# Patient Record
Sex: MALE | Race: WHITE | NOT HISPANIC OR LATINO | URBAN - METROPOLITAN AREA
[De-identification: names, ages, dates, MRNs, and addresses within clinical notes are randomized per-mention and may not be internally consistent; named-entity substitution may affect disease eponyms.]

---

## 2018-01-12 ENCOUNTER — APPOINTMENT (RX ONLY)
Dept: URBAN - METROPOLITAN AREA CLINIC 31 | Facility: CLINIC | Age: 83
Setting detail: DERMATOLOGY
End: 2018-01-12

## 2018-01-12 DIAGNOSIS — L57.8 OTHER SKIN CHANGES DUE TO CHRONIC EXPOSURE TO NONIONIZING RADIATION: ICD-10-CM

## 2018-01-12 DIAGNOSIS — D22 MELANOCYTIC NEVI: ICD-10-CM

## 2018-01-12 DIAGNOSIS — L11.1 TRANSIENT ACANTHOLYTIC DERMATOSIS [GROVER]: ICD-10-CM

## 2018-01-12 DIAGNOSIS — L82.1 OTHER SEBORRHEIC KERATOSIS: ICD-10-CM

## 2018-01-12 DIAGNOSIS — L57.0 ACTINIC KERATOSIS: ICD-10-CM

## 2018-01-12 PROBLEM — D22.61 MELANOCYTIC NEVI OF RIGHT UPPER LIMB, INCLUDING SHOULDER: Status: ACTIVE | Noted: 2018-01-12

## 2018-01-12 PROBLEM — I10 ESSENTIAL (PRIMARY) HYPERTENSION: Status: ACTIVE | Noted: 2018-01-12

## 2018-01-12 PROBLEM — Z85.828 PERSONAL HISTORY OF OTHER MALIGNANT NEOPLASM OF SKIN: Status: ACTIVE | Noted: 2018-01-12

## 2018-01-12 PROBLEM — D22.62 MELANOCYTIC NEVI OF LEFT UPPER LIMB, INCLUDING SHOULDER: Status: ACTIVE | Noted: 2018-01-12

## 2018-01-12 PROBLEM — D22.39 MELANOCYTIC NEVI OF OTHER PARTS OF FACE: Status: ACTIVE | Noted: 2018-01-12

## 2018-01-12 PROBLEM — D48.5 NEOPLASM OF UNCERTAIN BEHAVIOR OF SKIN: Status: ACTIVE | Noted: 2018-01-12

## 2018-01-12 PROBLEM — D22.5 MELANOCYTIC NEVI OF TRUNK: Status: ACTIVE | Noted: 2018-01-12

## 2018-01-12 PROBLEM — Z85.46 PERSONAL HISTORY OF MALIGNANT NEOPLASM OF PROSTATE: Status: ACTIVE | Noted: 2018-01-12

## 2018-01-12 PROCEDURE — ? LIQUID NITROGEN

## 2018-01-12 PROCEDURE — 11100: CPT | Mod: 59

## 2018-01-12 PROCEDURE — 17004 DESTROY PREMAL LESIONS 15/>: CPT

## 2018-01-12 PROCEDURE — ? COUNSELING

## 2018-01-12 PROCEDURE — ? BIOPSY BY SHAVE METHOD

## 2018-01-12 PROCEDURE — 99203 OFFICE O/P NEW LOW 30 MIN: CPT | Mod: 25

## 2018-01-12 ASSESSMENT — LOCATION DETAILED DESCRIPTION DERM
LOCATION DETAILED: LEFT CENTRAL MALAR CHEEK
LOCATION DETAILED: RIGHT ULNAR DORSAL HAND
LOCATION DETAILED: LEFT SUPERIOR UPPER BACK
LOCATION DETAILED: LEFT ANTERIOR DISTAL UPPER ARM
LOCATION DETAILED: LEFT LATERAL FOREHEAD
LOCATION DETAILED: RIGHT ANTERIOR PROXIMAL UPPER ARM
LOCATION DETAILED: RIGHT FOREHEAD
LOCATION DETAILED: RIGHT PROXIMAL DORSAL FOREARM
LOCATION DETAILED: LEFT SUPERIOR ANTERIOR NECK
LOCATION DETAILED: RIGHT ANTERIOR DISTAL UPPER ARM
LOCATION DETAILED: LEFT MEDIAL INFERIOR CHEST
LOCATION DETAILED: RIGHT MID-UPPER BACK
LOCATION DETAILED: RIGHT SUPERIOR MEDIAL UPPER BACK
LOCATION DETAILED: RIGHT MEDIAL SUPERIOR CHEST
LOCATION DETAILED: LEFT NASAL ALA
LOCATION DETAILED: LEFT DISTAL DORSAL FOREARM
LOCATION DETAILED: LEFT ULNAR DORSAL HAND
LOCATION DETAILED: STERNUM
LOCATION DETAILED: LEFT VENTRAL PROXIMAL FOREARM
LOCATION DETAILED: RIGHT DORSAL MIDDLE METACARPOPHALANGEAL JOINT
LOCATION DETAILED: RIGHT CENTRAL TEMPLE
LOCATION DETAILED: LEFT RADIAL DORSAL HAND
LOCATION DETAILED: RIGHT SUPERIOR LATERAL MALAR CHEEK
LOCATION DETAILED: LEFT FOREHEAD
LOCATION DETAILED: RIGHT DISTAL DORSAL FOREARM
LOCATION DETAILED: RIGHT RADIAL DORSAL HAND
LOCATION DETAILED: LEFT CENTRAL TEMPLE

## 2018-01-12 ASSESSMENT — LOCATION SIMPLE DESCRIPTION DERM
LOCATION SIMPLE: CHEST
LOCATION SIMPLE: LEFT FOREHEAD
LOCATION SIMPLE: RIGHT HAND
LOCATION SIMPLE: CHEST
LOCATION SIMPLE: LEFT ANTERIOR NECK
LOCATION SIMPLE: LEFT UPPER BACK
LOCATION SIMPLE: LEFT UPPER ARM
LOCATION SIMPLE: RIGHT FOREHEAD
LOCATION SIMPLE: LEFT TEMPLE
LOCATION SIMPLE: LEFT CHEEK
LOCATION SIMPLE: LEFT NOSE
LOCATION SIMPLE: RIGHT CHEEK
LOCATION SIMPLE: RIGHT TEMPLE
LOCATION SIMPLE: RIGHT UPPER BACK
LOCATION SIMPLE: RIGHT UPPER ARM
LOCATION SIMPLE: RIGHT FOREARM
LOCATION SIMPLE: LEFT FOREARM
LOCATION SIMPLE: LEFT HAND

## 2018-01-12 ASSESSMENT — LOCATION ZONE DERM
LOCATION ZONE: HAND
LOCATION ZONE: ARM
LOCATION ZONE: FACE
LOCATION ZONE: TRUNK
LOCATION ZONE: NOSE
LOCATION ZONE: NECK
LOCATION ZONE: TRUNK

## 2018-01-12 NOTE — PROCEDURE: BIOPSY BY SHAVE METHOD
Anesthesia Type: 1% lidocaine with epinephrine
Anesthesia Volume In Cc: 0
Hemostasis: Aluminum Chloride and Electrocautery
Notification Instructions: Patient will be notified of biopsy results. However, patient instructed to call the office if not contacted within 2 weeks.
Curettage Text: The wound bed was treated with curettage after the biopsy was performed.
Type Of Destruction Used: Curettage
Destruction After The Procedure: No
Detail Level: Detailed
Biopsy Type: H and E
Silver Nitrate Text: The wound bed was treated with silver nitrate after the biopsy was performed.
Electrodesiccation And Curettage Text: The wound bed was treated with electrodesiccation and curettage after the biopsy was performed.
Dressing: bandage
Post-Care Instructions: I reviewed with the patient in detail post-care instructions. Patient is to keep the biopsy site bandaged overnight, and then wash once daily with soap and water and then apply a thin layer of petrolatum once daily until healed.
Lab Facility: 
Lab: [id number]
Wound Care: Petrolatum
Consent: Written consent was obtained and risks were reviewed including but not limited to scarring, infection, bleeding, scabbing, incomplete removal, nerve damage and allergy to anesthesia.
Electrodesiccation Text: The wound bed was treated with electrodesiccation after the biopsy was performed.
Biopsy Method: Double edge Personna blades
Size Of Lesion In Cm: 0.5
Cryotherapy Text: The wound bed was treated with cryotherapy after the biopsy was performed.
Billing Type: Third-Party Bill

## 2018-01-12 NOTE — PROCEDURE: REASSURANCE
Additional Note: Patient is reassured regarding the benign nature of this/these lesion(s).  Patient is encouraged to return for evaluation if lesion(s) grow, change, or becomes symptomatic.
Detail Level: Zone
Include Location In Plan?: No

## 2018-01-12 NOTE — PROCEDURE: LIQUID NITROGEN
Render Post-Care Instructions In Note?: no
Post-Care Instructions: I reviewed with the patient in detail post-care instructions. Patient is to wear sunprotection, and avoid picking at any of the treated lesions. Pt may apply Vaseline to crusted or scabbing areas.
Detail Level: Detailed
Duration Of Freeze Thaw-Cycle (Seconds): 15
Consent: The patient's consent was obtained including but not limited to risks of crusting, scabbing, blistering, scarring, darker or lighter pigmentary change, recurrence, incomplete removal and infection.
Number Of Freeze-Thaw Cycles: 1 freeze-thaw cycle

## 2018-01-22 ENCOUNTER — APPOINTMENT (RX ONLY)
Dept: URBAN - METROPOLITAN AREA CLINIC 31 | Facility: CLINIC | Age: 83
Setting detail: DERMATOLOGY
End: 2018-01-22

## 2018-01-22 PROBLEM — C44.91 BASAL CELL CARCINOMA OF SKIN, UNSPECIFIED: Status: ACTIVE | Noted: 2018-01-22

## 2018-01-22 PROCEDURE — ? MOHS SURGERY PHONE CONSULTATION

## 2018-01-22 PROCEDURE — ? PATIENT SPECIFIC COUNSELING

## 2018-01-22 NOTE — PROCEDURE: MOHS SURGERY PHONE CONSULTATION
Has The Pathology Report Been Received?: Yes
Does The Patient Have A Living Will (Optional)?: No
Office Location Of Mohs Surgery: [place]
Pathology Report Dx If Different Than Diagnosis Selected: BCC, nodluo-infiltrative type
Detail Level: Simple
Referring Provider: Pa Andrews
Patient's Insurance: Medicare/
Time Of Mohs Surgery: 8:45am
Date Of Mohs Surgery: 02/05/2018
Patient Reported Location: Right superior medial malar cheek

## 2018-02-05 ENCOUNTER — APPOINTMENT (RX ONLY)
Dept: URBAN - METROPOLITAN AREA CLINIC 31 | Facility: CLINIC | Age: 83
Setting detail: DERMATOLOGY
End: 2018-02-05

## 2018-02-05 PROBLEM — C44.319 BASAL CELL CARCINOMA OF SKIN OF OTHER PARTS OF FACE: Status: ACTIVE | Noted: 2018-02-05

## 2018-02-05 PROCEDURE — ? MOHS SURGERY

## 2018-02-05 PROCEDURE — 13152 CMPLX RPR E/N/E/L 2.6-7.5 CM: CPT

## 2018-02-05 PROCEDURE — ? MEDICATION COUNSELING

## 2018-02-05 PROCEDURE — ? PRESCRIPTION

## 2018-02-05 PROCEDURE — 17311 MOHS 1 STAGE H/N/HF/G: CPT

## 2018-02-05 RX ORDER — HYDROCODONE BITATRATE AND ACETAMINOPHEN 7.5; 325 MG/1; MG/1
1 TABLET ORAL
Qty: 20 | Refills: 0 | COMMUNITY
Start: 2018-02-05

## 2018-02-05 RX ADMIN — HYDROCODONE BITATRATE AND ACETAMINOPHEN 1: 7.5; 325 TABLET ORAL at 00:00

## 2018-02-05 NOTE — PROCEDURE: MEDICATION COUNSELING
Methotrexate Pregnancy And Lactation Text: This medication is Pregnancy Category X and is known to cause fetal harm. This medication is excreted in breast milk.
Isotretinoin Pregnancy And Lactation Text: This medication is Pregnancy Category X and is considered extremely dangerous during pregnancy. It is unknown if it is excreted in breast milk.
High Dose Vitamin A Pregnancy And Lactation Text: High dose vitamin A therapy is contraindicated during pregnancy and breast feeding.
Picato Counseling:  I discussed with the patient the risks of Picato including but not limited to erythema, scaling, itching, weeping, crusting, and pain.
Ketoconazole Counseling:   Patient counseled regarding improving absorption with orange juice.  Adverse effects include but are not limited to breast enlargement, headache, diarrhea, nausea, upset stomach, liver function test abnormalities, taste disturbance, and stomach pain.  There is a rare possibility of liver failure that can occur when taking ketoconazole. The patient understands that monitoring of LFTs may be required, especially at baseline. The patient verbalized understanding of the proper use and possible adverse effects of ketoconazole.  All of the patient's questions and concerns were addressed.
Dapsone Counseling: I discussed with the patient the risks of dapsone including but not limited to hemolytic anemia, agranulocytosis, rashes, methemoglobinemia, kidney failure, peripheral neuropathy, headaches, GI upset, and liver toxicity.  Patients who start dapsone require monitoring including baseline LFTs and weekly CBCs for the first month, then every month thereafter.  The patient verbalized understanding of the proper use and possible adverse effects of dapsone.  All of the patient's questions and concerns were addressed.
Eucrisa Pregnancy And Lactation Text: This medication has not been assigned a Pregnancy Risk Category but animal studies failed to show danger with the topical medication. It is unknown if the medication is excreted in breast milk.
Griseofulvin Counseling:  I discussed with the patient the risks of griseofulvin including but not limited to photosensitivity, cytopenia, liver damage, nausea/vomiting and severe allergy.  The patient understands that this medication is best absorbed when taken with a fatty meal (e.g., ice cream or french fries).
Cellcept Counseling:  I discussed with the patient the risks of mycophenolate mofetil including but not limited to infection/immunosuppression, GI upset, hypokalemia, hypercholesterolemia, bone marrow suppression, lymphoproliferative disorders, malignancy, GI ulceration/bleed/perforation, colitis, interstitial lung disease, kidney failure, progressive multifocal leukoencephalopathy, and birth defects.  The patient understands that monitoring is required including a baseline creatinine and regular CBC testing. In addition, patient must alert us immediately if symptoms of infection or other concerning signs are noted.
Benzoyl Peroxide Counseling: Patient counseled that medicine may cause skin irritation and bleach clothing.  In the event of skin irritation, the patient was advised to reduce the amount of the drug applied or use it less frequently.   The patient verbalized understanding of the proper use and possible adverse effects of benzoyl peroxide.  All of the patient's questions and concerns were addressed.
Tetracycline Pregnancy And Lactation Text: This medication is Pregnancy Category D and not consider safe during pregnancy. It is also excreted in breast milk.
Odomzo Pregnancy And Lactation Text: This medication is Pregnancy Category X and is absolutely contraindicated during pregnancy. It is unknown if it is excreted in breast milk.
Drysol Pregnancy And Lactation Text: This medication is considered safe during pregnancy and breast feeding.
Itraconazole Pregnancy And Lactation Text: This medication is Pregnancy Category C and it isn't know if it is safe during pregnancy. It is also excreted in breast milk.
Colchicine Counseling:  Patient counseled regarding adverse effects including but not limited to stomach upset (nausea, vomiting, stomach pain, or diarrhea).  Patient instructed to limit alcohol consumption while taking this medication.  Colchicine may reduce blood counts especially with prolonged use.  The patient understands that monitoring of kidney function and blood counts may be required, especially at baseline. The patient verbalized understanding of the proper use and possible adverse effects of colchicine.  All of the patient's questions and concerns were addressed.
Cephalexin Counseling: I counseled the patient regarding use of cephalexin as an antibiotic for prophylactic and/or therapeutic purposes. Cephalexin (commonly prescribed under brand name Keflex) is a cephalosporin antibiotic which is active against numerous classes of bacteria, including most skin bacteria. Side effects may include nausea, diarrhea, gastrointestinal upset, rash, hives, yeast infections, and in rare cases, hepatitis, kidney disease, seizures, fever, confusion, neurologic symptoms, and others. Patients with severe allergies to penicillin medications are cautioned that there is about a 10% incidence of cross-reactivity with cephalosporins. When possible, patients with penicillin allergies should use alternatives to cephalosporins for antibiotic therapy.
Acitretin Pregnancy And Lactation Text: This medication is Pregnancy Category X and should not be given to women who are pregnant or may become pregnant in the future. This medication is excreted in breast milk.
Solaraze Counseling:  I discussed with the patient the risks of Solaraze including but not limited to erythema, scaling, itching, weeping, crusting, and pain.
Tazorac Counseling:  Patient advised that medication is irritating and drying.  Patient may need to apply sparingly and wash off after an hour before eventually leaving it on overnight.  The patient verbalized understanding of the proper use and possible adverse effects of tazorac.  All of the patient's questions and concerns were addressed.
Doxepin Counseling:  Patient advised that the medication is sedating and not to drive a car after taking this medication. Patient informed of potential adverse effects including but not limited to dry mouth, urinary retention, and blurry vision.  The patient verbalized understanding of the proper use and possible adverse effects of doxepin.  All of the patient's questions and concerns were addressed.
Minocycline Counseling: Patient advised regarding possible photosensitivity and discoloration of the teeth, skin, lips, tongue and gums.  Patient instructed to avoid sunlight, if possible.  When exposed to sunlight, patients should wear protective clothing, sunglasses, and sunscreen.  The patient was instructed to call the office immediately if the following severe adverse effects occur:  hearing changes, easy bruising/bleeding, severe headache, or vision changes.  The patient verbalized understanding of the proper use and possible adverse effects of minocycline.  All of the patient's questions and concerns were addressed.
Hydroxychloroquine Pregnancy And Lactation Text: This medication has been shown to cause fetal harm but it isn't assigned a Pregnancy Risk Category. There are small amounts excreted in breast milk.
Solaraze Pregnancy And Lactation Text: This medication is Pregnancy Category B and is considered safe. There is some data to suggest avoiding during the third trimester. It is unknown if this medication is excreted in breast milk.
SSKI Counseling:  I discussed with the patient the risks of SSKI including but not limited to thyroid abnormalities, metallic taste, GI upset, fever, headache, acne, arthralgias, paraesthesias, lymphadenopathy, easy bleeding, arrhythmias, and allergic reaction.
Prednisone Pregnancy And Lactation Text: This medication is Pregnancy Category C and it isn't know if it is safe during pregnancy. This medication is excreted in breast milk.
Elidel Pregnancy And Lactation Text: This medication is Pregnancy Category C. It is unknown if this medication is excreted in breast milk.
Cimetidine Pregnancy And Lactation Text: This medication is Pregnancy Category B and is considered safe during pregnancy. It is also excreted in breast milk and breast feeding isn't recommended.
Cellcept Pregnancy And Lactation Text: This medication is Pregnancy Category D and isn't considered safe during pregnancy. It is unknown if this medication is excreted in breast milk.
Fluconazole Counseling:  Patient counseled regarding adverse effects of fluconazole including but not limited to headache, diarrhea, nausea, upset stomach, liver function test abnormalities, taste disturbance, and stomach pain.  There is a rare possibility of liver failure that can occur when taking fluconazole.  The patient understands that monitoring of LFTs and kidney function test may be required, especially at baseline. The patient verbalized understanding of the proper use and possible adverse effects of fluconazole.  All of the patient's questions and concerns were addressed.
Stelara Pregnancy And Lactation Text: This medication is Pregnancy Category B and is considered safe during pregnancy. It is unknown if this medication is excreted in breast milk.
Nsaids Pregnancy And Lactation Text: These medications are considered safe up to 30 weeks gestation. It is excreted in breast milk.
Arava Counseling:  Patient counseled regarding adverse effects of Arava including but not limited to nausea, vomiting, abnormalities in liver function tests. Patients may develop mouth sores, rash, diarrhea, and abnormalities in blood counts. The patient understands that monitoring is required including LFTs and blood counts.  There is a rare possibility of scarring of the liver and lung problems that can occur when taking methotrexate. Persistent nausea, loss of appetite, pale stools, dark urine, cough, and shortness of breath should be reported immediately. Patient advised to discontinue Arava treatment and consult with a physician prior to attempting conception. The patient will have to undergo a treatment to eliminate Arava from the body prior to conception.
Isotretinoin Counseling: Patient should get monthly blood tests, not donate blood, not drive at night if vision affected, not share medication, and not undergo elective surgery for 6 months after tx completed. Side effects reviewed, pt to contact office should one occur.
Dupixent Counseling: I discussed with the patient the risks of dupilumab including but not limited to eye infection and irritation, cold sores, injection site reactions, worsening of asthma, allergic reactions and increased risk of parasitic infection.  Live vaccines should be avoided while taking dupilumab. Dupilumab will also interact with certain medications such as warfarin and cyclosporine. The patient understands that monitoring is required and they must alert us or the primary physician if symptoms of infection or other concerning signs are noted.
High Dose Vitamin A Counseling: Side effects reviewed, pt to contact office should one occur.
Hydroxyzine Counseling: Patient advised that the medication is sedating and not to drive a car after taking this medication.  Patient informed of potential adverse effects including but not limited to dry mouth, urinary retention, and blurry vision.  The patient verbalized understanding of the proper use and possible adverse effects of hydroxyzine.  All of the patient's questions and concerns were addressed.
Use Enhanced Medication Counseling?: No
5-Fu Counseling: 5-Fluorouracil Counseling:  I discussed with the patient the risks of 5-fluorouracil including but not limited to erythema, scaling, itching, weeping, crusting, and pain.
Doxycycline Pregnancy And Lactation Text: This medication is Pregnancy Category D and not consider safe during pregnancy. It is also excreted in breast milk but is considered safe for shorter treatment courses.
Humira Counseling:  I discussed with the patient the risks of adalimumab including but not limited to myelosuppression, immunosuppression, autoimmune hepatitis, demyelinating diseases, lymphoma, and serious infections.  The patient understands that monitoring is required including a PPD at baseline and must alert us or the primary physician if symptoms of infection or other concerning signs are noted.
Tremfya Pregnancy And Lactation Text: The risk during pregnancy and breastfeeding is uncertain with this medication.
Doxepin Pregnancy And Lactation Text: This medication is Pregnancy Category C and it isn't known if it is safe during pregnancy. It is also excreted in breast milk and breast feeding isn't recommended.
Rifampin Counseling: I discussed with the patient the risks of rifampin including but not limited to liver damage, kidney damage, red-orange body fluids, nausea/vomiting and severe allergy.
Birth Control Pills Pregnancy And Lactation Text: This medication should be avoided if pregnant and for the first 30 days post-partum.
Albendazole Counseling:  I discussed with the patient the risks of albendazole including but not limited to cytopenia, kidney damage, nausea/vomiting and severe allergy.  The patient understands that this medication is being used in an off-label manner.
Gabapentin Counseling: I discussed with the patient the risks of gabapentin including but not limited to dizziness, somnolence, fatigue and ataxia.
Zyclara Counseling:  I discussed with the patient the risks of imiquimod including but not limited to erythema, scaling, itching, weeping, crusting, and pain.  Patient understands that the inflammatory response to imiquimod is variable from person to person and was educated regarded proper titration schedule.  If flu-like symptoms develop, patient knows to discontinue the medication and contact us.
Topical Sulfur Applications Pregnancy And Lactation Text: This medication is Pregnancy Category C and has an unknown safety profile during pregnancy. It is unknown if this topical medication is excreted in breast milk.
Topical Clindamycin Counseling: Patient counseled that this medication may cause skin irritation or allergic reactions.  In the event of skin irritation, the patient was advised to reduce the amount of the drug applied or use it less frequently.   The patient verbalized understanding of the proper use and possible adverse effects of clindamycin.  All of the patient's questions and concerns were addressed.
Valtrex Pregnancy And Lactation Text: this medication is Pregnancy Category B and is considered safe during pregnancy. This medication is not directly found in breast milk but it's metabolite acyclovir is present.
Prednisone Counseling:  I discussed with the patient the risks of prolonged use of prednisone including but not limited to weight gain, insomnia, osteoporosis, mood changes, diabetes, susceptibility to infection, glaucoma and high blood pressure.  In cases where prednisone use is prolonged, patients should be monitored with blood pressure checks, serum glucose levels and an eye exam.  Additionally, the patient may need to be placed on GI prophylaxis, PCP prophylaxis, and calcium and vitamin D supplementation and/or a bisphosphonate.  The patient verbalized understanding of the proper use and the possible adverse effects of prednisone.  All of the patient's questions and concerns were addressed.
Carac Counseling:  I discussed with the patient the risks of Carac including but not limited to erythema, scaling, itching, weeping, crusting, and pain.
Thalidomide Counseling: I discussed with the patient the risks of thalidomide including but not limited to birth defects, anxiety, weakness, chest pain, dizziness, cough and severe allergy.
Bactrim Pregnancy And Lactation Text: This medication is Pregnancy Category D and is known to cause fetal risk.  It is also excreted in breast milk.
Sski Pregnancy And Lactation Text: This medication is Pregnancy Category D and isn't considered safe during pregnancy. It is excreted in breast milk.
Cephalexin Pregnancy And Lactation Text: This medication is Pregnancy Category B and considered safe during pregnancy.  It is also excreted in breast milk but can be used safely for shorter doses.
Spironolactone Pregnancy And Lactation Text: This medication can cause feminization of the male fetus and should be avoided during pregnancy. The active metabolite is also found in breast milk.
Birth Control Pills Counseling: Birth Control Pill Counseling: I discussed with the patient the potential side effects of OCPs including but not limited to increased risk of stroke, heart attack, thrombophlebitis, deep venous thrombosis, hepatic adenomas, breast changes, GI upset, headaches, and depression.  The patient verbalized understanding of the proper use and possible adverse effects of OCPs. All of the patient's questions and concerns were addressed.
Tazorac Pregnancy And Lactation Text: This medication is not safe during pregnancy. It is unknown if this medication is excreted in breast milk.
Eucrisa Counseling: Patient may experience a mild burning sensation during topical application. Eucrisa is not approved in children less than 2 years of age.
Erivedge Counseling- I discussed with the patient the risks of Erivedge including but not limited to nausea, vomiting, diarrhea, constipation, weight loss, changes in the sense of taste, decreased appetite, muscle spasms, and hair loss.  The patient verbalized understanding of the proper use and possible adverse effects of Erivedge.  All of the patient's questions and concerns were addressed.
Tetracycline Counseling: Patient counseled regarding possible photosensitivity and increased risk for sunburn.  Patient instructed to avoid sunlight, if possible.  When exposed to sunlight, patients should wear protective clothing, sunglasses, and sunscreen.  The patient was instructed to call the office immediately if the following severe adverse effects occur:  hearing changes, easy bruising/bleeding, severe headache, or vision changes.  The patient verbalized understanding of the proper use and possible adverse effects of tetracycline.  All of the patient's questions and concerns were addressed. Patient understands to avoid pregnancy while on therapy due to potential birth defects.
Azithromycin Pregnancy And Lactation Text: This medication is considered safe during pregnancy and is also secreted in breast milk.
Infliximab Counseling:  I discussed with the patient the risks of infliximab including but not limited to myelosuppression, immunosuppression, autoimmune hepatitis, demyelinating diseases, lymphoma, and serious infections.  The patient understands that monitoring is required including a PPD at baseline and must alert us or the primary physician if symptoms of infection or other concerning signs are noted.
Acitretin Counseling:  I discussed with the patient the risks of acitretin including but not limited to hair loss, dry lips/skin/eyes, liver damage, hyperlipidemia, depression/suicidal ideation, photosensitivity.  Serious rare side effects can include but are not limited to pancreatitis, pseudotumor cerebri, bony changes, clot formation/stroke/heart attack.  Patient understands that alcohol is contraindicated since it can result in liver toxicity and significantly prolong the elimination of the drug by many years.
Quinolones Counseling:  I discussed with the patient the risks of fluoroquinolones including but not limited to GI upset, allergic reaction, drug rash, diarrhea, dizziness, photosensitivity, yeast infections, liver function test abnormalities, tendonitis/tendon rupture.
Terbinafine Counseling: Patient counseling regarding adverse effects of terbinafine including but not limited to headache, diarrhea, rash, upset stomach, liver function test abnormalities, itching, taste/smell disturbance, nausea, abdominal pain, and flatulence.  There is a rare possibility of liver failure that can occur when taking terbinafine.  The patient understands that a baseline LFT and kidney function test may be required. The patient verbalized understanding of the proper use and possible adverse effects of terbinafine.  All of the patient's questions and concerns were addressed.
Itraconazole Counseling:  I discussed with the patient the risks of itraconazole including but not limited to liver damage, nausea/vomiting, neuropathy, and severe allergy.  The patient understands that this medication is best absorbed when taken with acidic beverages such as non-diet cola or ginger ale.  The patient understands that monitoring is required including baseline LFTs and repeat LFTs at intervals.  The patient understands that they are to contact us or the primary physician if concerning signs are noted.
Metronidazole Counseling:  I discussed with the patient the risks of metronidazole including but not limited to seizures, nausea/vomiting, a metallic taste in the mouth, nausea/vomiting and severe allergy.
Azithromycin Counseling:  I discussed with the patient the risks of azithromycin including but not limited to GI upset, allergic reaction, drug rash, diarrhea, and yeast infections.
Rituxan Pregnancy And Lactation Text: This medication is Pregnancy Category C and it isn't know if it is safe during pregnancy. It is unknown if this medication is excreted in breast milk but similar antibodies are known to be excreted.
Xeljanz Counseling: I discussed with the patient the risks of Xeljanz therapy including increased risk of infection, liver issues, headache, diarrhea, or cold symptoms. Live vaccines should be avoided. They were instructed to call if they have any problems.
Hydroxychloroquine Counseling:  I discussed with the patient that a baseline ophthalmologic exam is needed at the start of therapy and every year thereafter while on therapy. A CBC may also be warranted for monitoring.  The side effects of this medication were discussed with the patient, including but not limited to agranulocytosis, aplastic anemia, seizures, rashes, retinopathy, and liver toxicity. Patient instructed to call the office should any adverse effect occur.  The patient verbalized understanding of the proper use and possible adverse effects of Plaquenil.  All the patient's questions and concerns were addressed.
Doxycycline Counseling:  Patient counseled regarding possible photosensitivity and increased risk for sunburn.  Patient instructed to avoid sunlight, if possible.  When exposed to sunlight, patients should wear protective clothing, sunglasses, and sunscreen.  The patient was instructed to call the office immediately if the following severe adverse effects occur:  hearing changes, easy bruising/bleeding, severe headache, or vision changes.  The patient verbalized understanding of the proper use and possible adverse effects of doxycycline.  All of the patient's questions and concerns were addressed.
Spironolactone Counseling: Patient advised regarding risks of diarrhea, abdominal pain, hyperkalemia, birth defects (for female patients), liver toxicity and renal toxicity. The patient may need blood work to monitor liver and kidney function and potassium levels while on therapy. The patient verbalized understanding of the proper use and possible adverse effects of spironolactone.  All of the patient's questions and concerns were addressed.
Stelara Counseling:  I discussed with the patient the risks of ustekinumab including but not limited to immunosuppression, malignancy, posterior leukoencephalopathy syndrome, and serious infections.  The patient understands that monitoring is required including a PPD at baseline and must alert us or the primary physician if symptoms of infection or other concerning signs are noted.
Clofazimine Pregnancy And Lactation Text: This medication is Pregnancy Category C and isn't considered safe during pregnancy. It is excreted in breast milk.
Odomzo Counseling- I discussed with the patient the risks of Odomzo including but not limited to nausea, vomiting, diarrhea, constipation, weight loss, changes in the sense of taste, decreased appetite, muscle spasms, and hair loss.  The patient verbalized understanding of the proper use and possible adverse effects of Odomzo.  All of the patient's questions and concerns were addressed.
Topical Clindamycin Pregnancy And Lactation Text: This medication is Pregnancy Category B and is considered safe during pregnancy. It is unknown if it is excreted in breast milk.
Xolair Pregnancy And Lactation Text: This medication is Pregnancy Category B and is considered safe during pregnancy. This medication is excreted in breast milk.
Simponi Counseling:  I discussed with the patient the risks of golimumab including but not limited to myelosuppression, immunosuppression, autoimmune hepatitis, demyelinating diseases, lymphoma, and serious infections.  The patient understands that monitoring is required including a PPD at baseline and must alert us or the primary physician if symptoms of infection or other concerning signs are noted.
Dapsone Pregnancy And Lactation Text: This medication is Pregnancy Category C and is not considered safe during pregnancy or breast feeding.
Bexarotene Counseling:  I discussed with the patient the risks of bexarotene including but not limited to hair loss, dry lips/skin/eyes, liver abnormalities, hyperlipidemia, pancreatitis, depression/suicidal ideation, photosensitivity, drug rash/allergic reactions, hypothyroidism, anemia, leukopenia, infection, cataracts, and teratogenicity.  Patient understands that they will need regular blood tests to check lipid profile, liver function tests, white blood cell count, thyroid function tests and pregnancy test if applicable.
Rituxan Counseling:  I discussed with the patient the risks of Rituxan infusions. Side effects can include infusion reactions, severe drug rashes including mucocutaneous reactions, reactivation of latent hepatitis and other infections and rarely progressive multifocal leukoencephalopathy.  All of the patient's questions and concerns were addressed.
Dupixent Pregnancy And Lactation Text: This medication likely crosses the placenta but the risk for the fetus is uncertain. This medication is excreted in breast milk.
Azathioprine Counseling:  I discussed with the patient the risks of azathioprine including but not limited to myelosuppression, immunosuppression, hepatotoxicity, lymphoma, and infections.  The patient understands that monitoring is required including baseline LFTs, Creatinine, possible TPMP genotyping and weekly CBCs for the first month and then every 2 weeks thereafter.  The patient verbalized understanding of the proper use and possible adverse effects of azathioprine.  All of the patient's questions and concerns were addressed.
Detail Level: Detailed
Erythromycin Pregnancy And Lactation Text: This medication is Pregnancy Category B and is considered safe during pregnancy. It is also excreted in breast milk.
Drysol Counseling:  I discussed with the patient the risks of drysol/aluminum chloride including but not limited to skin rash, itching, irritation, burning.
Nsaids Counseling: NSAID Counseling: I discussed with the patient that NSAIDs should be taken with food. Prolonged use of NSAIDs can result in the development of stomach ulcers.  Patient advised to stop taking NSAIDs if abdominal pain occurs.  The patient verbalized understanding of the proper use and possible adverse effects of NSAIDs.  All of the patient's questions and concerns were addressed.
Ivermectin Counseling:  Patient instructed to take medication on an empty stomach with a full glass of water.  Patient informed of potential adverse effects including but not limited to nausea, diarrhea, dizziness, itching, and swelling of the extremities or lymph nodes.  The patient verbalized understanding of the proper use and possible adverse effects of ivermectin.  All of the patient's questions and concerns were addressed.
Enbrel Counseling:  I discussed with the patient the risks of etanercept including but not limited to myelosuppression, immunosuppression, autoimmune hepatitis, demyelinating diseases, lymphoma, and infections.  The patient understands that monitoring is required including a PPD at baseline and must alert us or the primary physician if symptoms of infection or other concerning signs are noted.
Cyclosporine Counseling:  I discussed with the patient the risks of cyclosporine including but not limited to hypertension, gingival hyperplasia,myelosuppression, immunosuppression, liver damage, kidney damage, neurotoxicity, lymphoma, and serious infections. The patient understands that monitoring is required including baseline blood pressure, CBC, CMP, lipid panel and uric acid, and then 1-2 times monthly CMP and blood pressure.
Cimetidine Counseling:  I discussed with the patient the risks of Cimetidine including but not limited to gynecomastia, headache, diarrhea, nausea, drowsiness, arrhythmias, pancreatitis, skin rashes, psychosis, bone marrow suppression and kidney toxicity.
Xelyukiz Pregnancy And Lactation Text: This medication is Pregnancy Category D and is not considered safe during pregnancy.  The risk during breast feeding is also uncertain.
Metronidazole Pregnancy And Lactation Text: This medication is Pregnancy Category B and considered safe during pregnancy.  It is also excreted in breast milk.
Clofazimine Counseling:  I discussed with the patient the risks of clofazimine including but not limited to skin and eye pigmentation, liver damage, nausea/vomiting, gastrointestinal bleeding and allergy.
Topical Retinoid counseling:  Patient advised to apply a pea-sized amount only at bedtime and wait 30 minutes after washing their face before applying.  If too drying, patient may add a non-comedogenic moisturizer. The patient verbalized understanding of the proper use and possible adverse effects of retinoids.  All of the patient's questions and concerns were addressed.
Griseofulvin Pregnancy And Lactation Text: This medication is Pregnancy Category X and is known to cause serious birth defects. It is unknown if this medication is excreted in breast milk but breast feeding should be avoided.
Ketoconazole Pregnancy And Lactation Text: This medication is Pregnancy Category C and it isn't know if it is safe during pregnancy. It is also excreted in breast milk and breast feeding isn't recommended.
Protopic Counseling: Patient may experience a mild burning sensation during topical application. Protopic is not approved in children less than 2 years of age. There have been case reports of hematologic and skin malignancies in patients using topical calcineurin inhibitors although causality is questionable.
Hydroxyzine Pregnancy And Lactation Text: This medication is not safe during pregnancy and should not be taken. It is also excreted in breast milk and breast feeding isn't recommended.
Protopic Pregnancy And Lactation Text: This medication is Pregnancy Category C. It is unknown if this medication is excreted in breast milk when applied topically.
Topical Sulfur Applications Counseling: Topical Sulfur Counseling: Patient counseled that this medication may cause skin irritation or allergic reactions.  In the event of skin irritation, the patient was advised to reduce the amount of the drug applied or use it less frequently.   The patient verbalized understanding of the proper use and possible adverse effects of topical sulfur application.  All of the patient's questions and concerns were addressed.
Methotrexate Counseling:  Patient counseled regarding adverse effects of methotrexate including but not limited to nausea, vomiting, abnormalities in liver function tests. Patients may develop mouth sores, rash, diarrhea, and abnormalities in blood counts. The patient understands that monitoring is required including LFT's and blood counts.  There is a rare possibility of scarring of the liver and lung problems that can occur when taking methotrexate. Persistent nausea, loss of appetite, pale stools, dark urine, cough, and shortness of breath should be reported immediately. Patient advised to discontinue methotrexate treatment at least three months before attempting to become pregnant.  I discussed the need for folate supplements while taking methotrexate.  These supplements can decrease side effects during methotrexate treatment. The patient verbalized understanding of the proper use and possible adverse effects of methotrexate.  All of the patient's questions and concerns were addressed.
Elidel Counseling: Patient may experience a mild burning sensation during topical application. Elidel is not approved in children less than 2 years of age. There have been case reports of hematologic and skin malignancies in patients using topical calcineurin inhibitors although causality is questionable.
Bactrim Counseling:  I discussed with the patient the risks of sulfa antibiotics including but not limited to GI upset, allergic reaction, drug rash, diarrhea, dizziness, photosensitivity, and yeast infections.  Rarely, more serious reactions can occur including but not limited to aplastic anemia, agranulocytosis, methemoglobinemia, blood dyscrasias, liver or kidney failure, lung infiltrates or desquamative/blistering drug rashes.
Minoxidil Counseling: Minoxidil is a topical medication which can increase blood flow where it is applied. It is uncertain how this medication increases hair growth. Side effects are uncommon and include stinging and allergic reactions.
Bexarotene Pregnancy And Lactation Text: This medication is Pregnancy Category X and should not be given to women who are pregnant or may become pregnant. This medication should not be used if you are breast feeding.
Imiquimod Counseling:  I discussed with the patient the risks of imiquimod including but not limited to erythema, scaling, itching, weeping, crusting, and pain.  Patient understands that the inflammatory response to imiquimod is variable from person to person and was educated regarded proper titration schedule.  If flu-like symptoms develop, patient knows to discontinue the medication and contact us.
Ivermectin Pregnancy And Lactation Text: This medication is Pregnancy Category C and it isn't known if it is safe during pregnancy. It is also excreted in breast milk.
Tremfya Counseling: I discussed with the patient the risks of guselkumab including but not limited to immunosuppression, serious infections, worsening of inflammatory bowel disease and drug reactions.  The patient understands that monitoring is required including a PPD at baseline and must alert us or the primary physician if symptoms of infection or other concerning signs are noted.
Taltz Counseling: I discussed with the patient the risks of ixekizumab including but not limited to immunosuppression, serious infections, worsening of inflammatory bowel disease and drug reactions.  The patient understands that monitoring is required including a PPD at baseline and must alert us or the primary physician if symptoms of infection or other concerning signs are noted.
Erythromycin Counseling:  I discussed with the patient the risks of erythromycin including but not limited to GI upset, allergic reaction, drug rash, diarrhea, increase in liver enzymes, and yeast infections.
Benzoyl Peroxide Pregnancy And Lactation Text: This medication is Pregnancy Category C. It is unknown if benzoyl peroxide is excreted in breast milk.
Cosentyx Counseling:  I discussed with the patient the risks of Cosentyx including but not limited to worsening of Crohn's disease, immunosuppression, allergic reactions and infections.  The patient understands that monitoring is required including a PPD at baseline and must alert us or the primary physician if symptoms of infection or other concerning signs are noted.
5-Fu Pregnancy And Lactation Text: This medication is Pregnancy Category X and contraindicated in pregnancy and in women who may become pregnant. It is unknown if this medication is excreted in breast milk.
Hydroquinone Counseling:  Patient advised that medication may result in skin irritation, lightening (hypopigmentation), dryness, and burning.  In the event of skin irritation, the patient was advised to reduce the amount of the drug applied or use it less frequently.  Rarely, spots that are treated with hydroquinone can become darker (pseudoochronosis).  Should this occur, patient instructed to stop medication and call the office. The patient verbalized understanding of the proper use and possible adverse effects of hydroquinone.  All of the patient's questions and concerns were addressed.
Valtrex Counseling: I discussed with the patient the risks of valacyclovir including but not limited to kidney damage, nausea, vomiting and severe allergy.  The patient understands that if the infection seems to be worsening or is not improving, they are to call.
Xolair Counseling:  Patient informed of potential adverse effects including but not limited to fever, muscle aches, rash and allergic reactions.  The patient verbalized understanding of the proper use and possible adverse effects of Xolair.  All of the patient's questions and concerns were addressed.
Rifampin Pregnancy And Lactation Text: This medication is Pregnancy Category C and it isn't know if it is safe during pregnancy. It is also excreted in breast milk and should not be used if you are breast feeding.

## 2018-02-05 NOTE — PROCEDURE: MOHS SURGERY
Postop Diagnosis: same
Consent 3/Introductory Paragraph: I gave the patient a chance to ask questions they had about the procedure.  Following this I explained the Mohs procedure and consent was obtained. The risks, benefits and alternatives to therapy were discussed in detail. Specifically, the risks of infection, scarring, bleeding, prolonged wound healing, incomplete removal, allergy to anesthesia, nerve injury and recurrence were addressed. Prior to the procedure, the treatment site was clearly identified and confirmed by the patient. All components of Universal Protocol/PAUSE Rule completed.
Stage 11: Number Of Blocks?: 0
Split-Thickness Skin Graft Text: The defect edges were debeveled with a #15 scalpel blade.  Given the location of the defect, shape of the defect and the proximity to free margins a split thickness skin graft was deemed most appropriate.  Using a sterile surgical marker, the primary defect shape was transferred to the donor site. The split thickness graft was then harvested.  The skin graft was then placed in the primary defect and oriented appropriately.
Hatchet Flap Text: The defect edges were debeveled with a #15 scalpel blade.  Given the location of the defect, shape of the defect and the proximity to free margins a hatchet flap was deemed most appropriate.  Using a sterile surgical marker, an appropriate hatchet flap was drawn incorporating the defect and placing the expected incisions within the relaxed skin tension lines where possible.    The area thus outlined was incised deep to adipose tissue with a #15 scalpel blade.  The skin margins were undermined to an appropriate distance in all directions utilizing iris scissors.
O-Z Plasty Text: The defect edges were debeveled with a #15 scalpel blade.  Given the location of the defect, shape of the defect and the proximity to free margins an O-Z plasty (double transposition flap) was deemed most appropriate.  Using a sterile surgical marker, the appropriate transposition flaps were drawn incorporating the defect and placing the expected incisions within the relaxed skin tension lines where possible.    The area thus outlined was incised deep to adipose tissue with a #15 scalpel blade.  The skin margins were undermined to an appropriate distance in all directions utilizing iris scissors.  Hemostasis was achieved with electrocautery.  The flaps were then transposed into place, one clockwise and the other counterclockwise, and anchored with interrupted buried subcutaneous sutures.
Consent (Marginal Mandibular)/Introductory Paragraph: The rationale for Mohs was explained to the patient and consent was obtained. The risks, benefits and alternatives to therapy were discussed in detail. Specifically, the risks of damage to the marginal mandibular branch of the facial nerve, infection, scarring, bleeding, prolonged wound healing, incomplete removal, allergy to anesthesia, and recurrence were addressed. Prior to the procedure, the treatment site was clearly identified and confirmed by the patient. All components of Universal Protocol/PAUSE Rule completed.
Show Quadrant Variables In The Stage Tabs: Yes
Dermal Autograft Text: The defect edges were debeveled with a #15 scalpel blade.  Given the location of the defect, shape of the defect and the proximity to free margins a dermal autograft was deemed most appropriate.  Using a sterile surgical marker, the primary defect shape was transferred to the donor site. The area thus outlined was incised deep to adipose tissue with a #15 scalpel blade.  The harvested graft was then trimmed of adipose and epidermal tissue until only dermis was left.  The skin graft was then placed in the primary defect and oriented appropriately.
Quadrant Reporting?: no
Area H Indication Text: Tumors in this location are included in Area H (eyelids, eyebrows, nose, lips, chin, ear, pre-auricular, post-auricular, temple, genitalia, hands, feet, ankles and areola).  Tissue conservation is critical in these anatomic locations.
Stage 11: Additional Anesthesia Type: 1% lidocaine with epinephrine
Detail Level: Detailed
H Plasty Text: Given the location of the defect, shape of the defect and the proximity to free margins a H-plasty was deemed most appropriate for repair.  Using a sterile surgical marker, the appropriate advancement arms of the H-plasty were drawn incorporating the defect and placing the expected incisions within the relaxed skin tension lines where possible. The area thus outlined was incised deep to adipose tissue with a #15 scalpel blade. The skin margins were undermined to an appropriate distance in all directions utilizing iris scissors.  The opposing advancement arms were then advanced into place in opposite direction and anchored with interrupted buried subcutaneous sutures.
Surgical Defect Width In Cm (Optional): 0.6
O-L Flap Text: The defect edges were debeveled with a #15 scalpel blade.  Given the location of the defect, shape of the defect and the proximity to free margins an O-L flap was deemed most appropriate.  Using a sterile surgical marker, an appropriate advancement flap was drawn incorporating the defect and placing the expected incisions within the relaxed skin tension lines where possible.    The area thus outlined was incised deep to adipose tissue with a #15 scalpel blade.  The skin margins were undermined to an appropriate distance in all directions utilizing iris scissors.
Anesthesia Volume In Cc: 6
Repair Performed By Another Provider Text (Leave Blank If You Do Not Want): After obtaining clear surgical margins the defect was repaired by another provider.
Epidermal Sutures: 6-0 Nylon
Trilobed Flap Text: The defect edges were debeveled with a #15 scalpel blade.  Given the location of the defect and the proximity to free margins a trilobed flap was deemed most appropriate.  Using a sterile surgical marker, an appropriate trilobed flap drawn around the defect.    The area thus outlined was incised deep to adipose tissue with a #15 scalpel blade.  The skin margins were undermined to an appropriate distance in all directions utilizing iris scissors.
Purse String (Intermediate) Text: Given the location of the defect and the characteristics of the surrounding skin a purse string intermediate closure was deemed most appropriate.  Undermining was performed circumfirentially around the surgical defect.  A purse string suture was then placed and tightened.
Interpolation Flap Text: A decision was made to reconstruct the defect utilizing an interpolation axial flap and a staged reconstruction.  A telfa template was made of the defect.  This telfa template was then used to outline the interpolation flap.  The donor area for the pedicle flap was then injected with anesthesia.  The flap was excised through the skin and subcutaneous tissue down to the layer of the underlying musculature.  The interpolation flap was carefully excised within this deep plane to maintain its blood supply.  The edges of the donor site were undermined.   The donor site was closed in a primary fashion.  The pedicle was then rotated into position and sutured.  Once the tube was sutured into place, adequate blood supply was confirmed with blanching and refill.  The pedicle was then wrapped with xeroform gauze and dressed appropriately with a telfa and gauze bandage to ensure continued blood supply and protect the attached pedicle.
Advancement Flap (Double) Text: The defect edges were debeveled with a #15 scalpel blade.  Given the location of the defect and the proximity to free margins a double advancement flap was deemed most appropriate.  Using a sterile surgical marker, the appropriate advancement flaps were drawn incorporating the defect and placing the expected incisions within the relaxed skin tension lines where possible.    The area thus outlined was incised deep to adipose tissue with a #15 scalpel blade.  The skin margins were undermined to an appropriate distance in all directions utilizing iris scissors.
Area L Indication Text: Tumors in this location are included in Area L (trunk and extremities).  Mohs surgery is indicated for larger tumors, or tumors with aggressive histologic features, in these anatomic locations.
Surgeon Performing Repair (Optional): Ravin Jerry M.D.
Surgeon/Pathologist Verbiage (Will Incorporate Name Of Surgeon From Intro If Not Blank): operated in two distinct and integrated capacities as the surgeon and pathologist.
Referred To Mid-Level For Closure Text (Leave Blank If You Do Not Want): After obtaining clear surgical margins the patient was sent to a mid-level provider for surgical repair.  The patient understands they will receive post-surgical care and follow-up from the mid-level provider.
Rhombic Flap Text: The defect edges were debeveled with a #15 scalpel blade.  Given the location of the defect and the proximity to free margins a rhombic flap was deemed most appropriate.  Using a sterile surgical marker, an appropriate rhombic flap was drawn incorporating the defect.    The area thus outlined was incised deep to adipose tissue with a #15 scalpel blade.  The skin margins were undermined to an appropriate distance in all directions utilizing iris scissors.
Eye Protection Verbiage: Before proceeding with the stage, a plastic scleral shield was inserted. The globe was anesthetized with a few drops of 1% lidocaine with 1:100,000 epinephrine. Then, an appropriate sized scleral shield was chosen and coated with lacrilube ointment. The shield was gently inserted and left in place for the duration of each stage. After the stage was completed, the shield was gently removed.
Secondary Intention Text (Leave Blank If You Do Not Want): The defect will heal with secondary intention.
Muscle Hinge Flap Text: The defect edges were debeveled with a #15 scalpel blade.  Given the size, depth and location of the defect and the proximity to free margins a muscle hinge flap was deemed most appropriate.  Using a sterile surgical marker, an appropriate hinge flap was drawn incorporating the defect. The area thus outlined was incised with a #15 scalpel blade.  The skin margins were undermined to an appropriate distance in all directions utilizing iris scissors.
Donor Site Anesthesia Type: same as repair anesthesia
Same Histology In Subsequent Stages Text: The pattern and morphology of the tumor is as described in the first stage.
Intermediate Repair Preamble Text (Leave Blank If You Do Not Want): Undermining was performed with blunt dissection.
Bilobed Flap Text: The defect edges were debeveled with a #15 scalpel blade.  Given the location of the defect and the proximity to free margins a bilobe flap was deemed most appropriate.  Using a sterile surgical marker, an appropriate bilobe flap drawn around the defect.    The area thus outlined was incised deep to adipose tissue with a #15 scalpel blade.  The skin margins were undermined to an appropriate distance in all directions utilizing iris scissors.
Full Thickness Lip Wedge Repair (Flap) Text: Given the location of the defect and the proximity to free margins a full thickness wedge repair was deemed most appropriate.  Using a sterile surgical marker, the appropriate repair was drawn incorporating the defect and placing the expected incisions perpendicular to the vermilion border.  The vermilion border was also meticulously outlined to ensure appropriate reapproximation during the repair.  The area thus outlined was incised through and through with a #15 scalpel blade.  The muscularis and dermis were reaproximated with deep sutures following hemostasis. Care was taken to realign the vermilion border before proceeding with the superficial closure.  Once the vermilion was realigned the superfical and mucosal closure was finished.
Consent (Ear)/Introductory Paragraph: The rationale for Mohs was explained to the patient and consent was obtained. The risks, benefits and alternatives to therapy were discussed in detail. Specifically, the risks of ear deformity, infection, scarring, bleeding, prolonged wound healing, incomplete removal, allergy to anesthesia, nerve injury and recurrence were addressed. Prior to the procedure, the treatment site was clearly identified and confirmed by the patient. All components of Universal Protocol/PAUSE Rule completed.
Modified Advancement Flap Text: The defect edges were debeveled with a #15 scalpel blade.  Given the location of the defect, shape of the defect and the proximity to free margins a modified advancement flap was deemed most appropriate.  Using a sterile surgical marker, an appropriate advancement flap was drawn incorporating the defect and placing the expected incisions within the relaxed skin tension lines where possible.    The area thus outlined was incised deep to adipose tissue with a #15 scalpel blade.  The skin margins were undermined to an appropriate distance in all directions utilizing iris scissors.
Unna Boot Text: An Unna boot was placed to help immobilize the limb and facilitate more rapid healing.
Xenograft Text: The defect edges were debeveled with a #15 scalpel blade.  Given the location of the defect, shape of the defect and the proximity to free margins a xenograft was deemed most appropriate.  The graft was then trimmed to fit the size of the defect.  The graft was then placed in the primary defect and oriented appropriately.
X Size Of Lesion In Cm (Optional): 0.3
Paramedian Forehead Flap Text: A decision was made to reconstruct the defect utilizing an interpolation axial flap and a staged reconstruction.  A telfa template was made of the defect.  This telfa template was then used to outline the paramedian forehead pedicle flap.  The donor area for the pedicle flap was then injected with anesthesia.  The flap was excised through the skin and subcutaneous tissue down to the layer of the underlying musculature.  The pedicle flap was carefully excised within this deep plane to maintain its blood supply.  The edges of the donor site were undermined.   The donor site was closed in a primary fashion.  The pedicle was then rotated into position and sutured.  Once the tube was sutured into place, adequate blood supply was confirmed with blanching and refill.  The pedicle was then wrapped with xeroform gauze and dressed appropriately with a telfa and gauze bandage to ensure continued blood supply and protect the attached pedicle.
Composite Graft Text: The defect edges were debeveled with a #15 scalpel blade.  Given the location of the defect, shape of the defect, the proximity to free margins and the fact the defect was full thickness a composite graft was deemed most appropriate.  The defect was outline and then transferred to the donor site.  A full thickness graft was then excised from the donor site. The graft was then placed in the primary defect, oriented appropriately and then sutured into place.  The secondary defect was then repaired using a primary closure.
Tarsorrhaphy Text: A tarsorrhaphy was performed using Frost sutures.
Consent 1/Introductory Paragraph: The rationale for Mohs was explained to the patient and consent was obtained. The risks, benefits and alternatives to therapy were discussed in detail. Specifically, the risks of infection, scarring, bleeding, prolonged wound healing, incomplete removal, allergy to anesthesia, nerve injury and recurrence were addressed. Prior to the procedure, the treatment site was clearly identified and confirmed by the patient. All components of Universal Protocol/PAUSE Rule completed.
Bilateral Helical Rim Advancement Flap Text: The defect edges were debeveled with a #15 blade scalpel.  Given the location of the defect and the proximity to free margins (helical rim) a bilateral helical rim advancement flap was deemed most appropriate.  Using a sterile surgical marker, the appropriate advancement flaps were drawn incorporating the defect and placing the expected incisions between the helical rim and antihelix where possible.  The area thus outlined was incised through and through with a #15 scalpel blade.  With a skin hook and iris scissors, the flaps were gently and sharply undermined and freed up.
Tumor Debulked?: curette
Localized Dermabrasion With Wire Brush Text: The patient was draped in routine manner.  Localized dermabrasion using 3 x 17 mm wire brush was performed in routine manner to papillary dermis. This spot dermabrasion is being performed to complete skin cancer reconstruction. It also will eliminate the other sun damaged precancerous cells that are known to be part of the regional effect of a lifetime's worth of sun exposure. This localized dermabrasion is therapeutic and should not be considered cosmetic in any regard.
Stage 1: Number Of Blocks?: 1
Advancement-Rotation Flap Text: The defect edges were debeveled with a #15 scalpel blade.  Given the location of the defect, shape of the defect and the proximity to free margins an advancement-rotation flap was deemed most appropriate.  Using a sterile surgical marker, an appropriate flap was drawn incorporating the defect and placing the expected incisions within the relaxed skin tension lines where possible. The area thus outlined was incised deep to adipose tissue with a #15 scalpel blade.  The skin margins were undermined to an appropriate distance in all directions utilizing iris scissors.
Skin Substitute Text: The defect edges were debeveled with a #15 scalpel blade.  Given the location of the defect, shape of the defect and the proximity to free margins a skin substitute graft was deemed most appropriate.  The graft material was trimmed to fit the size of the defect. The graft was then placed in the primary defect and oriented appropriately.
Dorsal Nasal Flap Text: The defect edges were debeveled with a #15 scalpel blade.  Given the location of the defect and the proximity to free margins a dorsal nasal flap was deemed most appropriate.  Using a sterile surgical marker, an appropriate dorsal nasal flap was drawn around the defect.    The area thus outlined was incised deep to adipose tissue with a #15 scalpel blade.  The skin margins were undermined to an appropriate distance in all directions utilizing iris scissors.
Z Plasty Text: The lesion was extirpated to the level of the fat with a #15 scalpel blade.  Given the location of the defect, shape of the defect and the proximity to free margins a Z-plasty was deemed most appropriate for repair.  Using a sterile surgical marker, the appropriate transposition arms of the Z-plasty were drawn incorporating the defect and placing the expected incisions within the relaxed skin tension lines where possible.    The area thus outlined was incised deep to adipose tissue with a #15 scalpel blade.  The skin margins were undermined to an appropriate distance in all directions utilizing iris scissors.  The opposing transposition arms were then transposed into place in opposite direction and anchored with interrupted buried subcutaneous sutures.
Alar Island Pedicle Flap Text: The defect edges were debeveled with a #15 scalpel blade.  Given the location of the defect, shape of the defect and the proximity to the alar rim an island pedicle advancement flap was deemed most appropriate.  Using a sterile surgical marker, an appropriate advancement flap was drawn incorporating the defect, outlining the appropriate donor tissue and placing the expected incisions within the nasal ala running parallel to the alar rim. The area thus outlined was incised with a #15 scalpel blade.  The skin margins were undermined minimally to an appropriate distance in all directions around the primary defect and laterally outward around the island pedicle utilizing iris scissors.  There was minimal undermining beneath the pedicle flap.
Crescentic Advancement Flap Text: The defect edges were debeveled with a #15 scalpel blade.  Given the location of the defect and the proximity to free margins a crescentic advancement flap was deemed most appropriate.  Using a sterile surgical marker, the appropriate advancement flap was drawn incorporating the defect and placing the expected incisions within the relaxed skin tension lines where possible.    The area thus outlined was incised deep to adipose tissue with a #15 scalpel blade.  The skin margins were undermined to an appropriate distance in all directions utilizing iris scissors.
Mastoid Interpolation Flap Text: A decision was made to reconstruct the defect utilizing an interpolation axial flap and a staged reconstruction.  A telfa template was made of the defect.  This telfa template was then used to outline the mastoid interpolation flap.  The donor area for the pedicle flap was then injected with anesthesia.  The flap was excised through the skin and subcutaneous tissue down to the layer of the underlying musculature.  The pedicle flap was carefully excised within this deep plane to maintain its blood supply.  The edges of the donor site were undermined.   The donor site was closed in a primary fashion.  The pedicle was then rotated into position and sutured.  Once the tube was sutured into place, adequate blood supply was confirmed with blanching and refill.  The pedicle was then wrapped with xeroform gauze and dressed appropriately with a telfa and gauze bandage to ensure continued blood supply and protect the attached pedicle.
Mohs Rapid Report Verbiage: The area of clinically evident tumor was marked with skin marking ink and appropriately hatched.  The initial incision was made following the Mohs approach through the skin.  The specimen was taken to the lab, divided into the necessary number of pieces, chromacoded and processed according to the Mohs protocol.  This was repeated in successive stages until a tumor free defect was achieved.
Consent Type: Consent 1 (Standard)
Cheiloplasty (Complex) Text: A decision was made to reconstruct the defect with a  cheiloplasty.  The defect was undermined extensively.  Additional obicularis oris muscle was excised with a 15 blade scalpel.  The defect was converted into a full thickness wedge to facilite a better cosmetic result.  Small vessels were then tied off with 5-0 monocyrl. The obicularis oris, superficial fascia, adipose and dermis were then reapproximated.  After the deeper layers were approximated the epidermis was reapproximated with particular care given to realign the vermilion border.
Inflammation Suggestive Of Cancer Camouflage Histology Text: There was a dense lymphocytic infiltrate which prevented adequate histologic evaluation of adjacent structures.
Spiral Flap Text: The defect edges were debeveled with a #15 scalpel blade.  Given the location of the defect, shape of the defect and the proximity to free margins a spiral flap was deemed most appropriate.  Using a sterile surgical marker, an appropriate rotation flap was drawn incorporating the defect and placing the expected incisions within the relaxed skin tension lines where possible. The area thus outlined was incised deep to adipose tissue with a #15 scalpel blade.  The skin margins were undermined to an appropriate distance in all directions utilizing iris scissors.
Keystone Flap Text: The defect edges were debeveled with a #15 scalpel blade.  Given the location of the defect, shape of the defect a keystone flap was deemed most appropriate.  Using a sterile surgical marker, an appropriate keystone flap was drawn incorporating the defect, outlining the appropriate donor tissue and placing the expected incisions within the relaxed skin tension lines where possible. The area thus outlined was incised deep to adipose tissue with a #15 scalpel blade.  The skin margins were undermined to an appropriate distance in all directions around the primary defect and laterally outward around the flap utilizing iris scissors.
Consent (Spinal Accessory)/Introductory Paragraph: The rationale for Mohs was explained to the patient and consent was obtained. The risks, benefits and alternatives to therapy were discussed in detail. Specifically, the risks of damage to the spinal accessory nerve, infection, scarring, bleeding, prolonged wound healing, incomplete removal, allergy to anesthesia, and recurrence were addressed. Prior to the procedure, the treatment site was clearly identified and confirmed by the patient. All components of Universal Protocol/PAUSE Rule completed.
Consent (Lip)/Introductory Paragraph: The rationale for Mohs was explained to the patient and consent was obtained. The risks, benefits and alternatives to therapy were discussed in detail. Specifically, the risks of lip deformity, changes in the oral aperture, infection, scarring, bleeding, prolonged wound healing, incomplete removal, allergy to anesthesia, nerve injury and recurrence were addressed. Prior to the procedure, the treatment site was clearly identified and confirmed by the patient. All components of Universal Protocol/PAUSE Rule completed.
Referred To Plastics For Closure Text (Leave Blank If You Do Not Want): After obtaining clear surgical margins the patient was sent to plastics for surgical repair.  The patient understands they will receive post-surgical care and follow-up from the referring physician's office.
Epidermal Closure Graft Donor Site (Optional): simple interrupted
Partial Purse String (Simple) Text: Given the location of the defect and the characteristics of the surrounding skin a simple purse string closure was deemed most appropriate.  Undermining was performed circumfirentially around the surgical defect.  A purse string suture was then placed and tightened. Wound tension only allowed a partial closure of the circular defect.
Cheek-To-Nose Interpolation Flap Text: A decision was made to reconstruct the defect utilizing an interpolation axial flap and a staged reconstruction.  A telfa template was made of the defect.  This telfa template was then used to outline the Cheek-To-Nose Interpolation flap.  The donor area for the pedicle flap was then injected with anesthesia.  The flap was excised through the skin and subcutaneous tissue down to the layer of the underlying musculature.  The interpolation flap was carefully excised within this deep plane to maintain its blood supply.  The edges of the donor site were undermined.   The donor site was closed in a primary fashion.  The pedicle was then rotated into position and sutured.  Once the tube was sutured into place, adequate blood supply was confirmed with blanching and refill.  The pedicle was then wrapped with xeroform gauze and dressed appropriately with a telfa and gauze bandage to ensure continued blood supply and protect the attached pedicle.
Bi-Rhombic Flap Text: The defect edges were debeveled with a #15 scalpel blade.  Given the location of the defect and the proximity to free margins a bi-rhombic flap was deemed most appropriate.  Using a sterile surgical marker, an appropriate rhombic flap was drawn incorporating the defect. The area thus outlined was incised deep to adipose tissue with a #15 scalpel blade.  The skin margins were undermined to an appropriate distance in all directions utilizing iris scissors.
Referred To Asc For Closure Text (Leave Blank If You Do Not Want): After obtaining clear surgical margins the patient was sent to an ASC for surgical repair.  The patient understands they will receive post-surgical care and follow-up from the ASC physician.
No Repair - Repaired With Adjacent Surgical Defect Text (Leave Blank If You Do Not Want): After obtaining clear surgical margins the defect was repaired concurrently with another surgical defect which was in close approximation.
Closure 2 Information: This tab is for additional flaps and grafts, including complex repair and grafts and complex repair and flaps. You can also specify a different location for the additional defect, if the location is the same you do not need to select a new one. We will insert the automated text for the repair you select below just as we do for solitary flaps and grafts. Please note that at this time if you select a location with a different insurance zone you will need to override the ICD10 and CPT if appropriate.
Deep Sutures: 6-0 PDS
Melolabial Interpolation Flap Text: A decision was made to reconstruct the defect utilizing an interpolation axial flap and a staged reconstruction.  A telfa template was made of the defect.  This telfa template was then used to outline the melolabial interpolation flap.  The donor area for the pedicle flap was then injected with anesthesia.  The flap was excised through the skin and subcutaneous tissue down to the layer of the underlying musculature.  The pedicle flap was carefully excised within this deep plane to maintain its blood supply.  The edges of the donor site were undermined.   The donor site was closed in a primary fashion.  The pedicle was then rotated into position and sutured.  Once the tube was sutured into place, adequate blood supply was confirmed with blanching and refill.  The pedicle was then wrapped with xeroform gauze and dressed appropriately with a telfa and gauze bandage to ensure continued blood supply and protect the attached pedicle.
Advancement Flap (Single) Text: The defect edges were debeveled with a #15 scalpel blade.  Given the location of the defect and the proximity to free margins a single advancement flap was deemed most appropriate.  Using a sterile surgical marker, an appropriate advancement flap was drawn incorporating the defect and placing the expected incisions within the relaxed skin tension lines where possible.    The area thus outlined was incised deep to adipose tissue with a #15 scalpel blade.  The skin margins were undermined to an appropriate distance in all directions utilizing iris scissors.
Complex Repair And Graft Additional Text (Will Appearing After The Standard Complex Repair Text): The complex repair was not sufficient to completely close the primary defect. The remaining additional defect was repaired with the graft mentioned below.
Post-Care Instructions: I reviewed with the patient in detail post-care instructions. Patient is not to engage in any heavy lifting, exercise, or swimming for the next 14 days. Should the patient develop any fevers, chills, bleeding, severe pain patient will contact the office immediately.
Referred To Oculoplastics For Closure Text (Leave Blank If You Do Not Want): After obtaining clear surgical margins the patient was sent to oculoplastics for surgical repair.  The patient understands they will receive post-surgical care and follow-up from the referring physician's office.
Medical Necessity Statement: Based on my medical judgement, Mohs surgery is the most appropriate treatment for this cancer compared to other treatments.
Transposition Flap Text: The defect edges were debeveled with a #15 scalpel blade.  Given the location of the defect and the proximity to free margins a transposition flap was deemed most appropriate.  Using a sterile surgical marker, an appropriate transposition flap was drawn incorporating the defect.    The area thus outlined was incised deep to adipose tissue with a #15 scalpel blade.  The skin margins were undermined to an appropriate distance in all directions utilizing iris scissors.
Mohs Method Verbiage: An incision at a 45 degree angle following the standard Mohs approach was done and the specimen was harvested as a microscopic controlled layer.
Complex Repair Preamble Text (Leave Blank If You Do Not Want): Extensive wide undermining was performed.
Mauc Instructions: By selecting yes to the question below the MAUC number will be added into the note.  This will be calculated automatically based on the diagnosis chosen, the size entered, the body zone selected (H,M,L) and the specific indications you chose. You will also have the option to override the Mohs AUC if you disagree with the automatically calculated number and this option is found in the Case Summary tab.
Consent (Temporal Branch)/Introductory Paragraph: The rationale for Mohs was explained to the patient and consent was obtained. The risks, benefits and alternatives to therapy were discussed in detail. Specifically, the risks of damage to the temporal branch of the facial nerve, infection, scarring, bleeding, prolonged wound healing, incomplete removal, allergy to anesthesia, and recurrence were addressed. Prior to the procedure, the treatment site was clearly identified and confirmed by the patient. All components of Universal Protocol/PAUSE Rule completed.
O-T Plasty Text: The defect edges were debeveled with a #15 scalpel blade.  Given the location of the defect, shape of the defect and the proximity to free margins an O-T plasty was deemed most appropriate.  Using a sterile surgical marker, an appropriate O-T plasty was drawn incorporating the defect and placing the expected incisions within the relaxed skin tension lines where possible.    The area thus outlined was incised deep to adipose tissue with a #15 scalpel blade.  The skin margins were undermined to an appropriate distance in all directions utilizing iris scissors.
Bilobed Transposition Flap Text: The defect edges were debeveled with a #15 scalpel blade.  Given the location of the defect and the proximity to free margins a bilobed transposition flap was deemed most appropriate.  Using a sterile surgical marker, an appropriate bilobe flap drawn around the defect.    The area thus outlined was incised deep to adipose tissue with a #15 scalpel blade.  The skin margins were undermined to an appropriate distance in all directions utilizing iris scissors.
Ear Wedge Repair Text: A wedge excision was completed by carrying down an excision through the full thickness of the ear and cartilage with an inward facing Burow's triangle. The wound was then closed in a layered fashion.
Dressing: pressure dressing with telfa
Area M Indication Text: Tumors in this location are included in Area M (cheek, forehead, scalp, neck, jawline and pretibial skin).  Mohs surgery is indicated for tumors in these anatomic locations.
W Plasty Text: The lesion was extirpated to the level of the fat with a #15 scalpel blade.  Given the location of the defect, shape of the defect and the proximity to free margins a W-plasty was deemed most appropriate for repair.  Using a sterile surgical marker, the appropriate transposition arms of the W-plasty were drawn incorporating the defect and placing the expected incisions within the relaxed skin tension lines where possible.    The area thus outlined was incised deep to adipose tissue with a #15 scalpel blade.  The skin margins were undermined to an appropriate distance in all directions utilizing iris scissors.  The opposing transposition arms were then transposed into place in opposite direction and anchored with interrupted buried subcutaneous sutures.
Ftsg Text: The defect edges were debeveled with a #15 scalpel blade.  Given the location of the defect, shape of the defect and the proximity to free margins a full thickness skin graft was deemed most appropriate.  Using a sterile surgical marker, the primary defect shape was transferred to the donor site. The area thus outlined was incised deep to adipose tissue with a #15 scalpel blade.  The harvested graft was then trimmed of adipose tissue until only dermis and epidermis was left.  The skin margins of the secondary defect were undermined to an appropriate distance in all directions utilizing iris scissors.  The secondary defect was closed with interrupted buried subcutaneous sutures.  The skin edges were then re-apposed with running  sutures.  The skin graft was then placed in the primary defect and oriented appropriately.
Mucosal Advancement Flap Text: Given the location of the defect, shape of the defect and the proximity to free margins a mucosal advancement flap was deemed most appropriate. Incisions were made with a 15 blade scalpel in the appropriate fashion along the cutaneous vermilion border and the mucosal lip. The remaining actinically damaged mucosal tissue was excised.  The mucosal advancement flap was then elevated to the gingival sulcus with care taken to preserve the neurovascular structures and advanced into the primary defect. Care was taken to ensure that precise realignment of the vermilion border was achieved.
Dressing (No Sutures): dry sterile dressing
Purse String (Simple) Text: Given the location of the defect and the characteristics of the surrounding skin a purse string closure was deemed most appropriate.  Undermining was performed circumfirentially around the surgical defect.  A purse string suture was then placed and tightened.
Surgical Defect Length In Cm (Optional): 1.5
Previous Accession (Optional): [id number]
Bcc Histology Text: There were numerous aggregates of basaloid cells.
Melolabial Transposition Flap Text: The defect edges were debeveled with a #15 scalpel blade.  Given the location of the defect and the proximity to free margins a melolabial flap was deemed most appropriate.  Using a sterile surgical marker, an appropriate melolabial transposition flap was drawn incorporating the defect.    The area thus outlined was incised deep to adipose tissue with a #15 scalpel blade.  The skin margins were undermined to an appropriate distance in all directions utilizing iris scissors.
Estimated Blood Loss (Cc): minimal
Mohs Case Number: [id number]
O-T Advancement Flap Text: The defect edges were debeveled with a #15 scalpel blade.  Given the location of the defect, shape of the defect and the proximity to free margins an O-T advancement flap was deemed most appropriate.  Using a sterile surgical marker, an appropriate advancement flap was drawn incorporating the defect and placing the expected incisions within the relaxed skin tension lines where possible.    The area thus outlined was incised deep to adipose tissue with a #15 scalpel blade.  The skin margins were undermined to an appropriate distance in all directions utilizing iris scissors.
S Plasty Text: Given the location and shape of the defect, and the orientation of relaxed skin tension lines, an S-plasty was deemed most appropriate for repair.  Using a sterile surgical marker, the appropriate outline of the S-plasty was drawn, incorporating the defect and placing the expected incisions within the relaxed skin tension lines where possible.  The area thus outlined was incised deep to adipose tissue with a #15 scalpel blade.  The skin margins were undermined to an appropriate distance in all directions utilizing iris scissors. The skin flaps were advanced over the defect.  The opposing margins were then approximated with interrupted buried subcutaneous sutures.
Cartilage Graft Text: The defect edges were debeveled with a #15 scalpel blade.  Given the location of the defect, shape of the defect, the fact the defect involved a full thickness cartilage defect a cartilage graft was deemed most appropriate.  An appropriate donor site was identified, cleansed, and anesthetized. The cartilage graft was then harvested and transferred to the recipient site, oriented appropriately and then sutured into place.  The secondary defect was then repaired using a primary closure.
Closure 3 Information: This tab is for additional flaps and grafts above and beyond our usual structured repairs.  Please note if you enter information here it will not currently bill and you will need to add the billing information manually.
Subsequent Stages Histo Method Verbiage: Using a similar technique to that described above, a thin layer of tissue was removed from all areas where tumor was visible on the previous stage.  The tissue was again oriented, mapped, dyed, and processed as above.
Posterior Auricular Interpolation Flap Text: A decision was made to reconstruct the defect utilizing an interpolation axial flap and a staged reconstruction.  A telfa template was made of the defect.  This telfa template was then used to outline the posterior auricular interpolation flap.  The donor area for the pedicle flap was then injected with anesthesia.  The flap was excised through the skin and subcutaneous tissue down to the layer of the underlying musculature.  The pedicle flap was carefully excised within this deep plane to maintain its blood supply.  The edges of the donor site were undermined.   The donor site was closed in a primary fashion.  The pedicle was then rotated into position and sutured.  Once the tube was sutured into place, adequate blood supply was confirmed with blanching and refill.  The pedicle was then wrapped with xeroform gauze and dressed appropriately with a telfa and gauze bandage to ensure continued blood supply and protect the attached pedicle.
Hemostasis: Electrocautery
Burow's Advancement Flap Text: The defect edges were debeveled with a #15 scalpel blade.  Given the location of the defect and the proximity to free margins a Burow's advancement flap was deemed most appropriate.  Using a sterile surgical marker, the appropriate advancement flap was drawn incorporating the defect and placing the expected incisions within the relaxed skin tension lines where possible.    The area thus outlined was incised deep to adipose tissue with a #15 scalpel blade.  The skin margins were undermined to an appropriate distance in all directions utilizing iris scissors.
Island Pedicle Flap With Canthal Suspension Text: The defect edges were debeveled with a #15 scalpel blade.  Given the location of the defect, shape of the defect and the proximity to free margins an island pedicle advancement flap was deemed most appropriate.  Using a sterile surgical marker, an appropriate advancement flap was drawn incorporating the defect, outlining the appropriate donor tissue and placing the expected incisions within the relaxed skin tension lines where possible. The area thus outlined was incised deep to adipose tissue with a #15 scalpel blade.  The skin margins were undermined to an appropriate distance in all directions around the primary defect and laterally outward around the island pedicle utilizing iris scissors.  There was minimal undermining beneath the pedicle flap. A suspension suture was placed in the canthal tendon to prevent tension and prevent ectropion.
A-T Advancement Flap Text: The defect edges were debeveled with a #15 scalpel blade.  Given the location of the defect, shape of the defect and the proximity to free margins an A-T advancement flap was deemed most appropriate.  Using a sterile surgical marker, an appropriate advancement flap was drawn incorporating the defect and placing the expected incisions within the relaxed skin tension lines where possible.    The area thus outlined was incised deep to adipose tissue with a #15 scalpel blade.  The skin margins were undermined to an appropriate distance in all directions utilizing iris scissors.
Wound Care: Aquaphor
Helical Rim Advancement Flap Text: The defect edges were debeveled with a #15 blade scalpel.  Given the location of the defect and the proximity to free margins (helical rim) a double helical rim advancement flap was deemed most appropriate.  Using a sterile surgical marker, the appropriate advancement flaps were drawn incorporating the defect and placing the expected incisions between the helical rim and antihelix where possible.  The area thus outlined was incised through and through with a #15 scalpel blade.  With a skin hook and iris scissors, the flaps were gently and sharply undermined and freed up.
Home Suture Removal Text: Patient was provided instructions on removing sutures and will remove their sutures at home.  If they have any questions or difficulties they will call the office.
Consent (Near Eyelid Margin)/Introductory Paragraph: The rationale for Mohs was explained to the patient and consent was obtained. The risks, benefits and alternatives to therapy were discussed in detail. Specifically, the risks of ectropion or eyelid deformity, infection, scarring, bleeding, prolonged wound healing, incomplete removal, allergy to anesthesia, nerve injury and recurrence were addressed. Prior to the procedure, the treatment site was clearly identified and confirmed by the patient. All components of Universal Protocol/PAUSE Rule completed.
Graft Donor Site Bandage (Optional-Leave Blank If You Don't Want In Note): Steri-strips and a pressure bandage were applied to the donor site.
Cheek Interpolation Flap Text: A decision was made to reconstruct the defect utilizing an interpolation axial flap and a staged reconstruction.  A telfa template was made of the defect.  This telfa template was then used to outline the Cheek Interpolation flap.  The donor area for the pedicle flap was then injected with anesthesia.  The flap was excised through the skin and subcutaneous tissue down to the layer of the underlying musculature.  The interpolation flap was carefully excised within this deep plane to maintain its blood supply.  The edges of the donor site were undermined.   The donor site was closed in a primary fashion.  The pedicle was then rotated into position and sutured.  Once the tube was sutured into place, adequate blood supply was confirmed with blanching and refill.  The pedicle was then wrapped with xeroform gauze and dressed appropriately with a telfa and gauze bandage to ensure continued blood supply and protect the attached pedicle.
Mohs Histo Method Verbiage: Each section was then chromacoded and processed in the Mohs lab using the Mohs protocol and submitted for frozen section.
Initial Size Of Lesion: 1.1
No Residual Tumor Seen Histology Text: There were no malignant cells seen in the sections examined.
Tissue Cultured Epidermal Autograft Text: The defect edges were debeveled with a #15 scalpel blade.  Given the location of the defect, shape of the defect and the proximity to free margins a tissue cultured epidermal autograft was deemed most appropriate.  The graft was then trimmed to fit the size of the defect.  The graft was then placed in the primary defect and oriented appropriately.
Cheiloplasty (Less Than 50%) Text: A decision was made to reconstruct the defect with a  cheiloplasty.  The defect was undermined extensively.  Additional obicularis oris muscle was excised with a 15 blade scalpel.  The defect was converted into a full thickness wedge, of less than 50% of the vertical height of the lip, to facilite a better cosmetic result.  Small vessels were then tied off with 5-0 monocyrl. The obicularis oris, superficial fascia, adipose and dermis were then reapproximated.  After the deeper layers were approximated the epidermis was reapproximated with particular care given to realign the vermilion border.
Epidermal Autograft Text: The defect edges were debeveled with a #15 scalpel blade.  Given the location of the defect, shape of the defect and the proximity to free margins an epidermal autograft was deemed most appropriate.  Using a sterile surgical marker, the primary defect shape was transferred to the donor site. The epidermal graft was then harvested.  The skin graft was then placed in the primary defect and oriented appropriately.
Bcc Infiltrative Histology Text: There were numerous aggregates of basaloid cells demonstrating an infiltrative pattern.
V-Y Plasty Text: The defect edges were debeveled with a #15 scalpel blade.  Given the location of the defect, shape of the defect and the proximity to free margins an V-Y advancement flap was deemed most appropriate.  Using a sterile surgical marker, an appropriate advancement flap was drawn incorporating the defect and placing the expected incisions within the relaxed skin tension lines where possible.    The area thus outlined was incised deep to adipose tissue with a #15 scalpel blade.  The skin margins were undermined to an appropriate distance in all directions utilizing iris scissors.
Island Pedicle Flap Text: The defect edges were debeveled with a #15 scalpel blade.  Given the location of the defect, shape of the defect and the proximity to free margins an island pedicle advancement flap was deemed most appropriate.  Using a sterile surgical marker, an appropriate advancement flap was drawn incorporating the defect, outlining the appropriate donor tissue and placing the expected incisions within the relaxed skin tension lines where possible.    The area thus outlined was incised deep to adipose tissue with a #15 scalpel blade.  The skin margins were undermined to an appropriate distance in all directions around the primary defect and laterally outward around the island pedicle utilizing iris scissors.  There was minimal undermining beneath the pedicle flap.
Simple / Intermediate / Complex Repair - Final Wound Length In Cm: 2.7
Alternatives Discussed Intro (Do Not Add Period): I discussed alternative treatments to Mohs surgery and specifically discussed the risks and benefits of
Consent (Nose)/Introductory Paragraph: The rationale for Mohs was explained to the patient and consent was obtained. The risks, benefits and alternatives to therapy were discussed in detail. Specifically, the risks of nasal deformity, changes in the flow of air through the nose, infection, scarring, bleeding, prolonged wound healing, incomplete removal, allergy to anesthesia, nerve injury and recurrence were addressed. Prior to the procedure, the treatment site was clearly identified and confirmed by the patient. All components of Universal Protocol/PAUSE Rule completed.
Star Wedge Flap Text: The defect edges were debeveled with a #15 scalpel blade.  Given the location of the defect, shape of the defect and the proximity to free margins a star wedge flap was deemed most appropriate.  Using a sterile surgical marker, an appropriate rotation flap was drawn incorporating the defect and placing the expected incisions within the relaxed skin tension lines where possible. The area thus outlined was incised deep to adipose tissue with a #15 scalpel blade.  The skin margins were undermined to an appropriate distance in all directions utilizing iris scissors.
Suture Removal: 14 days
Epidermal Closure: running
Rotation Flap Text: The defect edges were debeveled with a #15 scalpel blade.  Given the location of the defect, shape of the defect and the proximity to free margins a rotation flap was deemed most appropriate.  Using a sterile surgical marker, an appropriate rotation flap was drawn incorporating the defect and placing the expected incisions within the relaxed skin tension lines where possible.    The area thus outlined was incised deep to adipose tissue with a #15 scalpel blade.  The skin margins were undermined to an appropriate distance in all directions utilizing iris scissors.
Consent (Scalp)/Introductory Paragraph: The rationale for Mohs was explained to the patient and consent was obtained. The risks, benefits and alternatives to therapy were discussed in detail. Specifically, the risks of changes in hair growth pattern secondary to repair, infection, scarring, bleeding, prolonged wound healing, incomplete removal, allergy to anesthesia, nerve injury and recurrence were addressed. Prior to the procedure, the treatment site was clearly identified and confirmed by the patient. All components of Universal Protocol/PAUSE Rule completed.
Manual Repair Warning Statement: We plan on removing the manually selected variable below in favor of our much easier automatic structured text blocks found in the previous tab. We decided to do this to help make the flow better and give you the full power of structured data. Manual selection is never going to be ideal in our platform and I would encourage you to avoid using manual selection from this point on, especially since I will be sunsetting this feature. It is important that you do one of two things with the customized text below. First, you can save all of the text in a word file so you can have it for future reference. Second, transfer the text to the appropriate area in the Library tab. Lastly, if there is a flap or graft type which we do not have you need to let us know right away so I can add it in before the variable is hidden. No need to panic, we plan to give you roughly 6 months to make the change.
Double Island Pedicle Flap Text: The defect edges were debeveled with a #15 scalpel blade.  Given the location of the defect, shape of the defect and the proximity to free margins a double island pedicle advancement flap was deemed most appropriate.  Using a sterile surgical marker, an appropriate advancement flap was drawn incorporating the defect, outlining the appropriate donor tissue and placing the expected incisions within the relaxed skin tension lines where possible.    The area thus outlined was incised deep to adipose tissue with a #15 scalpel blade.  The skin margins were undermined to an appropriate distance in all directions around the primary defect and laterally outward around the island pedicle utilizing iris scissors.  There was minimal undermining beneath the pedicle flap.
Referred To Otolaryngology For Closure Text (Leave Blank If You Do Not Want): After obtaining clear surgical margins the patient was sent to otolaryngology for surgical repair.  The patient understands they will receive post-surgical care and follow-up from the referring physician's office.
Island Pedicle Flap-Requiring Vessel Identification Text: The defect edges were debeveled with a #15 scalpel blade.  Given the location of the defect, shape of the defect and the proximity to free margins an island pedicle advancement flap was deemed most appropriate.  Using a sterile surgical marker, an appropriate advancement flap was drawn, based on the axial vessel mentioned above, incorporating the defect, outlining the appropriate donor tissue and placing the expected incisions within the relaxed skin tension lines where possible.    The area thus outlined was incised deep to adipose tissue with a #15 scalpel blade.  The skin margins were undermined to an appropriate distance in all directions around the primary defect and laterally outward around the island pedicle utilizing iris scissors.  There was minimal undermining beneath the pedicle flap.
Ear Star Wedge Flap Text: The defect edges were debeveled with a #15 blade scalpel.  Given the location of the defect and the proximity to free margins (helical rim) an ear star wedge flap was deemed most appropriate.  Using a sterile surgical marker, the appropriate flap was drawn incorporating the defect and placing the expected incisions between the helical rim and antihelix where possible.  The area thus outlined was incised through and through with a #15 scalpel blade.
Repair Type: Complex Repair
Complex Repair And Flap Additional Text (Will Appearing After The Standard Complex Repair Text): The complex repair was not sufficient to completely close the primary defect. The remaining additional defect was repaired with the flap mentioned below.
V-Y Flap Text: The defect edges were debeveled with a #15 scalpel blade.  Given the location of the defect, shape of the defect and the proximity to free margins a V-Y flap was deemed most appropriate.  Using a sterile surgical marker, an appropriate advancement flap was drawn incorporating the defect and placing the expected incisions within the relaxed skin tension lines where possible.    The area thus outlined was incised deep to adipose tissue with a #15 scalpel blade.  The skin margins were undermined to an appropriate distance in all directions utilizing iris scissors.
Wound Care (No Sutures): Petrolatum
Consent 2/Introductory Paragraph: Mohs surgery was explained to the patient and consent was obtained. The risks, benefits and alternatives to therapy were discussed in detail. Specifically, the risks of infection, scarring, bleeding, prolonged wound healing, incomplete removal, allergy to anesthesia, nerve injury and recurrence were addressed. Prior to the procedure, the treatment site was clearly identified and confirmed by the patient. All components of Universal Protocol/PAUSE Rule completed.
Partial Purse String (Intermediate) Text: Given the location of the defect and the characteristics of the surrounding skin an intermediate purse string closure was deemed most appropriate.  Undermining was performed circumfirentially around the surgical defect.  A purse string suture was then placed and tightened. Wound tension only allowed a partial closure of the circular defect.

## 2018-02-20 ENCOUNTER — APPOINTMENT (RX ONLY)
Dept: URBAN - METROPOLITAN AREA CLINIC 31 | Facility: CLINIC | Age: 83
Setting detail: DERMATOLOGY
End: 2018-02-20

## 2018-02-20 DIAGNOSIS — Z48.02 ENCOUNTER FOR REMOVAL OF SUTURES: ICD-10-CM

## 2018-02-20 PROCEDURE — ? COUNSELING

## 2018-02-20 PROCEDURE — ? SUTURE REMOVAL (GLOBAL PERIOD)

## 2018-02-20 ASSESSMENT — LOCATION ZONE DERM
LOCATION ZONE: FACE
LOCATION ZONE: FACE

## 2018-02-20 ASSESSMENT — LOCATION DETAILED DESCRIPTION DERM
LOCATION DETAILED: RIGHT MEDIAL MALAR CHEEK
LOCATION DETAILED: RIGHT MEDIAL MALAR CHEEK

## 2018-02-20 ASSESSMENT — LOCATION SIMPLE DESCRIPTION DERM
LOCATION SIMPLE: RIGHT CHEEK
LOCATION SIMPLE: RIGHT CHEEK

## 2018-02-20 NOTE — PROCEDURE: SUTURE REMOVAL (GLOBAL PERIOD)
Add 87863 Cpt? (Important Note: In 2017 The Use Of 52413 Is Being Tracked By Cms To Determine Future Global Period Reimbursement For Global Periods): no
Detail Level: Detailed

## 2018-04-18 ENCOUNTER — APPOINTMENT (RX ONLY)
Dept: URBAN - METROPOLITAN AREA CLINIC 31 | Facility: CLINIC | Age: 83
Setting detail: DERMATOLOGY
End: 2018-04-18

## 2018-04-18 DIAGNOSIS — Z48.817 ENCOUNTER FOR SURGICAL AFTERCARE FOLLOWING SURGERY ON THE SKIN AND SUBCUTANEOUS TISSUE: ICD-10-CM

## 2018-04-18 PROCEDURE — ? POST-OP WOUND EVALUATION

## 2018-04-18 PROCEDURE — 99024 POSTOP FOLLOW-UP VISIT: CPT

## 2018-04-18 ASSESSMENT — LOCATION ZONE DERM: LOCATION ZONE: FACE

## 2018-04-18 ASSESSMENT — LOCATION DETAILED DESCRIPTION DERM: LOCATION DETAILED: RIGHT LATERAL MALAR CHEEK

## 2018-04-18 ASSESSMENT — LOCATION SIMPLE DESCRIPTION DERM: LOCATION SIMPLE: RIGHT CHEEK

## 2018-04-18 NOTE — PROCEDURE: POST-OP WOUND EVALUATION
Follow Up Units (Optional): -
Wound Evaluated By (Optional): Ravin Jerry MD
Add 57155 Cpt? (Important Note: In 2017 The Use Of 62229 Is Being Tracked By Cms To Determine Future Global Period Reimbursement For Global Periods): yes
Detail Level: Detailed
Patient To Follow-Up With?: their primary dermatologist
Wound Diameter In Cm(Optional): 0
Wound Crusting?: clean

## 2018-07-16 ENCOUNTER — APPOINTMENT (RX ONLY)
Dept: URBAN - METROPOLITAN AREA CLINIC 31 | Facility: CLINIC | Age: 83
Setting detail: DERMATOLOGY
End: 2018-07-16

## 2018-07-16 DIAGNOSIS — D22 MELANOCYTIC NEVI: ICD-10-CM

## 2018-07-16 DIAGNOSIS — L85.3 XEROSIS CUTIS: ICD-10-CM

## 2018-07-16 DIAGNOSIS — L82.1 OTHER SEBORRHEIC KERATOSIS: ICD-10-CM

## 2018-07-16 DIAGNOSIS — L57.8 OTHER SKIN CHANGES DUE TO CHRONIC EXPOSURE TO NONIONIZING RADIATION: ICD-10-CM

## 2018-07-16 DIAGNOSIS — L57.0 ACTINIC KERATOSIS: ICD-10-CM

## 2018-07-16 DIAGNOSIS — D18.0 HEMANGIOMA: ICD-10-CM

## 2018-07-16 PROBLEM — D22.5 MELANOCYTIC NEVI OF TRUNK: Status: ACTIVE | Noted: 2018-07-16

## 2018-07-16 PROBLEM — D48.5 NEOPLASM OF UNCERTAIN BEHAVIOR OF SKIN: Status: ACTIVE | Noted: 2018-07-16

## 2018-07-16 PROBLEM — D22.61 MELANOCYTIC NEVI OF RIGHT UPPER LIMB, INCLUDING SHOULDER: Status: ACTIVE | Noted: 2018-07-16

## 2018-07-16 PROBLEM — D18.01 HEMANGIOMA OF SKIN AND SUBCUTANEOUS TISSUE: Status: ACTIVE | Noted: 2018-07-16

## 2018-07-16 PROBLEM — D22.62 MELANOCYTIC NEVI OF LEFT UPPER LIMB, INCLUDING SHOULDER: Status: ACTIVE | Noted: 2018-07-16

## 2018-07-16 PROCEDURE — 17004 DESTROY PREMAL LESIONS 15/>: CPT

## 2018-07-16 PROCEDURE — ? LIQUID NITROGEN

## 2018-07-16 PROCEDURE — 99214 OFFICE O/P EST MOD 30 MIN: CPT | Mod: 25

## 2018-07-16 PROCEDURE — ? COUNSELING

## 2018-07-16 PROCEDURE — ? BIOPSY BY SHAVE METHOD

## 2018-07-16 PROCEDURE — 11100: CPT | Mod: 59

## 2018-07-16 PROCEDURE — 11101: CPT

## 2018-07-16 ASSESSMENT — LOCATION SIMPLE DESCRIPTION DERM
LOCATION SIMPLE: LEFT UPPER ARM
LOCATION SIMPLE: RIGHT CHEEK
LOCATION SIMPLE: LEFT FOREHEAD
LOCATION SIMPLE: CHEST
LOCATION SIMPLE: LEFT ANTERIOR NECK
LOCATION SIMPLE: LEFT HAND
LOCATION SIMPLE: RIGHT HAND
LOCATION SIMPLE: LEFT EAR
LOCATION SIMPLE: SCALP
LOCATION SIMPLE: RIGHT FOREARM
LOCATION SIMPLE: LEFT FOREARM
LOCATION SIMPLE: LEFT UPPER BACK
LOCATION SIMPLE: NOSE
LOCATION SIMPLE: RIGHT EAR
LOCATION SIMPLE: RIGHT UPPER ARM
LOCATION SIMPLE: LEFT CHEEK
LOCATION SIMPLE: NECK

## 2018-07-16 ASSESSMENT — LOCATION DETAILED DESCRIPTION DERM
LOCATION DETAILED: LEFT SUPERIOR FOREHEAD
LOCATION DETAILED: LEFT INFERIOR MEDIAL UPPER BACK
LOCATION DETAILED: RIGHT ANTERIOR DISTAL UPPER ARM
LOCATION DETAILED: RIGHT MEDIAL INFERIOR CHEST
LOCATION DETAILED: LEFT ULNAR DORSAL HAND
LOCATION DETAILED: LEFT INFERIOR CENTRAL MALAR CHEEK
LOCATION DETAILED: RIGHT RADIAL DORSAL HAND
LOCATION DETAILED: LEFT SUPERIOR ANTERIOR NECK
LOCATION DETAILED: LEFT SUPERIOR CENTRAL MALAR CHEEK
LOCATION DETAILED: RIGHT SUPERIOR PARIETAL SCALP
LOCATION DETAILED: NASAL DORSUM
LOCATION DETAILED: LEFT INFERIOR HELIX
LOCATION DETAILED: LEFT SUPERIOR LATERAL MALAR CHEEK
LOCATION DETAILED: LEFT VENTRAL PROXIMAL FOREARM
LOCATION DETAILED: LEFT DISTAL POSTERIOR UPPER ARM
LOCATION DETAILED: RIGHT SUPERIOR CRUS OF ANTIHELIX
LOCATION DETAILED: RIGHT ULNAR DORSAL HAND
LOCATION DETAILED: LEFT RADIAL DORSAL HAND
LOCATION DETAILED: RIGHT DISTAL POSTERIOR UPPER ARM
LOCATION DETAILED: RIGHT INFERIOR CENTRAL MALAR CHEEK
LOCATION DETAILED: RIGHT MEDIAL SUPERIOR CHEST
LOCATION DETAILED: LEFT INFERIOR ANTERIOR NECK
LOCATION DETAILED: LEFT INFERIOR POSTAURICULAR SKIN
LOCATION DETAILED: LEFT CENTRAL LATERAL NECK
LOCATION DETAILED: RIGHT DORSAL MIDDLE METACARPOPHALANGEAL JOINT
LOCATION DETAILED: LEFT MEDIAL INFERIOR CHEST
LOCATION DETAILED: LEFT SUPERIOR UPPER BACK
LOCATION DETAILED: LEFT PROXIMAL DORSAL FOREARM
LOCATION DETAILED: LEFT CENTRAL MALAR CHEEK
LOCATION DETAILED: NASAL SUPRATIP
LOCATION DETAILED: RIGHT PROXIMAL DORSAL FOREARM
LOCATION DETAILED: RIGHT SUPERIOR LATERAL BUCCAL CHEEK
LOCATION DETAILED: RIGHT CENTRAL MALAR CHEEK
LOCATION DETAILED: RIGHT LATERAL MALAR CHEEK

## 2018-07-16 ASSESSMENT — LOCATION ZONE DERM
LOCATION ZONE: NECK
LOCATION ZONE: EAR
LOCATION ZONE: HAND
LOCATION ZONE: TRUNK
LOCATION ZONE: FACE
LOCATION ZONE: NOSE
LOCATION ZONE: ARM
LOCATION ZONE: SCALP

## 2018-07-16 NOTE — PROCEDURE: BIOPSY BY SHAVE METHOD
Billing Type: Third-Party Bill
Electrodesiccation And Curettage Text: The wound bed was treated with electrodesiccation and curettage after the biopsy was performed.
Dressing: bandage
Size Of Lesion In Cm: 1.1
Lab Facility: 
Bill [id number] For Specimen Handling/Conveyance To Laboratory?: no
Detail Level: Detailed
X Size Of Lesion In Cm: 0
Wound Care: Petrolatum
Lab: [id number]
Notification Instructions: Patient will be notified of biopsy results. However, patient instructed to call the office if not contacted within 2 weeks.
Cryotherapy Text: The wound bed was treated with cryotherapy after the biopsy was performed.
Biopsy Type: H and E
Hemostasis: Aluminum Chloride and Electrocautery
Electrodesiccation Text: The wound bed was treated with electrodesiccation after the biopsy was performed.
Consent: Written consent was obtained and risks were reviewed including but not limited to scarring, infection, bleeding, scabbing, incomplete removal, nerve damage and allergy to anesthesia.
Was A Bandage Applied: Yes
Post-Care Instructions: I reviewed with the patient in detail post-care instructions. Patient is to keep the biopsy site bandaged overnight, and then wash once daily with soap and water and then apply a thin layer of petrolatum once daily until healed.
Type Of Destruction Used: Curettage
Silver Nitrate Text: The wound bed was treated with silver nitrate after the biopsy was performed.
Depth Of Biopsy: dermis
Anesthesia Type: 1% lidocaine with epinephrine
Curettage Text: The wound bed was treated with curettage after the biopsy was performed.
Size Of Lesion In Cm: 1.3

## 2018-07-16 NOTE — PROCEDURE: LIQUID NITROGEN
Consent: The patient's consent was obtained including but not limited to risks of crusting, scabbing, blistering, scarring, darker or lighter pigmentary change, recurrence, incomplete removal and infection.
Render Post-Care Instructions In Note?: no
Duration Of Freeze Thaw-Cycle (Seconds): 15
Detail Level: Detailed
Number Of Freeze-Thaw Cycles: 1 freeze-thaw cycle
Post-Care Instructions: I reviewed with the patient in detail post-care instructions. Patient is to wear sunprotection, and avoid picking at any of the treated lesions. Pt may apply Vaseline to crusted or scabbing areas.

## 2018-07-18 ENCOUNTER — APPOINTMENT (RX ONLY)
Dept: URBAN - METROPOLITAN AREA CLINIC 31 | Facility: CLINIC | Age: 83
Setting detail: DERMATOLOGY
End: 2018-07-18

## 2018-07-18 PROBLEM — C44.319 BASAL CELL CARCINOMA OF SKIN OF OTHER PARTS OF FACE: Status: ACTIVE | Noted: 2018-07-18

## 2018-07-18 PROCEDURE — ? CONSULTATION FOR MOHS SURGERY

## 2018-07-18 NOTE — PROCEDURE: CONSULTATION FOR MOHS SURGERY
X Size Of Lesion In Cm (Optional): 0
Detail Level: Detailed
Name Of The Referring Provider For Procedure: Juliana
Incorporate Mauc In Note: Yes

## 2018-07-30 ENCOUNTER — APPOINTMENT (RX ONLY)
Dept: URBAN - METROPOLITAN AREA CLINIC 31 | Facility: CLINIC | Age: 83
Setting detail: DERMATOLOGY
End: 2018-07-30

## 2018-07-30 PROBLEM — L85.3 XEROSIS CUTIS: Status: ACTIVE | Noted: 2018-07-30

## 2018-07-30 PROBLEM — C44.319 BASAL CELL CARCINOMA OF SKIN OF OTHER PARTS OF FACE: Status: ACTIVE | Noted: 2018-07-30

## 2018-07-30 PROBLEM — L55.1 SUNBURN OF SECOND DEGREE: Status: ACTIVE | Noted: 2018-07-30

## 2018-07-30 PROBLEM — L57.0 ACTINIC KERATOSIS: Status: ACTIVE | Noted: 2018-07-30

## 2018-07-30 PROCEDURE — ? MOHS SURGERY

## 2018-07-30 PROCEDURE — 17312 MOHS ADDL STAGE: CPT

## 2018-07-30 PROCEDURE — 14301 TIS TRNFR ANY 30.1-60 SQ CM: CPT

## 2018-07-30 PROCEDURE — ? PRESCRIPTION

## 2018-07-30 PROCEDURE — 17311 MOHS 1 STAGE H/N/HF/G: CPT

## 2018-07-30 PROCEDURE — ? MEDICATION COUNSELING

## 2018-07-30 RX ORDER — HYDROCODONE BITARTRATE AND ACETAMINOPHEN 7.5; 325 MG/1; MG/1
1 TABLET ORAL
Qty: 20 | Refills: 0

## 2018-07-30 NOTE — PROCEDURE: MOHS SURGERY
Consent (Ear)/Introductory Paragraph: The rationale for Mohs was explained to the patient and consent was obtained. The risks, benefits and alternatives to therapy were discussed in detail. Specifically, the risks of ear deformity, infection, scarring, bleeding, prolonged wound healing, incomplete removal, allergy to anesthesia, nerve injury and recurrence were addressed. Prior to the procedure, the treatment site was clearly identified and confirmed by the patient. All components of Universal Protocol/PAUSE Rule completed.
Consent (Temporal Branch)/Introductory Paragraph: The rationale for Mohs was explained to the patient and consent was obtained. The risks, benefits and alternatives to therapy were discussed in detail. Specifically, the risks of damage to the temporal branch of the facial nerve, infection, scarring, bleeding, prolonged wound healing, incomplete removal, allergy to anesthesia, and recurrence were addressed. Prior to the procedure, the treatment site was clearly identified and confirmed by the patient. All components of Universal Protocol/PAUSE Rule completed.
Quadrants Reporting?: 0
Referring Physician (Optional): MEY Andrews MD
Lazy S Complex Repair Preamble Text (Leave Blank If You Do Not Want): Extensive wide undermining was performed.
S Plasty Text: Given the location and shape of the defect, and the orientation of relaxed skin tension lines, an S-plasty was deemed most appropriate for repair.  Using a sterile surgical marker, the appropriate outline of the S-plasty was drawn, incorporating the defect and placing the expected incisions within the relaxed skin tension lines where possible.  The area thus outlined was incised deep to adipose tissue with a #15 scalpel blade.  The skin margins were undermined to an appropriate distance in all directions utilizing iris scissors. The skin flaps were advanced over the defect.  The opposing margins were then approximated with interrupted buried subcutaneous sutures.
Medical Necessity Statement: Based on my medical judgement, Mohs surgery is the most appropriate treatment for this cancer compared to other treatments.
Quadrant Reporting?: no
Use Separate Skin Prep For Stages And Repair: Yes
Anesthesia Volume In Cc: 10
Paramedian Forehead Flap Text: A decision was made to reconstruct the defect utilizing an interpolation axial flap and a staged reconstruction.  A telfa template was made of the defect.  This telfa template was then used to outline the paramedian forehead pedicle flap.  The donor area for the pedicle flap was then injected with anesthesia.  The flap was excised through the skin and subcutaneous tissue down to the layer of the underlying musculature.  The pedicle flap was carefully excised within this deep plane to maintain its blood supply.  The edges of the donor site were undermined.   The donor site was closed in a primary fashion.  The pedicle was then rotated into position and sutured.  Once the tube was sutured into place, adequate blood supply was confirmed with blanching and refill.  The pedicle was then wrapped with xeroform gauze and dressed appropriately with a telfa and gauze bandage to ensure continued blood supply and protect the attached pedicle.
Epidermal Autograft Text: The defect edges were debeveled with a #15 scalpel blade.  Given the location of the defect, shape of the defect and the proximity to free margins an epidermal autograft was deemed most appropriate.  Using a sterile surgical marker, the primary defect shape was transferred to the donor site. The epidermal graft was then harvested.  The skin graft was then placed in the primary defect and oriented appropriately.
Referred To Oculoplastics For Closure Text (Leave Blank If You Do Not Want): After obtaining clear surgical margins the patient was sent to oculoplastics for surgical repair.  The patient understands they will receive post-surgical care and follow-up from the referring physician's office.
Localized Dermabrasion With Wire Brush Text: The patient was draped in routine manner.  Localized dermabrasion using 3 x 17 mm wire brush was performed in routine manner to papillary dermis. This spot dermabrasion is being performed to complete skin cancer reconstruction. It also will eliminate the other sun damaged precancerous cells that are known to be part of the regional effect of a lifetime's worth of sun exposure. This localized dermabrasion is therapeutic and should not be considered cosmetic in any regard.
Purse String (Simple) Text: Given the location of the defect and the characteristics of the surrounding skin a purse string closure was deemed most appropriate.  Undermining was performed circumfirentially around the surgical defect.  A purse string suture was then placed and tightened.
Dressing (No Sutures): dry sterile dressing
Referred To Plastics For Closure Text (Leave Blank If You Do Not Want): After obtaining clear surgical margins the patient was sent to plastics for surgical repair.  The patient understands they will receive post-surgical care and follow-up from the referring physician's office.
V-Y Flap Text: The defect edges were debeveled with a #15 scalpel blade.  Given the location of the defect, shape of the defect and the proximity to free margins a V-Y flap was deemed most appropriate.  Using a sterile surgical marker, an appropriate advancement flap was drawn incorporating the defect and placing the expected incisions within the relaxed skin tension lines where possible.    The area thus outlined was incised deep to adipose tissue with a #15 scalpel blade.  The skin margins were undermined to an appropriate distance in all directions utilizing iris scissors.
V-Y Plasty Text: The defect edges were debeveled with a #15 scalpel blade.  Given the location of the defect, shape of the defect and the proximity to free margins an V-Y advancement flap was deemed most appropriate.  Using a sterile surgical marker, an appropriate advancement flap was drawn incorporating the defect and placing the expected incisions within the relaxed skin tension lines where possible.    The area thus outlined was incised deep to adipose tissue with a #15 scalpel blade.  The skin margins were undermined to an appropriate distance in all directions utilizing iris scissors.
Additional Epidermal Sutures: 3-0 Nylon
Surgical Defect Width In Cm (Optional): 2.7
Hatchet Flap Text: The defect edges were debeveled with a #15 scalpel blade.  Given the location of the defect, shape of the defect and the proximity to free margins a hatchet flap was deemed most appropriate.  Using a sterile surgical marker, an appropriate hatchet flap was drawn incorporating the defect and placing the expected incisions within the relaxed skin tension lines where possible.    The area thus outlined was incised deep to adipose tissue with a #15 scalpel blade.  The skin margins were undermined to an appropriate distance in all directions utilizing iris scissors.
Spiral Flap Text: The defect edges were debeveled with a #15 scalpel blade.  Given the location of the defect, shape of the defect and the proximity to free margins a spiral flap was deemed most appropriate.  Using a sterile surgical marker, an appropriate rotation flap was drawn incorporating the defect and placing the expected incisions within the relaxed skin tension lines where possible. The area thus outlined was incised deep to adipose tissue with a #15 scalpel blade.  The skin margins were undermined to an appropriate distance in all directions utilizing iris scissors.
Wound Care (No Sutures): Petrolatum
Bilobed Transposition Flap Text: The defect edges were debeveled with a #15 scalpel blade.  Given the location of the defect and the proximity to free margins a bilobed transposition flap was deemed most appropriate.  Using a sterile surgical marker, an appropriate bilobe flap drawn around the defect.    The area thus outlined was incised deep to adipose tissue with a #15 scalpel blade.  The skin margins were undermined to an appropriate distance in all directions utilizing iris scissors.
Helical Rim Advancement Flap Text: The defect edges were debeveled with a #15 blade scalpel.  Given the location of the defect and the proximity to free margins (helical rim) a double helical rim advancement flap was deemed most appropriate.  Using a sterile surgical marker, the appropriate advancement flaps were drawn incorporating the defect and placing the expected incisions between the helical rim and antihelix where possible.  The area thus outlined was incised through and through with a #15 scalpel blade.  With a skin hook and iris scissors, the flaps were gently and sharply undermined and freed up.
Island Pedicle Flap With Canthal Suspension Text: The defect edges were debeveled with a #15 scalpel blade.  Given the location of the defect, shape of the defect and the proximity to free margins an island pedicle advancement flap was deemed most appropriate.  Using a sterile surgical marker, an appropriate advancement flap was drawn incorporating the defect, outlining the appropriate donor tissue and placing the expected incisions within the relaxed skin tension lines where possible. The area thus outlined was incised deep to adipose tissue with a #15 scalpel blade.  The skin margins were undermined to an appropriate distance in all directions around the primary defect and laterally outward around the island pedicle utilizing iris scissors.  There was minimal undermining beneath the pedicle flap. A suspension suture was placed in the canthal tendon to prevent tension and prevent ectropion.
Stage 15: Additional Anesthesia Type: 1% lidocaine with epinephrine
Area M Indication Text: Tumors in this location are included in Area M (cheek, forehead, scalp, neck, jawline and pretibial skin).  Mohs surgery is indicated for tumors in these anatomic locations.
Cartilage Graft Text: The defect edges were debeveled with a #15 scalpel blade.  Given the location of the defect, shape of the defect, the fact the defect involved a full thickness cartilage defect a cartilage graft was deemed most appropriate.  An appropriate donor site was identified, cleansed, and anesthetized. The cartilage graft was then harvested and transferred to the recipient site, oriented appropriately and then sutured into place.  The secondary defect was then repaired using a primary closure.
Donor Site Anesthesia Type: same as repair anesthesia
Burow's Advancement Flap Text: The defect edges were debeveled with a #15 scalpel blade.  Given the location of the defect and the proximity to free margins a Burow's advancement flap was deemed most appropriate.  Using a sterile surgical marker, the appropriate advancement flap was drawn incorporating the defect and placing the expected incisions within the relaxed skin tension lines where possible.    The area thus outlined was incised deep to adipose tissue with a #15 scalpel blade.  The skin margins were undermined to an appropriate distance in all directions utilizing iris scissors.
Partial Purse String (Simple) Text: Given the location of the defect and the characteristics of the surrounding skin a simple purse string closure was deemed most appropriate.  Undermining was performed circumfirentially around the surgical defect.  A purse string suture was then placed and tightened. Wound tension only allowed a partial closure of the circular defect.
Deep Sutures: 4-0 PDS
Primary Defect Length In Cm (Final Defect Size - Required For Flaps/Grafts): 6
Melolabial Interpolation Flap Text: A decision was made to reconstruct the defect utilizing an interpolation axial flap and a staged reconstruction.  A telfa template was made of the defect.  This telfa template was then used to outline the melolabial interpolation flap.  The donor area for the pedicle flap was then injected with anesthesia.  The flap was excised through the skin and subcutaneous tissue down to the layer of the underlying musculature.  The pedicle flap was carefully excised within this deep plane to maintain its blood supply.  The edges of the donor site were undermined.   The donor site was closed in a primary fashion.  The pedicle was then rotated into position and sutured.  Once the tube was sutured into place, adequate blood supply was confirmed with blanching and refill.  The pedicle was then wrapped with xeroform gauze and dressed appropriately with a telfa and gauze bandage to ensure continued blood supply and protect the attached pedicle.
Surgical Defect Width In Cm (Optional): 3
Inflammation Suggestive Of Cancer Camouflage Histology Text: There was a dense lymphocytic infiltrate which prevented adequate histologic evaluation of adjacent structures.
Stage 5: Number Of Blocks?: 1
Mohs Histo Method Verbiage: Each section was then chromacoded and processed in the Mohs lab using the Mohs protocol and submitted for frozen section.
W Plasty Text: The lesion was extirpated to the level of the fat with a #15 scalpel blade.  Given the location of the defect, shape of the defect and the proximity to free margins a W-plasty was deemed most appropriate for repair.  Using a sterile surgical marker, the appropriate transposition arms of the W-plasty were drawn incorporating the defect and placing the expected incisions within the relaxed skin tension lines where possible.    The area thus outlined was incised deep to adipose tissue with a #15 scalpel blade.  The skin margins were undermined to an appropriate distance in all directions utilizing iris scissors.  The opposing transposition arms were then transposed into place in opposite direction and anchored with interrupted buried subcutaneous sutures.
Dorsal Nasal Flap Text: The defect edges were debeveled with a #15 scalpel blade.  Given the location of the defect and the proximity to free margins a dorsal nasal flap was deemed most appropriate.  Using a sterile surgical marker, an appropriate dorsal nasal flap was drawn around the defect.    The area thus outlined was incised deep to adipose tissue with a #15 scalpel blade.  The skin margins were undermined to an appropriate distance in all directions utilizing iris scissors.
Double Island Pedicle Flap Text: The defect edges were debeveled with a #15 scalpel blade.  Given the location of the defect, shape of the defect and the proximity to free margins a double island pedicle advancement flap was deemed most appropriate.  Using a sterile surgical marker, an appropriate advancement flap was drawn incorporating the defect, outlining the appropriate donor tissue and placing the expected incisions within the relaxed skin tension lines where possible.    The area thus outlined was incised deep to adipose tissue with a #15 scalpel blade.  The skin margins were undermined to an appropriate distance in all directions around the primary defect and laterally outward around the island pedicle utilizing iris scissors.  There was minimal undermining beneath the pedicle flap.
Subsequent Stages Histo Method Verbiage: Using a similar technique to that described above, a thin layer of tissue was removed from all areas where tumor was visible on the previous stage.  The tissue was again oriented, mapped, dyed, and processed as above.
Consent (Lip)/Introductory Paragraph: The rationale for Mohs was explained to the patient and consent was obtained. The risks, benefits and alternatives to therapy were discussed in detail. Specifically, the risks of lip deformity, changes in the oral aperture, infection, scarring, bleeding, prolonged wound healing, incomplete removal, allergy to anesthesia, nerve injury and recurrence were addressed. Prior to the procedure, the treatment site was clearly identified and confirmed by the patient. All components of Universal Protocol/PAUSE Rule completed.
Bcc Infiltrative Histology Text: There were numerous aggregates of basaloid cells demonstrating an infiltrative pattern.
Skin Substitute Text: The defect edges were debeveled with a #15 scalpel blade.  Given the location of the defect, shape of the defect and the proximity to free margins a skin substitute graft was deemed most appropriate.  The graft material was trimmed to fit the size of the defect. The graft was then placed in the primary defect and oriented appropriately.
Epidermal Closure Graft Donor Site (Optional): simple interrupted
Xenograft Text: The defect edges were debeveled with a #15 scalpel blade.  Given the location of the defect, shape of the defect and the proximity to free margins a xenograft was deemed most appropriate.  The graft was then trimmed to fit the size of the defect.  The graft was then placed in the primary defect and oriented appropriately.
Mastoid Interpolation Flap Text: A decision was made to reconstruct the defect utilizing an interpolation axial flap and a staged reconstruction.  A telfa template was made of the defect.  This telfa template was then used to outline the mastoid interpolation flap.  The donor area for the pedicle flap was then injected with anesthesia.  The flap was excised through the skin and subcutaneous tissue down to the layer of the underlying musculature.  The pedicle flap was carefully excised within this deep plane to maintain its blood supply.  The edges of the donor site were undermined.   The donor site was closed in a primary fashion.  The pedicle was then rotated into position and sutured.  Once the tube was sutured into place, adequate blood supply was confirmed with blanching and refill.  The pedicle was then wrapped with xeroform gauze and dressed appropriately with a telfa and gauze bandage to ensure continued blood supply and protect the attached pedicle.
Referred To Mid-Level For Closure Text (Leave Blank If You Do Not Want): After obtaining clear surgical margins the patient was sent to a mid-level provider for surgical repair.  The patient understands they will receive post-surgical care and follow-up from the mid-level provider.
Consent (Spinal Accessory)/Introductory Paragraph: The rationale for Mohs was explained to the patient and consent was obtained. The risks, benefits and alternatives to therapy were discussed in detail. Specifically, the risks of damage to the spinal accessory nerve, infection, scarring, bleeding, prolonged wound healing, incomplete removal, allergy to anesthesia, and recurrence were addressed. Prior to the procedure, the treatment site was clearly identified and confirmed by the patient. All components of Universal Protocol/PAUSE Rule completed.
Surgeon: Ravin Jerry M.D.
Advancement Flap (Single) Text: The defect edges were debeveled with a #15 scalpel blade.  Given the location of the defect and the proximity to free margins a single advancement flap was deemed most appropriate.  Using a sterile surgical marker, an appropriate advancement flap was drawn incorporating the defect and placing the expected incisions within the relaxed skin tension lines where possible.    The area thus outlined was incised deep to adipose tissue with a #15 scalpel blade.  The skin margins were undermined to an appropriate distance in all directions utilizing iris scissors.
Melolabial Transposition Flap Text: The defect edges were debeveled with a #15 scalpel blade.  Given the location of the defect and the proximity to free margins a melolabial flap was deemed most appropriate.  Using a sterile surgical marker, an appropriate melolabial transposition flap was drawn incorporating the defect.    The area thus outlined was incised deep to adipose tissue with a #15 scalpel blade.  The skin margins were undermined to an appropriate distance in all directions utilizing iris scissors.
Ftsg Text: The defect edges were debeveled with a #15 scalpel blade.  Given the location of the defect, shape of the defect and the proximity to free margins a full thickness skin graft was deemed most appropriate.  Using a sterile surgical marker, the primary defect shape was transferred to the donor site. The area thus outlined was incised deep to adipose tissue with a #15 scalpel blade.  The harvested graft was then trimmed of adipose tissue until only dermis and epidermis was left.  The skin margins of the secondary defect were undermined to an appropriate distance in all directions utilizing iris scissors.  The secondary defect was closed with interrupted buried subcutaneous sutures.  The skin edges were then re-apposed with running  sutures.  The skin graft was then placed in the primary defect and oriented appropriately.
O-T Advancement Flap Text: The defect edges were debeveled with a #15 scalpel blade.  Given the location of the defect, shape of the defect and the proximity to free margins an O-T advancement flap was deemed most appropriate.  Using a sterile surgical marker, an appropriate advancement flap was drawn incorporating the defect and placing the expected incisions within the relaxed skin tension lines where possible.    The area thus outlined was incised deep to adipose tissue with a #15 scalpel blade.  The skin margins were undermined to an appropriate distance in all directions utilizing iris scissors.
Mercedes Flap Text: The defect edges were debeveled with a #15 scalpel blade.  Given the location of the defect, shape of the defect and the proximity to free margins a Mercedes flap was deemed most appropriate.  Using a sterile surgical marker, an appropriate advancement flap was drawn incorporating the defect and placing the expected incisions within the relaxed skin tension lines where possible. The area thus outlined was incised deep to adipose tissue with a #15 scalpel blade.  The skin margins were undermined to an appropriate distance in all directions utilizing iris scissors.
X Size Of Lesion In Cm (Optional): 1.8
Graft Cartilage Fenestration Text: The cartilage was fenestrated with a 2mm punch biopsy to help facilitate graft survival and healing.
Cheiloplasty (Complex) Text: A decision was made to reconstruct the defect with a  cheiloplasty.  The defect was undermined extensively.  Additional obicularis oris muscle was excised with a 15 blade scalpel.  The defect was converted into a full thickness wedge to facilite a better cosmetic result.  Small vessels were then tied off with 5-0 monocyrl. The obicularis oris, superficial fascia, adipose and dermis were then reapproximated.  After the deeper layers were approximated the epidermis was reapproximated with particular care given to realign the vermilion border.
Mohs Rapid Report Verbiage: The area of clinically evident tumor was marked with skin marking ink and appropriately hatched.  The initial incision was made following the Mohs approach through the skin.  The specimen was taken to the lab, divided into the necessary number of pieces, chromacoded and processed according to the Mohs protocol.  This was repeated in successive stages until a tumor free defect was achieved.
Secondary Defect Width In Cm (Required For Flaps): 4
Closure 4 Information: This tab is for additional flaps and grafts above and beyond our usual structured repairs.  Please note if you enter information here it will not currently bill and you will need to add the billing information manually.
Ear Star Wedge Flap Text: The defect edges were debeveled with a #15 blade scalpel.  Given the location of the defect and the proximity to free margins (helical rim) an ear star wedge flap was deemed most appropriate.  Using a sterile surgical marker, the appropriate flap was drawn incorporating the defect and placing the expected incisions between the helical rim and antihelix where possible.  The area thus outlined was incised through and through with a #15 scalpel blade.
Complex Repair And Graft Additional Text (Will Appearing After The Standard Complex Repair Text): The complex repair was not sufficient to completely close the primary defect. The remaining additional defect was repaired with the graft mentioned below.
Modified Advancement Flap Text: The defect edges were debeveled with a #15 scalpel blade.  Given the location of the defect, shape of the defect and the proximity to free margins a modified advancement flap was deemed most appropriate.  Using a sterile surgical marker, an appropriate advancement flap was drawn incorporating the defect and placing the expected incisions within the relaxed skin tension lines where possible.    The area thus outlined was incised deep to adipose tissue with a #15 scalpel blade.  The skin margins were undermined to an appropriate distance in all directions utilizing iris scissors.
Intermediate Repair Preamble Text (Leave Blank If You Do Not Want): Undermining was performed with blunt dissection.
Epidermal Sutures: 2-0 Nylon
Eye Protection Verbiage: Before proceeding with the stage, a plastic scleral shield was inserted. The globe was anesthetized with a few drops of 1% lidocaine with 1:100,000 epinephrine. Then, an appropriate sized scleral shield was chosen and coated with lacrilube ointment. The shield was gently inserted and left in place for the duration of each stage. After the stage was completed, the shield was gently removed.
Suture Removal: 14 days
Muscle Hinge Flap Text: The defect edges were debeveled with a #15 scalpel blade.  Given the size, depth and location of the defect and the proximity to free margins a muscle hinge flap was deemed most appropriate.  Using a sterile surgical marker, an appropriate hinge flap was drawn incorporating the defect. The area thus outlined was incised with a #15 scalpel blade.  The skin margins were undermined to an appropriate distance in all directions utilizing iris scissors.
Previous Accession (Optional): [id number]. A
Postop Diagnosis: same
No Repair - Repaired With Adjacent Surgical Defect Text (Leave Blank If You Do Not Want): After obtaining clear surgical margins the defect was repaired concurrently with another surgical defect which was in close approximation.
Consent 1/Introductory Paragraph: The rationale for Mohs was explained to the patient and consent was obtained. The risks, benefits and alternatives to therapy were discussed in detail. Specifically, the risks of infection, scarring, bleeding, prolonged wound healing, incomplete removal, allergy to anesthesia, nerve injury and recurrence were addressed. Prior to the procedure, the treatment site was clearly identified and confirmed by the patient. All components of Universal Protocol/PAUSE Rule completed.
Stage 4: Additional Anesthesia Volume In Cc: 0.5
Detail Level: Detailed
Tarsorrhaphy Text: A tarsorrhaphy was performed using Frost sutures.
Keystone Flap Text: The defect edges were debeveled with a #15 scalpel blade.  Given the location of the defect, shape of the defect a keystone flap was deemed most appropriate.  Using a sterile surgical marker, an appropriate keystone flap was drawn incorporating the defect, outlining the appropriate donor tissue and placing the expected incisions within the relaxed skin tension lines where possible. The area thus outlined was incised deep to adipose tissue with a #15 scalpel blade.  The skin margins were undermined to an appropriate distance in all directions around the primary defect and laterally outward around the flap utilizing iris scissors.
Cheek-To-Nose Interpolation Flap Text: A decision was made to reconstruct the defect utilizing an interpolation axial flap and a staged reconstruction.  A telfa template was made of the defect.  This telfa template was then used to outline the Cheek-To-Nose Interpolation flap.  The donor area for the pedicle flap was then injected with anesthesia.  The flap was excised through the skin and subcutaneous tissue down to the layer of the underlying musculature.  The interpolation flap was carefully excised within this deep plane to maintain its blood supply.  The edges of the donor site were undermined.   The donor site was closed in a primary fashion.  The pedicle was then rotated into position and sutured.  Once the tube was sutured into place, adequate blood supply was confirmed with blanching and refill.  The pedicle was then wrapped with xeroform gauze and dressed appropriately with a telfa and gauze bandage to ensure continued blood supply and protect the attached pedicle.
Post-Care Instructions: I reviewed with the patient in detail post-care instructions. Patient is not to engage in any heavy lifting, exercise, or swimming for the next 14 days. Should the patient develop any fevers, chills, bleeding, severe pain patient will contact the office immediately.
Surgical Defect Width In Cm (Optional): 2.4
Z Plasty Text: The lesion was extirpated to the level of the fat with a #15 scalpel blade.  Given the location of the defect, shape of the defect and the proximity to free margins a Z-plasty was deemed most appropriate for repair.  Using a sterile surgical marker, the appropriate transposition arms of the Z-plasty were drawn incorporating the defect and placing the expected incisions within the relaxed skin tension lines where possible.    The area thus outlined was incised deep to adipose tissue with a #15 scalpel blade.  The skin margins were undermined to an appropriate distance in all directions utilizing iris scissors.  The opposing transposition arms were then transposed into place in opposite direction and anchored with interrupted buried subcutaneous sutures.
Composite Graft Text: The defect edges were debeveled with a #15 scalpel blade.  Given the location of the defect, shape of the defect, the proximity to free margins and the fact the defect was full thickness a composite graft was deemed most appropriate.  The defect was outline and then transferred to the donor site.  A full thickness graft was then excised from the donor site. The graft was then placed in the primary defect, oriented appropriately and then sutured into place.  The secondary defect was then repaired using a primary closure.
Star Wedge Flap Text: The defect edges were debeveled with a #15 scalpel blade.  Given the location of the defect, shape of the defect and the proximity to free margins a star wedge flap was deemed most appropriate.  Using a sterile surgical marker, an appropriate rotation flap was drawn incorporating the defect and placing the expected incisions within the relaxed skin tension lines where possible. The area thus outlined was incised deep to adipose tissue with a #15 scalpel blade.  The skin margins were undermined to an appropriate distance in all directions utilizing iris scissors.
Mohs Case Number: [id number]
Surgical Defect Length In Cm (Optional): 5
H Plasty Text: Given the location of the defect, shape of the defect and the proximity to free margins a H-plasty was deemed most appropriate for repair.  Using a sterile surgical marker, the appropriate advancement arms of the H-plasty were drawn incorporating the defect and placing the expected incisions within the relaxed skin tension lines where possible. The area thus outlined was incised deep to adipose tissue with a #15 scalpel blade. The skin margins were undermined to an appropriate distance in all directions utilizing iris scissors.  The opposing advancement arms were then advanced into place in opposite direction and anchored with interrupted buried subcutaneous sutures.
Alar Island Pedicle Flap Text: The defect edges were debeveled with a #15 scalpel blade.  Given the location of the defect, shape of the defect and the proximity to the alar rim an island pedicle advancement flap was deemed most appropriate.  Using a sterile surgical marker, an appropriate advancement flap was drawn incorporating the defect, outlining the appropriate donor tissue and placing the expected incisions within the nasal ala running parallel to the alar rim. The area thus outlined was incised with a #15 scalpel blade.  The skin margins were undermined minimally to an appropriate distance in all directions around the primary defect and laterally outward around the island pedicle utilizing iris scissors.  There was minimal undermining beneath the pedicle flap.
Wound Care: Aquaphor
Consent Type: Consent 1 (Standard)
Rotation Flap Text: The defect edges were debeveled with a #15 scalpel blade.  Given the location of the defect, shape of the defect and the proximity to free margins a rotation flap was deemed most appropriate.  Using a sterile surgical marker, an appropriate rotation flap was drawn incorporating the defect and placing the expected incisions within the relaxed skin tension lines where possible.    The area thus outlined was incised deep to adipose tissue with a #15 scalpel blade.  The skin margins were undermined to an appropriate distance in all directions utilizing iris scissors.
Interpolation Flap Text: A decision was made to reconstruct the defect utilizing an interpolation axial flap and a staged reconstruction.  A telfa template was made of the defect.  This telfa template was then used to outline the interpolation flap.  The donor area for the pedicle flap was then injected with anesthesia.  The flap was excised through the skin and subcutaneous tissue down to the layer of the underlying musculature.  The interpolation flap was carefully excised within this deep plane to maintain its blood supply.  The edges of the donor site were undermined.   The donor site was closed in a primary fashion.  The pedicle was then rotated into position and sutured.  Once the tube was sutured into place, adequate blood supply was confirmed with blanching and refill.  The pedicle was then wrapped with xeroform gauze and dressed appropriately with a telfa and gauze bandage to ensure continued blood supply and protect the attached pedicle.
Epidermal Closure: running
Bilobed Flap Text: The defect edges were debeveled with a #15 scalpel blade.  Given the location of the defect and the proximity to free margins a bilobe flap was deemed most appropriate.  Using a sterile surgical marker, an appropriate bilobe flap drawn around the defect.    The area thus outlined was incised deep to adipose tissue with a #15 scalpel blade.  The skin margins were undermined to an appropriate distance in all directions utilizing iris scissors.
Bilateral Helical Rim Advancement Flap Text: The defect edges were debeveled with a #15 blade scalpel.  Given the location of the defect and the proximity to free margins (helical rim) a bilateral helical rim advancement flap was deemed most appropriate.  Using a sterile surgical marker, the appropriate advancement flaps were drawn incorporating the defect and placing the expected incisions between the helical rim and antihelix where possible.  The area thus outlined was incised through and through with a #15 scalpel blade.  With a skin hook and iris scissors, the flaps were gently and sharply undermined and freed up.
Mauc Instructions: By selecting yes to the question below the MAUC number will be added into the note.  This will be calculated automatically based on the diagnosis chosen, the size entered, the body zone selected (H,M,L) and the specific indications you chose. You will also have the option to override the Mohs AUC if you disagree with the automatically calculated number and this option is found in the Case Summary tab.
Consent 3/Introductory Paragraph: I gave the patient a chance to ask questions they had about the procedure.  Following this I explained the Mohs procedure and consent was obtained. The risks, benefits and alternatives to therapy were discussed in detail. Specifically, the risks of infection, scarring, bleeding, prolonged wound healing, incomplete removal, allergy to anesthesia, nerve injury and recurrence were addressed. Prior to the procedure, the treatment site was clearly identified and confirmed by the patient. All components of Universal Protocol/PAUSE Rule completed.
Alternatives Discussed Intro (Do Not Add Period): I discussed alternative treatments to Mohs surgery and specifically discussed the risks and benefits of
Crescentic Advancement Flap Text: The defect edges were debeveled with a #15 scalpel blade.  Given the location of the defect and the proximity to free margins a crescentic advancement flap was deemed most appropriate.  Using a sterile surgical marker, the appropriate advancement flap was drawn incorporating the defect and placing the expected incisions within the relaxed skin tension lines where possible.    The area thus outlined was incised deep to adipose tissue with a #15 scalpel blade.  The skin margins were undermined to an appropriate distance in all directions utilizing iris scissors.
Bcc Histology Text: There were numerous aggregates of basaloid cells.
Primary Defect Width In Cm (Final Defect Size - Required For Flaps/Grafts): 3.3
Island Pedicle Flap Text: The defect edges were debeveled with a #15 scalpel blade.  Given the location of the defect, shape of the defect and the proximity to free margins an island pedicle advancement flap was deemed most appropriate.  Using a sterile surgical marker, an appropriate advancement flap was drawn incorporating the defect, outlining the appropriate donor tissue and placing the expected incisions within the relaxed skin tension lines where possible.    The area thus outlined was incised deep to adipose tissue with a #15 scalpel blade.  The skin margins were undermined to an appropriate distance in all directions around the primary defect and laterally outward around the island pedicle utilizing iris scissors.  There was minimal undermining beneath the pedicle flap.
Consent (Nose)/Introductory Paragraph: The rationale for Mohs was explained to the patient and consent was obtained. The risks, benefits and alternatives to therapy were discussed in detail. Specifically, the risks of nasal deformity, changes in the flow of air through the nose, infection, scarring, bleeding, prolonged wound healing, incomplete removal, allergy to anesthesia, nerve injury and recurrence were addressed. Prior to the procedure, the treatment site was clearly identified and confirmed by the patient. All components of Universal Protocol/PAUSE Rule completed.
Referred To Otolaryngology For Closure Text (Leave Blank If You Do Not Want): After obtaining clear surgical margins the patient was sent to otolaryngology for surgical repair.  The patient understands they will receive post-surgical care and follow-up from the referring physician's office.
Dressing: pressure dressing with telfa
Non-Graft Cartilage Fenestration Text: The cartilage was fenestrated with a 2mm punch biopsy to help facilitate healing.
Posterior Auricular Interpolation Flap Text: A decision was made to reconstruct the defect utilizing an interpolation axial flap and a staged reconstruction.  A telfa template was made of the defect.  This telfa template was then used to outline the posterior auricular interpolation flap.  The donor area for the pedicle flap was then injected with anesthesia.  The flap was excised through the skin and subcutaneous tissue down to the layer of the underlying musculature.  The pedicle flap was carefully excised within this deep plane to maintain its blood supply.  The edges of the donor site were undermined.   The donor site was closed in a primary fashion.  The pedicle was then rotated into position and sutured.  Once the tube was sutured into place, adequate blood supply was confirmed with blanching and refill.  The pedicle was then wrapped with xeroform gauze and dressed appropriately with a telfa and gauze bandage to ensure continued blood supply and protect the attached pedicle.
Surgeon/Pathologist Verbiage (Will Incorporate Name Of Surgeon From Intro If Not Blank): operated in two distinct and integrated capacities as the surgeon and pathologist.
Unna Boot Text: An Unna boot was placed to help immobilize the limb and facilitate more rapid healing.
Advancement-Rotation Flap Text: The defect edges were debeveled with a #15 scalpel blade.  Given the location of the defect, shape of the defect and the proximity to free margins an advancement-rotation flap was deemed most appropriate.  Using a sterile surgical marker, an appropriate flap was drawn incorporating the defect and placing the expected incisions within the relaxed skin tension lines where possible. The area thus outlined was incised deep to adipose tissue with a #15 scalpel blade.  The skin margins were undermined to an appropriate distance in all directions utilizing iris scissors.
O-Z Plasty Text: The defect edges were debeveled with a #15 scalpel blade.  Given the location of the defect, shape of the defect and the proximity to free margins an O-Z plasty (double transposition flap) was deemed most appropriate.  Using a sterile surgical marker, the appropriate transposition flaps were drawn incorporating the defect and placing the expected incisions within the relaxed skin tension lines where possible.    The area thus outlined was incised deep to adipose tissue with a #15 scalpel blade.  The skin margins were undermined to an appropriate distance in all directions utilizing iris scissors.  Hemostasis was achieved with electrocautery.  The flaps were then transposed into place, one clockwise and the other counterclockwise, and anchored with interrupted buried subcutaneous sutures.
Consent (Marginal Mandibular)/Introductory Paragraph: The rationale for Mohs was explained to the patient and consent was obtained. The risks, benefits and alternatives to therapy were discussed in detail. Specifically, the risks of damage to the marginal mandibular branch of the facial nerve, infection, scarring, bleeding, prolonged wound healing, incomplete removal, allergy to anesthesia, and recurrence were addressed. Prior to the procedure, the treatment site was clearly identified and confirmed by the patient. All components of Universal Protocol/PAUSE Rule completed.
No Residual Tumor Seen Histology Text: There were no malignant cells seen in the sections examined.
Bi-Rhombic Flap Text: The defect edges were debeveled with a #15 scalpel blade.  Given the location of the defect and the proximity to free margins a bi-rhombic flap was deemed most appropriate.  Using a sterile surgical marker, an appropriate rhombic flap was drawn incorporating the defect. The area thus outlined was incised deep to adipose tissue with a #15 scalpel blade.  The skin margins were undermined to an appropriate distance in all directions utilizing iris scissors.
Split-Thickness Skin Graft Text: The defect edges were debeveled with a #15 scalpel blade.  Given the location of the defect, shape of the defect and the proximity to free margins a split thickness skin graft was deemed most appropriate.  Using a sterile surgical marker, the primary defect shape was transferred to the donor site. The split thickness graft was then harvested.  The skin graft was then placed in the primary defect and oriented appropriately.
Surgical Defect Length In Cm (Optional): 5.5
Trilobed Flap Text: The defect edges were debeveled with a #15 scalpel blade.  Given the location of the defect and the proximity to free margins a trilobed flap was deemed most appropriate.  Using a sterile surgical marker, an appropriate trilobed flap drawn around the defect.    The area thus outlined was incised deep to adipose tissue with a #15 scalpel blade.  The skin margins were undermined to an appropriate distance in all directions utilizing iris scissors.
Area H Indication Text: Tumors in this location are included in Area H (eyelids, eyebrows, nose, lips, chin, ear, pre-auricular, post-auricular, temple, genitalia, hands, feet, ankles and areola).  Tissue conservation is critical in these anatomic locations.
Purse String (Intermediate) Text: Given the location of the defect and the characteristics of the surrounding skin a purse string intermediate closure was deemed most appropriate.  Undermining was performed circumfirentially around the surgical defect.  A purse string suture was then placed and tightened.
Surgical Defect Width In Cm (Optional): 2.2
Hemostasis: Electrocautery
Flap Type: Rotation Flap
Area L Indication Text: Tumors in this location are included in Area L (trunk and extremities).  Mohs surgery is indicated for larger tumors, or tumors with aggressive histologic features, in these anatomic locations.
Dermal Autograft Text: The defect edges were debeveled with a #15 scalpel blade.  Given the location of the defect, shape of the defect and the proximity to free margins a dermal autograft was deemed most appropriate.  Using a sterile surgical marker, the primary defect shape was transferred to the donor site. The area thus outlined was incised deep to adipose tissue with a #15 scalpel blade.  The harvested graft was then trimmed of adipose and epidermal tissue until only dermis was left.  The skin graft was then placed in the primary defect and oriented appropriately.
Tumor Debulked?: curette
O-T Plasty Text: The defect edges were debeveled with a #15 scalpel blade.  Given the location of the defect, shape of the defect and the proximity to free margins an O-T plasty was deemed most appropriate.  Using a sterile surgical marker, an appropriate O-T plasty was drawn incorporating the defect and placing the expected incisions within the relaxed skin tension lines where possible.    The area thus outlined was incised deep to adipose tissue with a #15 scalpel blade.  The skin margins were undermined to an appropriate distance in all directions utilizing iris scissors.
Transposition Flap Text: The defect edges were debeveled with a #15 scalpel blade.  Given the location of the defect and the proximity to free margins a transposition flap was deemed most appropriate.  Using a sterile surgical marker, an appropriate transposition flap was drawn incorporating the defect.    The area thus outlined was incised deep to adipose tissue with a #15 scalpel blade.  The skin margins were undermined to an appropriate distance in all directions utilizing iris scissors.
Partial Purse String (Intermediate) Text: Given the location of the defect and the characteristics of the surrounding skin an intermediate purse string closure was deemed most appropriate.  Undermining was performed circumfirentially around the surgical defect.  A purse string suture was then placed and tightened. Wound tension only allowed a partial closure of the circular defect.
Date Of Previous Biopsy (Optional): 07/16/2018
Same Histology In Subsequent Stages Text: The pattern and morphology of the tumor is as described in the first stage.
Ear Wedge Repair Text: A wedge excision was completed by carrying down an excision through the full thickness of the ear and cartilage with an inward facing Burow's triangle. The wound was then closed in a layered fashion.
Consent (Scalp)/Introductory Paragraph: The rationale for Mohs was explained to the patient and consent was obtained. The risks, benefits and alternatives to therapy were discussed in detail. Specifically, the risks of changes in hair growth pattern secondary to repair, infection, scarring, bleeding, prolonged wound healing, incomplete removal, allergy to anesthesia, nerve injury and recurrence were addressed. Prior to the procedure, the treatment site was clearly identified and confirmed by the patient. All components of Universal Protocol/PAUSE Rule completed.
Secondary Intention Text (Leave Blank If You Do Not Want): The defect will heal with secondary intention.
Estimated Blood Loss (Cc): minimal
Tissue Cultured Epidermal Autograft Text: The defect edges were debeveled with a #15 scalpel blade.  Given the location of the defect, shape of the defect and the proximity to free margins a tissue cultured epidermal autograft was deemed most appropriate.  The graft was then trimmed to fit the size of the defect.  The graft was then placed in the primary defect and oriented appropriately.
Closure 2 Information: This tab is for additional flaps and grafts, including complex repair and grafts and complex repair and flaps. You can also specify a different location for the additional defect, if the location is the same you do not need to select a new one. We will insert the automated text for the repair you select below just as we do for solitary flaps and grafts. Please note that at this time if you select a location with a different insurance zone you will need to override the ICD10 and CPT if appropriate.
Advancement Flap (Double) Text: The defect edges were debeveled with a #15 scalpel blade.  Given the location of the defect and the proximity to free margins a double advancement flap was deemed most appropriate.  Using a sterile surgical marker, the appropriate advancement flaps were drawn incorporating the defect and placing the expected incisions within the relaxed skin tension lines where possible.    The area thus outlined was incised deep to adipose tissue with a #15 scalpel blade.  The skin margins were undermined to an appropriate distance in all directions utilizing iris scissors.
A-T Advancement Flap Text: The defect edges were debeveled with a #15 scalpel blade.  Given the location of the defect, shape of the defect and the proximity to free margins an A-T advancement flap was deemed most appropriate.  Using a sterile surgical marker, an appropriate advancement flap was drawn incorporating the defect and placing the expected incisions within the relaxed skin tension lines where possible.    The area thus outlined was incised deep to adipose tissue with a #15 scalpel blade.  The skin margins were undermined to an appropriate distance in all directions utilizing iris scissors.
Manual Repair Warning Statement: We plan on removing the manually selected variable below in favor of our much easier automatic structured text blocks found in the previous tab. We decided to do this to help make the flow better and give you the full power of structured data. Manual selection is never going to be ideal in our platform and I would encourage you to avoid using manual selection from this point on, especially since I will be sunsetting this feature. It is important that you do one of two things with the customized text below. First, you can save all of the text in a word file so you can have it for future reference. Second, transfer the text to the appropriate area in the Library tab. Lastly, if there is a flap or graft type which we do not have you need to let us know right away so I can add it in before the variable is hidden. No need to panic, we plan to give you roughly 6 months to make the change.
Consent 2/Introductory Paragraph: Mohs surgery was explained to the patient and consent was obtained. The risks, benefits and alternatives to therapy were discussed in detail. Specifically, the risks of infection, scarring, bleeding, prolonged wound healing, incomplete removal, allergy to anesthesia, nerve injury and recurrence were addressed. Prior to the procedure, the treatment site was clearly identified and confirmed by the patient. All components of Universal Protocol/PAUSE Rule completed.
O-L Flap Text: The defect edges were debeveled with a #15 scalpel blade.  Given the location of the defect, shape of the defect and the proximity to free margins an O-L flap was deemed most appropriate.  Using a sterile surgical marker, an appropriate advancement flap was drawn incorporating the defect and placing the expected incisions within the relaxed skin tension lines where possible.    The area thus outlined was incised deep to adipose tissue with a #15 scalpel blade.  The skin margins were undermined to an appropriate distance in all directions utilizing iris scissors.
Consent (Near Eyelid Margin)/Introductory Paragraph: The rationale for Mohs was explained to the patient and consent was obtained. The risks, benefits and alternatives to therapy were discussed in detail. Specifically, the risks of ectropion or eyelid deformity, infection, scarring, bleeding, prolonged wound healing, incomplete removal, allergy to anesthesia, nerve injury and recurrence were addressed. Prior to the procedure, the treatment site was clearly identified and confirmed by the patient. All components of Universal Protocol/PAUSE Rule completed.
Repair Performed By Another Provider Text (Leave Blank If You Do Not Want): After obtaining clear surgical margins the defect was repaired by another provider.
Island Pedicle Flap-Requiring Vessel Identification Text: The defect edges were debeveled with a #15 scalpel blade.  Given the location of the defect, shape of the defect and the proximity to free margins an island pedicle advancement flap was deemed most appropriate.  Using a sterile surgical marker, an appropriate advancement flap was drawn, based on the axial vessel mentioned above, incorporating the defect, outlining the appropriate donor tissue and placing the expected incisions within the relaxed skin tension lines where possible.    The area thus outlined was incised deep to adipose tissue with a #15 scalpel blade.  The skin margins were undermined to an appropriate distance in all directions around the primary defect and laterally outward around the island pedicle utilizing iris scissors.  There was minimal undermining beneath the pedicle flap.
Banner Transposition Flap Text: The defect edges were debeveled with a #15 scalpel blade.  Given the location of the defect and the proximity to free margins a Banner transposition flap was deemed most appropriate.  Using a sterile surgical marker, an appropriate flap drawn around the defect. The area thus outlined was incised deep to adipose tissue with a #15 scalpel blade.  The skin margins were undermined to an appropriate distance in all directions utilizing iris scissors.
Initial Size Of Lesion: 3.4
Cheiloplasty (Less Than 50%) Text: A decision was made to reconstruct the defect with a  cheiloplasty.  The defect was undermined extensively.  Additional obicularis oris muscle was excised with a 15 blade scalpel.  The defect was converted into a full thickness wedge, of less than 50% of the vertical height of the lip, to facilite a better cosmetic result.  Small vessels were then tied off with 5-0 monocyrl. The obicularis oris, superficial fascia, adipose and dermis were then reapproximated.  After the deeper layers were approximated the epidermis was reapproximated with particular care given to realign the vermilion border.
Mohs Method Verbiage: An incision at a 45 degree angle following the standard Mohs approach was done and the specimen was harvested as a microscopic controlled layer.
Cheek Interpolation Flap Text: A decision was made to reconstruct the defect utilizing an interpolation axial flap and a staged reconstruction.  A telfa template was made of the defect.  This telfa template was then used to outline the Cheek Interpolation flap.  The donor area for the pedicle flap was then injected with anesthesia.  The flap was excised through the skin and subcutaneous tissue down to the layer of the underlying musculature.  The interpolation flap was carefully excised within this deep plane to maintain its blood supply.  The edges of the donor site were undermined.   The donor site was closed in a primary fashion.  The pedicle was then rotated into position and sutured.  Once the tube was sutured into place, adequate blood supply was confirmed with blanching and refill.  The pedicle was then wrapped with xeroform gauze and dressed appropriately with a telfa and gauze bandage to ensure continued blood supply and protect the attached pedicle.
Rhombic Flap Text: The defect edges were debeveled with a #15 scalpel blade.  Given the location of the defect and the proximity to free margins a rhombic flap was deemed most appropriate.  Using a sterile surgical marker, an appropriate rhombic flap was drawn incorporating the defect.    The area thus outlined was incised deep to adipose tissue with a #15 scalpel blade.  The skin margins were undermined to an appropriate distance in all directions utilizing iris scissors.
Home Suture Removal Text: Patient was provided instructions on removing sutures and will remove their sutures at home.  If they have any questions or difficulties they will call the office.
Mucosal Advancement Flap Text: Given the location of the defect, shape of the defect and the proximity to free margins a mucosal advancement flap was deemed most appropriate. Incisions were made with a 15 blade scalpel in the appropriate fashion along the cutaneous vermilion border and the mucosal lip. The remaining actinically damaged mucosal tissue was excised.  The mucosal advancement flap was then elevated to the gingival sulcus with care taken to preserve the neurovascular structures and advanced into the primary defect. Care was taken to ensure that precise realignment of the vermilion border was achieved.
Complex Repair And Flap Additional Text (Will Appearing After The Standard Complex Repair Text): The complex repair was not sufficient to completely close the primary defect. The remaining additional defect was repaired with the flap mentioned below.
Graft Donor Site Bandage (Optional-Leave Blank If You Don't Want In Note): Steri-strips and a pressure bandage were applied to the donor site.
Surgical Defect Length In Cm (Optional): 3.8
Referred To Asc For Closure Text (Leave Blank If You Do Not Want): After obtaining clear surgical margins the patient was sent to an ASC for surgical repair.  The patient understands they will receive post-surgical care and follow-up from the ASC physician.
Repair Type: Flap
Full Thickness Lip Wedge Repair (Flap) Text: Given the location of the defect and the proximity to free margins a full thickness wedge repair was deemed most appropriate.  Using a sterile surgical marker, the appropriate repair was drawn incorporating the defect and placing the expected incisions perpendicular to the vermilion border.  The vermilion border was also meticulously outlined to ensure appropriate reapproximation during the repair.  The area thus outlined was incised through and through with a #15 scalpel blade.  The muscularis and dermis were reaproximated with deep sutures following hemostasis. Care was taken to realign the vermilion border before proceeding with the superficial closure.  Once the vermilion was realigned the superfical and mucosal closure was finished.

## 2018-07-30 NOTE — PROCEDURE: MEDICATION COUNSELING
Cyclosporine Counseling:  I discussed with the patient the risks of cyclosporine including but not limited to hypertension, gingival hyperplasia,myelosuppression, immunosuppression, liver damage, kidney damage, neurotoxicity, lymphoma, and serious infections. The patient understands that monitoring is required including baseline blood pressure, CBC, CMP, lipid panel and uric acid, and then 1-2 times monthly CMP and blood pressure.
Tremfya Counseling: I discussed with the patient the risks of guselkumab including but not limited to immunosuppression, serious infections, worsening of inflammatory bowel disease and drug reactions.  The patient understands that monitoring is required including a PPD at baseline and must alert us or the primary physician if symptoms of infection or other concerning signs are noted.
Dupixent Counseling: I discussed with the patient the risks of dupilumab including but not limited to eye infection and irritation, cold sores, injection site reactions, worsening of asthma, allergic reactions and increased risk of parasitic infection.  Live vaccines should be avoided while taking dupilumab. Dupilumab will also interact with certain medications such as warfarin and cyclosporine. The patient understands that monitoring is required and they must alert us or the primary physician if symptoms of infection or other concerning signs are noted.
Azithromycin Counseling:  I discussed with the patient the risks of azithromycin including but not limited to GI upset, allergic reaction, drug rash, diarrhea, and yeast infections.
Stelara Pregnancy And Lactation Text: This medication is Pregnancy Category B and is considered safe during pregnancy. It is unknown if this medication is excreted in breast milk.
Thalidomide Counseling: I discussed with the patient the risks of thalidomide including but not limited to birth defects, anxiety, weakness, chest pain, dizziness, cough and severe allergy.
Xolair Counseling:  Patient informed of potential adverse effects including but not limited to fever, muscle aches, rash and allergic reactions.  The patient verbalized understanding of the proper use and possible adverse effects of Xolair.  All of the patient's questions and concerns were addressed.
Colchicine Pregnancy And Lactation Text: This medication is Pregnancy Category C and isn't considered safe during pregnancy. It is excreted in breast milk.
Topical Clindamycin Pregnancy And Lactation Text: This medication is Pregnancy Category B and is considered safe during pregnancy. It is unknown if it is excreted in breast milk.
Eucrisa Counseling: Patient may experience a mild burning sensation during topical application. Eucrisa is not approved in children less than 2 years of age.
Erythromycin Pregnancy And Lactation Text: This medication is Pregnancy Category B and is considered safe during pregnancy. It is also excreted in breast milk.
Carac Counseling:  I discussed with the patient the risks of Carac including but not limited to erythema, scaling, itching, weeping, crusting, and pain.
Picato Counseling:  I discussed with the patient the risks of Picato including but not limited to erythema, scaling, itching, weeping, crusting, and pain.
Arava Pregnancy And Lactation Text: This medication is Pregnancy Category X and is absolutely contraindicated during pregnancy. It is unknown if it is excreted in breast milk.
Doxepin Counseling:  Patient advised that the medication is sedating and not to drive a car after taking this medication. Patient informed of potential adverse effects including but not limited to dry mouth, urinary retention, and blurry vision.  The patient verbalized understanding of the proper use and possible adverse effects of doxepin.  All of the patient's questions and concerns were addressed.
Rituxan Pregnancy And Lactation Text: This medication is Pregnancy Category C and it isn't know if it is safe during pregnancy. It is unknown if this medication is excreted in breast milk but similar antibodies are known to be excreted.
Birth Control Pills Counseling: Birth Control Pill Counseling: I discussed with the patient the potential side effects of OCPs including but not limited to increased risk of stroke, heart attack, thrombophlebitis, deep venous thrombosis, hepatic adenomas, breast changes, GI upset, headaches, and depression.  The patient verbalized understanding of the proper use and possible adverse effects of OCPs. All of the patient's questions and concerns were addressed.
Prednisone Counseling:  I discussed with the patient the risks of prolonged use of prednisone including but not limited to weight gain, insomnia, osteoporosis, mood changes, diabetes, susceptibility to infection, glaucoma and high blood pressure.  In cases where prednisone use is prolonged, patients should be monitored with blood pressure checks, serum glucose levels and an eye exam.  Additionally, the patient may need to be placed on GI prophylaxis, PCP prophylaxis, and calcium and vitamin D supplementation and/or a bisphosphonate.  The patient verbalized understanding of the proper use and the possible adverse effects of prednisone.  All of the patient's questions and concerns were addressed.
Xeljanz Counseling: I discussed with the patient the risks of Xeljanz therapy including increased risk of infection, liver issues, headache, diarrhea, or cold symptoms. Live vaccines should be avoided. They were instructed to call if they have any problems.
Zyclara Counseling:  I discussed with the patient the risks of imiquimod including but not limited to erythema, scaling, itching, weeping, crusting, and pain.  Patient understands that the inflammatory response to imiquimod is variable from person to person and was educated regarded proper titration schedule.  If flu-like symptoms develop, patient knows to discontinue the medication and contact us.
Cephalexin Counseling: I counseled the patient regarding use of cephalexin as an antibiotic for prophylactic and/or therapeutic purposes. Cephalexin (commonly prescribed under brand name Keflex) is a cephalosporin antibiotic which is active against numerous classes of bacteria, including most skin bacteria. Side effects may include nausea, diarrhea, gastrointestinal upset, rash, hives, yeast infections, and in rare cases, hepatitis, kidney disease, seizures, fever, confusion, neurologic symptoms, and others. Patients with severe allergies to penicillin medications are cautioned that there is about a 10% incidence of cross-reactivity with cephalosporins. When possible, patients with penicillin allergies should use alternatives to cephalosporins for antibiotic therapy.
Isotretinoin Counseling: Patient should get monthly blood tests, not donate blood, not drive at night if vision affected, not share medication, and not undergo elective surgery for 6 months after tx completed. Side effects reviewed, pt to contact office should one occur.
Otezla Pregnancy And Lactation Text: This medication is Pregnancy Category C and it isn't known if it is safe during pregnancy. It is unknown if it is excreted in breast milk.
Tetracycline Counseling: Patient counseled regarding possible photosensitivity and increased risk for sunburn.  Patient instructed to avoid sunlight, if possible.  When exposed to sunlight, patients should wear protective clothing, sunglasses, and sunscreen.  The patient was instructed to call the office immediately if the following severe adverse effects occur:  hearing changes, easy bruising/bleeding, severe headache, or vision changes.  The patient verbalized understanding of the proper use and possible adverse effects of tetracycline.  All of the patient's questions and concerns were addressed. Patient understands to avoid pregnancy while on therapy due to potential birth defects.
Humira Counseling:  I discussed with the patient the risks of adalimumab including but not limited to myelosuppression, immunosuppression, autoimmune hepatitis, demyelinating diseases, lymphoma, and serious infections.  The patient understands that monitoring is required including a PPD at baseline and must alert us or the primary physician if symptoms of infection or other concerning signs are noted.
Methotrexate Counseling:  Patient counseled regarding adverse effects of methotrexate including but not limited to nausea, vomiting, abnormalities in liver function tests. Patients may develop mouth sores, rash, diarrhea, and abnormalities in blood counts. The patient understands that monitoring is required including LFT's and blood counts.  There is a rare possibility of scarring of the liver and lung problems that can occur when taking methotrexate. Persistent nausea, loss of appetite, pale stools, dark urine, cough, and shortness of breath should be reported immediately. Patient advised to discontinue methotrexate treatment at least three months before attempting to become pregnant.  I discussed the need for folate supplements while taking methotrexate.  These supplements can decrease side effects during methotrexate treatment. The patient verbalized understanding of the proper use and possible adverse effects of methotrexate.  All of the patient's questions and concerns were addressed.
Benzoyl Peroxide Pregnancy And Lactation Text: This medication is Pregnancy Category C. It is unknown if benzoyl peroxide is excreted in breast milk.
Imiquimod Counseling:  I discussed with the patient the risks of imiquimod including but not limited to erythema, scaling, itching, weeping, crusting, and pain.  Patient understands that the inflammatory response to imiquimod is variable from person to person and was educated regarded proper titration schedule.  If flu-like symptoms develop, patient knows to discontinue the medication and contact us.
Tazorac Pregnancy And Lactation Text: This medication is not safe during pregnancy. It is unknown if this medication is excreted in breast milk.
Hydroxychloroquine Pregnancy And Lactation Text: This medication has been shown to cause fetal harm but it isn't assigned a Pregnancy Risk Category. There are small amounts excreted in breast milk.
Oxybutynin Counseling:  I discussed with the patient the risks of oxybutynin including but not limited to skin rash, drowsiness, dry mouth, difficulty urinating, and blurred vision.
Dupixent Pregnancy And Lactation Text: This medication likely crosses the placenta but the risk for the fetus is uncertain. This medication is excreted in breast milk.
Albendazole Pregnancy And Lactation Text: This medication is Pregnancy Category C and it isn't known if it is safe during pregnancy. It is also excreted in breast milk.
Valtrex Pregnancy And Lactation Text: this medication is Pregnancy Category B and is considered safe during pregnancy. This medication is not directly found in breast milk but it's metabolite acyclovir is present.
Spironolactone Counseling: Patient advised regarding risks of diarrhea, abdominal pain, hyperkalemia, birth defects (for female patients), liver toxicity and renal toxicity. The patient may need blood work to monitor liver and kidney function and potassium levels while on therapy. The patient verbalized understanding of the proper use and possible adverse effects of spironolactone.  All of the patient's questions and concerns were addressed.
Minoxidil Counseling: Minoxidil is a topical medication which can increase blood flow where it is applied. It is uncertain how this medication increases hair growth. Side effects are uncommon and include stinging and allergic reactions.
Acitretin Pregnancy And Lactation Text: This medication is Pregnancy Category X and should not be given to women who are pregnant or may become pregnant in the future. This medication is excreted in breast milk.
Ketoconazole Counseling:   Patient counseled regarding improving absorption with orange juice.  Adverse effects include but are not limited to breast enlargement, headache, diarrhea, nausea, upset stomach, liver function test abnormalities, taste disturbance, and stomach pain.  There is a rare possibility of liver failure that can occur when taking ketoconazole. The patient understands that monitoring of LFTs may be required, especially at baseline. The patient verbalized understanding of the proper use and possible adverse effects of ketoconazole.  All of the patient's questions and concerns were addressed.
Glycopyrrolate Pregnancy And Lactation Text: This medication is Pregnancy Category B and is considered safe during pregnancy. It is unknown if it is excreted breast milk.
Cellcept Counseling:  I discussed with the patient the risks of mycophenolate mofetil including but not limited to infection/immunosuppression, GI upset, hypokalemia, hypercholesterolemia, bone marrow suppression, lymphoproliferative disorders, malignancy, GI ulceration/bleed/perforation, colitis, interstitial lung disease, kidney failure, progressive multifocal leukoencephalopathy, and birth defects.  The patient understands that monitoring is required including a baseline creatinine and regular CBC testing. In addition, patient must alert us immediately if symptoms of infection or other concerning signs are noted.
Solaraze Pregnancy And Lactation Text: This medication is Pregnancy Category B and is considered safe. There is some data to suggest avoiding during the third trimester. It is unknown if this medication is excreted in breast milk.
5-Fu Pregnancy And Lactation Text: This medication is Pregnancy Category X and contraindicated in pregnancy and in women who may become pregnant. It is unknown if this medication is excreted in breast milk.
Minocycline Pregnancy And Lactation Text: This medication is Pregnancy Category D and not consider safe during pregnancy. It is also excreted in breast milk.
Nsaids Pregnancy And Lactation Text: These medications are considered safe up to 30 weeks gestation. It is excreted in breast milk.
Erivedge Counseling- I discussed with the patient the risks of Erivedge including but not limited to nausea, vomiting, diarrhea, constipation, weight loss, changes in the sense of taste, decreased appetite, muscle spasms, and hair loss.  The patient verbalized understanding of the proper use and possible adverse effects of Erivedge.  All of the patient's questions and concerns were addressed.
Fluconazole Counseling:  Patient counseled regarding adverse effects of fluconazole including but not limited to headache, diarrhea, nausea, upset stomach, liver function test abnormalities, taste disturbance, and stomach pain.  There is a rare possibility of liver failure that can occur when taking fluconazole.  The patient understands that monitoring of LFTs and kidney function test may be required, especially at baseline. The patient verbalized understanding of the proper use and possible adverse effects of fluconazole.  All of the patient's questions and concerns were addressed.
Minocycline Counseling: Patient advised regarding possible photosensitivity and discoloration of the teeth, skin, lips, tongue and gums.  Patient instructed to avoid sunlight, if possible.  When exposed to sunlight, patients should wear protective clothing, sunglasses, and sunscreen.  The patient was instructed to call the office immediately if the following severe adverse effects occur:  hearing changes, easy bruising/bleeding, severe headache, or vision changes.  The patient verbalized understanding of the proper use and possible adverse effects of minocycline.  All of the patient's questions and concerns were addressed.
Rituxan Counseling:  I discussed with the patient the risks of Rituxan infusions. Side effects can include infusion reactions, severe drug rashes including mucocutaneous reactions, reactivation of latent hepatitis and other infections and rarely progressive multifocal leukoencephalopathy.  All of the patient's questions and concerns were addressed.
Rifampin Counseling: I discussed with the patient the risks of rifampin including but not limited to liver damage, kidney damage, red-orange body fluids, nausea/vomiting and severe allergy.
Hydroxychloroquine Counseling:  I discussed with the patient that a baseline ophthalmologic exam is needed at the start of therapy and every year thereafter while on therapy. A CBC may also be warranted for monitoring.  The side effects of this medication were discussed with the patient, including but not limited to agranulocytosis, aplastic anemia, seizures, rashes, retinopathy, and liver toxicity. Patient instructed to call the office should any adverse effect occur.  The patient verbalized understanding of the proper use and possible adverse effects of Plaquenil.  All the patient's questions and concerns were addressed.
Itraconazole Counseling:  I discussed with the patient the risks of itraconazole including but not limited to liver damage, nausea/vomiting, neuropathy, and severe allergy.  The patient understands that this medication is best absorbed when taken with acidic beverages such as non-diet cola or ginger ale.  The patient understands that monitoring is required including baseline LFTs and repeat LFTs at intervals.  The patient understands that they are to contact us or the primary physician if concerning signs are noted.
Tremfya Pregnancy And Lactation Text: The risk during pregnancy and breastfeeding is uncertain with this medication.
Clofazimine Counseling:  I discussed with the patient the risks of clofazimine including but not limited to skin and eye pigmentation, liver damage, nausea/vomiting, gastrointestinal bleeding and allergy.
Elidel Counseling: Patient may experience a mild burning sensation during topical application. Elidel is not approved in children less than 2 years of age. There have been case reports of hematologic and skin malignancies in patients using topical calcineurin inhibitors although causality is questionable.
Bactrim Pregnancy And Lactation Text: This medication is Pregnancy Category D and is known to cause fetal risk.  It is also excreted in breast milk.
Elidel Pregnancy And Lactation Text: This medication is Pregnancy Category C. It is unknown if this medication is excreted in breast milk.
Colchicine Counseling:  Patient counseled regarding adverse effects including but not limited to stomach upset (nausea, vomiting, stomach pain, or diarrhea).  Patient instructed to limit alcohol consumption while taking this medication.  Colchicine may reduce blood counts especially with prolonged use.  The patient understands that monitoring of kidney function and blood counts may be required, especially at baseline. The patient verbalized understanding of the proper use and possible adverse effects of colchicine.  All of the patient's questions and concerns were addressed.
Cimetidine Counseling:  I discussed with the patient the risks of Cimetidine including but not limited to gynecomastia, headache, diarrhea, nausea, drowsiness, arrhythmias, pancreatitis, skin rashes, psychosis, bone marrow suppression and kidney toxicity.
Include Pregnancy/Lactation Warning?: No
Protopic Pregnancy And Lactation Text: This medication is Pregnancy Category C. It is unknown if this medication is excreted in breast milk when applied topically.
Terbinafine Pregnancy And Lactation Text: This medication is Pregnancy Category B and is considered safe during pregnancy. It is also excreted in breast milk and breast feeding isn't recommended.
Isotretinoin Pregnancy And Lactation Text: This medication is Pregnancy Category X and is considered extremely dangerous during pregnancy. It is unknown if it is excreted in breast milk.
Prednisone Pregnancy And Lactation Text: This medication is Pregnancy Category C and it isn't know if it is safe during pregnancy. This medication is excreted in breast milk.
Rifampin Pregnancy And Lactation Text: This medication is Pregnancy Category C and it isn't know if it is safe during pregnancy. It is also excreted in breast milk and should not be used if you are breast feeding.
Ketoconazole Pregnancy And Lactation Text: This medication is Pregnancy Category C and it isn't know if it is safe during pregnancy. It is also excreted in breast milk and breast feeding isn't recommended.
Methotrexate Pregnancy And Lactation Text: This medication is Pregnancy Category X and is known to cause fetal harm. This medication is excreted in breast milk.
Albendazole Counseling:  I discussed with the patient the risks of albendazole including but not limited to cytopenia, kidney damage, nausea/vomiting and severe allergy.  The patient understands that this medication is being used in an off-label manner.
Azathioprine Counseling:  I discussed with the patient the risks of azathioprine including but not limited to myelosuppression, immunosuppression, hepatotoxicity, lymphoma, and infections.  The patient understands that monitoring is required including baseline LFTs, Creatinine, possible TPMP genotyping and weekly CBCs for the first month and then every 2 weeks thereafter.  The patient verbalized understanding of the proper use and possible adverse effects of azathioprine.  All of the patient's questions and concerns were addressed.
Xolair Pregnancy And Lactation Text: This medication is Pregnancy Category B and is considered safe during pregnancy. This medication is excreted in breast milk.
Ivermectin Counseling:  Patient instructed to take medication on an empty stomach with a full glass of water.  Patient informed of potential adverse effects including but not limited to nausea, diarrhea, dizziness, itching, and swelling of the extremities or lymph nodes.  The patient verbalized understanding of the proper use and possible adverse effects of ivermectin.  All of the patient's questions and concerns were addressed.
Bexarotene Counseling:  I discussed with the patient the risks of bexarotene including but not limited to hair loss, dry lips/skin/eyes, liver abnormalities, hyperlipidemia, pancreatitis, depression/suicidal ideation, photosensitivity, drug rash/allergic reactions, hypothyroidism, anemia, leukopenia, infection, cataracts, and teratogenicity.  Patient understands that they will need regular blood tests to check lipid profile, liver function tests, white blood cell count, thyroid function tests and pregnancy test if applicable.
Topical Sulfur Applications Counseling: Topical Sulfur Counseling: Patient counseled that this medication may cause skin irritation or allergic reactions.  In the event of skin irritation, the patient was advised to reduce the amount of the drug applied or use it less frequently.   The patient verbalized understanding of the proper use and possible adverse effects of topical sulfur application.  All of the patient's questions and concerns were addressed.
Acitretin Counseling:  I discussed with the patient the risks of acitretin including but not limited to hair loss, dry lips/skin/eyes, liver damage, hyperlipidemia, depression/suicidal ideation, photosensitivity.  Serious rare side effects can include but are not limited to pancreatitis, pseudotumor cerebri, bony changes, clot formation/stroke/heart attack.  Patient understands that alcohol is contraindicated since it can result in liver toxicity and significantly prolong the elimination of the drug by many years.
Taltz Counseling: I discussed with the patient the risks of ixekizumab including but not limited to immunosuppression, serious infections, worsening of inflammatory bowel disease and drug reactions.  The patient understands that monitoring is required including a PPD at baseline and must alert us or the primary physician if symptoms of infection or other concerning signs are noted.
Dapsone Pregnancy And Lactation Text: This medication is Pregnancy Category C and is not considered safe during pregnancy or breast feeding.
Hydroxyzine Pregnancy And Lactation Text: This medication is not safe during pregnancy and should not be taken. It is also excreted in breast milk and breast feeding isn't recommended.
Valtrex Counseling: I discussed with the patient the risks of valacyclovir including but not limited to kidney damage, nausea, vomiting and severe allergy.  The patient understands that if the infection seems to be worsening or is not improving, they are to call.
Cosentyx Counseling:  I discussed with the patient the risks of Cosentyx including but not limited to worsening of Crohn's disease, immunosuppression, allergic reactions and infections.  The patient understands that monitoring is required including a PPD at baseline and must alert us or the primary physician if symptoms of infection or other concerning signs are noted.
Xelyukiz Pregnancy And Lactation Text: This medication is Pregnancy Category D and is not considered safe during pregnancy.  The risk during breast feeding is also uncertain.
Fluconazole Pregnancy And Lactation Text: This medication is Pregnancy Category C and it isn't know if it is safe during pregnancy. It is also excreted in breast milk.
Topical Clindamycin Counseling: Patient counseled that this medication may cause skin irritation or allergic reactions.  In the event of skin irritation, the patient was advised to reduce the amount of the drug applied or use it less frequently.   The patient verbalized understanding of the proper use and possible adverse effects of clindamycin.  All of the patient's questions and concerns were addressed.
Drysol Counseling:  I discussed with the patient the risks of drysol/aluminum chloride including but not limited to skin rash, itching, irritation, burning.
Enbrel Counseling:  I discussed with the patient the risks of etanercept including but not limited to myelosuppression, immunosuppression, autoimmune hepatitis, demyelinating diseases, lymphoma, and infections.  The patient understands that monitoring is required including a PPD at baseline and must alert us or the primary physician if symptoms of infection or other concerning signs are noted.
Tazorac Counseling:  Patient advised that medication is irritating and drying.  Patient may need to apply sparingly and wash off after an hour before eventually leaving it on overnight.  The patient verbalized understanding of the proper use and possible adverse effects of tazorac.  All of the patient's questions and concerns were addressed.
Minoxidil Pregnancy And Lactation Text: This medication has not been assigned a Pregnancy Risk Category but animal studies failed to show danger with the topical medication. It is unknown if the medication is excreted in breast milk.
Hydroquinone Counseling:  Patient advised that medication may result in skin irritation, lightening (hypopigmentation), dryness, and burning.  In the event of skin irritation, the patient was advised to reduce the amount of the drug applied or use it less frequently.  Rarely, spots that are treated with hydroquinone can become darker (pseudoochronosis).  Should this occur, patient instructed to stop medication and call the office. The patient verbalized understanding of the proper use and possible adverse effects of hydroquinone.  All of the patient's questions and concerns were addressed.
Metronidazole Counseling:  I discussed with the patient the risks of metronidazole including but not limited to seizures, nausea/vomiting, a metallic taste in the mouth, nausea/vomiting and severe allergy.
Birth Control Pills Pregnancy And Lactation Text: This medication should be avoided if pregnant and for the first 30 days post-partum.
Doxepin Pregnancy And Lactation Text: This medication is Pregnancy Category C and it isn't known if it is safe during pregnancy. It is also excreted in breast milk and breast feeding isn't recommended.
Terbinafine Counseling: Patient counseling regarding adverse effects of terbinafine including but not limited to headache, diarrhea, rash, upset stomach, liver function test abnormalities, itching, taste/smell disturbance, nausea, abdominal pain, and flatulence.  There is a rare possibility of liver failure that can occur when taking terbinafine.  The patient understands that a baseline LFT and kidney function test may be required. The patient verbalized understanding of the proper use and possible adverse effects of terbinafine.  All of the patient's questions and concerns were addressed.
Metronidazole Pregnancy And Lactation Text: This medication is Pregnancy Category B and considered safe during pregnancy.  It is also excreted in breast milk.
SSKI Counseling:  I discussed with the patient the risks of SSKI including but not limited to thyroid abnormalities, metallic taste, GI upset, fever, headache, acne, arthralgias, paraesthesias, lymphadenopathy, easy bleeding, arrhythmias, and allergic reaction.
Griseofulvin Pregnancy And Lactation Text: This medication is Pregnancy Category X and is known to cause serious birth defects. It is unknown if this medication is excreted in breast milk but breast feeding should be avoided.
Azithromycin Pregnancy And Lactation Text: This medication is considered safe during pregnancy and is also secreted in breast milk.
Benzoyl Peroxide Counseling: Patient counseled that medicine may cause skin irritation and bleach clothing.  In the event of skin irritation, the patient was advised to reduce the amount of the drug applied or use it less frequently.   The patient verbalized understanding of the proper use and possible adverse effects of benzoyl peroxide.  All of the patient's questions and concerns were addressed.
Stelara Counseling:  I discussed with the patient the risks of ustekinumab including but not limited to immunosuppression, malignancy, posterior leukoencephalopathy syndrome, and serious infections.  The patient understands that monitoring is required including a PPD at baseline and must alert us or the primary physician if symptoms of infection or other concerning signs are noted.
Nsaids Counseling: NSAID Counseling: I discussed with the patient that NSAIDs should be taken with food. Prolonged use of NSAIDs can result in the development of stomach ulcers.  Patient advised to stop taking NSAIDs if abdominal pain occurs.  The patient verbalized understanding of the proper use and possible adverse effects of NSAIDs.  All of the patient's questions and concerns were addressed.
Doxycycline Pregnancy And Lactation Text: This medication is Pregnancy Category D and not consider safe during pregnancy. It is also excreted in breast milk but is considered safe for shorter treatment courses.
Simponi Counseling:  I discussed with the patient the risks of golimumab including but not limited to myelosuppression, immunosuppression, autoimmune hepatitis, demyelinating diseases, lymphoma, and serious infections.  The patient understands that monitoring is required including a PPD at baseline and must alert us or the primary physician if symptoms of infection or other concerning signs are noted.
Topical Sulfur Applications Pregnancy And Lactation Text: This medication is Pregnancy Category C and has an unknown safety profile during pregnancy. It is unknown if this topical medication is excreted in breast milk.
Odomzo Counseling- I discussed with the patient the risks of Odomzo including but not limited to nausea, vomiting, diarrhea, constipation, weight loss, changes in the sense of taste, decreased appetite, muscle spasms, and hair loss.  The patient verbalized understanding of the proper use and possible adverse effects of Odomzo.  All of the patient's questions and concerns were addressed.
Bexarotene Pregnancy And Lactation Text: This medication is Pregnancy Category X and should not be given to women who are pregnant or may become pregnant. This medication should not be used if you are breast feeding.
Solaraze Counseling:  I discussed with the patient the risks of Solaraze including but not limited to erythema, scaling, itching, weeping, crusting, and pain.
Infliximab Counseling:  I discussed with the patient the risks of infliximab including but not limited to myelosuppression, immunosuppression, autoimmune hepatitis, demyelinating diseases, lymphoma, and serious infections.  The patient understands that monitoring is required including a PPD at baseline and must alert us or the primary physician if symptoms of infection or other concerning signs are noted.
Cellcept Pregnancy And Lactation Text: This medication is Pregnancy Category D and isn't considered safe during pregnancy. It is unknown if this medication is excreted in breast milk.
Drysol Pregnancy And Lactation Text: This medication is considered safe during pregnancy and breast feeding.
Bactrim Counseling:  I discussed with the patient the risks of sulfa antibiotics including but not limited to GI upset, allergic reaction, drug rash, diarrhea, dizziness, photosensitivity, and yeast infections.  Rarely, more serious reactions can occur including but not limited to aplastic anemia, agranulocytosis, methemoglobinemia, blood dyscrasias, liver or kidney failure, lung infiltrates or desquamative/blistering drug rashes.
High Dose Vitamin A Pregnancy And Lactation Text: High dose vitamin A therapy is contraindicated during pregnancy and breast feeding.
Cephalexin Pregnancy And Lactation Text: This medication is Pregnancy Category B and considered safe during pregnancy.  It is also excreted in breast milk but can be used safely for shorter doses.
Hydroxyzine Counseling: Patient advised that the medication is sedating and not to drive a car after taking this medication.  Patient informed of potential adverse effects including but not limited to dry mouth, urinary retention, and blurry vision.  The patient verbalized understanding of the proper use and possible adverse effects of hydroxyzine.  All of the patient's questions and concerns were addressed.
High Dose Vitamin A Counseling: Side effects reviewed, pt to contact office should one occur.
Glycopyrrolate Counseling:  I discussed with the patient the risks of glycopyrrolate including but not limited to skin rash, drowsiness, dry mouth, difficulty urinating, and blurred vision.
Doxycycline Counseling:  Patient counseled regarding possible photosensitivity and increased risk for sunburn.  Patient instructed to avoid sunlight, if possible.  When exposed to sunlight, patients should wear protective clothing, sunglasses, and sunscreen.  The patient was instructed to call the office immediately if the following severe adverse effects occur:  hearing changes, easy bruising/bleeding, severe headache, or vision changes.  The patient verbalized understanding of the proper use and possible adverse effects of doxycycline.  All of the patient's questions and concerns were addressed.
Sski Pregnancy And Lactation Text: This medication is Pregnancy Category D and isn't considered safe during pregnancy. It is excreted in breast milk.
Protopic Counseling: Patient may experience a mild burning sensation during topical application. Protopic is not approved in children less than 2 years of age. There have been case reports of hematologic and skin malignancies in patients using topical calcineurin inhibitors although causality is questionable.
Topical Retinoid counseling:  Patient advised to apply a pea-sized amount only at bedtime and wait 30 minutes after washing their face before applying.  If too drying, patient may add a non-comedogenic moisturizer. The patient verbalized understanding of the proper use and possible adverse effects of retinoids.  All of the patient's questions and concerns were addressed.
Spironolactone Pregnancy And Lactation Text: This medication can cause feminization of the male fetus and should be avoided during pregnancy. The active metabolite is also found in breast milk.
Griseofulvin Counseling:  I discussed with the patient the risks of griseofulvin including but not limited to photosensitivity, cytopenia, liver damage, nausea/vomiting and severe allergy.  The patient understands that this medication is best absorbed when taken with a fatty meal (e.g., ice cream or french fries).
Dapsone Counseling: I discussed with the patient the risks of dapsone including but not limited to hemolytic anemia, agranulocytosis, rashes, methemoglobinemia, kidney failure, peripheral neuropathy, headaches, GI upset, and liver toxicity.  Patients who start dapsone require monitoring including baseline LFTs and weekly CBCs for the first month, then every month thereafter.  The patient verbalized understanding of the proper use and possible adverse effects of dapsone.  All of the patient's questions and concerns were addressed.
Detail Level: Detailed
Gabapentin Counseling: I discussed with the patient the risks of gabapentin including but not limited to dizziness, somnolence, fatigue and ataxia.
Arava Counseling:  Patient counseled regarding adverse effects of Arava including but not limited to nausea, vomiting, abnormalities in liver function tests. Patients may develop mouth sores, rash, diarrhea, and abnormalities in blood counts. The patient understands that monitoring is required including LFTs and blood counts.  There is a rare possibility of scarring of the liver and lung problems that can occur when taking methotrexate. Persistent nausea, loss of appetite, pale stools, dark urine, cough, and shortness of breath should be reported immediately. Patient advised to discontinue Arava treatment and consult with a physician prior to attempting conception. The patient will have to undergo a treatment to eliminate Arava from the body prior to conception.
Otezla Counseling: The side effects of Otezla were discussed with the patient, including but not limited to worsening or new depression, weight loss, diarrhea, nausea, upper respiratory tract infection, and headache. Patient instructed to call the office should any adverse effect occur.  The patient verbalized understanding of the proper use and possible adverse effects of Otezla.  All the patient's questions and concerns were addressed.
5-Fu Counseling: 5-Fluorouracil Counseling:  I discussed with the patient the risks of 5-fluorouracil including but not limited to erythema, scaling, itching, weeping, crusting, and pain.
Erythromycin Counseling:  I discussed with the patient the risks of erythromycin including but not limited to GI upset, allergic reaction, drug rash, diarrhea, increase in liver enzymes, and yeast infections.
Quinolones Counseling:  I discussed with the patient the risks of fluoroquinolones including but not limited to GI upset, allergic reaction, drug rash, diarrhea, dizziness, photosensitivity, yeast infections, liver function test abnormalities, tendonitis/tendon rupture.

## 2018-08-06 ENCOUNTER — APPOINTMENT (RX ONLY)
Dept: URBAN - METROPOLITAN AREA CLINIC 31 | Facility: CLINIC | Age: 83
Setting detail: DERMATOLOGY
End: 2018-08-06

## 2018-08-06 DIAGNOSIS — Z48.817 ENCOUNTER FOR SURGICAL AFTERCARE FOLLOWING SURGERY ON THE SKIN AND SUBCUTANEOUS TISSUE: ICD-10-CM

## 2018-08-06 PROCEDURE — 99024 POSTOP FOLLOW-UP VISIT: CPT

## 2018-08-06 PROCEDURE — ? POST-OP WOUND EVALUATION

## 2018-08-06 ASSESSMENT — LOCATION DETAILED DESCRIPTION DERM: LOCATION DETAILED: RIGHT SUPERIOR MEDIAL FOREHEAD

## 2018-08-06 ASSESSMENT — LOCATION ZONE DERM: LOCATION ZONE: FACE

## 2018-08-06 ASSESSMENT — LOCATION SIMPLE DESCRIPTION DERM: LOCATION SIMPLE: RIGHT FOREHEAD

## 2018-08-06 NOTE — PROCEDURE: POST-OP WOUND EVALUATION
Wound Diameter In Cm(Optional): 0
Wound Crusting?: clean
Detail Level: Detailed
Wound Evaluated By (Optional): Ravin Jerry MD
Add 48869 Cpt? (Important Note: In 2017 The Use Of 89566 Is Being Tracked By Cms To Determine Future Global Period Reimbursement For Global Periods): yes
Patient To Follow-Up With?: their primary dermatologist

## 2018-08-13 ENCOUNTER — APPOINTMENT (RX ONLY)
Dept: URBAN - METROPOLITAN AREA CLINIC 31 | Facility: CLINIC | Age: 83
Setting detail: DERMATOLOGY
End: 2018-08-13

## 2018-08-13 DIAGNOSIS — Z48.02 ENCOUNTER FOR REMOVAL OF SUTURES: ICD-10-CM

## 2018-08-13 PROBLEM — C44.619 BASAL CELL CARCINOMA OF SKIN OF LEFT UPPER LIMB, INCLUDING SHOULDER: Status: ACTIVE | Noted: 2018-08-13

## 2018-08-13 PROCEDURE — 13121 CMPLX RPR S/A/L 2.6-7.5 CM: CPT | Mod: 79

## 2018-08-13 PROCEDURE — ? EXCISION

## 2018-08-13 PROCEDURE — 11603 EXC TR-EXT MAL+MARG 2.1-3 CM: CPT | Mod: 79

## 2018-08-13 PROCEDURE — ? SUTURE REMOVAL (GLOBAL PERIOD)

## 2018-08-13 PROCEDURE — ? COUNSELING

## 2018-08-13 ASSESSMENT — LOCATION ZONE DERM: LOCATION ZONE: FACE

## 2018-08-13 ASSESSMENT — LOCATION SIMPLE DESCRIPTION DERM: LOCATION SIMPLE: LEFT FOREHEAD

## 2018-08-13 ASSESSMENT — LOCATION DETAILED DESCRIPTION DERM: LOCATION DETAILED: LEFT SUPERIOR FOREHEAD

## 2018-08-13 NOTE — PROCEDURE: EXCISION
Size Of Margin In Cm: 0.5
Dressing: pressure dressing with telfa
Perilesional Excision Additional Text (Leave Blank If You Do Not Want): The margin was drawn around the clinically apparent lesion. Incisions were then made along these lines to the appropriate tissue plane and the lesion was extirpated.
No Repair - Repaired With Adjacent Surgical Defect Text (Leave Blank If You Do Not Want): After the excision the defect was repaired concurrently with another surgical defect which was in close approximation.
Spiral Flap Text: The defect edges were debeveled with a #15 scalpel blade.  Given the location of the defect, shape of the defect and the proximity to free margins a spiral flap was deemed most appropriate.  Using a sterile surgical marker, an appropriate rotation flap was drawn incorporating the defect and placing the expected incisions within the relaxed skin tension lines where possible. The area thus outlined was incised deep to adipose tissue with a #15 scalpel blade.  The skin margins were undermined to an appropriate distance in all directions utilizing iris scissors.
V-Y Flap Text: The defect edges were debeveled with a #15 scalpel blade.  Given the location of the defect, shape of the defect and the proximity to free margins a V-Y flap was deemed most appropriate.  Using a sterile surgical marker, an appropriate advancement flap was drawn incorporating the defect and placing the expected incisions within the relaxed skin tension lines where possible.    The area thus outlined was incised deep to adipose tissue with a #15 scalpel blade.  The skin margins were undermined to an appropriate distance in all directions utilizing iris scissors.
Repair Performed By Another Provider Text (Leave Blank If You Do Not Want): After the tissue was excised the defect was repaired by another provider.
Show Pathology Comment Variable: Yes
Epidermal Autograft Text: The defect edges were debeveled with a #15 scalpel blade.  Given the location of the defect, shape of the defect and the proximity to free margins an epidermal autograft was deemed most appropriate.  Using a sterile surgical marker, the primary defect shape was transferred to the donor site. The epidermal graft was then harvested.  The skin graft was then placed in the primary defect and oriented appropriately.
Skin Substitute Text: The defect edges were debeveled with a #15 scalpel blade.  Given the location of the defect, shape of the defect and the proximity to free margins a skin substitute graft was deemed most appropriate.  The graft material was trimmed to fit the size of the defect. The graft was then placed in the primary defect and oriented appropriately.
Transposition Flap Text: The defect edges were debeveled with a #15 scalpel blade.  Given the location of the defect and the proximity to free margins a transposition flap was deemed most appropriate.  Using a sterile surgical marker, an appropriate transposition flap was drawn incorporating the defect.    The area thus outlined was incised deep to adipose tissue with a #15 scalpel blade.  The skin margins were undermined to an appropriate distance in all directions utilizing iris scissors.
Complex Repair And Split-Thickness Skin Graft Text: The defect edges were debeveled with a #15 scalpel blade.  The primary defect was closed partially with a complex linear closure.  Given the location of the defect, shape of the defect and the proximity to free margins a split thickness skin graft was deemed most appropriate to repair the remaining defect.  The graft was trimmed to fit the size of the remaining defect.  The graft was then placed in the primary defect, oriented appropriately, and sutured into place.
Advancement-Rotation Flap Text: The defect edges were debeveled with a #15 scalpel blade.  Given the location of the defect, shape of the defect and the proximity to free margins an advancement-rotation flap was deemed most appropriate.  Using a sterile surgical marker, an appropriate flap was drawn incorporating the defect and placing the expected incisions within the relaxed skin tension lines where possible. The area thus outlined was incised deep to adipose tissue with a #15 scalpel blade.  The skin margins were undermined to an appropriate distance in all directions utilizing iris scissors.
Bilobed Flap Text: The defect edges were debeveled with a #15 scalpel blade.  Given the location of the defect and the proximity to free margins a bilobe flap was deemed most appropriate.  Using a sterile surgical marker, an appropriate bilobe flap drawn around the defect.    The area thus outlined was incised deep to adipose tissue with a #15 scalpel blade.  The skin margins were undermined to an appropriate distance in all directions utilizing iris scissors.
Partial Purse String (Simple) Text: Given the location of the defect and the characteristics of the surrounding skin a simple purse string closure was deemed most appropriate.  Undermining was performed circumferentially around the surgical defect.  A purse string suture was then placed and tightened. Wound tension of the circular defect prevented complete closure of the wound.
Curvilinear Excision Additional Text (Leave Blank If You Do Not Want): The margin was drawn around the clinically apparent lesion.  A curvilinear shape was then drawn on the skin incorporating the lesion and margins.  Incisions were then made along these lines to the appropriate tissue plane and the lesion was extirpated.
Keystone Flap Text: The defect edges were debeveled with a #15 scalpel blade.  Given the location of the defect, shape of the defect a keystone flap was deemed most appropriate.  Using a sterile surgical marker, an appropriate keystone flap was drawn incorporating the defect, outlining the appropriate donor tissue and placing the expected incisions within the relaxed skin tension lines where possible. The area thus outlined was incised deep to adipose tissue with a #15 scalpel blade.  The skin margins were undermined to an appropriate distance in all directions around the primary defect and laterally outward around the flap utilizing iris scissors.
Detail Level: Detailed
Island Pedicle Flap-Requiring Vessel Identification Text: The defect edges were debeveled with a #15 scalpel blade.  Given the location of the defect, shape of the defect and the proximity to free margins an island pedicle advancement flap was deemed most appropriate.  Using a sterile surgical marker, an appropriate advancement flap was drawn, based on the axial vessel mentioned above, incorporating the defect, outlining the appropriate donor tissue and placing the expected incisions within the relaxed skin tension lines where possible.    The area thus outlined was incised deep to adipose tissue with a #15 scalpel blade.  The skin margins were undermined to an appropriate distance in all directions around the primary defect and laterally outward around the island pedicle utilizing iris scissors.  There was minimal undermining beneath the pedicle flap.
Body Location Override (Optional - Billing Will Still Be Based On Selected Body Map Location If Applicable): Left distal posterior upper arm
Lazy S Intermediate Repair Preamble Text (Leave Blank If You Do Not Want): Undermining was performed with blunt dissection.
Repair Type: Complex
O-L Flap Text: The defect edges were debeveled with a #15 scalpel blade.  Given the location of the defect, shape of the defect and the proximity to free margins an O-L flap was deemed most appropriate.  Using a sterile surgical marker, an appropriate advancement flap was drawn incorporating the defect and placing the expected incisions within the relaxed skin tension lines where possible.    The area thus outlined was incised deep to adipose tissue with a #15 scalpel blade.  The skin margins were undermined to an appropriate distance in all directions utilizing iris scissors.
Epidermal Closure: running
Primary Defect Length (In Cm): 0
Dermal Autograft Text: The defect edges were debeveled with a #15 scalpel blade.  Given the location of the defect, shape of the defect and the proximity to free margins a dermal autograft was deemed most appropriate.  Using a sterile surgical marker, the primary defect shape was transferred to the donor site. The area thus outlined was incised deep to adipose tissue with a #15 scalpel blade.  The harvested graft was then trimmed of adipose and epidermal tissue until only dermis was left.  The skin graft was then placed in the primary defect and oriented appropriately.
Scalpel Size: 15 blade
Crescentic Advancement Flap Text: The defect edges were debeveled with a #15 scalpel blade.  Given the location of the defect and the proximity to free margins a crescentic advancement flap was deemed most appropriate.  Using a sterile surgical marker, the appropriate advancement flap was drawn incorporating the defect and placing the expected incisions within the relaxed skin tension lines where possible.    The area thus outlined was incised deep to adipose tissue with a #15 scalpel blade.  The skin margins were undermined to an appropriate distance in all directions utilizing iris scissors.
Complex Repair And Ftsg Text: The defect edges were debeveled with a #15 scalpel blade.  The primary defect was closed partially with a complex linear closure.  Given the location of the defect, shape of the defect and the proximity to free margins a full thickness skin graft was deemed most appropriate to repair the remaining defect.  The graft was trimmed to fit the size of the remaining defect.  The graft was then placed in the primary defect, oriented appropriately, and sutured into place.
Complex Repair Preamble Text (Leave Blank If You Do Not Want): Extensive wide undermining was performed.
Hemostasis: Electrocautery
Burow's Advancement Flap Text: The defect edges were debeveled with a #15 scalpel blade.  Given the location of the defect and the proximity to free margins a Burow's advancement flap was deemed most appropriate.  Using a sterile surgical marker, the appropriate advancement flap was drawn incorporating the defect and placing the expected incisions within the relaxed skin tension lines where possible.    The area thus outlined was incised deep to adipose tissue with a #15 scalpel blade.  The skin margins were undermined to an appropriate distance in all directions utilizing iris scissors.
Star Wedge Flap Text: The defect edges were debeveled with a #15 scalpel blade.  Given the location of the defect, shape of the defect and the proximity to free margins a star wedge flap was deemed most appropriate.  Using a sterile surgical marker, an appropriate rotation flap was drawn incorporating the defect and placing the expected incisions within the relaxed skin tension lines where possible. The area thus outlined was incised deep to adipose tissue with a #15 scalpel blade.  The skin margins were undermined to an appropriate distance in all directions utilizing iris scissors.
Curettage Prior To Excision?: No
Epidermal Closure Graft Donor Site (Optional): simple interrupted
Helical Rim Advancement Flap Text: The defect edges were debeveled with a #15 blade scalpel.  Given the location of the defect and the proximity to free margins (helical rim) a double helical rim advancement flap was deemed most appropriate.  Using a sterile surgical marker, the appropriate advancement flaps were drawn incorporating the defect and placing the expected incisions between the helical rim and antihelix where possible.  The area thus outlined was incised through and through with a #15 scalpel blade.  With a skin hook and iris scissors, the flaps were gently and sharply undermined and freed up.
Rotation Flap Text: The defect edges were debeveled with a #15 scalpel blade.  Given the location of the defect, shape of the defect and the proximity to free margins a rotation flap was deemed most appropriate.  Using a sterile surgical marker, an appropriate rotation flap was drawn incorporating the defect and placing the expected incisions within the relaxed skin tension lines where possible.    The area thus outlined was incised deep to adipose tissue with a #15 scalpel blade.  The skin margins were undermined to an appropriate distance in all directions utilizing iris scissors.
Tissue Cultured Epidermal Autograft Text: The defect edges were debeveled with a #15 scalpel blade.  Given the location of the defect, shape of the defect and the proximity to free margins a tissue cultured epidermal autograft was deemed most appropriate.  The graft was then trimmed to fit the size of the defect.  The graft was then placed in the primary defect and oriented appropriately.
Mastoid Interpolation Flap Text: A decision was made to reconstruct the defect utilizing an interpolation axial flap and a staged reconstruction.  A telfa template was made of the defect.  This telfa template was then used to outline the mastoid interpolation flap.  The donor area for the pedicle flap was then injected with anesthesia.  The flap was excised through the skin and subcutaneous tissue down to the layer of the underlying musculature.  The pedicle flap was carefully excised within this deep plane to maintain its blood supply.  The edges of the donor site were undermined.   The donor site was closed in a primary fashion.  The pedicle was then rotated into position and sutured.  Once the tube was sutured into place, adequate blood supply was confirmed with blanching and refill.  The pedicle was then wrapped with xeroform gauze and dressed appropriately with a telfa and gauze bandage to ensure continued blood supply and protect the attached pedicle.
Path Notes (To The Dermatopathologist): Please check margins.
Suture Removal: 14 days
W Plasty Text: The lesion was extirpated to the level of the fat with a #15 scalpel blade.  Given the location of the defect, shape of the defect and the proximity to free margins a W-plasty was deemed most appropriate for repair.  Using a sterile surgical marker, the appropriate transposition arms of the W-plasty were drawn incorporating the defect and placing the expected incisions within the relaxed skin tension lines where possible.    The area thus outlined was incised deep to adipose tissue with a #15 scalpel blade.  The skin margins were undermined to an appropriate distance in all directions utilizing iris scissors.  The opposing transposition arms were then transposed into place in opposite direction and anchored with interrupted buried subcutaneous sutures.
H Plasty Text: Given the location of the defect, shape of the defect and the proximity to free margins a H-plasty was deemed most appropriate for repair.  Using a sterile surgical marker, the appropriate advancement arms of the H-plasty were drawn incorporating the defect and placing the expected incisions within the relaxed skin tension lines where possible. The area thus outlined was incised deep to adipose tissue with a #15 scalpel blade. The skin margins were undermined to an appropriate distance in all directions utilizing iris scissors.  The opposing advancement arms were then advanced into place in opposite direction and anchored with interrupted buried subcutaneous sutures.
Complex Repair And M Plasty Text: The defect edges were debeveled with a #15 scalpel blade.  The primary defect was closed partially with a complex linear closure.  Given the location of the remaining defect, shape of the defect and the proximity to free margins an M plasty was deemed most appropriate for complete closure of the defect.  Using a sterile surgical marker, an appropriate advancement flap was drawn incorporating the defect and placing the expected incisions within the relaxed skin tension lines where possible.    The area thus outlined was incised deep to adipose tissue with a #15 scalpel blade.  The skin margins were undermined to an appropriate distance in all directions utilizing iris scissors.
Alar Island Pedicle Flap Text: The defect edges were debeveled with a #15 scalpel blade.  Given the location of the defect, shape of the defect and the proximity to the alar rim an island pedicle advancement flap was deemed most appropriate.  Using a sterile surgical marker, an appropriate advancement flap was drawn incorporating the defect, outlining the appropriate donor tissue and placing the expected incisions within the nasal ala running parallel to the alar rim. The area thus outlined was incised with a #15 scalpel blade.  The skin margins were undermined minimally to an appropriate distance in all directions around the primary defect and laterally outward around the island pedicle utilizing iris scissors.  There was minimal undermining beneath the pedicle flap.
Complex Repair And Bilobe Flap Text: The defect edges were debeveled with a #15 scalpel blade.  The primary defect was closed partially with a complex linear closure.  Given the location of the remaining defect, shape of the defect and the proximity to free margins a bilobe flap was deemed most appropriate for complete closure of the defect.  Using a sterile surgical marker, an appropriate advancement flap was drawn incorporating the defect and placing the expected incisions within the relaxed skin tension lines where possible.    The area thus outlined was incised deep to adipose tissue with a #15 scalpel blade.  The skin margins were undermined to an appropriate distance in all directions utilizing iris scissors.
Excision Method: Elliptical
Split-Thickness Skin Graft Text: The defect edges were debeveled with a #15 scalpel blade.  Given the location of the defect, shape of the defect and the proximity to free margins a split thickness skin graft was deemed most appropriate.  Using a sterile surgical marker, the primary defect shape was transferred to the donor site. The split thickness graft was then harvested.  The skin graft was then placed in the primary defect and oriented appropriately.
Double Island Pedicle Flap Text: The defect edges were debeveled with a #15 scalpel blade.  Given the location of the defect, shape of the defect and the proximity to free margins a double island pedicle advancement flap was deemed most appropriate.  Using a sterile surgical marker, an appropriate advancement flap was drawn incorporating the defect, outlining the appropriate donor tissue and placing the expected incisions within the relaxed skin tension lines where possible.    The area thus outlined was incised deep to adipose tissue with a #15 scalpel blade.  The skin margins were undermined to an appropriate distance in all directions around the primary defect and laterally outward around the island pedicle utilizing iris scissors.  There was minimal undermining beneath the pedicle flap.
Complex Repair And Melolabial Flap Text: The defect edges were debeveled with a #15 scalpel blade.  The primary defect was closed partially with a complex linear closure.  Given the location of the remaining defect, shape of the defect and the proximity to free margins a melolabial flap was deemed most appropriate for complete closure of the defect.  Using a sterile surgical marker, an appropriate advancement flap was drawn incorporating the defect and placing the expected incisions within the relaxed skin tension lines where possible.    The area thus outlined was incised deep to adipose tissue with a #15 scalpel blade.  The skin margins were undermined to an appropriate distance in all directions utilizing iris scissors.
Post-Care Instructions: I reviewed with the patient in detail post-care instructions. Patient is not to engage in any heavy lifting, exercise, or swimming for the next 14 days. Should the patient develop any fevers, chills, bleeding, severe pain patient will contact the office immediately.
Fusiform Excision Additional Text (Leave Blank If You Do Not Want): The margin was drawn around the clinically apparent lesion.  A fusiform shape was then drawn on the skin incorporating the lesion and margins.  Incisions were then made along these lines to the appropriate tissue plane and the lesion was extirpated.
Muscle Hinge Flap Text: The defect edges were debeveled with a #15 scalpel blade.  Given the size, depth and location of the defect and the proximity to free margins a muscle hinge flap was deemed most appropriate.  Using a sterile surgical marker, an appropriate hinge flap was drawn incorporating the defect. The area thus outlined was incised with a #15 scalpel blade.  The skin margins were undermined to an appropriate distance in all directions utilizing iris scissors.
Posterior Auricular Interpolation Flap Text: A decision was made to reconstruct the defect utilizing an interpolation axial flap and a staged reconstruction.  A telfa template was made of the defect.  This telfa template was then used to outline the posterior auricular interpolation flap.  The donor area for the pedicle flap was then injected with anesthesia.  The flap was excised through the skin and subcutaneous tissue down to the layer of the underlying musculature.  The pedicle flap was carefully excised within this deep plane to maintain its blood supply.  The edges of the donor site were undermined.   The donor site was closed in a primary fashion.  The pedicle was then rotated into position and sutured.  Once the tube was sutured into place, adequate blood supply was confirmed with blanching and refill.  The pedicle was then wrapped with xeroform gauze and dressed appropriately with a telfa and gauze bandage to ensure continued blood supply and protect the attached pedicle.
Composite Graft Text: The defect edges were debeveled with a #15 scalpel blade.  Given the location of the defect, shape of the defect, the proximity to free margins and the fact the defect was full thickness a composite graft was deemed most appropriate.  The defect was outline and then transferred to the donor site.  A full thickness graft was then excised from the donor site. The graft was then placed in the primary defect, oriented appropriately and then sutured into place.  The secondary defect was then repaired using a primary closure.
Complex Repair And Dorsal Nasal Flap Text: The defect edges were debeveled with a #15 scalpel blade.  The primary defect was closed partially with a complex linear closure.  Given the location of the remaining defect, shape of the defect and the proximity to free margins a dorsal nasal flap was deemed most appropriate for complete closure of the defect.  Using a sterile surgical marker, an appropriate flap was drawn incorporating the defect and placing the expected incisions within the relaxed skin tension lines where possible.    The area thus outlined was incised deep to adipose tissue with a #15 scalpel blade.  The skin margins were undermined to an appropriate distance in all directions utilizing iris scissors.
Lab: [id number]
Lab Facility: 
Cartilage Graft Text: The defect edges were debeveled with a #15 scalpel blade.  Given the location of the defect, shape of the defect, the fact the defect involved a full thickness cartilage defect a cartilage graft was deemed most appropriate.  An appropriate donor site was identified, cleansed, and anesthetized. The cartilage graft was then harvested and transferred to the recipient site, oriented appropriately and then sutured into place.  The secondary defect was then repaired using a primary closure.
Estimated Blood Loss (Cc): minimal
Bilateral Helical Rim Advancement Flap Text: The defect edges were debeveled with a #15 blade scalpel.  Given the location of the defect and the proximity to free margins (helical rim) a bilateral helical rim advancement flap was deemed most appropriate.  Using a sterile surgical marker, the appropriate advancement flaps were drawn incorporating the defect and placing the expected incisions between the helical rim and antihelix where possible.  The area thus outlined was incised through and through with a #15 scalpel blade.  With a skin hook and iris scissors, the flaps were gently and sharply undermined and freed up.
Excisional Biopsy Additional Text (Leave Blank If You Do Not Want): The margin was drawn around the clinically apparent lesion. An elliptical shape was then drawn on the skin incorporating the lesion and margins.  Incisions were then made along these lines to the appropriate tissue plane and the lesion was extirpated.
Mercedes Flap Text: The defect edges were debeveled with a #15 scalpel blade.  Given the location of the defect, shape of the defect and the proximity to free margins a Mercedes flap was deemed most appropriate.  Using a sterile surgical marker, an appropriate advancement flap was drawn incorporating the defect and placing the expected incisions within the relaxed skin tension lines where possible. The area thus outlined was incised deep to adipose tissue with a #15 scalpel blade.  The skin margins were undermined to an appropriate distance in all directions utilizing iris scissors.
A-T Advancement Flap Text: The defect edges were debeveled with a #15 scalpel blade.  Given the location of the defect, shape of the defect and the proximity to free margins an A-T advancement flap was deemed most appropriate.  Using a sterile surgical marker, an appropriate advancement flap was drawn incorporating the defect and placing the expected incisions within the relaxed skin tension lines where possible.    The area thus outlined was incised deep to adipose tissue with a #15 scalpel blade.  The skin margins were undermined to an appropriate distance in all directions utilizing iris scissors.
O-T Plasty Text: The defect edges were debeveled with a #15 scalpel blade.  Given the location of the defect, shape of the defect and the proximity to free margins an O-T plasty was deemed most appropriate.  Using a sterile surgical marker, an appropriate O-T plasty was drawn incorporating the defect and placing the expected incisions within the relaxed skin tension lines where possible.    The area thus outlined was incised deep to adipose tissue with a #15 scalpel blade.  The skin margins were undermined to an appropriate distance in all directions utilizing iris scissors.
Partial Purse String (Intermediate) Text: Given the location of the defect and the characteristics of the surrounding skin an intermediate purse string closure was deemed most appropriate.  Undermining was performed circumferentially around the surgical defect.  A purse string suture was then placed and tightened. Wound tension of the circular defect prevented complete closure of the wound.
Home Suture Removal Text: Patient was provided a home suture removal kit and will remove their sutures at home.  If they have any questions or difficulties they will call the office.
Ftsg Text: The defect edges were debeveled with a #15 scalpel blade.  Given the location of the defect, shape of the defect and the proximity to free margins a full thickness skin graft was deemed most appropriate.  Using a sterile surgical marker, the primary defect shape was transferred to the donor site. The area thus outlined was incised deep to adipose tissue with a #15 scalpel blade.  The harvested graft was then trimmed of adipose tissue until only dermis and epidermis was left.  The skin margins of the secondary defect were undermined to an appropriate distance in all directions utilizing iris scissors.  The secondary defect was closed with interrupted buried subcutaneous sutures.  The skin edges were then re-apposed with running  sutures.  The skin graft was then placed in the primary defect and oriented appropriately.
Undermining Location (Optional): in the superficial subcutaneous fat
O-T Advancement Flap Text: The defect edges were debeveled with a #15 scalpel blade.  Given the location of the defect, shape of the defect and the proximity to free margins an O-T advancement flap was deemed most appropriate.  Using a sterile surgical marker, an appropriate advancement flap was drawn incorporating the defect and placing the expected incisions within the relaxed skin tension lines where possible.    The area thus outlined was incised deep to adipose tissue with a #15 scalpel blade.  The skin margins were undermined to an appropriate distance in all directions utilizing iris scissors.
Xenograft Text: The defect edges were debeveled with a #15 scalpel blade.  Given the location of the defect, shape of the defect and the proximity to free margins a xenograft was deemed most appropriate.  The graft was then trimmed to fit the size of the defect.  The graft was then placed in the primary defect and oriented appropriately.
Complex Repair And Rotation Flap Text: The defect edges were debeveled with a #15 scalpel blade.  The primary defect was closed partially with a complex linear closure.  Given the location of the remaining defect, shape of the defect and the proximity to free margins a rotation flap was deemed most appropriate for complete closure of the defect.  Using a sterile surgical marker, an appropriate advancement flap was drawn incorporating the defect and placing the expected incisions within the relaxed skin tension lines where possible.    The area thus outlined was incised deep to adipose tissue with a #15 scalpel blade.  The skin margins were undermined to an appropriate distance in all directions utilizing iris scissors.
O-Z Plasty Text: The defect edges were debeveled with a #15 scalpel blade.  Given the location of the defect, shape of the defect and the proximity to free margins an O-Z plasty (double transposition flap) was deemed most appropriate.  Using a sterile surgical marker, the appropriate transposition flaps were drawn incorporating the defect and placing the expected incisions within the relaxed skin tension lines where possible.    The area thus outlined was incised deep to adipose tissue with a #15 scalpel blade.  The skin margins were undermined to an appropriate distance in all directions utilizing iris scissors.  Hemostasis was achieved with electrocautery.  The flaps were then transposed into place, one clockwise and the other counterclockwise, and anchored with interrupted buried subcutaneous sutures.
Island Pedicle Flap Text: The defect edges were debeveled with a #15 scalpel blade.  Given the location of the defect, shape of the defect and the proximity to free margins an island pedicle advancement flap was deemed most appropriate.  Using a sterile surgical marker, an appropriate advancement flap was drawn incorporating the defect, outlining the appropriate donor tissue and placing the expected incisions within the relaxed skin tension lines where possible.    The area thus outlined was incised deep to adipose tissue with a #15 scalpel blade.  The skin margins were undermined to an appropriate distance in all directions around the primary defect and laterally outward around the island pedicle utilizing iris scissors.  There was minimal undermining beneath the pedicle flap.
Complex Repair And Dermal Autograft Text: The defect edges were debeveled with a #15 scalpel blade.  The primary defect was closed partially with a complex linear closure.  Given the location of the defect, shape of the defect and the proximity to free margins an dermal autograft was deemed most appropriate to repair the remaining defect.  The graft was trimmed to fit the size of the remaining defect.  The graft was then placed in the primary defect, oriented appropriately, and sutured into place.
Complex Repair And Single Advancement Flap Text: The defect edges were debeveled with a #15 scalpel blade.  The primary defect was closed partially with a complex linear closure.  Given the location of the remaining defect, shape of the defect and the proximity to free margins a single advancement flap was deemed most appropriate for complete closure of the defect.  Using a sterile surgical marker, an appropriate advancement flap was drawn incorporating the defect and placing the expected incisions within the relaxed skin tension lines where possible.    The area thus outlined was incised deep to adipose tissue with a #15 scalpel blade.  The skin margins were undermined to an appropriate distance in all directions utilizing iris scissors.
Trilobed Flap Text: The defect edges were debeveled with a #15 scalpel blade.  Given the location of the defect and the proximity to free margins a trilobed flap was deemed most appropriate.  Using a sterile surgical marker, an appropriate trilobed flap drawn around the defect.    The area thus outlined was incised deep to adipose tissue with a #15 scalpel blade.  The skin margins were undermined to an appropriate distance in all directions utilizing iris scissors.
Ear Star Wedge Flap Text: The defect edges were debeveled with a #15 blade scalpel.  Given the location of the defect and the proximity to free margins (helical rim) an ear star wedge flap was deemed most appropriate.  Using a sterile surgical marker, the appropriate flap was drawn incorporating the defect and placing the expected incisions between the helical rim and antihelix where possible.  The area thus outlined was incised through and through with a #15 scalpel blade.
Island Pedicle Flap With Canthal Suspension Text: The defect edges were debeveled with a #15 scalpel blade.  Given the location of the defect, shape of the defect and the proximity to free margins an island pedicle advancement flap was deemed most appropriate.  Using a sterile surgical marker, an appropriate advancement flap was drawn incorporating the defect, outlining the appropriate donor tissue and placing the expected incisions within the relaxed skin tension lines where possible. The area thus outlined was incised deep to adipose tissue with a #15 scalpel blade.  The skin margins were undermined to an appropriate distance in all directions around the primary defect and laterally outward around the island pedicle utilizing iris scissors.  There was minimal undermining beneath the pedicle flap. A suspension suture was placed in the canthal tendon to prevent tension and prevent ectropion.
Complex Repair And W Plasty Text: The defect edges were debeveled with a #15 scalpel blade.  The primary defect was closed partially with a complex linear closure.  Given the location of the remaining defect, shape of the defect and the proximity to free margins a W plasty was deemed most appropriate for complete closure of the defect.  Using a sterile surgical marker, an appropriate advancement flap was drawn incorporating the defect and placing the expected incisions within the relaxed skin tension lines where possible.    The area thus outlined was incised deep to adipose tissue with a #15 scalpel blade.  The skin margins were undermined to an appropriate distance in all directions utilizing iris scissors.
Z Plasty Text: The lesion was extirpated to the level of the fat with a #15 scalpel blade.  Given the location of the defect, shape of the defect and the proximity to free margins a Z-plasty was deemed most appropriate for repair.  Using a sterile surgical marker, the appropriate transposition arms of the Z-plasty were drawn incorporating the defect and placing the expected incisions within the relaxed skin tension lines where possible.    The area thus outlined was incised deep to adipose tissue with a #15 scalpel blade.  The skin margins were undermined to an appropriate distance in all directions utilizing iris scissors.  The opposing transposition arms were then transposed into place in opposite direction and anchored with interrupted buried subcutaneous sutures.
Complex Repair And Double M Plasty Text: The defect edges were debeveled with a #15 scalpel blade.  The primary defect was closed partially with a complex linear closure.  Given the location of the remaining defect, shape of the defect and the proximity to free margins a double M plasty was deemed most appropriate for complete closure of the defect.  Using a sterile surgical marker, an appropriate advancement flap was drawn incorporating the defect and placing the expected incisions within the relaxed skin tension lines where possible.    The area thus outlined was incised deep to adipose tissue with a #15 scalpel blade.  The skin margins were undermined to an appropriate distance in all directions utilizing iris scissors.
Additional Anesthesia Volume In Cc: 10
Complex Repair And O-L Flap Text: The defect edges were debeveled with a #15 scalpel blade.  The primary defect was closed partially with a complex linear closure.  Given the location of the remaining defect, shape of the defect and the proximity to free margins an O-L flap was deemed most appropriate for complete closure of the defect.  Using a sterile surgical marker, an appropriate flap was drawn incorporating the defect and placing the expected incisions within the relaxed skin tension lines where possible.    The area thus outlined was incised deep to adipose tissue with a #15 scalpel blade.  The skin margins were undermined to an appropriate distance in all directions utilizing iris scissors.
Complex Repair And O-T Advancement Flap Text: The defect edges were debeveled with a #15 scalpel blade.  The primary defect was closed partially with a complex linear closure.  Given the location of the remaining defect, shape of the defect and the proximity to free margins an O-T advancement flap was deemed most appropriate for complete closure of the defect.  Using a sterile surgical marker, an appropriate advancement flap was drawn incorporating the defect and placing the expected incisions within the relaxed skin tension lines where possible.    The area thus outlined was incised deep to adipose tissue with a #15 scalpel blade.  The skin margins were undermined to an appropriate distance in all directions utilizing iris scissors.
Complex Repair And Skin Substitute Graft Text: The defect edges were debeveled with a #15 scalpel blade.  The primary defect was closed partially with a complex linear closure.  Given the location of the remaining defect, shape of the defect and the proximity to free margins a skin substitute graft was deemed most appropriate to repair the remaining defect.  The graft was trimmed to fit the size of the remaining defect.  The graft was then placed in the primary defect, oriented appropriately, and sutured into place.
Rhombic Flap Text: The defect edges were debeveled with a #15 scalpel blade.  Given the location of the defect and the proximity to free margins a rhombic flap was deemed most appropriate.  Using a sterile surgical marker, an appropriate rhombic flap was drawn incorporating the defect.    The area thus outlined was incised deep to adipose tissue with a #15 scalpel blade.  The skin margins were undermined to an appropriate distance in all directions utilizing iris scissors.
Dorsal Nasal Flap Text: The defect edges were debeveled with a #15 scalpel blade.  Given the location of the defect and the proximity to free margins a dorsal nasal flap was deemed most appropriate.  Using a sterile surgical marker, an appropriate dorsal nasal flap was drawn around the defect.    The area thus outlined was incised deep to adipose tissue with a #15 scalpel blade.  The skin margins were undermined to an appropriate distance in all directions utilizing iris scissors.
Slit Excision Additional Text (Leave Blank If You Do Not Want): A linear line was drawn on the skin overlying the lesion. An incision was made slowly until the lesion was visualized.  Once visualized, the lesion was removed with blunt dissection.
Saucerization Excision Additional Text (Leave Blank If You Do Not Want): The margin was drawn around the clinically apparent lesion.  Incisions were then made along these lines, in a tangential fashion, to the appropriate tissue plane and the lesion was extirpated.
Complex Repair And Double Advancement Flap Text: The defect edges were debeveled with a #15 scalpel blade.  The primary defect was closed partially with a complex linear closure.  Given the location of the remaining defect, shape of the defect and the proximity to free margins a double advancement flap was deemed most appropriate for complete closure of the defect.  Using a sterile surgical marker, an appropriate advancement flap was drawn incorporating the defect and placing the expected incisions within the relaxed skin tension lines where possible.    The area thus outlined was incised deep to adipose tissue with a #15 scalpel blade.  The skin margins were undermined to an appropriate distance in all directions utilizing iris scissors.
Hatchet Flap Text: The defect edges were debeveled with a #15 scalpel blade.  Given the location of the defect, shape of the defect and the proximity to free margins a hatchet flap was deemed most appropriate.  Using a sterile surgical marker, an appropriate hatchet flap was drawn incorporating the defect and placing the expected incisions within the relaxed skin tension lines where possible.    The area thus outlined was incised deep to adipose tissue with a #15 scalpel blade.  The skin margins were undermined to an appropriate distance in all directions utilizing iris scissors.
Wound Care: Petrolatum
Graft Donor Site Bandage (Optional-Leave Blank If You Don't Want In Note): Steri-strips and a pressure bandage were applied to the donor site.
Banner Transposition Flap Text: The defect edges were debeveled with a #15 scalpel blade.  Given the location of the defect and the proximity to free margins a Banner transposition flap was deemed most appropriate.  Using a sterile surgical marker, an appropriate flap drawn around the defect. The area thus outlined was incised deep to adipose tissue with a #15 scalpel blade.  The skin margins were undermined to an appropriate distance in all directions utilizing iris scissors.
Lip Wedge Excision Repair Text: Given the location of the defect and the proximity to free margins a full thickness wedge repair was deemed most appropriate.  Using a sterile surgical marker, the appropriate repair was drawn incorporating the defect and placing the expected incisions perpendicular to the vermilion border.  The vermilion border was also meticulously outlined to ensure appropriate reapproximation during the repair.  The area thus outlined was incised through and through with a #15 scalpel blade.  The muscularis and dermis were reaproximated with deep sutures following hemostasis. Care was taken to realign the vermilion border before proceeding with the superficial closure.  Once the vermilion was realigned the superfical and mucosal closure was finished.
Complex Repair And Rhombic Flap Text: The defect edges were debeveled with a #15 scalpel blade.  The primary defect was closed partially with a complex linear closure.  Given the location of the remaining defect, shape of the defect and the proximity to free margins a rhombic flap was deemed most appropriate for complete closure of the defect.  Using a sterile surgical marker, an appropriate advancement flap was drawn incorporating the defect and placing the expected incisions within the relaxed skin tension lines where possible.    The area thus outlined was incised deep to adipose tissue with a #15 scalpel blade.  The skin margins were undermined to an appropriate distance in all directions utilizing iris scissors.
Melolabial Interpolation Flap Text: A decision was made to reconstruct the defect utilizing an interpolation axial flap and a staged reconstruction.  A telfa template was made of the defect.  This telfa template was then used to outline the melolabial interpolation flap.  The donor area for the pedicle flap was then injected with anesthesia.  The flap was excised through the skin and subcutaneous tissue down to the layer of the underlying musculature.  The pedicle flap was carefully excised within this deep plane to maintain its blood supply.  The edges of the donor site were undermined.   The donor site was closed in a primary fashion.  The pedicle was then rotated into position and sutured.  Once the tube was sutured into place, adequate blood supply was confirmed with blanching and refill.  The pedicle was then wrapped with xeroform gauze and dressed appropriately with a telfa and gauze bandage to ensure continued blood supply and protect the attached pedicle.
Excision Depth: adipose tissue
Modified Advancement Flap Text: The defect edges were debeveled with a #15 scalpel blade.  Given the location of the defect, shape of the defect and the proximity to free margins a modified advancement flap was deemed most appropriate.  Using a sterile surgical marker, an appropriate advancement flap was drawn incorporating the defect and placing the expected incisions within the relaxed skin tension lines where possible.    The area thus outlined was incised deep to adipose tissue with a #15 scalpel blade.  The skin margins were undermined to an appropriate distance in all directions utilizing iris scissors.
S Plasty Text: Given the location and shape of the defect, and the orientation of relaxed skin tension lines, an S-plasty was deemed most appropriate for repair.  Using a sterile surgical marker, the appropriate outline of the S-plasty was drawn, incorporating the defect and placing the expected incisions within the relaxed skin tension lines where possible.  The area thus outlined was incised deep to adipose tissue with a #15 scalpel blade.  The skin margins were undermined to an appropriate distance in all directions utilizing iris scissors. The skin flaps were advanced over the defect.  The opposing margins were then approximated with interrupted buried subcutaneous sutures.
Anesthesia Type: 1% lidocaine with epinephrine
Complex Repair And Z Plasty Text: The defect edges were debeveled with a #15 scalpel blade.  The primary defect was closed partially with a complex linear closure.  Given the location of the remaining defect, shape of the defect and the proximity to free margins a Z plasty was deemed most appropriate for complete closure of the defect.  Using a sterile surgical marker, an appropriate advancement flap was drawn incorporating the defect and placing the expected incisions within the relaxed skin tension lines where possible.    The area thus outlined was incised deep to adipose tissue with a #15 scalpel blade.  The skin margins were undermined to an appropriate distance in all directions utilizing iris scissors.
Purse String (Simple) Text: Given the location of the defect and the characteristics of the surrounding skin a purse string simple closure was deemed most appropriate.  Undermining was performed circumferentially around the surgical defect.  A purse string suture was then placed and tightened.
Deep Sutures: 3-0 Maxon
Pre-Excision Curettage Text (Leave Blank If You Do Not Want): Prior to drawing the surgical margin the visible lesion was removed with electrodesiccation and curettage to clearly define the lesion size.
Billing Type: Third-Party Bill
Complex Repair And Transposition Flap Text: The defect edges were debeveled with a #15 scalpel blade.  The primary defect was closed partially with a complex linear closure.  Given the location of the remaining defect, shape of the defect and the proximity to free margins a transposition flap was deemed most appropriate for complete closure of the defect.  Using a sterile surgical marker, an appropriate advancement flap was drawn incorporating the defect and placing the expected incisions within the relaxed skin tension lines where possible.    The area thus outlined was incised deep to adipose tissue with a #15 scalpel blade.  The skin margins were undermined to an appropriate distance in all directions utilizing iris scissors.
Advancement Flap (Double) Text: The defect edges were debeveled with a #15 scalpel blade.  Given the location of the defect and the proximity to free margins a double advancement flap was deemed most appropriate.  Using a sterile surgical marker, the appropriate advancement flaps were drawn incorporating the defect and placing the expected incisions within the relaxed skin tension lines where possible.    The area thus outlined was incised deep to adipose tissue with a #15 scalpel blade.  The skin margins were undermined to an appropriate distance in all directions utilizing iris scissors.
Epidermal Sutures: 4-0 Nylon
Size Of Lesion In Cm: 1.3
Consent was obtained from the patient. The risks and benefits to therapy were discussed in detail. Specifically, the risks of infection, scarring, bleeding, prolonged wound healing, incomplete removal, allergy to anesthesia, nerve injury and recurrence were addressed. Prior to the procedure, the treatment site was clearly identified and confirmed by the patient. All components of Universal Protocol/PAUSE Rule completed.
Advancement Flap (Single) Text: The defect edges were debeveled with a #15 scalpel blade.  Given the location of the defect and the proximity to free margins a single advancement flap was deemed most appropriate.  Using a sterile surgical marker, an appropriate advancement flap was drawn incorporating the defect and placing the expected incisions within the relaxed skin tension lines where possible.    The area thus outlined was incised deep to adipose tissue with a #15 scalpel blade.  The skin margins were undermined to an appropriate distance in all directions utilizing iris scissors.
Complex Repair And Tissue Cultured Epidermal Autograft Text: The defect edges were debeveled with a #15 scalpel blade.  The primary defect was closed partially with a complex linear closure.  Given the location of the defect, shape of the defect and the proximity to free margins an tissue cultured epidermal autograft was deemed most appropriate to repair the remaining defect.  The graft was trimmed to fit the size of the remaining defect.  The graft was then placed in the primary defect, oriented appropriately, and sutured into place.
Elliptical Excision Additional Text (Leave Blank If You Do Not Want): The margin was drawn around the clinically apparent lesion.  An elliptical shape was then drawn on the skin incorporating the lesion and margins.  Incisions were then made along these lines to the appropriate tissue plane and the lesion was extirpated.
Cheek Interpolation Flap Text: A decision was made to reconstruct the defect utilizing an interpolation axial flap and a staged reconstruction.  A telfa template was made of the defect.  This telfa template was then used to outline the Cheek Interpolation flap.  The donor area for the pedicle flap was then injected with anesthesia.  The flap was excised through the skin and subcutaneous tissue down to the layer of the underlying musculature.  The interpolation flap was carefully excised within this deep plane to maintain its blood supply.  The edges of the donor site were undermined.   The donor site was closed in a primary fashion.  The pedicle was then rotated into position and sutured.  Once the tube was sutured into place, adequate blood supply was confirmed with blanching and refill.  The pedicle was then wrapped with xeroform gauze and dressed appropriately with a telfa and gauze bandage to ensure continued blood supply and protect the attached pedicle.
Melolabial Transposition Flap Text: The defect edges were debeveled with a #15 scalpel blade.  Given the location of the defect and the proximity to free margins a melolabial flap was deemed most appropriate.  Using a sterile surgical marker, an appropriate melolabial transposition flap was drawn incorporating the defect.    The area thus outlined was incised deep to adipose tissue with a #15 scalpel blade.  The skin margins were undermined to an appropriate distance in all directions utilizing iris scissors.
Bilobed Transposition Flap Text: The defect edges were debeveled with a #15 scalpel blade.  Given the location of the defect and the proximity to free margins a bilobed transposition flap was deemed most appropriate.  Using a sterile surgical marker, an appropriate bilobe flap drawn around the defect.    The area thus outlined was incised deep to adipose tissue with a #15 scalpel blade.  The skin margins were undermined to an appropriate distance in all directions utilizing iris scissors.
Bi-Rhombic Flap Text: The defect edges were debeveled with a #15 scalpel blade.  Given the location of the defect and the proximity to free margins a bi-rhombic flap was deemed most appropriate.  Using a sterile surgical marker, an appropriate rhombic flap was drawn incorporating the defect. The area thus outlined was incised deep to adipose tissue with a #15 scalpel blade.  The skin margins were undermined to an appropriate distance in all directions utilizing iris scissors.
Complex Repair And Epidermal Autograft Text: The defect edges were debeveled with a #15 scalpel blade.  The primary defect was closed partially with a complex linear closure.  Given the location of the defect, shape of the defect and the proximity to free margins an epidermal autograft was deemed most appropriate to repair the remaining defect.  The graft was trimmed to fit the size of the remaining defect.  The graft was then placed in the primary defect, oriented appropriately, and sutured into place.
Purse String (Intermediate) Text: Given the location of the defect and the characteristics of the surrounding skin a purse string intermediate closure was deemed most appropriate.  Undermining was performed circumfirentially around the surgical defect.  A purse string suture was then placed and tightened.
Interpolation Flap Text: A decision was made to reconstruct the defect utilizing an interpolation axial flap and a staged reconstruction.  A telfa template was made of the defect.  This telfa template was then used to outline the interpolation flap.  The donor area for the pedicle flap was then injected with anesthesia.  The flap was excised through the skin and subcutaneous tissue down to the layer of the underlying musculature.  The interpolation flap was carefully excised within this deep plane to maintain its blood supply.  The edges of the donor site were undermined.   The donor site was closed in a primary fashion.  The pedicle was then rotated into position and sutured.  Once the tube was sutured into place, adequate blood supply was confirmed with blanching and refill.  The pedicle was then wrapped with xeroform gauze and dressed appropriately with a telfa and gauze bandage to ensure continued blood supply and protect the attached pedicle.
Cheek-To-Nose Interpolation Flap Text: A decision was made to reconstruct the defect utilizing an interpolation axial flap and a staged reconstruction.  A telfa template was made of the defect.  This telfa template was then used to outline the Cheek-To-Nose Interpolation flap.  The donor area for the pedicle flap was then injected with anesthesia.  The flap was excised through the skin and subcutaneous tissue down to the layer of the underlying musculature.  The interpolation flap was carefully excised within this deep plane to maintain its blood supply.  The edges of the donor site were undermined.   The donor site was closed in a primary fashion.  The pedicle was then rotated into position and sutured.  Once the tube was sutured into place, adequate blood supply was confirmed with blanching and refill.  The pedicle was then wrapped with xeroform gauze and dressed appropriately with a telfa and gauze bandage to ensure continued blood supply and protect the attached pedicle.
Surgeon Performing The Repair (Optional): Pa Andrews MD
Positioning (Leave Blank If You Do Not Want): The patient was placed in a comfortable position exposing the surgical site.
Intermediate / Complex Repair - Final Wound Length In Cm: 5.3
Paramedian Forehead Flap Text: A decision was made to reconstruct the defect utilizing an interpolation axial flap and a staged reconstruction.  A telfa template was made of the defect.  This telfa template was then used to outline the paramedian forehead pedicle flap.  The donor area for the pedicle flap was then injected with anesthesia.  The flap was excised through the skin and subcutaneous tissue down to the layer of the underlying musculature.  The pedicle flap was carefully excised within this deep plane to maintain its blood supply.  The edges of the donor site were undermined.   The donor site was closed in a primary fashion.  The pedicle was then rotated into position and sutured.  Once the tube was sutured into place, adequate blood supply was confirmed with blanching and refill.  The pedicle was then wrapped with xeroform gauze and dressed appropriately with a telfa and gauze bandage to ensure continued blood supply and protect the attached pedicle.
Complex Repair And A-T Advancement Flap Text: The defect edges were debeveled with a #15 scalpel blade.  The primary defect was closed partially with a complex linear closure.  Given the location of the remaining defect, shape of the defect and the proximity to free margins an A-T advancement flap was deemed most appropriate for complete closure of the defect.  Using a sterile surgical marker, an appropriate advancement flap was drawn incorporating the defect and placing the expected incisions within the relaxed skin tension lines where possible.    The area thus outlined was incised deep to adipose tissue with a #15 scalpel blade.  The skin margins were undermined to an appropriate distance in all directions utilizing iris scissors.
Complex Repair And V-Y Plasty Text: The defect edges were debeveled with a #15 scalpel blade.  The primary defect was closed partially with a complex linear closure.  Given the location of the remaining defect, shape of the defect and the proximity to free margins a V-Y plasty was deemed most appropriate for complete closure of the defect.  Using a sterile surgical marker, an appropriate advancement flap was drawn incorporating the defect and placing the expected incisions within the relaxed skin tension lines where possible.    The area thus outlined was incised deep to adipose tissue with a #15 scalpel blade.  The skin margins were undermined to an appropriate distance in all directions utilizing iris scissors.
V-Y Plasty Text: The defect edges were debeveled with a #15 scalpel blade.  Given the location of the defect, shape of the defect and the proximity to free margins an V-Y advancement flap was deemed most appropriate.  Using a sterile surgical marker, an appropriate advancement flap was drawn incorporating the defect and placing the expected incisions within the relaxed skin tension lines where possible.    The area thus outlined was incised deep to adipose tissue with a #15 scalpel blade.  The skin margins were undermined to an appropriate distance in all directions utilizing iris scissors.
Mucosal Advancement Flap Text: Given the location of the defect, shape of the defect and the proximity to free margins a mucosal advancement flap was deemed most appropriate. Incisions were made with a 15 blade scalpel in the appropriate fashion along the cutaneous vermilion border and the mucosal lip. The remaining actinically damaged mucosal tissue was excised.  The mucosal advancement flap was then elevated to the gingival sulcus with care taken to preserve the neurovascular structures and advanced into the primary defect. Care was taken to ensure that precise realignment of the vermilion border was achieved.
Complex Repair And Modified Advancement Flap Text: The defect edges were debeveled with a #15 scalpel blade.  The primary defect was closed partially with a complex linear closure.  Given the location of the remaining defect, shape of the defect and the proximity to free margins a modified advancement flap was deemed most appropriate for complete closure of the defect.  Using a sterile surgical marker, an appropriate advancement flap was drawn incorporating the defect and placing the expected incisions within the relaxed skin tension lines where possible.    The area thus outlined was incised deep to adipose tissue with a #15 scalpel blade.  The skin margins were undermined to an appropriate distance in all directions utilizing iris scissors.
Complex Repair And Xenograft Text: The defect edges were debeveled with a #15 scalpel blade.  The primary defect was closed partially with a complex linear closure.  Given the location of the defect, shape of the defect and the proximity to free margins a xenograft was deemed most appropriate to repair the remaining defect.  The graft was trimmed to fit the size of the remaining defect.  The graft was then placed in the primary defect, oriented appropriately, and sutured into place.

## 2018-08-13 NOTE — PROCEDURE: SUTURE REMOVAL (GLOBAL PERIOD)
Detail Level: Detailed
Add 01704 Cpt? (Important Note: In 2017 The Use Of 07022 Is Being Tracked By Cms To Determine Future Global Period Reimbursement For Global Periods): no

## 2018-08-28 ENCOUNTER — APPOINTMENT (RX ONLY)
Dept: URBAN - METROPOLITAN AREA CLINIC 31 | Facility: CLINIC | Age: 83
Setting detail: DERMATOLOGY
End: 2018-08-28

## 2018-08-28 DIAGNOSIS — Z48.02 ENCOUNTER FOR REMOVAL OF SUTURES: ICD-10-CM

## 2018-08-28 PROCEDURE — ? SUTURE REMOVAL (GLOBAL PERIOD)

## 2018-08-28 ASSESSMENT — LOCATION DETAILED DESCRIPTION DERM: LOCATION DETAILED: LEFT DISTAL POSTERIOR UPPER ARM

## 2018-08-28 ASSESSMENT — LOCATION SIMPLE DESCRIPTION DERM: LOCATION SIMPLE: LEFT UPPER ARM

## 2018-08-28 ASSESSMENT — LOCATION ZONE DERM: LOCATION ZONE: ARM

## 2018-08-28 NOTE — PROCEDURE: SUTURE REMOVAL (GLOBAL PERIOD)
Detail Level: Detailed
Add 21252 Cpt? (Important Note: In 2017 The Use Of 62158 Is Being Tracked By Cms To Determine Future Global Period Reimbursement For Global Periods): no

## 2019-01-14 ENCOUNTER — APPOINTMENT (RX ONLY)
Dept: URBAN - METROPOLITAN AREA CLINIC 31 | Facility: CLINIC | Age: 84
Setting detail: DERMATOLOGY
End: 2019-01-14

## 2019-01-14 DIAGNOSIS — D18.0 HEMANGIOMA: ICD-10-CM

## 2019-01-14 DIAGNOSIS — L82.1 OTHER SEBORRHEIC KERATOSIS: ICD-10-CM

## 2019-01-14 DIAGNOSIS — L85.3 XEROSIS CUTIS: ICD-10-CM

## 2019-01-14 DIAGNOSIS — L57.0 ACTINIC KERATOSIS: ICD-10-CM

## 2019-01-14 DIAGNOSIS — L57.8 OTHER SKIN CHANGES DUE TO CHRONIC EXPOSURE TO NONIONIZING RADIATION: ICD-10-CM

## 2019-01-14 DIAGNOSIS — L11.1 TRANSIENT ACANTHOLYTIC DERMATOSIS [GROVER]: ICD-10-CM

## 2019-01-14 DIAGNOSIS — D22 MELANOCYTIC NEVI: ICD-10-CM

## 2019-01-14 PROBLEM — D18.01 HEMANGIOMA OF SKIN AND SUBCUTANEOUS TISSUE: Status: ACTIVE | Noted: 2019-01-14

## 2019-01-14 PROBLEM — D48.5 NEOPLASM OF UNCERTAIN BEHAVIOR OF SKIN: Status: ACTIVE | Noted: 2019-01-14

## 2019-01-14 PROBLEM — D22.5 MELANOCYTIC NEVI OF TRUNK: Status: ACTIVE | Noted: 2019-01-14

## 2019-01-14 PROCEDURE — 17004 DESTROY PREMAL LESIONS 15/>: CPT

## 2019-01-14 PROCEDURE — ? COUNSELING

## 2019-01-14 PROCEDURE — ? LIQUID NITROGEN

## 2019-01-14 PROCEDURE — 11103 TANGNTL BX SKIN EA SEP/ADDL: CPT

## 2019-01-14 PROCEDURE — ? PRESCRIPTION

## 2019-01-14 PROCEDURE — 11102 TANGNTL BX SKIN SINGLE LES: CPT | Mod: 59

## 2019-01-14 PROCEDURE — ? BIOPSY BY SHAVE METHOD

## 2019-01-14 PROCEDURE — 99214 OFFICE O/P EST MOD 30 MIN: CPT | Mod: 25

## 2019-01-14 RX ORDER — TRIAMCINOLONE ACETONIDE 1 MG/G
CREAM TOPICAL BID
Qty: 1 | Refills: 3 | Status: ERX | COMMUNITY
Start: 2019-01-14

## 2019-01-14 RX ADMIN — TRIAMCINOLONE ACETONIDE: 1 CREAM TOPICAL at 00:00

## 2019-01-14 ASSESSMENT — LOCATION SIMPLE DESCRIPTION DERM
LOCATION SIMPLE: CHEST
LOCATION SIMPLE: RIGHT FOREARM
LOCATION SIMPLE: RIGHT CHEEK
LOCATION SIMPLE: LEFT ZYGOMA
LOCATION SIMPLE: RIGHT LOWER BACK
LOCATION SIMPLE: ABDOMEN
LOCATION SIMPLE: LEFT FOREHEAD
LOCATION SIMPLE: RIGHT HAND
LOCATION SIMPLE: LEFT HAND
LOCATION SIMPLE: LEFT ANTERIOR NECK
LOCATION SIMPLE: RIGHT UPPER BACK
LOCATION SIMPLE: LEFT FOREARM
LOCATION SIMPLE: LOWER BACK
LOCATION SIMPLE: RIGHT UPPER ARM
LOCATION SIMPLE: LEFT EAR
LOCATION SIMPLE: RIGHT TEMPLE
LOCATION SIMPLE: LEFT CHEEK
LOCATION SIMPLE: RIGHT SHOULDER
LOCATION SIMPLE: RIGHT FOREHEAD
LOCATION SIMPLE: RIGHT SCALP
LOCATION SIMPLE: LEFT UPPER BACK
LOCATION SIMPLE: RIGHT ZYGOMA
LOCATION SIMPLE: LEFT TEMPLE

## 2019-01-14 ASSESSMENT — LOCATION ZONE DERM
LOCATION ZONE: SCALP
LOCATION ZONE: EAR
LOCATION ZONE: NECK
LOCATION ZONE: FACE
LOCATION ZONE: TRUNK
LOCATION ZONE: ARM
LOCATION ZONE: HAND

## 2019-01-14 ASSESSMENT — LOCATION DETAILED DESCRIPTION DERM
LOCATION DETAILED: RIGHT DISTAL DORSAL FOREARM
LOCATION DETAILED: LEFT LATERAL MALAR CHEEK
LOCATION DETAILED: LEFT ULNAR DORSAL HAND
LOCATION DETAILED: LEFT MEDIAL ZYGOMA
LOCATION DETAILED: RIGHT INFERIOR MEDIAL MIDBACK
LOCATION DETAILED: LEFT SUPERIOR HELIX
LOCATION DETAILED: SUPERIOR LUMBAR SPINE
LOCATION DETAILED: LEFT RADIAL DORSAL HAND
LOCATION DETAILED: RIGHT LATERAL ABDOMEN
LOCATION DETAILED: LEFT MEDIAL TEMPLE
LOCATION DETAILED: RIGHT LATERAL ZYGOMA
LOCATION DETAILED: PERIUMBILICAL SKIN
LOCATION DETAILED: LEFT LATERAL FOREHEAD
LOCATION DETAILED: RIGHT CENTRAL FRONTAL SCALP
LOCATION DETAILED: LEFT SUPERIOR LATERAL FOREHEAD
LOCATION DETAILED: LEFT SUPERIOR FOREHEAD
LOCATION DETAILED: RIGHT MEDIAL INFERIOR CHEST
LOCATION DETAILED: LEFT LATERAL ABDOMEN
LOCATION DETAILED: LEFT VENTRAL PROXIMAL FOREARM
LOCATION DETAILED: LEFT INFERIOR MEDIAL UPPER BACK
LOCATION DETAILED: LEFT CENTRAL MALAR CHEEK
LOCATION DETAILED: RIGHT SUPERIOR FOREHEAD
LOCATION DETAILED: RIGHT PROXIMAL DORSAL FOREARM
LOCATION DETAILED: RIGHT ULNAR DORSAL HAND
LOCATION DETAILED: RIGHT SUPERIOR MEDIAL MALAR CHEEK
LOCATION DETAILED: RIGHT DORSAL MIDDLE METACARPOPHALANGEAL JOINT
LOCATION DETAILED: LEFT INFERIOR CENTRAL MALAR CHEEK
LOCATION DETAILED: RIGHT POSTERIOR SHOULDER
LOCATION DETAILED: RIGHT CENTRAL TEMPLE
LOCATION DETAILED: RIGHT INFERIOR MEDIAL UPPER BACK
LOCATION DETAILED: LEFT SUPERIOR ANTERIOR NECK
LOCATION DETAILED: LEFT SUPERIOR LATERAL MALAR CHEEK
LOCATION DETAILED: EPIGASTRIC SKIN
LOCATION DETAILED: RIGHT LATERAL SUPERIOR CHEST
LOCATION DETAILED: RIGHT MID-UPPER BACK
LOCATION DETAILED: RIGHT ANTERIOR DISTAL UPPER ARM
LOCATION DETAILED: LEFT PROXIMAL DORSAL FOREARM
LOCATION DETAILED: LEFT SUPERIOR LATERAL UPPER BACK

## 2019-01-14 NOTE — PROCEDURE: BIOPSY BY SHAVE METHOD
Billing Type: Third-Party Bill
Electrodesiccation And Curettage Text: The wound bed was treated with electrodesiccation and curettage after the biopsy was performed.
X Size Of Lesion In Cm: 0
Type Of Destruction Used: Curettage
Dressing: bandage
Depth Of Biopsy: dermis
Silver Nitrate Text: The wound bed was treated with silver nitrate after the biopsy was performed.
Post-Care Instructions: I reviewed with the patient in detail post-care instructions. Patient is to keep the biopsy site bandaged overnight, and then wash once daily with soap and water and then apply a thin layer of petrolatum once daily until healed.
Bill For Surgical Tray: no
Notification Instructions: Patient will be notified of biopsy results. However, patient instructed to call the office if not contacted within 2 weeks.
Biopsy Type: H and E
Curettage Text: The wound bed was treated with curettage after the biopsy was performed.
Hemostasis: Aluminum Chloride and Electrocautery
Detail Level: Detailed
Lab: [id number]
Anesthesia Type: 1% lidocaine with epinephrine
Lab Facility: 
Was A Bandage Applied: Yes
Cryotherapy Text: The wound bed was treated with cryotherapy after the biopsy was performed.
Consent: Written consent was obtained and risks were reviewed including but not limited to scarring, infection, bleeding, scabbing, incomplete removal, nerve damage and allergy to anesthesia.
Wound Care: Petrolatum
Size Of Lesion In Cm: 0.5
Electrodesiccation Text: The wound bed was treated with electrodesiccation after the biopsy was performed.

## 2019-01-14 NOTE — PROCEDURE: LIQUID NITROGEN
Consent: The patient's consent was obtained including but not limited to risks of crusting, scabbing, blistering, scarring, darker or lighter pigmentary change, recurrence, incomplete removal and infection.
Render Post-Care Instructions In Note?: no
Number Of Freeze-Thaw Cycles: 1 freeze-thaw cycle
Post-Care Instructions: I reviewed with the patient in detail post-care instructions. Patient is to wear sunprotection, and avoid picking at any of the treated lesions. Pt may apply Vaseline to crusted or scabbing areas.
Duration Of Freeze Thaw-Cycle (Seconds): 15
Detail Level: Detailed

## 2019-01-17 ENCOUNTER — APPOINTMENT (RX ONLY)
Dept: URBAN - METROPOLITAN AREA CLINIC 31 | Facility: CLINIC | Age: 84
Setting detail: DERMATOLOGY
End: 2019-01-17

## 2019-01-17 PROBLEM — C44.319 BASAL CELL CARCINOMA OF SKIN OF OTHER PARTS OF FACE: Status: ACTIVE | Noted: 2019-01-17

## 2019-01-17 PROCEDURE — ? EXCISION

## 2019-01-17 PROCEDURE — 13132 CMPLX RPR F/C/C/M/N/AX/G/H/F: CPT | Mod: 79

## 2019-01-17 PROCEDURE — 11642 EXC F/E/E/N/L MAL+MRG 1.1-2: CPT | Mod: 79

## 2019-01-17 NOTE — PROCEDURE: EXCISION
Estimated Blood Loss (Cc): minimal
Complex Repair And Double Advancement Flap Text: The defect edges were debeveled with a #15 scalpel blade.  The primary defect was closed partially with a complex linear closure.  Given the location of the remaining defect, shape of the defect and the proximity to free margins a double advancement flap was deemed most appropriate for complete closure of the defect.  Using a sterile surgical marker, an appropriate advancement flap was drawn incorporating the defect and placing the expected incisions within the relaxed skin tension lines where possible.    The area thus outlined was incised deep to adipose tissue with a #15 scalpel blade.  The skin margins were undermined to an appropriate distance in all directions utilizing iris scissors.
Curvilinear Excision Additional Text (Leave Blank If You Do Not Want): The margin was drawn around the clinically apparent lesion.  A curvilinear shape was then drawn on the skin incorporating the lesion and margins.  Incisions were then made along these lines to the appropriate tissue plane and the lesion was extirpated.
Melolabial Interpolation Flap Text: A decision was made to reconstruct the defect utilizing an interpolation axial flap and a staged reconstruction.  A telfa template was made of the defect.  This telfa template was then used to outline the melolabial interpolation flap.  The donor area for the pedicle flap was then injected with anesthesia.  The flap was excised through the skin and subcutaneous tissue down to the layer of the underlying musculature.  The pedicle flap was carefully excised within this deep plane to maintain its blood supply.  The edges of the donor site were undermined.   The donor site was closed in a primary fashion.  The pedicle was then rotated into position and sutured.  Once the tube was sutured into place, adequate blood supply was confirmed with blanching and refill.  The pedicle was then wrapped with xeroform gauze and dressed appropriately with a telfa and gauze bandage to ensure continued blood supply and protect the attached pedicle.
Wound Care: Petrolatum
Trilobed Flap Text: The defect edges were debeveled with a #15 scalpel blade.  Given the location of the defect and the proximity to free margins a trilobed flap was deemed most appropriate.  Using a sterile surgical marker, an appropriate trilobed flap drawn around the defect.    The area thus outlined was incised deep to adipose tissue with a #15 scalpel blade.  The skin margins were undermined to an appropriate distance in all directions utilizing iris scissors.
Lab Facility: 
Mercedes Flap Text: The defect edges were debeveled with a #15 scalpel blade.  Given the location of the defect, shape of the defect and the proximity to free margins a Mercedes flap was deemed most appropriate.  Using a sterile surgical marker, an appropriate advancement flap was drawn incorporating the defect and placing the expected incisions within the relaxed skin tension lines where possible. The area thus outlined was incised deep to adipose tissue with a #15 scalpel blade.  The skin margins were undermined to an appropriate distance in all directions utilizing iris scissors.
Show Surgeon Variable: Yes
Complex Repair And Modified Advancement Flap Text: The defect edges were debeveled with a #15 scalpel blade.  The primary defect was closed partially with a complex linear closure.  Given the location of the remaining defect, shape of the defect and the proximity to free margins a modified advancement flap was deemed most appropriate for complete closure of the defect.  Using a sterile surgical marker, an appropriate advancement flap was drawn incorporating the defect and placing the expected incisions within the relaxed skin tension lines where possible.    The area thus outlined was incised deep to adipose tissue with a #15 scalpel blade.  The skin margins were undermined to an appropriate distance in all directions utilizing iris scissors.
Interpolation Flap Text: A decision was made to reconstruct the defect utilizing an interpolation axial flap and a staged reconstruction.  A telfa template was made of the defect.  This telfa template was then used to outline the interpolation flap.  The donor area for the pedicle flap was then injected with anesthesia.  The flap was excised through the skin and subcutaneous tissue down to the layer of the underlying musculature.  The interpolation flap was carefully excised within this deep plane to maintain its blood supply.  The edges of the donor site were undermined.   The donor site was closed in a primary fashion.  The pedicle was then rotated into position and sutured.  Once the tube was sutured into place, adequate blood supply was confirmed with blanching and refill.  The pedicle was then wrapped with xeroform gauze and dressed appropriately with a telfa and gauze bandage to ensure continued blood supply and protect the attached pedicle.
Path Notes (To The Dermatopathologist): Please check margins.
Complex Repair And Single Advancement Flap Text: The defect edges were debeveled with a #15 scalpel blade.  The primary defect was closed partially with a complex linear closure.  Given the location of the remaining defect, shape of the defect and the proximity to free margins a single advancement flap was deemed most appropriate for complete closure of the defect.  Using a sterile surgical marker, an appropriate advancement flap was drawn incorporating the defect and placing the expected incisions within the relaxed skin tension lines where possible.    The area thus outlined was incised deep to adipose tissue with a #15 scalpel blade.  The skin margins were undermined to an appropriate distance in all directions utilizing iris scissors.
Fusiform Excision Additional Text (Leave Blank If You Do Not Want): The margin was drawn around the clinically apparent lesion.  A fusiform shape was then drawn on the skin incorporating the lesion and margins.  Incisions were then made along these lines to the appropriate tissue plane and the lesion was extirpated.
Mastoid Interpolation Flap Text: A decision was made to reconstruct the defect utilizing an interpolation axial flap and a staged reconstruction.  A telfa template was made of the defect.  This telfa template was then used to outline the mastoid interpolation flap.  The donor area for the pedicle flap was then injected with anesthesia.  The flap was excised through the skin and subcutaneous tissue down to the layer of the underlying musculature.  The pedicle flap was carefully excised within this deep plane to maintain its blood supply.  The edges of the donor site were undermined.   The donor site was closed in a primary fashion.  The pedicle was then rotated into position and sutured.  Once the tube was sutured into place, adequate blood supply was confirmed with blanching and refill.  The pedicle was then wrapped with xeroform gauze and dressed appropriately with a telfa and gauze bandage to ensure continued blood supply and protect the attached pedicle.
Surgeon Performing The Repair (Optional): Pa Andrews MD
Dorsal Nasal Flap Text: The defect edges were debeveled with a #15 scalpel blade.  Given the location of the defect and the proximity to free margins a dorsal nasal flap was deemed most appropriate.  Using a sterile surgical marker, an appropriate dorsal nasal flap was drawn around the defect.    The area thus outlined was incised deep to adipose tissue with a #15 scalpel blade.  The skin margins were undermined to an appropriate distance in all directions utilizing iris scissors.
Excision Depth: adipose tissue
Modified Advancement Flap Text: The defect edges were debeveled with a #15 scalpel blade.  Given the location of the defect, shape of the defect and the proximity to free margins a modified advancement flap was deemed most appropriate.  Using a sterile surgical marker, an appropriate advancement flap was drawn incorporating the defect and placing the expected incisions within the relaxed skin tension lines where possible.    The area thus outlined was incised deep to adipose tissue with a #15 scalpel blade.  The skin margins were undermined to an appropriate distance in all directions utilizing iris scissors.
Deep Sutures: 4-0 Maxon
Graft Donor Site Bandage (Optional-Leave Blank If You Don't Want In Note): Steri-strips and a pressure bandage were applied to the donor site.
Validate Referring Provider (Can Hide Referring Provider In Settings Tab): No
Consent was obtained from the patient. The risks and benefits to therapy were discussed in detail. Specifically, the risks of infection, scarring, bleeding, prolonged wound healing, incomplete removal, allergy to anesthesia, nerve injury and recurrence were addressed. Prior to the procedure, the treatment site was clearly identified and confirmed by the patient. All components of Universal Protocol/PAUSE Rule completed.
Posterior Auricular Interpolation Flap Text: A decision was made to reconstruct the defect utilizing an interpolation axial flap and a staged reconstruction.  A telfa template was made of the defect.  This telfa template was then used to outline the posterior auricular interpolation flap.  The donor area for the pedicle flap was then injected with anesthesia.  The flap was excised through the skin and subcutaneous tissue down to the layer of the underlying musculature.  The pedicle flap was carefully excised within this deep plane to maintain its blood supply.  The edges of the donor site were undermined.   The donor site was closed in a primary fashion.  The pedicle was then rotated into position and sutured.  Once the tube was sutured into place, adequate blood supply was confirmed with blanching and refill.  The pedicle was then wrapped with xeroform gauze and dressed appropriately with a telfa and gauze bandage to ensure continued blood supply and protect the attached pedicle.
Elliptical Excision Additional Text (Leave Blank If You Do Not Want): The margin was drawn around the clinically apparent lesion.  An elliptical shape was then drawn on the skin incorporating the lesion and margins.  Incisions were then made along these lines to the appropriate tissue plane and the lesion was extirpated.
Complex Repair And O-T Advancement Flap Text: The defect edges were debeveled with a #15 scalpel blade.  The primary defect was closed partially with a complex linear closure.  Given the location of the remaining defect, shape of the defect and the proximity to free margins an O-T advancement flap was deemed most appropriate for complete closure of the defect.  Using a sterile surgical marker, an appropriate advancement flap was drawn incorporating the defect and placing the expected incisions within the relaxed skin tension lines where possible.    The area thus outlined was incised deep to adipose tissue with a #15 scalpel blade.  The skin margins were undermined to an appropriate distance in all directions utilizing iris scissors.
Island Pedicle Flap Text: The defect edges were debeveled with a #15 scalpel blade.  Given the location of the defect, shape of the defect and the proximity to free margins an island pedicle advancement flap was deemed most appropriate.  Using a sterile surgical marker, an appropriate advancement flap was drawn incorporating the defect, outlining the appropriate donor tissue and placing the expected incisions within the relaxed skin tension lines where possible.    The area thus outlined was incised deep to adipose tissue with a #15 scalpel blade.  The skin margins were undermined to an appropriate distance in all directions around the primary defect and laterally outward around the island pedicle utilizing iris scissors.  There was minimal undermining beneath the pedicle flap.
Dressing: pressure dressing with telfa
Mucosal Advancement Flap Text: Given the location of the defect, shape of the defect and the proximity to free margins a mucosal advancement flap was deemed most appropriate. Incisions were made with a 15 blade scalpel in the appropriate fashion along the cutaneous vermilion border and the mucosal lip. The remaining actinically damaged mucosal tissue was excised.  The mucosal advancement flap was then elevated to the gingival sulcus with care taken to preserve the neurovascular structures and advanced into the primary defect. Care was taken to ensure that precise realignment of the vermilion border was achieved.
Hatchet Flap Text: The defect edges were debeveled with a #15 scalpel blade.  Given the location of the defect, shape of the defect and the proximity to free margins a hatchet flap was deemed most appropriate.  Using a sterile surgical marker, an appropriate hatchet flap was drawn incorporating the defect and placing the expected incisions within the relaxed skin tension lines where possible.    The area thus outlined was incised deep to adipose tissue with a #15 scalpel blade.  The skin margins were undermined to an appropriate distance in all directions utilizing iris scissors.
Saucerization Excision Additional Text (Leave Blank If You Do Not Want): The margin was drawn around the clinically apparent lesion.  Incisions were then made along these lines, in a tangential fashion, to the appropriate tissue plane and the lesion was extirpated.
Scalpel Size: 15C blade
Paramedian Forehead Flap Text: A decision was made to reconstruct the defect utilizing an interpolation axial flap and a staged reconstruction.  A telfa template was made of the defect.  This telfa template was then used to outline the paramedian forehead pedicle flap.  The donor area for the pedicle flap was then injected with anesthesia.  The flap was excised through the skin and subcutaneous tissue down to the layer of the underlying musculature.  The pedicle flap was carefully excised within this deep plane to maintain its blood supply.  The edges of the donor site were undermined.   The donor site was closed in a primary fashion.  The pedicle was then rotated into position and sutured.  Once the tube was sutured into place, adequate blood supply was confirmed with blanching and refill.  The pedicle was then wrapped with xeroform gauze and dressed appropriately with a telfa and gauze bandage to ensure continued blood supply and protect the attached pedicle.
Complex Repair And A-T Advancement Flap Text: The defect edges were debeveled with a #15 scalpel blade.  The primary defect was closed partially with a complex linear closure.  Given the location of the remaining defect, shape of the defect and the proximity to free margins an A-T advancement flap was deemed most appropriate for complete closure of the defect.  Using a sterile surgical marker, an appropriate advancement flap was drawn incorporating the defect and placing the expected incisions within the relaxed skin tension lines where possible.    The area thus outlined was incised deep to adipose tissue with a #15 scalpel blade.  The skin margins were undermined to an appropriate distance in all directions utilizing iris scissors.
Island Pedicle Flap With Canthal Suspension Text: The defect edges were debeveled with a #15 scalpel blade.  Given the location of the defect, shape of the defect and the proximity to free margins an island pedicle advancement flap was deemed most appropriate.  Using a sterile surgical marker, an appropriate advancement flap was drawn incorporating the defect, outlining the appropriate donor tissue and placing the expected incisions within the relaxed skin tension lines where possible. The area thus outlined was incised deep to adipose tissue with a #15 scalpel blade.  The skin margins were undermined to an appropriate distance in all directions around the primary defect and laterally outward around the island pedicle utilizing iris scissors.  There was minimal undermining beneath the pedicle flap. A suspension suture was placed in the canthal tendon to prevent tension and prevent ectropion.
Complex Repair And Bilobe Flap Text: The defect edges were debeveled with a #15 scalpel blade.  The primary defect was closed partially with a complex linear closure.  Given the location of the remaining defect, shape of the defect and the proximity to free margins a bilobe flap was deemed most appropriate for complete closure of the defect.  Using a sterile surgical marker, an appropriate advancement flap was drawn incorporating the defect and placing the expected incisions within the relaxed skin tension lines where possible.    The area thus outlined was incised deep to adipose tissue with a #15 scalpel blade.  The skin margins were undermined to an appropriate distance in all directions utilizing iris scissors.
Primary Defect Length (In Cm): 0
Rotation Flap Text: The defect edges were debeveled with a #15 scalpel blade.  Given the location of the defect, shape of the defect and the proximity to free margins a rotation flap was deemed most appropriate.  Using a sterile surgical marker, an appropriate rotation flap was drawn incorporating the defect and placing the expected incisions within the relaxed skin tension lines where possible.    The area thus outlined was incised deep to adipose tissue with a #15 scalpel blade.  The skin margins were undermined to an appropriate distance in all directions utilizing iris scissors.
Slit Excision Additional Text (Leave Blank If You Do Not Want): A linear line was drawn on the skin overlying the lesion. An incision was made slowly until the lesion was visualized.  Once visualized, the lesion was removed with blunt dissection.
Lip Wedge Excision Repair Text: Given the location of the defect and the proximity to free margins a full thickness wedge repair was deemed most appropriate.  Using a sterile surgical marker, the appropriate repair was drawn incorporating the defect and placing the expected incisions perpendicular to the vermilion border.  The vermilion border was also meticulously outlined to ensure appropriate reapproximation during the repair.  The area thus outlined was incised through and through with a #15 scalpel blade.  The muscularis and dermis were reaproximated with deep sutures following hemostasis. Care was taken to realign the vermilion border before proceeding with the superficial closure.  Once the vermilion was realigned the superfical and mucosal closure was finished.
Detail Level: Detailed
Wound Check: 14 days
Alar Island Pedicle Flap Text: The defect edges were debeveled with a #15 scalpel blade.  Given the location of the defect, shape of the defect and the proximity to the alar rim an island pedicle advancement flap was deemed most appropriate.  Using a sterile surgical marker, an appropriate advancement flap was drawn incorporating the defect, outlining the appropriate donor tissue and placing the expected incisions within the nasal ala running parallel to the alar rim. The area thus outlined was incised with a #15 scalpel blade.  The skin margins were undermined minimally to an appropriate distance in all directions around the primary defect and laterally outward around the island pedicle utilizing iris scissors.  There was minimal undermining beneath the pedicle flap.
Complex Repair And Melolabial Flap Text: The defect edges were debeveled with a #15 scalpel blade.  The primary defect was closed partially with a complex linear closure.  Given the location of the remaining defect, shape of the defect and the proximity to free margins a melolabial flap was deemed most appropriate for complete closure of the defect.  Using a sterile surgical marker, an appropriate advancement flap was drawn incorporating the defect and placing the expected incisions within the relaxed skin tension lines where possible.    The area thus outlined was incised deep to adipose tissue with a #15 scalpel blade.  The skin margins were undermined to an appropriate distance in all directions utilizing iris scissors.
Star Wedge Flap Text: The defect edges were debeveled with a #15 scalpel blade.  Given the location of the defect, shape of the defect and the proximity to free margins a star wedge flap was deemed most appropriate.  Using a sterile surgical marker, an appropriate rotation flap was drawn incorporating the defect and placing the expected incisions within the relaxed skin tension lines where possible. The area thus outlined was incised deep to adipose tissue with a #15 scalpel blade.  The skin margins were undermined to an appropriate distance in all directions utilizing iris scissors.
Split-Thickness Skin Graft Text: The defect edges were debeveled with a #15 scalpel blade.  Given the location of the defect, shape of the defect and the proximity to free margins a split thickness skin graft was deemed most appropriate.  Using a sterile surgical marker, the primary defect shape was transferred to the donor site. The split thickness graft was then harvested.  The skin graft was then placed in the primary defect and oriented appropriately.
Perilesional Excision Additional Text (Leave Blank If You Do Not Want): The margin was drawn around the clinically apparent lesion. Incisions were then made along these lines to the appropriate tissue plane and the lesion was extirpated.
Complex Repair And O-L Flap Text: The defect edges were debeveled with a #15 scalpel blade.  The primary defect was closed partially with a complex linear closure.  Given the location of the remaining defect, shape of the defect and the proximity to free margins an O-L flap was deemed most appropriate for complete closure of the defect.  Using a sterile surgical marker, an appropriate flap was drawn incorporating the defect and placing the expected incisions within the relaxed skin tension lines where possible.    The area thus outlined was incised deep to adipose tissue with a #15 scalpel blade.  The skin margins were undermined to an appropriate distance in all directions utilizing iris scissors.
Post-Care Instructions: I reviewed with the patient in detail post-care instructions. Patient is not to engage in any heavy lifting, exercise, or swimming for the next 14 days. Should the patient develop any fevers, chills, bleeding, severe pain patient will contact the office immediately.
Double Island Pedicle Flap Text: The defect edges were debeveled with a #15 scalpel blade.  Given the location of the defect, shape of the defect and the proximity to free margins a double island pedicle advancement flap was deemed most appropriate.  Using a sterile surgical marker, an appropriate advancement flap was drawn incorporating the defect, outlining the appropriate donor tissue and placing the expected incisions within the relaxed skin tension lines where possible.    The area thus outlined was incised deep to adipose tissue with a #15 scalpel blade.  The skin margins were undermined to an appropriate distance in all directions around the primary defect and laterally outward around the island pedicle utilizing iris scissors.  There was minimal undermining beneath the pedicle flap.
Spiral Flap Text: The defect edges were debeveled with a #15 scalpel blade.  Given the location of the defect, shape of the defect and the proximity to free margins a spiral flap was deemed most appropriate.  Using a sterile surgical marker, an appropriate rotation flap was drawn incorporating the defect and placing the expected incisions within the relaxed skin tension lines where possible. The area thus outlined was incised deep to adipose tissue with a #15 scalpel blade.  The skin margins were undermined to an appropriate distance in all directions utilizing iris scissors.
Ftsg Text: The defect edges were debeveled with a #15 scalpel blade.  Given the location of the defect, shape of the defect and the proximity to free margins a full thickness skin graft was deemed most appropriate.  Using a sterile surgical marker, the primary defect shape was transferred to the donor site. The area thus outlined was incised deep to adipose tissue with a #15 scalpel blade.  The harvested graft was then trimmed of adipose tissue until only dermis and epidermis was left.  The skin margins of the secondary defect were undermined to an appropriate distance in all directions utilizing iris scissors.  The secondary defect was closed with interrupted buried subcutaneous sutures.  The skin edges were then re-apposed with running  sutures.  The skin graft was then placed in the primary defect and oriented appropriately.
Excisional Biopsy Additional Text (Leave Blank If You Do Not Want): The margin was drawn around the clinically apparent lesion. An elliptical shape was then drawn on the skin incorporating the lesion and margins.  Incisions were then made along these lines to the appropriate tissue plane and the lesion was extirpated.
Keystone Flap Text: The defect edges were debeveled with a #15 scalpel blade.  Given the location of the defect, shape of the defect a keystone flap was deemed most appropriate.  Using a sterile surgical marker, an appropriate keystone flap was drawn incorporating the defect, outlining the appropriate donor tissue and placing the expected incisions within the relaxed skin tension lines where possible. The area thus outlined was incised deep to adipose tissue with a #15 scalpel blade.  The skin margins were undermined to an appropriate distance in all directions around the primary defect and laterally outward around the flap utilizing iris scissors.
Complex Repair And Double M Plasty Text: The defect edges were debeveled with a #15 scalpel blade.  The primary defect was closed partially with a complex linear closure.  Given the location of the remaining defect, shape of the defect and the proximity to free margins a double M plasty was deemed most appropriate for complete closure of the defect.  Using a sterile surgical marker, an appropriate advancement flap was drawn incorporating the defect and placing the expected incisions within the relaxed skin tension lines where possible.    The area thus outlined was incised deep to adipose tissue with a #15 scalpel blade.  The skin margins were undermined to an appropriate distance in all directions utilizing iris scissors.
Transposition Flap Text: The defect edges were debeveled with a #15 scalpel blade.  Given the location of the defect and the proximity to free margins a transposition flap was deemed most appropriate.  Using a sterile surgical marker, an appropriate transposition flap was drawn incorporating the defect.    The area thus outlined was incised deep to adipose tissue with a #15 scalpel blade.  The skin margins were undermined to an appropriate distance in all directions utilizing iris scissors.
Cartilage Graft Text: The defect edges were debeveled with a #15 scalpel blade.  Given the location of the defect, shape of the defect, the fact the defect involved a full thickness cartilage defect a cartilage graft was deemed most appropriate.  An appropriate donor site was identified, cleansed, and anesthetized. The cartilage graft was then harvested and transferred to the recipient site, oriented appropriately and then sutured into place.  The secondary defect was then repaired using a primary closure.
Positioning (Leave Blank If You Do Not Want): The patient was placed in a comfortable position exposing the surgical site.
Home Suture Removal Text: Patient was provided a home suture removal kit and will remove their sutures at home.  If they have any questions or difficulties they will call the office.
Complex Repair And Rhombic Flap Text: The defect edges were debeveled with a #15 scalpel blade.  The primary defect was closed partially with a complex linear closure.  Given the location of the remaining defect, shape of the defect and the proximity to free margins a rhombic flap was deemed most appropriate for complete closure of the defect.  Using a sterile surgical marker, an appropriate advancement flap was drawn incorporating the defect and placing the expected incisions within the relaxed skin tension lines where possible.    The area thus outlined was incised deep to adipose tissue with a #15 scalpel blade.  The skin margins were undermined to an appropriate distance in all directions utilizing iris scissors.
Complex Repair And M Plasty Text: The defect edges were debeveled with a #15 scalpel blade.  The primary defect was closed partially with a complex linear closure.  Given the location of the remaining defect, shape of the defect and the proximity to free margins an M plasty was deemed most appropriate for complete closure of the defect.  Using a sterile surgical marker, an appropriate advancement flap was drawn incorporating the defect and placing the expected incisions within the relaxed skin tension lines where possible.    The area thus outlined was incised deep to adipose tissue with a #15 scalpel blade.  The skin margins were undermined to an appropriate distance in all directions utilizing iris scissors.
Island Pedicle Flap-Requiring Vessel Identification Text: The defect edges were debeveled with a #15 scalpel blade.  Given the location of the defect, shape of the defect and the proximity to free margins an island pedicle advancement flap was deemed most appropriate.  Using a sterile surgical marker, an appropriate advancement flap was drawn, based on the axial vessel mentioned above, incorporating the defect, outlining the appropriate donor tissue and placing the expected incisions within the relaxed skin tension lines where possible.    The area thus outlined was incised deep to adipose tissue with a #15 scalpel blade.  The skin margins were undermined to an appropriate distance in all directions around the primary defect and laterally outward around the island pedicle utilizing iris scissors.  There was minimal undermining beneath the pedicle flap.
Complex Repair And W Plasty Text: The defect edges were debeveled with a #15 scalpel blade.  The primary defect was closed partially with a complex linear closure.  Given the location of the remaining defect, shape of the defect and the proximity to free margins a W plasty was deemed most appropriate for complete closure of the defect.  Using a sterile surgical marker, an appropriate advancement flap was drawn incorporating the defect and placing the expected incisions within the relaxed skin tension lines where possible.    The area thus outlined was incised deep to adipose tissue with a #15 scalpel blade.  The skin margins were undermined to an appropriate distance in all directions utilizing iris scissors.
O-T Plasty Text: The defect edges were debeveled with a #15 scalpel blade.  Given the location of the defect, shape of the defect and the proximity to free margins an O-T plasty was deemed most appropriate.  Using a sterile surgical marker, an appropriate O-T plasty was drawn incorporating the defect and placing the expected incisions within the relaxed skin tension lines where possible.    The area thus outlined was incised deep to adipose tissue with a #15 scalpel blade.  The skin margins were undermined to an appropriate distance in all directions utilizing iris scissors.
Muscle Hinge Flap Text: The defect edges were debeveled with a #15 scalpel blade.  Given the size, depth and location of the defect and the proximity to free margins a muscle hinge flap was deemed most appropriate.  Using a sterile surgical marker, an appropriate hinge flap was drawn incorporating the defect. The area thus outlined was incised with a #15 scalpel blade.  The skin margins were undermined to an appropriate distance in all directions utilizing iris scissors.
Repair Performed By Another Provider Text (Leave Blank If You Do Not Want): After the tissue was excised the defect was repaired by another provider.
Composite Graft Text: The defect edges were debeveled with a #15 scalpel blade.  Given the location of the defect, shape of the defect, the proximity to free margins and the fact the defect was full thickness a composite graft was deemed most appropriate.  The defect was outline and then transferred to the donor site.  A full thickness graft was then excised from the donor site. The graft was then placed in the primary defect, oriented appropriately and then sutured into place.  The secondary defect was then repaired using a primary closure.
Pre-Excision Curettage Text (Leave Blank If You Do Not Want): Prior to drawing the surgical margin the visible lesion was removed with electrodesiccation and curettage to clearly define the lesion size.
Complex Repair And Rotation Flap Text: The defect edges were debeveled with a #15 scalpel blade.  The primary defect was closed partially with a complex linear closure.  Given the location of the remaining defect, shape of the defect and the proximity to free margins a rotation flap was deemed most appropriate for complete closure of the defect.  Using a sterile surgical marker, an appropriate advancement flap was drawn incorporating the defect and placing the expected incisions within the relaxed skin tension lines where possible.    The area thus outlined was incised deep to adipose tissue with a #15 scalpel blade.  The skin margins were undermined to an appropriate distance in all directions utilizing iris scissors.
Dermal Autograft Text: The defect edges were debeveled with a #15 scalpel blade.  Given the location of the defect, shape of the defect and the proximity to free margins a dermal autograft was deemed most appropriate.  Using a sterile surgical marker, the primary defect shape was transferred to the donor site. The area thus outlined was incised deep to adipose tissue with a #15 scalpel blade.  The harvested graft was then trimmed of adipose and epidermal tissue until only dermis was left.  The skin graft was then placed in the primary defect and oriented appropriately.
Complex Repair And V-Y Plasty Text: The defect edges were debeveled with a #15 scalpel blade.  The primary defect was closed partially with a complex linear closure.  Given the location of the remaining defect, shape of the defect and the proximity to free margins a V-Y plasty was deemed most appropriate for complete closure of the defect.  Using a sterile surgical marker, an appropriate advancement flap was drawn incorporating the defect and placing the expected incisions within the relaxed skin tension lines where possible.    The area thus outlined was incised deep to adipose tissue with a #15 scalpel blade.  The skin margins were undermined to an appropriate distance in all directions utilizing iris scissors.
Complex Repair And Z Plasty Text: The defect edges were debeveled with a #15 scalpel blade.  The primary defect was closed partially with a complex linear closure.  Given the location of the remaining defect, shape of the defect and the proximity to free margins a Z plasty was deemed most appropriate for complete closure of the defect.  Using a sterile surgical marker, an appropriate advancement flap was drawn incorporating the defect and placing the expected incisions within the relaxed skin tension lines where possible.    The area thus outlined was incised deep to adipose tissue with a #15 scalpel blade.  The skin margins were undermined to an appropriate distance in all directions utilizing iris scissors.
Size Of Lesion In Cm: 0.5
O-Z Plasty Text: The defect edges were debeveled with a #15 scalpel blade.  Given the location of the defect, shape of the defect and the proximity to free margins an O-Z plasty (double transposition flap) was deemed most appropriate.  Using a sterile surgical marker, the appropriate transposition flaps were drawn incorporating the defect and placing the expected incisions within the relaxed skin tension lines where possible.    The area thus outlined was incised deep to adipose tissue with a #15 scalpel blade.  The skin margins were undermined to an appropriate distance in all directions utilizing iris scissors.  Hemostasis was achieved with electrocautery.  The flaps were then transposed into place, one clockwise and the other counterclockwise, and anchored with interrupted buried subcutaneous sutures.
Melolabial Transposition Flap Text: The defect edges were debeveled with a #15 scalpel blade.  Given the location of the defect and the proximity to free margins a melolabial flap was deemed most appropriate.  Using a sterile surgical marker, an appropriate melolabial transposition flap was drawn incorporating the defect.    The area thus outlined was incised deep to adipose tissue with a #15 scalpel blade.  The skin margins were undermined to an appropriate distance in all directions utilizing iris scissors.
Epidermal Sutures: 5-0 Prolene
No Repair - Repaired With Adjacent Surgical Defect Text (Leave Blank If You Do Not Want): After the excision the defect was repaired concurrently with another surgical defect which was in close approximation.
Complex Repair Preamble Text (Leave Blank If You Do Not Want): Extensive wide undermining was performed.
Skin Substitute Text: The defect edges were debeveled with a #15 scalpel blade.  Given the location of the defect, shape of the defect and the proximity to free margins a skin substitute graft was deemed most appropriate.  The graft material was trimmed to fit the size of the defect. The graft was then placed in the primary defect and oriented appropriately.
Epidermal Autograft Text: The defect edges were debeveled with a #15 scalpel blade.  Given the location of the defect, shape of the defect and the proximity to free margins an epidermal autograft was deemed most appropriate.  Using a sterile surgical marker, the primary defect shape was transferred to the donor site. The epidermal graft was then harvested.  The skin graft was then placed in the primary defect and oriented appropriately.
Information: Selecting Yes will display possible errors in your note based on the variables you have selected. This validation is only offered as a suggestion for you. PLEASE NOTE THAT THE VALIDATION TEXT WILL BE REMOVED WHEN YOU FINALIZE YOUR NOTE. IF YOU WANT TO FAX A PRELIMINARY NOTE YOU WILL NEED TO TOGGLE THIS TO 'NO' IF YOU DO NOT WANT IT IN YOUR FAXED NOTE.
Complex Repair And Transposition Flap Text: The defect edges were debeveled with a #15 scalpel blade.  The primary defect was closed partially with a complex linear closure.  Given the location of the remaining defect, shape of the defect and the proximity to free margins a transposition flap was deemed most appropriate for complete closure of the defect.  Using a sterile surgical marker, an appropriate advancement flap was drawn incorporating the defect and placing the expected incisions within the relaxed skin tension lines where possible.    The area thus outlined was incised deep to adipose tissue with a #15 scalpel blade.  The skin margins were undermined to an appropriate distance in all directions utilizing iris scissors.
Double O-Z Plasty Text: The defect edges were debeveled with a #15 scalpel blade.  Given the location of the defect, shape of the defect and the proximity to free margins a Double O-Z plasty (double transposition flap) was deemed most appropriate.  Using a sterile surgical marker, the appropriate transposition flaps were drawn incorporating the defect and placing the expected incisions within the relaxed skin tension lines where possible. The area thus outlined was incised deep to adipose tissue with a #15 scalpel blade.  The skin margins were undermined to an appropriate distance in all directions utilizing iris scissors.  Hemostasis was achieved with electrocautery.  The flaps were then transposed into place, one clockwise and the other counterclockwise, and anchored with interrupted buried subcutaneous sutures.
Epidermal Closure Graft Donor Site (Optional): simple interrupted
Rhombic Flap Text: The defect edges were debeveled with a #15 scalpel blade.  Given the location of the defect and the proximity to free margins a rhombic flap was deemed most appropriate.  Using a sterile surgical marker, an appropriate rhombic flap was drawn incorporating the defect.    The area thus outlined was incised deep to adipose tissue with a #15 scalpel blade.  The skin margins were undermined to an appropriate distance in all directions utilizing iris scissors.
Complex Repair And Dorsal Nasal Flap Text: The defect edges were debeveled with a #15 scalpel blade.  The primary defect was closed partially with a complex linear closure.  Given the location of the remaining defect, shape of the defect and the proximity to free margins a dorsal nasal flap was deemed most appropriate for complete closure of the defect.  Using a sterile surgical marker, an appropriate flap was drawn incorporating the defect and placing the expected incisions within the relaxed skin tension lines where possible.    The area thus outlined was incised deep to adipose tissue with a #15 scalpel blade.  The skin margins were undermined to an appropriate distance in all directions utilizing iris scissors.
Anesthesia Type: 1% lidocaine with epinephrine
Advancement Flap (Single) Text: The defect edges were debeveled with a #15 scalpel blade.  Given the location of the defect and the proximity to free margins a single advancement flap was deemed most appropriate.  Using a sterile surgical marker, an appropriate advancement flap was drawn incorporating the defect and placing the expected incisions within the relaxed skin tension lines where possible.    The area thus outlined was incised deep to adipose tissue with a #15 scalpel blade.  The skin margins were undermined to an appropriate distance in all directions utilizing iris scissors.
Intermediate Repair Preamble Text (Leave Blank If You Do Not Want): Undermining was performed with blunt dissection.
Bi-Rhombic Flap Text: The defect edges were debeveled with a #15 scalpel blade.  Given the location of the defect and the proximity to free margins a bi-rhombic flap was deemed most appropriate.  Using a sterile surgical marker, an appropriate rhombic flap was drawn incorporating the defect. The area thus outlined was incised deep to adipose tissue with a #15 scalpel blade.  The skin margins were undermined to an appropriate distance in all directions utilizing iris scissors.
Burow's Advancement Flap Text: The defect edges were debeveled with a #15 scalpel blade.  Given the location of the defect and the proximity to free margins a Burow's advancement flap was deemed most appropriate.  Using a sterile surgical marker, the appropriate advancement flap was drawn incorporating the defect and placing the expected incisions within the relaxed skin tension lines where possible.    The area thus outlined was incised deep to adipose tissue with a #15 scalpel blade.  The skin margins were undermined to an appropriate distance in all directions utilizing iris scissors.
Tissue Cultured Epidermal Autograft Text: The defect edges were debeveled with a #15 scalpel blade.  Given the location of the defect, shape of the defect and the proximity to free margins a tissue cultured epidermal autograft was deemed most appropriate.  The graft was then trimmed to fit the size of the defect.  The graft was then placed in the primary defect and oriented appropriately.
Complex Repair And Ftsg Text: The defect edges were debeveled with a #15 scalpel blade.  The primary defect was closed partially with a complex linear closure.  Given the location of the defect, shape of the defect and the proximity to free margins a full thickness skin graft was deemed most appropriate to repair the remaining defect.  The graft was trimmed to fit the size of the remaining defect.  The graft was then placed in the primary defect, oriented appropriately, and sutured into place.
S Plasty Text: Given the location and shape of the defect, and the orientation of relaxed skin tension lines, an S-plasty was deemed most appropriate for repair.  Using a sterile surgical marker, the appropriate outline of the S-plasty was drawn, incorporating the defect and placing the expected incisions within the relaxed skin tension lines where possible.  The area thus outlined was incised deep to adipose tissue with a #15 scalpel blade.  The skin margins were undermined to an appropriate distance in all directions utilizing iris scissors. The skin flaps were advanced over the defect.  The opposing margins were then approximated with interrupted buried subcutaneous sutures.
Rhomboid Transposition Flap Text: The defect edges were debeveled with a #15 scalpel blade.  Given the location of the defect and the proximity to free margins a rhomboid transposition flap was deemed most appropriate.  Using a sterile surgical marker, an appropriate rhomboid flap was drawn incorporating the defect.    The area thus outlined was incised deep to adipose tissue with a #15 scalpel blade.  The skin margins were undermined to an appropriate distance in all directions utilizing iris scissors.
Size Of Margin In Cm: 0.4
Advancement Flap (Double) Text: The defect edges were debeveled with a #15 scalpel blade.  Given the location of the defect and the proximity to free margins a double advancement flap was deemed most appropriate.  Using a sterile surgical marker, the appropriate advancement flaps were drawn incorporating the defect and placing the expected incisions within the relaxed skin tension lines where possible.    The area thus outlined was incised deep to adipose tissue with a #15 scalpel blade.  The skin margins were undermined to an appropriate distance in all directions utilizing iris scissors.
H Plasty Text: Given the location of the defect, shape of the defect and the proximity to free margins a H-plasty was deemed most appropriate for repair.  Using a sterile surgical marker, the appropriate advancement arms of the H-plasty were drawn incorporating the defect and placing the expected incisions within the relaxed skin tension lines where possible. The area thus outlined was incised deep to adipose tissue with a #15 scalpel blade. The skin margins were undermined to an appropriate distance in all directions utilizing iris scissors.  The opposing advancement arms were then advanced into place in opposite direction and anchored with interrupted buried subcutaneous sutures.
Helical Rim Advancement Flap Text: The defect edges were debeveled with a #15 blade scalpel.  Given the location of the defect and the proximity to free margins (helical rim) a double helical rim advancement flap was deemed most appropriate.  Using a sterile surgical marker, the appropriate advancement flaps were drawn incorporating the defect and placing the expected incisions between the helical rim and antihelix where possible.  The area thus outlined was incised through and through with a #15 scalpel blade.  With a skin hook and iris scissors, the flaps were gently and sharply undermined and freed up.
Complex Repair And Epidermal Autograft Text: The defect edges were debeveled with a #15 scalpel blade.  The primary defect was closed partially with a complex linear closure.  Given the location of the defect, shape of the defect and the proximity to free margins an epidermal autograft was deemed most appropriate to repair the remaining defect.  The graft was trimmed to fit the size of the remaining defect.  The graft was then placed in the primary defect, oriented appropriately, and sutured into place.
Crescentic Advancement Flap Text: The defect edges were debeveled with a #15 scalpel blade.  Given the location of the defect and the proximity to free margins a crescentic advancement flap was deemed most appropriate.  Using a sterile surgical marker, the appropriate advancement flap was drawn incorporating the defect and placing the expected incisions within the relaxed skin tension lines where possible.    The area thus outlined was incised deep to adipose tissue with a #15 scalpel blade.  The skin margins were undermined to an appropriate distance in all directions utilizing iris scissors.
Complex Repair And Split-Thickness Skin Graft Text: The defect edges were debeveled with a #15 scalpel blade.  The primary defect was closed partially with a complex linear closure.  Given the location of the defect, shape of the defect and the proximity to free margins a split thickness skin graft was deemed most appropriate to repair the remaining defect.  The graft was trimmed to fit the size of the remaining defect.  The graft was then placed in the primary defect, oriented appropriately, and sutured into place.
Excision Method: Elliptical
V-Y Plasty Text: The defect edges were debeveled with a #15 scalpel blade.  Given the location of the defect, shape of the defect and the proximity to free margins an V-Y advancement flap was deemed most appropriate.  Using a sterile surgical marker, an appropriate advancement flap was drawn incorporating the defect and placing the expected incisions within the relaxed skin tension lines where possible.    The area thus outlined was incised deep to adipose tissue with a #15 scalpel blade.  The skin margins were undermined to an appropriate distance in all directions utilizing iris scissors.
Additional Anesthesia Volume In Cc: 10
Billing Type: Third-Party Bill
W Plasty Text: The lesion was extirpated to the level of the fat with a #15 scalpel blade.  Given the location of the defect, shape of the defect and the proximity to free margins a W-plasty was deemed most appropriate for repair.  Using a sterile surgical marker, the appropriate transposition arms of the W-plasty were drawn incorporating the defect and placing the expected incisions within the relaxed skin tension lines where possible.    The area thus outlined was incised deep to adipose tissue with a #15 scalpel blade.  The skin margins were undermined to an appropriate distance in all directions utilizing iris scissors.  The opposing transposition arms were then transposed into place in opposite direction and anchored with interrupted buried subcutaneous sutures.
Bilateral Helical Rim Advancement Flap Text: The defect edges were debeveled with a #15 blade scalpel.  Given the location of the defect and the proximity to free margins (helical rim) a bilateral helical rim advancement flap was deemed most appropriate.  Using a sterile surgical marker, the appropriate advancement flaps were drawn incorporating the defect and placing the expected incisions between the helical rim and antihelix where possible.  The area thus outlined was incised through and through with a #15 scalpel blade.  With a skin hook and iris scissors, the flaps were gently and sharply undermined and freed up.
A-T Advancement Flap Text: The defect edges were debeveled with a #15 scalpel blade.  Given the location of the defect, shape of the defect and the proximity to free margins an A-T advancement flap was deemed most appropriate.  Using a sterile surgical marker, an appropriate advancement flap was drawn incorporating the defect and placing the expected incisions within the relaxed skin tension lines where possible.    The area thus outlined was incised deep to adipose tissue with a #15 scalpel blade.  The skin margins were undermined to an appropriate distance in all directions utilizing iris scissors.
Purse String (Simple) Text: Given the location of the defect and the characteristics of the surrounding skin a purse string simple closure was deemed most appropriate.  Undermining was performed circumferentially around the surgical defect.  A purse string suture was then placed and tightened.
Xenograft Text: The defect edges were debeveled with a #15 scalpel blade.  Given the location of the defect, shape of the defect and the proximity to free margins a xenograft was deemed most appropriate.  The graft was then trimmed to fit the size of the defect.  The graft was then placed in the primary defect and oriented appropriately.
Complex Repair And Tissue Cultured Epidermal Autograft Text: The defect edges were debeveled with a #15 scalpel blade.  The primary defect was closed partially with a complex linear closure.  Given the location of the defect, shape of the defect and the proximity to free margins an tissue cultured epidermal autograft was deemed most appropriate to repair the remaining defect.  The graft was trimmed to fit the size of the remaining defect.  The graft was then placed in the primary defect, oriented appropriately, and sutured into place.
Z Plasty Text: The lesion was extirpated to the level of the fat with a #15 scalpel blade.  Given the location of the defect, shape of the defect and the proximity to free margins a Z-plasty was deemed most appropriate for repair.  Using a sterile surgical marker, the appropriate transposition arms of the Z-plasty were drawn incorporating the defect and placing the expected incisions within the relaxed skin tension lines where possible.    The area thus outlined was incised deep to adipose tissue with a #15 scalpel blade.  The skin margins were undermined to an appropriate distance in all directions utilizing iris scissors.  The opposing transposition arms were then transposed into place in opposite direction and anchored with interrupted buried subcutaneous sutures.
Ear Star Wedge Flap Text: The defect edges were debeveled with a #15 blade scalpel.  Given the location of the defect and the proximity to free margins (helical rim) an ear star wedge flap was deemed most appropriate.  Using a sterile surgical marker, the appropriate flap was drawn incorporating the defect and placing the expected incisions between the helical rim and antihelix where possible.  The area thus outlined was incised through and through with a #15 scalpel blade.
Epidermal Closure: horizontal mattress and simple interrupted
Repair Type: Complex
O-T Advancement Flap Text: The defect edges were debeveled with a #15 scalpel blade.  Given the location of the defect, shape of the defect and the proximity to free margins an O-T advancement flap was deemed most appropriate.  Using a sterile surgical marker, an appropriate advancement flap was drawn incorporating the defect and placing the expected incisions within the relaxed skin tension lines where possible.    The area thus outlined was incised deep to adipose tissue with a #15 scalpel blade.  The skin margins were undermined to an appropriate distance in all directions utilizing iris scissors.
Purse String (Intermediate) Text: Given the location of the defect and the characteristics of the surrounding skin a purse string intermediate closure was deemed most appropriate.  Undermining was performed circumfirentially around the surgical defect.  A purse string suture was then placed and tightened.
Complex Repair And Dermal Autograft Text: The defect edges were debeveled with a #15 scalpel blade.  The primary defect was closed partially with a complex linear closure.  Given the location of the defect, shape of the defect and the proximity to free margins an dermal autograft was deemed most appropriate to repair the remaining defect.  The graft was trimmed to fit the size of the remaining defect.  The graft was then placed in the primary defect, oriented appropriately, and sutured into place.
V-Y Flap Text: The defect edges were debeveled with a #15 scalpel blade.  Given the location of the defect, shape of the defect and the proximity to free margins a V-Y flap was deemed most appropriate.  Using a sterile surgical marker, an appropriate advancement flap was drawn incorporating the defect and placing the expected incisions within the relaxed skin tension lines where possible.    The area thus outlined was incised deep to adipose tissue with a #15 scalpel blade.  The skin margins were undermined to an appropriate distance in all directions utilizing iris scissors.
Complex Repair And Xenograft Text: The defect edges were debeveled with a #15 scalpel blade.  The primary defect was closed partially with a complex linear closure.  Given the location of the defect, shape of the defect and the proximity to free margins a xenograft was deemed most appropriate to repair the remaining defect.  The graft was trimmed to fit the size of the remaining defect.  The graft was then placed in the primary defect, oriented appropriately, and sutured into place.
Partial Purse String (Simple) Text: Given the location of the defect and the characteristics of the surrounding skin a simple purse string closure was deemed most appropriate.  Undermining was performed circumferentially around the surgical defect.  A purse string suture was then placed and tightened. Wound tension of the circular defect prevented complete closure of the wound.
Cheek Interpolation Flap Text: A decision was made to reconstruct the defect utilizing an interpolation axial flap and a staged reconstruction.  A telfa template was made of the defect.  This telfa template was then used to outline the Cheek Interpolation flap.  The donor area for the pedicle flap was then injected with anesthesia.  The flap was excised through the skin and subcutaneous tissue down to the layer of the underlying musculature.  The interpolation flap was carefully excised within this deep plane to maintain its blood supply.  The edges of the donor site were undermined.   The donor site was closed in a primary fashion.  The pedicle was then rotated into position and sutured.  Once the tube was sutured into place, adequate blood supply was confirmed with blanching and refill.  The pedicle was then wrapped with xeroform gauze and dressed appropriately with a telfa and gauze bandage to ensure continued blood supply and protect the attached pedicle.
Banner Transposition Flap Text: The defect edges were debeveled with a #15 scalpel blade.  Given the location of the defect and the proximity to free margins a Banner transposition flap was deemed most appropriate.  Using a sterile surgical marker, an appropriate flap drawn around the defect. The area thus outlined was incised deep to adipose tissue with a #15 scalpel blade.  The skin margins were undermined to an appropriate distance in all directions utilizing iris scissors.
O-L Flap Text: The defect edges were debeveled with a #15 scalpel blade.  Given the location of the defect, shape of the defect and the proximity to free margins an O-L flap was deemed most appropriate.  Using a sterile surgical marker, an appropriate advancement flap was drawn incorporating the defect and placing the expected incisions within the relaxed skin tension lines where possible.    The area thus outlined was incised deep to adipose tissue with a #15 scalpel blade.  The skin margins were undermined to an appropriate distance in all directions utilizing iris scissors.
Bilobed Transposition Flap Text: The defect edges were debeveled with a #15 scalpel blade.  Given the location of the defect and the proximity to free margins a bilobed transposition flap was deemed most appropriate.  Using a sterile surgical marker, an appropriate bilobe flap drawn around the defect.    The area thus outlined was incised deep to adipose tissue with a #15 scalpel blade.  The skin margins were undermined to an appropriate distance in all directions utilizing iris scissors.
Lab: [id number]
Advancement-Rotation Flap Text: The defect edges were debeveled with a #15 scalpel blade.  Given the location of the defect, shape of the defect and the proximity to free margins an advancement-rotation flap was deemed most appropriate.  Using a sterile surgical marker, an appropriate flap was drawn incorporating the defect and placing the expected incisions within the relaxed skin tension lines where possible. The area thus outlined was incised deep to adipose tissue with a #15 scalpel blade.  The skin margins were undermined to an appropriate distance in all directions utilizing iris scissors.
Undermining Location (Optional): in the superficial subcutaneous fat
Complex Repair And Skin Substitute Graft Text: The defect edges were debeveled with a #15 scalpel blade.  The primary defect was closed partially with a complex linear closure.  Given the location of the remaining defect, shape of the defect and the proximity to free margins a skin substitute graft was deemed most appropriate to repair the remaining defect.  The graft was trimmed to fit the size of the remaining defect.  The graft was then placed in the primary defect, oriented appropriately, and sutured into place.
Partial Purse String (Intermediate) Text: Given the location of the defect and the characteristics of the surrounding skin an intermediate purse string closure was deemed most appropriate.  Undermining was performed circumferentially around the surgical defect.  A purse string suture was then placed and tightened. Wound tension of the circular defect prevented complete closure of the wound.
Hemostasis: Electrocautery
Cheek-To-Nose Interpolation Flap Text: A decision was made to reconstruct the defect utilizing an interpolation axial flap and a staged reconstruction.  A telfa template was made of the defect.  This telfa template was then used to outline the Cheek-To-Nose Interpolation flap.  The donor area for the pedicle flap was then injected with anesthesia.  The flap was excised through the skin and subcutaneous tissue down to the layer of the underlying musculature.  The interpolation flap was carefully excised within this deep plane to maintain its blood supply.  The edges of the donor site were undermined.   The donor site was closed in a primary fashion.  The pedicle was then rotated into position and sutured.  Once the tube was sutured into place, adequate blood supply was confirmed with blanching and refill.  The pedicle was then wrapped with xeroform gauze and dressed appropriately with a telfa and gauze bandage to ensure continued blood supply and protect the attached pedicle.
Bilobed Flap Text: The defect edges were debeveled with a #15 scalpel blade.  Given the location of the defect and the proximity to free margins a bilobe flap was deemed most appropriate.  Using a sterile surgical marker, an appropriate bilobe flap drawn around the defect.    The area thus outlined was incised deep to adipose tissue with a #15 scalpel blade.  The skin margins were undermined to an appropriate distance in all directions utilizing iris scissors.
Intermediate / Complex Repair - Final Wound Length In Cm: 4.2

## 2019-01-31 ENCOUNTER — APPOINTMENT (RX ONLY)
Dept: URBAN - METROPOLITAN AREA CLINIC 31 | Facility: CLINIC | Age: 84
Setting detail: DERMATOLOGY
End: 2019-01-31

## 2019-01-31 DIAGNOSIS — Z48.02 ENCOUNTER FOR REMOVAL OF SUTURES: ICD-10-CM

## 2019-01-31 PROCEDURE — ? SUTURE REMOVAL (NO GLOBAL PERIOD)

## 2019-01-31 ASSESSMENT — LOCATION SIMPLE DESCRIPTION DERM: LOCATION SIMPLE: RIGHT CHEEK

## 2019-01-31 ASSESSMENT — LOCATION ZONE DERM: LOCATION ZONE: FACE

## 2019-01-31 ASSESSMENT — LOCATION DETAILED DESCRIPTION DERM: LOCATION DETAILED: RIGHT INFERIOR CENTRAL MALAR CHEEK

## 2019-02-14 ENCOUNTER — APPOINTMENT (RX ONLY)
Dept: URBAN - METROPOLITAN AREA CLINIC 31 | Facility: CLINIC | Age: 84
Setting detail: DERMATOLOGY
End: 2019-02-14

## 2019-02-14 PROBLEM — C44.319 BASAL CELL CARCINOMA OF SKIN OF OTHER PARTS OF FACE: Status: ACTIVE | Noted: 2019-02-14

## 2019-02-14 PROCEDURE — 17286 DSTR MAL LS F/E/E/N/L/M>4.0: CPT

## 2019-02-14 PROCEDURE — ? CRYOSURGICAL DESTRUCTION

## 2019-02-14 NOTE — PROCEDURE: CRYOSURGICAL DESTRUCTION
Number Of Freeze-Thaw Cycles: 2 freeze-thaw cycles
Detail Level: Detailed
Post-Care Instructions: I reviewed with the patient in detail post-care instructions. Patient is to keep the area dry for 48 hours, and not to engage in any heavy lifting, exercise, or swimming for the next 14 days. Should the patient develop any fevers, chills, bleeding, severe pain patient will contact the office immediately.
Pre-Procedure: The surgical site was antiseptically prepared.
Anesthesia Type: 1% lidocaine with epinephrine
Additional Information: (Optional): The wound was cleaned, and a dressing was applied.  The patient received detailed post-op instructions.
Total Time In Minutes: 2 minutes
Size Of Lesion In Cm: 5
Consent was obtained from the patient. The risks and benefits to therapy were discussed in detail. Specifically, the risks of infection, scarring, bleeding, prolonged wound healing, incomplete removal, allergy to anesthesia, nerve injury and recurrence were addressed. Alternatives to liquid nitrogen, such as ED&C, surgical removal were also discussed.  Prior to the procedure, the treatment site was clearly identified and confirmed by the patient. All components of Universal Protocol/PAUSE Rule completed.
Anesthesia Volume In Cc: 0
Total Volume (Ccs): 1
Body Location Override (Optional - Billing Will Still Be Based On Selected Body Map Location If Applicable): right inferior central malar cheek
Add Intralesional Injection: No
Medication Injected: 5-Fluorouracil
Bill As A Line Item Or As Units: Line Item

## 2019-06-17 ENCOUNTER — APPOINTMENT (RX ONLY)
Dept: URBAN - METROPOLITAN AREA CLINIC 31 | Facility: CLINIC | Age: 84
Setting detail: DERMATOLOGY
End: 2019-06-17

## 2019-06-17 DIAGNOSIS — L57.0 ACTINIC KERATOSIS: ICD-10-CM

## 2019-06-17 DIAGNOSIS — L57.8 OTHER SKIN CHANGES DUE TO CHRONIC EXPOSURE TO NONIONIZING RADIATION: ICD-10-CM

## 2019-06-17 DIAGNOSIS — H61.03 CHONDRITIS OF EXTERNAL EAR: ICD-10-CM

## 2019-06-17 DIAGNOSIS — L85.3 XEROSIS CUTIS: ICD-10-CM

## 2019-06-17 DIAGNOSIS — L82.1 OTHER SEBORRHEIC KERATOSIS: ICD-10-CM

## 2019-06-17 PROBLEM — H61.032 CHONDRITIS OF LEFT EXTERNAL EAR: Status: ACTIVE | Noted: 2019-06-17

## 2019-06-17 PROCEDURE — 17004 DESTROY PREMAL LESIONS 15/>: CPT

## 2019-06-17 PROCEDURE — ? LIQUID NITROGEN

## 2019-06-17 PROCEDURE — 99213 OFFICE O/P EST LOW 20 MIN: CPT | Mod: 25

## 2019-06-17 PROCEDURE — ? COUNSELING

## 2019-06-17 ASSESSMENT — LOCATION DETAILED DESCRIPTION DERM
LOCATION DETAILED: LEFT SUPERIOR LATERAL BUCCAL CHEEK
LOCATION DETAILED: LEFT INFERIOR LATERAL MALAR CHEEK
LOCATION DETAILED: LEFT LATERAL MALAR CHEEK
LOCATION DETAILED: LEFT SUPERIOR CRUS OF ANTIHELIX
LOCATION DETAILED: LEFT PROXIMAL DORSAL FOREARM
LOCATION DETAILED: LEFT SUPERIOR ANTERIOR NECK
LOCATION DETAILED: LEFT RADIAL DORSAL HAND
LOCATION DETAILED: LEFT LATERAL INFERIOR EYELID
LOCATION DETAILED: RIGHT DORSAL MIDDLE METACARPOPHALANGEAL JOINT
LOCATION DETAILED: RIGHT DORSAL THUMB METACARPOPHALANGEAL JOINT
LOCATION DETAILED: RIGHT ANTERIOR DISTAL UPPER ARM
LOCATION DETAILED: LEFT INFERIOR CENTRAL MALAR CHEEK
LOCATION DETAILED: LEFT SUPERIOR LATERAL MALAR CHEEK
LOCATION DETAILED: NASAL DORSUM
LOCATION DETAILED: RIGHT PROXIMAL DORSAL FOREARM
LOCATION DETAILED: RIGHT MID TEMPLE
LOCATION DETAILED: RIGHT FOREHEAD
LOCATION DETAILED: LEFT ULNAR DORSAL HAND
LOCATION DETAILED: RIGHT ULNAR DORSAL HAND
LOCATION DETAILED: LEFT CENTRAL MALAR CHEEK
LOCATION DETAILED: LEFT INFERIOR LATERAL FOREHEAD
LOCATION DETAILED: LEFT VENTRAL PROXIMAL FOREARM
LOCATION DETAILED: LEFT DORSAL THUMB METACARPOPHALANGEAL JOINT
LOCATION DETAILED: RIGHT SUPERIOR LATERAL FOREHEAD
LOCATION DETAILED: RIGHT ANTITRAGUS

## 2019-06-17 ASSESSMENT — LOCATION ZONE DERM
LOCATION ZONE: NOSE
LOCATION ZONE: NECK
LOCATION ZONE: EYELID
LOCATION ZONE: EAR
LOCATION ZONE: HAND
LOCATION ZONE: ARM
LOCATION ZONE: FACE
LOCATION ZONE: FINGER

## 2019-06-17 ASSESSMENT — LOCATION SIMPLE DESCRIPTION DERM
LOCATION SIMPLE: RIGHT EAR
LOCATION SIMPLE: LEFT INFERIOR EYELID
LOCATION SIMPLE: LEFT EAR
LOCATION SIMPLE: NOSE
LOCATION SIMPLE: LEFT ANTERIOR NECK
LOCATION SIMPLE: RIGHT TEMPLE
LOCATION SIMPLE: RIGHT THUMB
LOCATION SIMPLE: RIGHT UPPER ARM
LOCATION SIMPLE: LEFT CHEEK
LOCATION SIMPLE: RIGHT FOREHEAD
LOCATION SIMPLE: RIGHT HAND
LOCATION SIMPLE: LEFT FOREHEAD
LOCATION SIMPLE: LEFT FOREARM
LOCATION SIMPLE: LEFT HAND
LOCATION SIMPLE: RIGHT FOREARM

## 2019-06-17 NOTE — PROCEDURE: LIQUID NITROGEN
Post-Care Instructions: I reviewed with the patient in detail post-care instructions. Patient is to wear sunprotection, and avoid picking at any of the treated lesions. Pt may apply Vaseline to crusted or scabbing areas.
Duration Of Freeze Thaw-Cycle (Seconds): 15
Consent: The patient's consent was obtained including but not limited to risks of crusting, scabbing, blistering, scarring, darker or lighter pigmentary change, recurrence, incomplete removal and infection.
Render Note In Bullet Format When Appropriate: No
Detail Level: Detailed
Number Of Freeze-Thaw Cycles: 1 freeze-thaw cycle

## 2019-12-16 ENCOUNTER — APPOINTMENT (RX ONLY)
Dept: URBAN - METROPOLITAN AREA CLINIC 31 | Facility: CLINIC | Age: 84
Setting detail: DERMATOLOGY
End: 2019-12-16

## 2019-12-16 DIAGNOSIS — L57.8 OTHER SKIN CHANGES DUE TO CHRONIC EXPOSURE TO NONIONIZING RADIATION: ICD-10-CM

## 2019-12-16 DIAGNOSIS — L85.3 XEROSIS CUTIS: ICD-10-CM

## 2019-12-16 DIAGNOSIS — L82.1 OTHER SEBORRHEIC KERATOSIS: ICD-10-CM

## 2019-12-16 DIAGNOSIS — L57.0 ACTINIC KERATOSIS: ICD-10-CM

## 2019-12-16 DIAGNOSIS — D22 MELANOCYTIC NEVI: ICD-10-CM

## 2019-12-16 PROBLEM — D22.62 MELANOCYTIC NEVI OF LEFT UPPER LIMB, INCLUDING SHOULDER: Status: ACTIVE | Noted: 2019-12-16

## 2019-12-16 PROBLEM — D22.61 MELANOCYTIC NEVI OF RIGHT UPPER LIMB, INCLUDING SHOULDER: Status: ACTIVE | Noted: 2019-12-16

## 2019-12-16 PROBLEM — D48.5 NEOPLASM OF UNCERTAIN BEHAVIOR OF SKIN: Status: ACTIVE | Noted: 2019-12-16

## 2019-12-16 PROCEDURE — 17003 DESTRUCT PREMALG LES 2-14: CPT

## 2019-12-16 PROCEDURE — ? BIOPSY BY SHAVE METHOD

## 2019-12-16 PROCEDURE — 99213 OFFICE O/P EST LOW 20 MIN: CPT | Mod: 25

## 2019-12-16 PROCEDURE — ? LIQUID NITROGEN

## 2019-12-16 PROCEDURE — 17000 DESTRUCT PREMALG LESION: CPT | Mod: 59

## 2019-12-16 PROCEDURE — 11102 TANGNTL BX SKIN SINGLE LES: CPT

## 2019-12-16 PROCEDURE — ? COUNSELING

## 2019-12-16 ASSESSMENT — LOCATION SIMPLE DESCRIPTION DERM
LOCATION SIMPLE: RIGHT CHEEK
LOCATION SIMPLE: LEFT FOREARM
LOCATION SIMPLE: LEFT CHEEK
LOCATION SIMPLE: LEFT HAND
LOCATION SIMPLE: RIGHT TEMPLE
LOCATION SIMPLE: RIGHT UPPER ARM
LOCATION SIMPLE: RIGHT HAND
LOCATION SIMPLE: NECK
LOCATION SIMPLE: RIGHT FOREARM
LOCATION SIMPLE: LEFT TEMPLE

## 2019-12-16 ASSESSMENT — LOCATION ZONE DERM
LOCATION ZONE: FACE
LOCATION ZONE: HAND
LOCATION ZONE: ARM
LOCATION ZONE: NECK

## 2019-12-16 ASSESSMENT — LOCATION DETAILED DESCRIPTION DERM
LOCATION DETAILED: LEFT SUPERIOR CENTRAL MALAR CHEEK
LOCATION DETAILED: LEFT ULNAR DORSAL HAND
LOCATION DETAILED: LEFT PROXIMAL DORSAL FOREARM
LOCATION DETAILED: RIGHT CENTRAL LATERAL NECK
LOCATION DETAILED: RIGHT DORSAL MIDDLE METACARPOPHALANGEAL JOINT
LOCATION DETAILED: LEFT CENTRAL MALAR CHEEK
LOCATION DETAILED: RIGHT PROXIMAL DORSAL FOREARM
LOCATION DETAILED: RIGHT ANTERIOR DISTAL UPPER ARM
LOCATION DETAILED: RIGHT MID TEMPLE
LOCATION DETAILED: LEFT INFERIOR CENTRAL MALAR CHEEK
LOCATION DETAILED: LEFT MID TEMPLE
LOCATION DETAILED: RIGHT CENTRAL MALAR CHEEK
LOCATION DETAILED: RIGHT SUPERIOR LATERAL NECK
LOCATION DETAILED: RIGHT DISTAL DORSAL FOREARM
LOCATION DETAILED: RIGHT INFERIOR CENTRAL MALAR CHEEK
LOCATION DETAILED: RIGHT INFERIOR TEMPLE
LOCATION DETAILED: LEFT VENTRAL PROXIMAL FOREARM
LOCATION DETAILED: LEFT DISTAL DORSAL FOREARM
LOCATION DETAILED: LEFT RADIAL DORSAL HAND

## 2019-12-16 NOTE — PROCEDURE: BIOPSY BY SHAVE METHOD
Silver Nitrate Text: The wound bed was treated with silver nitrate after the biopsy was performed.
Hemostasis: Aluminum Chloride and Electrocautery
Render Path Notes In Note?: No
Post-Care Instructions: I reviewed with the patient in detail post-care instructions. Patient is to keep the biopsy site bandaged overnight, and then wash once daily with soap and water and then apply a thin layer of petrolatum once daily until healed.
Anesthesia Type: 1% lidocaine with epinephrine
Billing Type: Third-Party Bill
Detail Level: Detailed
Was A Bandage Applied: Yes
Curettage Text: The wound bed was treated with curettage after the biopsy was performed.
Lab: [id number]
Notification Instructions: Patient will be notified of biopsy results. However, patient instructed to call the office if not contacted within 2 weeks.
Size Of Lesion In Cm: 0.4
Electrodesiccation Text: The wound bed was treated with electrodesiccation after the biopsy was performed.
Additional Anesthesia Volume In Cc (Will Not Render If 0): 0
Cryotherapy Text: The wound bed was treated with cryotherapy after the biopsy was performed.
Lab Facility: 
Wound Care: Petrolatum
Electrodesiccation And Curettage Text: The wound bed was treated with electrodesiccation and curettage after the biopsy was performed.
Biopsy Type: H and E
Dressing: bandage
Type Of Destruction Used: Curettage
Consent: Written consent was obtained and risks were reviewed including but not limited to scarring, infection, bleeding, scabbing, incomplete removal, nerve damage and allergy to anesthesia.
Depth Of Biopsy: dermis

## 2020-01-22 ENCOUNTER — HOSPITAL ENCOUNTER (INPATIENT)
Facility: REHABILITATION | Age: 85
LOS: 17 days | DRG: 056 | End: 2020-02-08
Attending: PHYSICAL MEDICINE & REHABILITATION | Admitting: PHYSICAL MEDICINE & REHABILITATION
Payer: MEDICARE

## 2020-01-22 PROBLEM — I25.10 CAD (CORONARY ARTERY DISEASE): Status: ACTIVE | Noted: 2020-01-22

## 2020-01-22 PROBLEM — I63.9 CEREBROVASCULAR ACCIDENT (CVA) DUE TO EMBOLISM (HCC): Status: ACTIVE | Noted: 2020-01-22

## 2020-01-22 PROBLEM — I48.91 ATRIAL FIBRILLATION (HCC): Status: ACTIVE | Noted: 2020-01-22

## 2020-01-22 PROBLEM — I10 HYPERTENSION: Status: ACTIVE | Noted: 2020-01-22

## 2020-01-22 PROBLEM — R21 RASH OF BACK: Status: ACTIVE | Noted: 2020-01-22

## 2020-01-22 PROBLEM — R13.10 DYSPHAGIA: Status: ACTIVE | Noted: 2020-01-22

## 2020-01-22 PROBLEM — I50.20 SYSTOLIC CHF (HCC): Status: ACTIVE | Noted: 2020-01-22

## 2020-01-22 PROBLEM — C61 PROSTATE CANCER (HCC): Status: ACTIVE | Noted: 2020-01-22

## 2020-01-22 PROBLEM — R47.1 DYSARTHRIA: Status: ACTIVE | Noted: 2020-01-22

## 2020-01-22 PROCEDURE — 99223 1ST HOSP IP/OBS HIGH 75: CPT | Mod: AI | Performed by: HOSPITALIST

## 2020-01-22 PROCEDURE — A9270 NON-COVERED ITEM OR SERVICE: HCPCS | Performed by: PHYSICAL MEDICINE & REHABILITATION

## 2020-01-22 PROCEDURE — A9270 NON-COVERED ITEM OR SERVICE: HCPCS | Performed by: HOSPITALIST

## 2020-01-22 PROCEDURE — 94760 N-INVAS EAR/PLS OXIMETRY 1: CPT

## 2020-01-22 PROCEDURE — 302196 LINEN, HYPOALLERGENIC: Performed by: PHYSICAL MEDICINE & REHABILITATION

## 2020-01-22 PROCEDURE — 700102 HCHG RX REV CODE 250 W/ 637 OVERRIDE(OP): Performed by: HOSPITALIST

## 2020-01-22 PROCEDURE — 700102 HCHG RX REV CODE 250 W/ 637 OVERRIDE(OP): Performed by: PHYSICAL MEDICINE & REHABILITATION

## 2020-01-22 PROCEDURE — 99223 1ST HOSP IP/OBS HIGH 75: CPT | Mod: AI | Performed by: PHYSICAL MEDICINE & REHABILITATION

## 2020-01-22 PROCEDURE — 770010 HCHG ROOM/CARE - REHAB SEMI PRIVAT*

## 2020-01-22 RX ORDER — BICALUTAMIDE 50 MG/1
50 TABLET, FILM COATED ORAL DAILY
Status: DISCONTINUED | OUTPATIENT
Start: 2020-01-23 | End: 2020-01-24

## 2020-01-22 RX ORDER — ATORVASTATIN CALCIUM 40 MG/1
40 TABLET, FILM COATED ORAL EVERY EVENING
Status: DISCONTINUED | OUTPATIENT
Start: 2020-01-22 | End: 2020-02-08 | Stop reason: HOSPADM

## 2020-01-22 RX ORDER — ALUMINA, MAGNESIA, AND SIMETHICONE 2400; 2400; 240 MG/30ML; MG/30ML; MG/30ML
20 SUSPENSION ORAL
Status: DISCONTINUED | OUTPATIENT
Start: 2020-01-22 | End: 2020-02-08 | Stop reason: HOSPADM

## 2020-01-22 RX ORDER — ONDANSETRON 2 MG/ML
4 INJECTION INTRAMUSCULAR; INTRAVENOUS 4 TIMES DAILY PRN
Status: DISCONTINUED | OUTPATIENT
Start: 2020-01-22 | End: 2020-02-08 | Stop reason: HOSPADM

## 2020-01-22 RX ORDER — LISINOPRIL 5 MG/1
2.5 TABLET ORAL
Status: DISCONTINUED | OUTPATIENT
Start: 2020-01-23 | End: 2020-01-23

## 2020-01-22 RX ORDER — POLYETHYLENE GLYCOL 3350 17 G/17G
1 POWDER, FOR SOLUTION ORAL
Status: DISCONTINUED | OUTPATIENT
Start: 2020-01-22 | End: 2020-02-08 | Stop reason: HOSPADM

## 2020-01-22 RX ORDER — ACETAMINOPHEN 325 MG/1
650 TABLET ORAL EVERY 4 HOURS PRN
Status: DISCONTINUED | OUTPATIENT
Start: 2020-01-22 | End: 2020-02-08 | Stop reason: HOSPADM

## 2020-01-22 RX ORDER — LORATADINE 10 MG/1
10 TABLET ORAL DAILY
Status: DISCONTINUED | OUTPATIENT
Start: 2020-01-23 | End: 2020-01-26

## 2020-01-22 RX ORDER — POLYVINYL ALCOHOL 14 MG/ML
1 SOLUTION/ DROPS OPHTHALMIC PRN
Status: DISCONTINUED | OUTPATIENT
Start: 2020-01-22 | End: 2020-02-08 | Stop reason: HOSPADM

## 2020-01-22 RX ORDER — AMOXICILLIN 250 MG
2 CAPSULE ORAL 2 TIMES DAILY
Status: DISCONTINUED | OUTPATIENT
Start: 2020-01-22 | End: 2020-02-08 | Stop reason: HOSPADM

## 2020-01-22 RX ORDER — LACTULOSE 20 G/30ML
30 SOLUTION ORAL
Status: DISCONTINUED | OUTPATIENT
Start: 2020-01-22 | End: 2020-02-08 | Stop reason: HOSPADM

## 2020-01-22 RX ORDER — ECHINACEA PURPUREA EXTRACT 125 MG
2 TABLET ORAL PRN
Status: DISCONTINUED | OUTPATIENT
Start: 2020-01-22 | End: 2020-02-08 | Stop reason: HOSPADM

## 2020-01-22 RX ORDER — BISACODYL 10 MG
10 SUPPOSITORY, RECTAL RECTAL
Status: DISCONTINUED | OUTPATIENT
Start: 2020-01-22 | End: 2020-02-08 | Stop reason: HOSPADM

## 2020-01-22 RX ORDER — FAMOTIDINE 20 MG/1
20 TABLET, FILM COATED ORAL 2 TIMES DAILY
Status: DISCONTINUED | OUTPATIENT
Start: 2020-01-22 | End: 2020-01-22

## 2020-01-22 RX ORDER — LANOLIN ALCOHOL/MO/W.PET/CERES
3 CREAM (GRAM) TOPICAL NIGHTLY PRN
Status: DISCONTINUED | OUTPATIENT
Start: 2020-01-22 | End: 2020-01-24

## 2020-01-22 RX ORDER — DIGOXIN 125 MCG
125 TABLET ORAL DAILY
Status: DISCONTINUED | OUTPATIENT
Start: 2020-01-23 | End: 2020-02-08 | Stop reason: HOSPADM

## 2020-01-22 RX ORDER — BENZOCAINE/MENTHOL 6 MG-10 MG
LOZENGE MUCOUS MEMBRANE 4 TIMES DAILY
Status: DISCONTINUED | OUTPATIENT
Start: 2020-01-22 | End: 2020-01-28

## 2020-01-22 RX ORDER — TRAZODONE HYDROCHLORIDE 50 MG/1
50 TABLET ORAL
Status: DISCONTINUED | OUTPATIENT
Start: 2020-01-22 | End: 2020-01-24

## 2020-01-22 RX ORDER — ONDANSETRON 4 MG/1
4 TABLET, ORALLY DISINTEGRATING ORAL 4 TIMES DAILY PRN
Status: DISCONTINUED | OUTPATIENT
Start: 2020-01-22 | End: 2020-02-08 | Stop reason: HOSPADM

## 2020-01-22 RX ORDER — POTASSIUM CHLORIDE 20 MEQ/1
20 TABLET, EXTENDED RELEASE ORAL DAILY
Status: DISCONTINUED | OUTPATIENT
Start: 2020-01-23 | End: 2020-01-23

## 2020-01-22 RX ORDER — BENZOCAINE/MENTHOL 6 MG-10 MG
LOZENGE MUCOUS MEMBRANE 2 TIMES DAILY
Status: DISCONTINUED | OUTPATIENT
Start: 2020-01-22 | End: 2020-01-22

## 2020-01-22 RX ORDER — ALBUTEROL SULFATE 90 UG/1
2 AEROSOL, METERED RESPIRATORY (INHALATION) EVERY 4 HOURS PRN
Status: DISCONTINUED | OUTPATIENT
Start: 2020-01-22 | End: 2020-02-08 | Stop reason: HOSPADM

## 2020-01-22 RX ADMIN — ATORVASTATIN CALCIUM 40 MG: 40 TABLET, FILM COATED ORAL at 20:54

## 2020-01-22 RX ADMIN — HYDROCORTISONE: 1 CREAM TOPICAL at 21:00

## 2020-01-22 RX ADMIN — HYDROCORTISONE: 1 CREAM TOPICAL at 17:45

## 2020-01-22 RX ADMIN — METOPROLOL TARTRATE 75 MG: 25 TABLET ORAL at 17:16

## 2020-01-22 RX ADMIN — APIXABAN 5 MG: 5 TABLET, FILM COATED ORAL at 20:54

## 2020-01-22 RX ADMIN — SENNOSIDES AND DOCUSATE SODIUM 2 TABLET: 8.6; 5 TABLET ORAL at 20:54

## 2020-01-22 SDOH — HEALTH STABILITY: MENTAL HEALTH: HOW OFTEN DO YOU HAVE A DRINK CONTAINING ALCOHOL?: 2-3 TIMES A WEEK

## 2020-01-22 SDOH — HEALTH STABILITY: MENTAL HEALTH: HOW OFTEN DO YOU HAVE 6 OR MORE DRINKS ON ONE OCCASION?: NEVER

## 2020-01-22 SDOH — ECONOMIC STABILITY: FOOD INSECURITY: WITHIN THE PAST 12 MONTHS, YOU WORRIED THAT YOUR FOOD WOULD RUN OUT BEFORE YOU GOT MONEY TO BUY MORE.: NEVER TRUE

## 2020-01-22 SDOH — ECONOMIC STABILITY: TRANSPORTATION INSECURITY
IN THE PAST 12 MONTHS, HAS LACK OF TRANSPORTATION KEPT YOU FROM MEETINGS, WORK, OR FROM GETTING THINGS NEEDED FOR DAILY LIVING?: NO

## 2020-01-22 SDOH — ECONOMIC STABILITY: TRANSPORTATION INSECURITY
IN THE PAST 12 MONTHS, HAS THE LACK OF TRANSPORTATION KEPT YOU FROM MEDICAL APPOINTMENTS OR FROM GETTING MEDICATIONS?: NO

## 2020-01-22 SDOH — HEALTH STABILITY: MENTAL HEALTH: HOW MANY STANDARD DRINKS CONTAINING ALCOHOL DO YOU HAVE ON A TYPICAL DAY?: 1 OR 2

## 2020-01-22 SDOH — ECONOMIC STABILITY: FOOD INSECURITY: WITHIN THE PAST 12 MONTHS, THE FOOD YOU BOUGHT JUST DIDN'T LAST AND YOU DIDN'T HAVE MONEY TO GET MORE.: NEVER TRUE

## 2020-01-22 ASSESSMENT — ENCOUNTER SYMPTOMS
NAUSEA: 0
CHILLS: 0
FEVER: 0
SPEECH CHANGE: 1
PALPITATIONS: 0
POLYDIPSIA: 0
SHORTNESS OF BREATH: 0
COUGH: 0
ABDOMINAL PAIN: 0
VOMITING: 0
EYES NEGATIVE: 1
BRUISES/BLEEDS EASILY: 0

## 2020-01-22 ASSESSMENT — PATIENT HEALTH QUESTIONNAIRE - PHQ9
1. LITTLE INTEREST OR PLEASURE IN DOING THINGS: NOT AT ALL
SUM OF ALL RESPONSES TO PHQ9 QUESTIONS 1 AND 2: 0
2. FEELING DOWN, DEPRESSED, IRRITABLE, OR HOPELESS: NOT AT ALL
1. LITTLE INTEREST OR PLEASURE IN DOING THINGS: NOT AT ALL
2. FEELING DOWN, DEPRESSED, IRRITABLE, OR HOPELESS: NOT AT ALL
SUM OF ALL RESPONSES TO PHQ9 QUESTIONS 1 AND 2: 0

## 2020-01-22 ASSESSMENT — LIFESTYLE VARIABLES
HAVE YOU EVER FELT YOU SHOULD CUT DOWN ON YOUR DRINKING: YES
AVERAGE NUMBER OF DAYS PER WEEK YOU HAVE A DRINK CONTAINING ALCOHOL: 2
DOES PATIENT WANT TO TALK TO SOMEONE ABOUT QUITTING: NO
EVER_SMOKED: YES
DOES PATIENT WANT TO STOP DRINKING: YES
EVER FELT BAD OR GUILTY ABOUT YOUR DRINKING: NO
HOW MANY TIMES IN THE PAST YEAR HAVE YOU HAD 5 OR MORE DRINKS IN A DAY: 0
ON A TYPICAL DAY WHEN YOU DRINK ALCOHOL HOW MANY DRINKS DO YOU HAVE: 1
HAVE PEOPLE ANNOYED YOU BY CRITICIZING YOUR DRINKING: NO
CONSUMPTION TOTAL: NEGATIVE
TOTAL SCORE: 1
EVER HAD A DRINK FIRST THING IN THE MORNING TO STEADY YOUR NERVES TO GET RID OF A HANGOVER: NO
TOTAL SCORE: 1
TOTAL SCORE: 1
ALCOHOL_USE: YES

## 2020-01-22 ASSESSMENT — CHA2DS2 SCORE
AGE 75 OR GREATER: YES
CHA2DS2 VASC SCORE: 4
PRIOR STROKE OR TIA OR THROMBOEMBOLISM: YES

## 2020-01-22 NOTE — FLOWSHEET NOTE
01/22/20 1343   Type of Assessment   Assessment Yes   Patient History   Pulmonary Diagnosis pna, asthma   Home O2 No   Home Treatments/Frequency Yes   MDI 1/Frequency Alb MDI PRN   COPD Risk Screening   Do you have a history of COPD? No   Smoking History   Have you ever smoked Yes   Have you smoked in the last 12 months No   Confirm Quit Date 01/22/95   Level Of Consciousness   Level of Consciousness Alert   Respiratory WDL   Respiratory (WDL) X   Breath Sounds   Pre/Post Intervention   (intermittant audible, faint wheezes, pt denies SOB)   RUL Breath Sounds Diminished   RML Breath Sounds Diminished   RLL Breath Sounds Diminished   PRISCA Breath Sounds Diminished   LLL Breath Sounds Diminished   Oximetry   #Pulse Oximetry (Single Determination) Yes   Oxygen   Pulse Oximetry 96 %   O2 Daily Delivery Respiratory  Room Air with O2 Available

## 2020-01-22 NOTE — CARE PLAN
Problem: Communication  Goal: The ability to communicate needs accurately and effectively will improve  Note:   Pt is Aox4, answers questions appropriately, able to communicate needs effectively to staff.     Problem: Safety  Goal: Will remain free from injury  Note:   Pt is a new admit. Oriented pt to new environment, educated of safety precautions, encouraged use of call light when assistance is needed, x3 rails up, bed and chair alarms ON.

## 2020-01-22 NOTE — FLOWSHEET NOTE
01/22/20 1343   Bronchodilator Protocol   Med Order With RCP No   Is this an exacerbation of COPD/Asthma? Patient on Home Regimen;No   Bronchodilator Indications History / Diagnosis;Strong Subjective / Objective Improvement   Breath Sounds Criteria 1    Benefit Criteria 1    Pulse Criteria 0    Respiratory Rate Criteria 1    S.O.B. Criteria 0    Criteria Total 3   Point Values 0-3 - PRN   Bronchodilator Goals/Outcome Patient at Stable Baseline

## 2020-01-23 LAB
25(OH)D3 SERPL-MCNC: 42 NG/ML (ref 30–100)
ALBUMIN SERPL BCP-MCNC: 3.4 G/DL (ref 3.2–4.9)
ALBUMIN/GLOB SERPL: 1 G/DL
ALP SERPL-CCNC: 41 U/L (ref 30–99)
ALT SERPL-CCNC: 26 U/L (ref 2–50)
ANION GAP SERPL CALC-SCNC: 14 MMOL/L (ref 0–11.9)
AST SERPL-CCNC: 25 U/L (ref 12–45)
BASOPHILS # BLD AUTO: 0.3 % (ref 0–1.8)
BASOPHILS # BLD: 0.03 K/UL (ref 0–0.12)
BILIRUB SERPL-MCNC: 0.8 MG/DL (ref 0.1–1.5)
BUN SERPL-MCNC: 17 MG/DL (ref 8–22)
CALCIUM SERPL-MCNC: 10 MG/DL (ref 8.5–10.5)
CHLORIDE SERPL-SCNC: 110 MMOL/L (ref 96–112)
CO2 SERPL-SCNC: 20 MMOL/L (ref 20–33)
CREAT SERPL-MCNC: 1.03 MG/DL (ref 0.5–1.4)
DIGOXIN SERPL-MCNC: 0.9 NG/ML (ref 0.8–2)
EOSINOPHIL # BLD AUTO: 0.17 K/UL (ref 0–0.51)
EOSINOPHIL NFR BLD: 1.8 % (ref 0–6.9)
ERYTHROCYTE [DISTWIDTH] IN BLOOD BY AUTOMATED COUNT: 47.9 FL (ref 35.9–50)
GLOBULIN SER CALC-MCNC: 3.3 G/DL (ref 1.9–3.5)
GLUCOSE SERPL-MCNC: 104 MG/DL (ref 65–99)
HCT VFR BLD AUTO: 43.1 % (ref 42–52)
HGB BLD-MCNC: 14.4 G/DL (ref 14–18)
IMM GRANULOCYTES # BLD AUTO: 0.03 K/UL (ref 0–0.11)
IMM GRANULOCYTES NFR BLD AUTO: 0.3 % (ref 0–0.9)
LYMPHOCYTES # BLD AUTO: 4.51 K/UL (ref 1–4.8)
LYMPHOCYTES NFR BLD: 47.6 % (ref 22–41)
MAGNESIUM SERPL-MCNC: 2 MG/DL (ref 1.5–2.5)
MCH RBC QN AUTO: 32.9 PG (ref 27–33)
MCHC RBC AUTO-ENTMCNC: 33.4 G/DL (ref 33.7–35.3)
MCV RBC AUTO: 98.4 FL (ref 81.4–97.8)
MONOCYTES # BLD AUTO: 0.92 K/UL (ref 0–0.85)
MONOCYTES NFR BLD AUTO: 9.7 % (ref 0–13.4)
NEUTROPHILS # BLD AUTO: 3.81 K/UL (ref 1.82–7.42)
NEUTROPHILS NFR BLD: 40.3 % (ref 44–72)
NRBC # BLD AUTO: 0 K/UL
NRBC BLD-RTO: 0 /100 WBC
PLATELET # BLD AUTO: 263 K/UL (ref 164–446)
PMV BLD AUTO: 10.5 FL (ref 9–12.9)
POTASSIUM SERPL-SCNC: 3.6 MMOL/L (ref 3.6–5.5)
PROT SERPL-MCNC: 6.7 G/DL (ref 6–8.2)
RBC # BLD AUTO: 4.38 M/UL (ref 4.7–6.1)
SODIUM SERPL-SCNC: 144 MMOL/L (ref 135–145)
WBC # BLD AUTO: 9.5 K/UL (ref 4.8–10.8)

## 2020-01-23 PROCEDURE — 85025 COMPLETE CBC W/AUTO DIFF WBC: CPT

## 2020-01-23 PROCEDURE — 99233 SBSQ HOSP IP/OBS HIGH 50: CPT | Performed by: PHYSICAL MEDICINE & REHABILITATION

## 2020-01-23 PROCEDURE — 36415 COLL VENOUS BLD VENIPUNCTURE: CPT

## 2020-01-23 PROCEDURE — 700102 HCHG RX REV CODE 250 W/ 637 OVERRIDE(OP): Performed by: PHYSICAL MEDICINE & REHABILITATION

## 2020-01-23 PROCEDURE — 97162 PT EVAL MOD COMPLEX 30 MIN: CPT

## 2020-01-23 PROCEDURE — 80162 ASSAY OF DIGOXIN TOTAL: CPT

## 2020-01-23 PROCEDURE — 99232 SBSQ HOSP IP/OBS MODERATE 35: CPT | Performed by: HOSPITALIST

## 2020-01-23 PROCEDURE — 770010 HCHG ROOM/CARE - REHAB SEMI PRIVAT*

## 2020-01-23 PROCEDURE — A9270 NON-COVERED ITEM OR SERVICE: HCPCS | Performed by: PHYSICAL MEDICINE & REHABILITATION

## 2020-01-23 PROCEDURE — 97166 OT EVAL MOD COMPLEX 45 MIN: CPT

## 2020-01-23 PROCEDURE — 80053 COMPREHEN METABOLIC PANEL: CPT

## 2020-01-23 PROCEDURE — 82306 VITAMIN D 25 HYDROXY: CPT

## 2020-01-23 PROCEDURE — 83880 ASSAY OF NATRIURETIC PEPTIDE: CPT

## 2020-01-23 PROCEDURE — 97530 THERAPEUTIC ACTIVITIES: CPT

## 2020-01-23 PROCEDURE — 83735 ASSAY OF MAGNESIUM: CPT

## 2020-01-23 RX ORDER — MODAFINIL 100 MG/1
100 TABLET ORAL EVERY MORNING
Status: DISCONTINUED | OUTPATIENT
Start: 2020-01-23 | End: 2020-02-06

## 2020-01-23 RX ORDER — ASPIRIN 81 MG/1
81 TABLET, CHEWABLE ORAL DAILY
Status: DISCONTINUED | OUTPATIENT
Start: 2020-01-24 | End: 2020-02-08 | Stop reason: HOSPADM

## 2020-01-23 RX ORDER — LISINOPRIL 5 MG/1
5 TABLET ORAL
Status: DISCONTINUED | OUTPATIENT
Start: 2020-01-24 | End: 2020-02-08 | Stop reason: HOSPADM

## 2020-01-23 RX ADMIN — ATORVASTATIN CALCIUM 40 MG: 40 TABLET, FILM COATED ORAL at 21:02

## 2020-01-23 RX ADMIN — SENNOSIDES AND DOCUSATE SODIUM 2 TABLET: 8.6; 5 TABLET ORAL at 21:02

## 2020-01-23 RX ADMIN — METOPROLOL TARTRATE 75 MG: 25 TABLET ORAL at 05:05

## 2020-01-23 RX ADMIN — APIXABAN 5 MG: 5 TABLET, FILM COATED ORAL at 21:02

## 2020-01-23 RX ADMIN — LORATADINE 10 MG: 10 TABLET ORAL at 09:44

## 2020-01-23 RX ADMIN — HYDROCORTISONE: 1 CREAM TOPICAL at 17:00

## 2020-01-23 RX ADMIN — METOPROLOL TARTRATE 75 MG: 25 TABLET ORAL at 17:48

## 2020-01-23 RX ADMIN — HYDROCORTISONE: 1 CREAM TOPICAL at 11:13

## 2020-01-23 RX ADMIN — LISINOPRIL 2.5 MG: 5 TABLET ORAL at 05:04

## 2020-01-23 RX ADMIN — APIXABAN 5 MG: 5 TABLET, FILM COATED ORAL at 09:44

## 2020-01-23 RX ADMIN — HYDROCORTISONE 1 EACH: 1 CREAM TOPICAL at 21:03

## 2020-01-23 RX ADMIN — HYDROCORTISONE: 1 CREAM TOPICAL at 09:00

## 2020-01-23 RX ADMIN — POTASSIUM CHLORIDE 20 MEQ: 1500 TABLET, EXTENDED RELEASE ORAL at 09:44

## 2020-01-23 RX ADMIN — ASPIRIN 81 MG: 81 TABLET, COATED ORAL at 09:44

## 2020-01-23 RX ADMIN — DIGOXIN 125 MCG: 125 TABLET ORAL at 17:48

## 2020-01-23 RX ADMIN — BICALUTAMIDE 50 MG: 50 TABLET, FILM COATED ORAL at 09:44

## 2020-01-23 RX ADMIN — MODAFINIL 100 MG: 100 TABLET ORAL at 11:12

## 2020-01-23 RX ADMIN — SENNOSIDES AND DOCUSATE SODIUM 2 TABLET: 8.6; 5 TABLET ORAL at 09:44

## 2020-01-23 ASSESSMENT — BRIEF INTERVIEW FOR MENTAL STATUS (BIMS)
WHAT YEAR IS IT: CORRECT
BIMS SUMMARY SCORE: 13
ASKED TO RECALL BLUE: YES, NO CUE REQUIRED
WHAT MONTH IS IT: ACCURATE WITHIN 5 DAYS
INITIAL REPETITION OF BED BLUE SOCK - FIRST ATTEMPT: 3
WHAT DAY OF THE WEEK IS IT: CORRECT
ASKED TO RECALL BED: YES, NO CUE REQUIRED
ASKED TO RECALL SOCK: NO, COULD NOT RECALL

## 2020-01-23 ASSESSMENT — ENCOUNTER SYMPTOMS
POLYDIPSIA: 0
ABDOMINAL PAIN: 0
SHORTNESS OF BREATH: 0
CHILLS: 0
COUGH: 0
PALPITATIONS: 0
VOMITING: 0
EYES NEGATIVE: 1
FEVER: 0
BRUISES/BLEEDS EASILY: 0
NAUSEA: 0

## 2020-01-23 ASSESSMENT — ACTIVITIES OF DAILY LIVING (ADL): TOILETING: INDEPENDENT

## 2020-01-23 NOTE — CONSULTS
HOSPITAL MEDICINE CONSULTATION    Requesting Physician:  Dr. Fortnue    Reason for Consult:  Atrial Fibrillation, Congestive Heart Failure    History of Present Illness:  The patient is an 89-year-old  male with past medical history significant for coronary artery disease, atrial fibrillation, hypertension, and prostate cancer.  He was admitted to [facility] for left facial droop and slurred speech.  Neuroimaging revealed bilateral strokes.  His hospital course was complicated by atrial fibrillation with rapid ventricular response, pneumonia, and acute kidney injury--all of which have now been treated.  Due to his ongoing functional debility, the patient was transferred to [facility] on 1/22/20.  Hospital Medicine consultation is requested to assist in the management of this patient's AFib, CHF, and HTN.  He is also noted to have a rash covering his entire back.    Review of Systems:  Review of Systems   Constitutional: Negative for chills and fever.   HENT: Negative.    Eyes: Negative.    Respiratory: Negative for cough and shortness of breath.    Cardiovascular: Negative for chest pain and palpitations.   Gastrointestinal: Negative for abdominal pain, nausea and vomiting.   Skin: Positive for rash.   Neurological: Positive for speech change.   Endo/Heme/Allergies: Negative for polydipsia. Does not bruise/bleed easily.   All other systems reviewed and are negative.      Allergies:  No Known Allergies    Medications:    Current Facility-Administered Medications:   •  Respiratory Care per Protocol, , Nebulization, Continuous RT, Gauri Fortune M.D.  •  Pharmacy Consult Request ...Pain Management Review 1 Each, 1 Each, Other, PHARMACY TO DOSE, Gauri Fortune M.D.  •  acetaminophen (TYLENOL) tablet 650 mg, 650 mg, Oral, Q4HRS PRN, Gauri Fortune M.D.  •  senna-docusate (PERICOLACE or SENOKOT S) 8.6-50 MG per tablet 2 Tab, 2 Tab, Oral, BID, Stopped at 01/22/20 7461  **AND** polyethylene glycol/lytes (MIRALAX) PACKET 1 Packet, 1 Packet, Oral, QDAY PRN **AND** magnesium hydroxide (MILK OF MAGNESIA) suspension 30 mL, 30 mL, Oral, QDAY PRN **AND** bisacodyl (DULCOLAX) suppository 10 mg, 10 mg, Rectal, QDAY PRN, Gauri Fortune M.D.  •  artificial tears ophthalmic solution 1 Drop, 1 Drop, Both Eyes, PRN, Gauri Fortune M.D.  •  benzocaine-menthol (CEPACOL) lozenge 1 Lozenge, 1 Lozenge, Mouth/Throat, Q2HRS PRN, Gauri Fortune M.D.  •  mag hydrox-al hydrox-simeth (MAALOX PLUS ES or MYLANTA DS) suspension 20 mL, 20 mL, Oral, Q2HRS PRN, Gauri Fortune M.D.  •  ondansetron (ZOFRAN ODT) dispertab 4 mg, 4 mg, Oral, 4X/DAY PRN **OR** ondansetron (ZOFRAN) syringe/vial injection 4 mg, 4 mg, Intramuscular, 4X/DAY PRN, Gauri Fortune M.D.  •  traZODone (DESYREL) tablet 50 mg, 50 mg, Oral, QHS PRN, Gauri Fortune M.D.  •  sodium chloride (OCEAN) 0.65 % nasal spray 2 Spray, 2 Spray, Nasal, PRN, Gauri Fortune M.D.  •  melatonin tablet 3 mg, 3 mg, Oral, HS PRN, Gauri Fortune M.D.  •  [START ON 1/23/2020] aspirin EC (ECOTRIN) tablet 81 mg, 81 mg, Oral, DAILY, Gauri Fortune M.D.  •  apixaban (ELIQUIS) tablet 5 mg, 5 mg, Oral, BID, Gauri Fortune M.D.  •  lactulose 20 GM/30ML solution 30 mL, 30 mL, Oral, QDAY PRN, Gauri Fortune M.D.  •  docusate sodium (ENEMEEZ) enema 283 mg, 283 mg, Rectal, QDAY PRN, Gauri Fortune M.D.  •  atorvastatin (LIPITOR) tablet 40 mg, 40 mg, Oral, Q EVENING, Gauri Fortune M.D.  •  [START ON 1/23/2020] bicalutamide (CASODEX) tablet 50 mg, 50 mg, Oral, DAILY, Gauri Fortune M.D.  •  [START ON 1/23/2020] digoxin (LANOXIN) tablet 125 mcg, 125 mcg, Oral, DAILY AT 1800, Gauri Fortune M.D.  •  metoprolol (LOPRESSOR) tablet 75 mg, 75 mg, Oral, TWICE DAILY, Gauri Fortune M.D., 75 mg at 01/22/20 1716  •  [START ON 1/23/2020] potassium chloride SA (Kdur) tablet 20 mEq, 20 mEq, Oral, DAILY, Gauri Fortune M.D.  •   [START ON 1/23/2020] lisinopril (PRINIVIL) tablet 2.5 mg, 2.5 mg, Oral, Q DAY, Gauri Fortune M.D.  •  albuterol inhaler 2 Puff, 2 Puff, Inhalation, Q4HRS PRN, Gauri Fortune M.D.  •  hydrocortisone 1 % cream, , Topical, 4X/DAY, Mehreen Alford M.D.  •  [START ON 1/23/2020] loratadine (CLARITIN) tablet 10 mg, 10 mg, Oral, DAILY, Gauri Fortune M.D.    Past Medical/Surgical History:  Past Medical History:   Diagnosis Date   • Arrhythmia    • Cancer (HCC)    • Congestive heart failure (HCC)    • Fall    • Hyperlipidemia    • Hypertension    • Myocardial infarct (HCC)    • Pneumonia    • Stroke (HCC)    • Urinary incontinence      Past Surgical History:   Procedure Laterality Date   • OTHER ORTHOPEDIC SURGERY      right ankle surgery   • OTHER ORTHOPEDIC SURGERY      left forearm surgery   • TONSILLECTOMY         Social History:  Social History     Socioeconomic History   • Marital status: Single     Spouse name: Not on file   • Number of children: Not on file   • Years of education: Not on file   • Highest education level: Not on file   Occupational History   • Not on file   Social Needs   • Financial resource strain: Not on file   • Food insecurity:     Worry: Never true     Inability: Never true   • Transportation needs:     Medical: No     Non-medical: No   Tobacco Use   • Smoking status: Never Smoker   • Smokeless tobacco: Never Used   Substance and Sexual Activity   • Alcohol use: Yes     Alcohol/week: 1.2 oz     Types: 2 Shots of liquor per week     Frequency: 2-3 times a week     Drinks per session: 1 or 2     Binge frequency: Never   • Drug use: Not Currently   • Sexual activity: Not on file   Lifestyle   • Physical activity:     Days per week: Not on file     Minutes per session: Not on file   • Stress: Not on file   Relationships   • Social connections:     Talks on phone: Not on file     Gets together: Not on file     Attends Taoist service: Not on file     Active member of club or organization: Not  "on file     Attends meetings of clubs or organizations: Not on file     Relationship status: Not on file   • Intimate partner violence:     Fear of current or ex partner: Not on file     Emotionally abused: Not on file     Physically abused: Not on file     Forced sexual activity: Not on file   Other Topics Concern   • Not on file   Social History Narrative   • Not on file       Family History  Family History   Problem Relation Age of Onset   • Alcohol abuse Mother        Physical Examination:   Vitals:    01/22/20 1343 01/22/20 1345 01/22/20 1400 01/22/20 1717   BP:   145/90    Pulse:  93     Resp:  20     Temp:  36.3 °C (97.4 °F)     TempSrc:  Oral     SpO2: 96%      Weight:  66 kg (145 lb 8.1 oz)  66 kg (145 lb 8.1 oz)   Height:  1.727 m (5' 8\")  1.727 m (5' 8\")       Physical Exam   Constitutional: He is oriented to person, place, and time. No distress.   HENT:   Head: Normocephalic and atraumatic.   Right Ear: External ear normal.   Left Ear: External ear normal.   Eyes: Conjunctivae and EOM are normal. Right eye exhibits no discharge. Left eye exhibits no discharge.   Neck: Normal range of motion. Neck supple. No tracheal deviation present.   Cardiovascular:   irreg irreg   Pulmonary/Chest: No stridor. No respiratory distress. He has no wheezes.   Decreased BS   Abdominal: Soft. Bowel sounds are normal. He exhibits no distension. There is no tenderness.   Musculoskeletal:         General: No tenderness or edema.   Neurological: He is alert and oriented to person, place, and time.   dysarthria   Skin: Skin is warm and dry. He is not diaphoretic.   Vitals reviewed.      Laboratory Data:            Imaging:  No orders to display       Impressions/Recommendations:  Prostate cancer (HCC)  On Casodex  Outpt Urology F/U    Atrial fibrillation (HCC)  S/P RVR at [facility]  On Digoxin, check drug level  Anticoagulated on Eliquis    Cerebrovascular accident (CVA) due to embolism (HCC)  On Eliquis, ASA, and " Lipitor    Hypertension  On Lisinopril and Metoprolol  Observe blood pressure trends    CAD (coronary artery disease)  On maximal medical management w/ ASA, Lipitor, Lisinopril, and Metoprolol    Rash of back  Trial Pepcid and topical steroid    Systolic CHF (HCC)  Echo (from [facility]) EF 25-30%, mod MR, and RVSP 40-45 mmHg  Closely monitor volume status    DNR    Discussed with Pharmacy and Dr. Fortune.  Thank you for the opportunity to assist in this patient's care.  We will continue to follow along with you.

## 2020-01-23 NOTE — PROGRESS NOTES
Hospital Medicine Daily Progress Note        Chief Complaint  AFib, CHF, HTN    Interval Problem Update  Blood pressures trending high.  Pt denies chest pain, shortness of breath, or palpitations.    Review of Systems  Review of Systems   Constitutional: Negative for chills and fever.   HENT: Negative.    Eyes: Negative.    Respiratory: Negative for cough and shortness of breath.    Cardiovascular: Negative for chest pain and palpitations.   Gastrointestinal: Negative for abdominal pain, nausea and vomiting.   Skin: Positive for rash.   Endo/Heme/Allergies: Negative for polydipsia. Does not bruise/bleed easily.        Physical Exam  Temp:  [36.6 °C (97.8 °F)] 36.6 °C (97.8 °F)  Pulse:  [] 98  Resp:  [16] 16  BP: (140-142)/(85-91) 142/91  SpO2:  [94 %] 94 %    Physical Exam  Constitutional:       General: He is not in acute distress.     Appearance: Normal appearance. He is not ill-appearing.   HENT:      Head: Normocephalic and atraumatic.      Right Ear: External ear normal.      Left Ear: External ear normal.      Nose: Nose normal.   Eyes:      General:         Right eye: No discharge.         Left eye: No discharge.      Extraocular Movements: Extraocular movements intact.      Conjunctiva/sclera: Conjunctivae normal.   Neck:      Musculoskeletal: Normal range of motion and neck supple.   Cardiovascular:      Rate and Rhythm: Normal rate and regular rhythm.   Pulmonary:      Effort: No respiratory distress.      Breath sounds: No wheezing.      Comments: Decreased BS  Abdominal:      General: Bowel sounds are normal. There is no distension.      Palpations: Abdomen is soft.      Tenderness: There is no tenderness.   Musculoskeletal:      Right lower leg: No edema.      Left lower leg: No edema.   Skin:     General: Skin is warm and dry.   Neurological:      Mental Status: He is alert.      Comments: awake         Fluids    Intake/Output Summary (Last 24 hours) at 1/23/2020 3143  Last data filed at  1/23/2020 1200  Gross per 24 hour   Intake 740 ml   Output --   Net 740 ml       Laboratory  Recent Labs     01/23/20  0601   WBC 9.5   RBC 4.38*   HEMOGLOBIN 14.4   HEMATOCRIT 43.1   MCV 98.4*   MCH 32.9   MCHC 33.4*   RDW 47.9   PLATELETCT 263   MPV 10.5     Recent Labs     01/23/20  0601   SODIUM 144   POTASSIUM 3.6   CHLORIDE 110   CO2 20   GLUCOSE 104*   BUN 17   CREATININE 1.03   CALCIUM 10.0                   Assessment/Plan  * Cerebrovascular accident (CVA) due to embolism (HCC)- (present on admission)  Assessment & Plan  On Eliquis, ASA, and Lipitor    Rash of back  Assessment & Plan  On therapeutic trial of Claritin and topical steroid    CAD (coronary artery disease)  Assessment & Plan  On maximal medical management w/ ASA, Lipitor, Lisinopril, and Metoprolol    Systolic CHF (HCC)- (present on admission)  Assessment & Plan  Echo (from [facility]) EF 25-30%, mod MR, and RVSP 40-45 mmHg  Closely monitor volume status    Hypertension- (present on admission)  Assessment & Plan  Increase Lisinopril for elevated blood pressure trends  Continue current dose Metoprolol    Prostate cancer (HCC)- (present on admission)  Assessment & Plan  On Casodex  Outpt Urology F/U    Atrial fibrillation (HCC)- (present on admission)  Assessment & Plan  S/P RVR at [facility]  On Digoxin, drug level non-toxic  Anticoagulated on Eliquis     DNR

## 2020-01-23 NOTE — CARE PLAN
Problem: Safety  Goal: Will remain free from falls  Intervention: Implement fall precautions  Note:   Use of call light encouraged to make needs known.Fall precautions and frequent rounding in place for safety.Call light within reach.Will continue to monitor and assess needs and safety.     Problem: Urinary Elimination:  Goal: Ability to reestablish a normal urinary elimination pattern will improve  Intervention: Assist patient to sit on commode or toilet for voiding  Note:   Assisted to the bathroom, continent of bladder at this time.Denies any discomfort or dysuria.Will continue to monitor.

## 2020-01-23 NOTE — H&P
REHABILITATION HISTORY AND PHYSICAL/POST ADMISSION EVALUATION    1/22/2020  4:35 PM  Yousif Kimble  [id number]  Admission  1/22/2020  1:23 PM  [facility] Code/Reason for admission: 01.2 (R) Body Involvement (L) Brain    Etiologic diagnosis/problem: Cerebrovascular accident (CVA) due to embolism (HCC)  Chief Complaint: slurred speech    HPI:  The patient is a 89 y.o. male with a past medical history of coronary artery disease, hyperlipidemia, CHF, hypertension, prostate cancer; now admitted for acute inpatient rehabilitation with severe functional debility after an acute stroke.      On admission the patient and medical record report, he lives with his brother who was out of town for several days and when his mother returned patient had left facial droop and slurred speech.  He went to the outside hospital where he had imaging that showed bilateral strokes the largest in the right frontal lobe.  He also had small strokes in the right parietal temporal and occipital lobe as well as the left occipital lobe.  He went to atrial fibrillation with RVR which required multiple medications and adjustments.  He was treated for pneumonia with Zosyn.  He was found to have dysarthria but refused tube feeds.  He is currently on a puréed and nectar thick diet.  He was also noted to be cognitively impaired.  He had acute kidney injury that resolved.  He had an echocardiogram that showed systolic CHF with ejection fraction of 25 to 30%, moderate mitral regurgitation, moderate aortic regurgitation, RSVP 40 to 45 mg mercury.  He had carotid ultrasounds which were negative for significant stenosis.  Hemoglobin A1c was 5.6.    Patient current reports continued slurred speech from his stroke.  He denies any focal weakness or paresthesias.  He is right-hand dominant.  He is aware that he has had a stroke but is not really sure what the cause of it was.  He denies any pain.  He has moved his bowels and denies any dysuria.  He does wear glasses and  "denies any double vision or blurry vision.  He is a poor historian in terms of his timeline.  He denies any previous history of difficulty with his thinking and reports he was driving prior to this stroke.  When asked about the scar on his forehead he reports he had a fall at home 3 weeks ago, but denies frequent falls.    Patient was evaluated by Rehab Medicine physician and Physical Therapy, Occupational Therapy and Speech Therapy and determined to be appropriate for acute inpatient rehab and was transferred to [facility] on 2020  1:23 PM.    With this acute therapeutic intervention, this patient hopes to improve his functional status, and return to independent living with the supportive care of brother.    REVIEW OF SYSTEMS:     A complete review of systems was performed and was negative in detail with the exception of items mentioned elsewhere in this document.    PMH:  Past Medical History:   Diagnosis Date   • Arrhythmia    • Cancer (HCC)    • Congestive heart failure (HCC)    • Fall    • Hyperlipidemia    • Hypertension    • Myocardial infarct (HCC)    • Pneumonia    • Stroke (HCC)    • Urinary incontinence        PSH:  Past Surgical History:   Procedure Laterality Date   • OTHER ORTHOPEDIC SURGERY      right ankle surgery   • OTHER ORTHOPEDIC SURGERY      left forearm surgery   • TONSILLECTOMY         Family History   Problem Relation Age of Onset   • Alcohol abuse Mother      Mother  at 99, from alcohol abuse. Father  from \"uremic blood\". Neither had strokes that he knows of.      MEDICATIONS:  Current Facility-Administered Medications   Medication Dose   • Respiratory Care per Protocol     • Pharmacy Consult Request ...Pain Management Review 1 Each  1 Each   • acetaminophen (TYLENOL) tablet 650 mg  650 mg   • senna-docusate (PERICOLACE or SENOKOT S) 8.6-50 MG per tablet 2 Tab  2 Tab    And   • polyethylene glycol/lytes (MIRALAX) PACKET 1 Packet  1 Packet    And   • " magnesium hydroxide (MILK OF MAGNESIA) suspension 30 mL  30 mL    And   • bisacodyl (DULCOLAX) suppository 10 mg  10 mg   • artificial tears ophthalmic solution 1 Drop  1 Drop   • benzocaine-menthol (CEPACOL) lozenge 1 Lozenge  1 Lozenge   • mag hydrox-al hydrox-simeth (MAALOX PLUS ES or MYLANTA DS) suspension 20 mL  20 mL   • ondansetron (ZOFRAN ODT) dispertab 4 mg  4 mg    Or   • ondansetron (ZOFRAN) syringe/vial injection 4 mg  4 mg   • traZODone (DESYREL) tablet 50 mg  50 mg   • sodium chloride (OCEAN) 0.65 % nasal spray 2 Spray  2 Spray   • melatonin tablet 3 mg  3 mg   • [START ON 2020] aspirin EC (ECOTRIN) tablet 81 mg  81 mg   • apixaban (ELIQUIS) tablet 5 mg  5 mg   • lactulose 20 GM/30ML solution 30 mL  30 mL   • docusate sodium (ENEMEEZ) enema 283 mg  283 mg   • atorvastatin (LIPITOR) tablet 40 mg  40 mg   • [START ON 2020] bicalutamide (CASODEX) tablet 50 mg  50 mg   • [START ON 2020] digoxin (LANOXIN) tablet 125 mcg  125 mcg   • metoprolol (LOPRESSOR) tablet 75 mg  75 mg   • [START ON 2020] potassium chloride SA (Kdur) tablet 20 mEq  20 mEq   • [START ON 2020] lisinopril (PRINIVIL) tablet 2.5 mg  2.5 mg   • albuterol inhaler 2 Puff  2 Puff       ALLERGIES:  Patient has no known allergies.    PSYCHOSOCIAL HISTORY:  Pre-mobidly, the patient lived in a two level home with 2 steps to enter, in [place] with his brother.  Reports he is been a  since .  He said he had 3 children but they are all .  He does live with his brother.  He used to work in the laundry service industry.  He denies any tobacco or drugs but does have half an ounce drink of liquor daily..     LEVEL OF FUNCTION PRIOR TO DISABILTY:  Independent, per patient report, but poor historian    LEVEL OF FUNCTION PRIOR TO ADMISSION to [facility]:  Max assist for ADLs  Mod assist for ambulation  Min assist for transfers, dysphagia diet    CURRENT LEVEL OF FUNCTION:   Same as level of  "function prior to admission to [facility]    PHYSICAL EXAM:     VITAL SIGNS:   height is 1.727 m (5' 8\") and weight is 66 kg (145 lb 8.1 oz). His oral temperature is 36.3 °C (97.4 °F). His blood pressure is 145/90 and his pulse is 93. His respiration is 20 and oxygen saturation is 96%.     GENERAL: No apparent distress  HEENT: Normocephalic/atraumatic, EOMI, PERRL and No nystagmus, dry mucous membranes  CARDIAC: Regular rate, irregular rhythm, normal S1, S2, no murmurs, no peripheral edema   LUNGS: Clear to auscultation, normal respiratory effort, on room air   ABDOMINAL: bowel sounds present, soft, nontender and nondistended    EXTREMITIES: no edema or no calf tenderness bilaterally  MSK: No joint swelling    NEURO:    Mental status: alert, disoriented, knows year but not month  Speech: fluent, no aphasia, + dysarthria    CRANIAL NERVES:  2,3: visual acuity grossly intact, PERRL  3,4,6: EOMI bilaterally, no nystagmus or diplopia  5: intact in all branches  7: Left facial droop  8: Hard of hearing  9,10: symmetric palate elevation  11: SCM/Trapezius strength 5/5 bilaterally  12: tongue protrudes midline    Motor:  Shoulder flexors:  Right -  5/5, Left -  5/5  Elbow flexors:  Right -  5/5, Left -  5/5  Elbow extensors:  Right -  5/5, Left -  5/5  Symmetrical   Hip flexors:  Right -  5/5, Left -  5/5  Knee ext:  Right -  5/5, Left -  5/5  Dorsiflexors:  Right -  5/5, Left -  5/5  EHL:  Right -  5/5, Left -  5/5  Plantar flexors:  Right -  5/5, Left -  5/5   Positive left Pronator drift    Sensory:   intact to light touch through out    DTRs: 1+ in bilateral biceps, triceps, brachioradialis, 1+ in bilateral patellar and 0 at bilateral achilles tendons  No clonus at bilateral ankles  Negative babinski b/l  Negative Guzman b/l     Coordination:   Impaired finger to nose bilaterally, left worse than right      PRIMARY REHAB DIAGNOSIS:    This patient is a 89 y.o. male admitted for acute inpatient " rehabilitation with Cerebrovascular accident (CVA) due to embolism (HCC).    IMPAIRMENTS:   Cognitive  ADLs/IADLs  Mobility  Speech  Swallow    SECONDARY DIAGNOSIS/MEDICAL CO-MORBIDITIES AFFECTING FUNCTION:    Hypertension  Atrial fibrillation  Systolic CHF  History of prostate cancer  Coronary artery disease    RELEVANT CHANGES SINCE PREADMISSION EVALUATION:    Status unchanged    The patient's rehabilitation potential is Good  The patient's medical prognosis is good    PLAN:   Discussion and Recommendations, discussed with the patient and/or family:   1. The patient requires an acute inpatient rehabilitation program with a coordinated program of care at an intensity and frequency not available at a lower level of care. This recommendation is substantiated by the patient's medical physicians who recommend that the patient's intervention and assessment of medical issues needs to be done at an acute level of care for patient's safety and maximum outcome.     2. A coordinated program of care will be supplied by an interdisciplinary team of physical therapy, occupational therapy, rehab physician, rehab nursing, and, if needed, speech therapy and rehab psychology. Rehab team presents a patient-specific rehabilitation and education program concentrating on prevention of future problems related to accessibility, mobility, skin, bowel, bladder, sexuality, and psychosocial and medical/surgical problems.     3. Need for Rehabilitation Physician: The rehab physician will be evaluating the patient on a multi-weekly basis to help coordinate the program of care. The rehab physician communicates between medical physicians, therapists, and nurses to maximize the patient's potential outcome. Specific areas in which the rehab physician will be providing daily assessment include the following:   A. Assessing the patient's heart rate and blood pressure response (vitals monitoring) to activity and making adjustments in medications or  conservative measures as needed.   B. The rehab physician will be assessing the frequency at which the program can be increased to allow the patient to reach optimal functional outcome.   C. The rehab physician will also provide assessments in daily skin care, especially in light of patient's impairments in mobility.   D. The rehab physician will provide special expertise in understanding how to work with functional impairment and recommend appropriate interventions, compensatory techniques, and education that will facilitate the patient's outcome.     4. Rehab R.N.   The rehab RN will be working with patient to carry over in room mobility and activities of daily living when the patient is not in 3 hours of skilled therapy. Rehab nursing will be working in conjunction with rehab physician to address all the medical issues above and continue to assess laboratory work and discuss abnormalities with the treating physicians, assess vitals, and response to activity, and discuss and report abnormalities with the rehab physician. Rehab RN will also continue daily skin care, supervise bladder/bowel program, instruct in medication administration, and ensure patient safety.     5. Therapies to treat at intensity and frequency of (may change after completion of evaluation by all therapeutic disciplines):       PT:  Physical therapy to address mobility, transfer, gait training and evaluation for adaptive equipment needs 1hour/day at least 5 days/week for the duration of the ELOS (see below)       OT:  Occupational therapy to address ADLs, self-care, home management training, functional mobility/transfers and assistive device evaluation, and community re-integration 1hour/day at least 5 days/week for the duration of the ELOS (see below).        ST/Dysphagia:  Speech therapy to address speech, language, and cognitive deficits as well as swallowing difficulties with retraining/dysphagia management and community re-integration with  comprehension, expression, cognitive training 1hour/day at least 5 days/week for the duration of the ELOS (see below).     6. Medical management / Rehabilitation Issues/Adverse Potential affecting function as part of rehabilitation plan.    Appreciate the assistance of the hospitalist with his medical comorbidities:    Hypertension  Atrial fibrillation  Systolic CHF  History of prostate cancer  Coronary artery disease    I performed a complete drug regimen review and did not identify any potential clinically significant medication issues.    The patient's CODE STATUS was continued as DNR/DNI based on outside records where physicians understood patient's request for no chest compressions, defibrillation, or intubation.     REHABILITATION ISSUES/ADVERSE POTENTIAL:  1.  CVA (Cerebrovascular Accident): Cont aspirin and Eliquis for secondary prophylaxis as well as lipid and blood pressure management. Patient demonstrates functional deficits in strength, balance, coordination, and ADL's. Patient is admitted to [facility] for comprehensive rehabilitation therapy as described below.   Rehabilitation nursing monitors bowel and bladder control, educates on medication administration, co-morbidities and monitors patient safety.    2.  DVT prophylaxis:  Patient is on Eliquis for anticoagulation upon transfer. Encourage OOB. Monitor daily for signs and symptoms of DVT including but not limited to swelling and pain to prevent the development of DVT that may interfere with therapies.    3.  Pain: No issues with pain currently / Controlled with as needed oral analgesics.    4.  Nutrition/Dysphagia: Dietician monitors nutrient intake, recommend supplements prn and provide nutrition education to pt/family to promote optimal nutrition for wound healing/recovery.     5.  Bladder/bowel:  Start bowel and bladder program as described below, to prevent constipation, urinary retention (which may lead to UTI), and urinary  incontinence (which will impact upon pt's functional independence).   - TV Q3h while awake with post void bladder scans, I&O cath for PVRs >400  - up to commode after meal     6.  Skin/dermal ulcer prophylaxis: Monitor for new skin conditions with q.2 h. turns as required to prevent the development of skin breakdown.     7.  Cognition/Behavior:  Psychologist Dr. Mckenzie provides adjustment counseling to illness and psychosocial barriers that may be potential barriers to rehabilitation.     8. Respiratory therapy: RT performs O2 management prn, breathing retraining, pulmonary hygiene and bronchospasm management prn to optimize participation in therapies.    Pt was seen today for 72 min, and entire time spent in face-to-face contact was >50% in counseling and coordination of care as detailed in A/P above.        GOALS/EXPECTED LEVEL OF FUNCTION BASED ON CURRENT MEDICAL AND FUNCTIONAL STATUS (may change based on patient's medical status and rate of impairment recovery):  Transfers:   Supervision  Mobility/Gait:   Supervision  ADL's:   Supervision  Cognition:  Supervision  Swallowing:  Regular with thins    DISPOSITION: Discharge to pre-morbid independent living setting with the supportive care of patient's brother.      ELOS: 21 days    Gauri Fortune M.D.  Physical Medicine and Rehabilitation

## 2020-01-23 NOTE — ASSESSMENT & PLAN NOTE
Echo (from [facility]) EF 25-30%, mod MR, and RVSP 40-45 mmHg  Closely monitor volume status  BNP improving

## 2020-01-23 NOTE — THERAPY
"Occupational Therapy   Initial Evaluation     Patient Name: Yousif Kimble  Age:  89 y.o., Sex:  male  Medical Record #: [id number]  Today's Date: 1/23/2020     Subjective    \"He's supposed to bring me a sweat suit\" patient stated after attempting to wake up his roommate (mistakenly, as patient thought roommate is his brother)     Objective       01/23/20 0701   Prior Living Situation   Prior Services None;Home-Independent   Housing / Facility 2 Story House   Steps Into Home 2   Steps In Home   (full flight of stairs, spiral staircase)   Elevator No   Bathroom Set up Walk In Shower;Shower Glass Doors;Shower Chair  (Built-in bench)   Equipment Owned None   Lives with - Patient's Self Care Capacity Other (Comments)  (Brother)   Prior Level of ADL Function   Self Feeding Independent   Grooming / Hygiene Independent   Bathing Independent   Dressing Independent   Toileting Independent   Comments No AD   Prior Level of IADL Function   Medication Management Independent   Laundry Independent   Kitchen Mobility Independent   Home Management Independent   Shopping Other (Comments)  (Brother primarily does grocery shopping)   Prior Level Of Mobility Independent Without Device in Community   Driving / Transportation Driving Independent   Occupation (Pre-Hospital Vocational) Retired Due To Age   Leisure Interests Reading   IRF-GENARO:  Prior Functioning: Everyday Activities   Self Care Independent   Indoor Mobility (Ambulation) Independent   Stairs Independent   Functional Cognition Independent   Prior Device Use None of the given options  (No AD)   Vitals   Room Air Oximetry 98   Cognition    Level of Consciousness Alert   IRF-GENARO:  Cognitive Pattern Assessment   Cognitive Pattern Assessment Used BIMS   IRF-GENARO:  Brief Interview for Mental Status (BIMS)   Repetition of Three Words (First Attempt) 3   Temporal Orientation: Able to Report the Correct Year Correct   Temporal Orientation: Able to Report the Correct Month Accurate within 5 " "days   Temporal Orientation: Able to Report the Correct Day of the Week Correct   Able to Recall \"Sock\" No, could not recall   Able to Recall \"Blue\" Yes, no cue required   Able to Recall \"Bed\" Yes, no cue required   BIMS Summary Score 13   Vision Screen   Vision Not tested  (Not formally assessed, denies diplopia/other vision changes)   Passive ROM Upper Body   Passive ROM Upper Body WDL   Active ROM Upper Body   Active ROM Upper Body  X   Dominant Hand Right   Comments Decreased supination in LUE (premorbid)   Strength Upper Body   Upper Body Strength  X   Comments Generalized weakness   Sensation Upper Body   Upper Extremity Sensation  Not Tested   Comments Not formally assessed, denies any tingling/numbness   Upper Body Muscle Tone   Upper Body Muscle Tone  WDL   Balance Assessment   Sitting Balance (Static) Fair +   Sitting Balance (Dynamic) Fair   Standing Balance (Static) Fair -   Standing Balance (Dynamic) Fair -   Bed Mobility    Supine to Sit Minimal Assist   Sit to Stand Contact Guard Assist   Scooting Contact Guard Assist   Coordination Upper Body   Coordination Not Tested   Comments Not formally assessed; appears grossly intact during functional assessment   IRF-GENARO:  Eating   Assistance Needed Set-up / clean-up;Supervision   CARE Score 4   Discharge Goal:  Assistance Needed Independent   Discharge Goal:  Score 6   IRF-GENARO:  Oral Hygiene   Assistance Needed Set-up / clean-up;Supervision   CARE Score 4   Discharge Goal:  Assistance Needed Independent   Discharge Goal:  Score 6   IRF-GENARO:  Shower/Bathe Self   Assistance Needed Physical assistance   Physical Assistance Level Less than 25%   CARE Score 3   Discharge Goal:  Assistance Needed Supervision   Discharge Goal Score 4   IRF-GENARO:  Upper Body Dressing   Assistance Needed Physical assistance   Physical Assistance Level Less than 25%   CARE Score 3   Discharge Goal:  Assistance Needed Independent   Dischage Goal:  Score 6   IRF-GENARO:  Lower Body Dressing "   Assistance Needed Physical assistance   Physical Assistance Level Less than 25%   CARE Score 3   Discharge Goal:  Assistance Needed Supervision   Discharge Goal:  Score 4   IRF GENARO:  Putting On/Taking Off Footwear   Assistance Needed Incidental touching   CARE Score 4   Discharge Goal:  Assistance Needed Independent   Discharge Goal:  Score 6   IRF-GENARO:  Toileting Hygiene   Assistance Needed Physical assistance   Physical Assistance Level Less than 25%   CARE Score 3   Discharge Goal:  Assistance Needed Supervision   Discharge Goal:  Score 4   IRF-GENARO:  Toilet Transfer   Physical Assistance Level Less than 25%   CARE Score 3   Discharge Goal:  Assistance Needed Supervision   Discahrge Goal:  Score 4   IRF-GENARO:  Hearing, Speech, and Vision   Expression of Ideas and Wants 3  (Dysarthria)   Understanding Verbal and Non-Verbal Content 4   Problem List   Problem List Decreased Active Daily Living Skills;Decreased Homemaking Skills;Decreased Upper Extremity Strength Right;Decreased Upper Extremity Strength Left;Decreased Functional Mobility;Decreased Activity Tolerance;Safety Awareness Deficits / Cognition;Impaired Posture / Trunk Alignment;Impaired Cognitive Function;Impaired Postural Control / Balance   Precautions   Precautions Fall Risk;Swallow Precautions ( See Comments)   Comments Pureed, Nectar thick liquids   Current Discharge Plan   Current Discharge Plan Return to Prior Living Situation   Benefit    Therapy Benefit Patient Would Benefit from Inpatient Rehab Occupational Therapy to Maximize [place] with ADLs, IADLs and Functional Mobility.   Interdisciplinary Plan of Care Collaboration   IDT Collaboration with  Speech Therapist   Patient Position at End of Therapy Seated;Self Releasing Lap Belt Applied   Collaboration Comments Transferred care to    Equipment Needs   Assistive Device / DME Other (Comments);Shower Chair;Grab Bars In Shower / Tub;Grab Bars By Toilet  (TBA further)   Adaptive Equipment  Other (Comments)  (TBA)   OT Total Time Spent   OT Individual Total Time Spent (Mins) 60   OT Charge Group   OT Evaluation OT Evaluation Mod       FIM Eating Score:  5 - Standby Prompting/Supervision or Set-up  Eating Description:  Modified diet, Supervision for safety, Set-up of equipment or meal/tube feeding    FIM Grooming Score:  5 - Standby Prompting/Supervision or Set-up  Grooming Description:  Increased time, Supervision for safety, Set-up of equipment    FIM Bathing Score:  4 - Minimal Assistance  Bathing Description:  Grab bar, Hand held shower, Increased time, Supervision for safety, Set-up of equipment(Min assist for thoroughness with rinsing all body parts (while incorporating sitting and standing with use of grab bar))    FIM Upper Body Dressin - Minimal Assistance  Upper Body Dressing Description:  Supervision for safety, Set-up of equipment, Increased time(Min assist for donning button up shirt while seated in w/c)    FIM Lower Body Dressing Score:  4 - Minimal Assistance  Lower Body Dressing Description:  Increased time, Supervision for safety, Set-up of equipment(Patient donned slack bottoms with min assist for balance and safety. )    FIM Toileting Body Dressin - Minimal Assistance  Toileting Description:  Grab bar, Increased time, Supervision for safety(Patient demo'd ability to perform toileting tasks at min to CGA level (for safety and balance))    FIM Bed/Chair/Wheelchair Transfers Score:    Bed/Chair/Wheelchair Transfers Description:       FIM Toilet Transfer Score:  4 - Minimal Assistance  Toilet Transfer Description:  Grab bar, Increased time, Supervision for safety(Patient demo'd ability to perform toilet txfrs at min to CGA level with use of grab bar)    FIM Tub/Shower Transfers Score:  4 - Minimal Assistance  Tub/Shower Transfers Description:  Grab bar, Increased time, Supervision for safety(Patient performed wc<>fold-down shower bench txfr at min assist level (for safety and  balance))    Assessment  Patient is 89 y.o. male with a diagnosis of functional debility after CVA.  Additional factors influencing patient status / progress (ie: cognitive factors, co-morbidities, social support, etc): past medical history of coronary artery disease, hyperlipidemia, CHF, hypertension, prostate cancer. Patient previously independent with all ADLs and functional mobility (without AD). Resides with brother in 2-story home (patient resides on main floor, does not need to access downstairs). Presents with decreased cognitive and functional status and will benefit from daily skilled OT to facilitate independence with ADL tasks, for strengthening/endurance/functional mobility, and functional cognition.     Plan  Recommend Occupational Therapy  minutes per day 5-7 days per week for 10-14 days for the following treatments:  OT Group Therapy, OT Self Care/ADL, OT Cognitive Skill Dev, OT Community Reintegration, OT Manual Ther Technique, OT Neuro Re-Ed/Balance, OT Sensory Int Techniques, OT Therapeutic Activity, OT Evaluation and OT Therapeutic Exercise.    Goals:  Long term and short term goals have been discussed with patient and they are in agreement.    Occupational Therapy Goals     Problem: Bathing     Dates: Start: 01/23/20       Description:     Goal: STG-Within one week, patient will bathe     Dates: Start: 01/23/20       Description: 1) Individualized Goal:  CGA  2) Interventions:  OT Group Therapy, OT Self Care/ADL, OT Cognitive Skill Dev, OT Community Reintegration, OT Manual Ther Technique, OT Neuro Re-Ed/Balance, OT Sensory Int Techniques, OT Therapeutic Activity, OT Evaluation and OT Therapeutic Exercise                   Problem: Dressing     Dates: Start: 01/23/20       Description:     Goal: STG-Within one week, patient will dress UB     Dates: Start: 01/23/20       Description: 1) Individualized Goal: CGA  2) Interventions: OT Group Therapy, OT Self Care/ADL, OT Cognitive Skill Dev,  OT Community Reintegration, OT Manual Ther Technique, OT Neuro Re-Ed/Balance, OT Sensory Int Techniques, OT Therapeutic Activity, OT Evaluation and OT Therapeutic Exercise             Goal: STG-Within one week, patient will dress LB     Dates: Start: 01/23/20       Description: 1) Individualized Goal: CGA  2) Interventions: OT Group Therapy, OT Self Care/ADL, OT Cognitive Skill Dev, OT Community Reintegration, OT Manual Ther Technique, OT Neuro Re-Ed/Balance, OT Sensory Int Techniques, OT Therapeutic Activity, OT Evaluation and OT Therapeutic Exercise                   Problem: OT Long Term Goals     Dates: Start: 01/23/20       Description:     Goal: LTG-By discharge, patient will complete basic self care tasks     Dates: Start: 01/23/20       Description: 1) Individualized Goal: Supervision    2) Interventions: OT Group Therapy, OT Self Care/ADL, OT Cognitive Skill Dev, OT Community Reintegration, OT Manual Ther Technique, OT Neuro Re-Ed/Balance, OT Sensory Int Techniques, OT Therapeutic Activity, OT Evaluation and OT Therapeutic Exercise             Goal: LTG-By discharge, patient will perform bathroom transfers     Dates: Start: 01/23/20       Description: 1) Individualized Goal: Supervision    2) Interventions: OT Group Therapy, OT Self Care/ADL, OT Cognitive Skill Dev, OT Community Reintegration, OT Manual Ther Technique, OT Neuro Re-Ed/Balance, OT Sensory Int Techniques, OT Therapeutic Activity, OT Evaluation and OT Therapeutic Exercise

## 2020-01-23 NOTE — PROGRESS NOTES
"Rehab Progress Note     Date of Service: 1/23/2020  Chief Complaint: follow up stroke    Interval Events (Subjective)    Patient seen and examined in his room this morning. He is very delayed and tired. He denies any pain. He does have an itchy rash on his back. He reports he slept well last night. Moved his bowels yesterday. He has no complaints.    Objective:  VITAL SIGNS: /91   Pulse 98   Temp 36.6 °C (97.8 °F) (Oral)   Resp 16   Ht 1.727 m (5' 8\")   Wt 66 kg (145 lb 8.1 oz)   SpO2 94%   BMI 22.12 kg/m²   Gen: alert, no apparent distress  CV: regular rate, irregular rhythm, no murmurs, no peripheral edema  Resp: clear to ascultation bilaterally, normal respiratory effort  GI: soft, non-tender abdomen, bowel sounds present  Neuro: notable for severely delayed responses    Recent Results (from the past 72 hour(s))   CBC with Differential    Collection Time: 01/23/20  6:01 AM   Result Value Ref Range    WBC 9.5 4.8 - 10.8 K/uL    RBC 4.38 (L) 4.70 - 6.10 M/uL    Hemoglobin 14.4 14.0 - 18.0 g/dL    Hematocrit 43.1 42.0 - 52.0 %    MCV 98.4 (H) 81.4 - 97.8 fL    MCH 32.9 27.0 - 33.0 pg    MCHC 33.4 (L) 33.7 - 35.3 g/dL    RDW 47.9 35.9 - 50.0 fL    Platelet Count 263 164 - 446 K/uL    MPV 10.5 9.0 - 12.9 fL    Neutrophils-Polys 40.30 (L) 44.00 - 72.00 %    Lymphocytes 47.60 (H) 22.00 - 41.00 %    Monocytes 9.70 0.00 - 13.40 %    Eosinophils 1.80 0.00 - 6.90 %    Basophils 0.30 0.00 - 1.80 %    Immature Granulocytes 0.30 0.00 - 0.90 %    Nucleated RBC 0.00 /100 WBC    Neutrophils (Absolute) 3.81 1.82 - 7.42 K/uL    Lymphs (Absolute) 4.51 1.00 - 4.80 K/uL    Monos (Absolute) 0.92 (H) 0.00 - 0.85 K/uL    Eos (Absolute) 0.17 0.00 - 0.51 K/uL    Baso (Absolute) 0.03 0.00 - 0.12 K/uL    Immature Granulocytes (abs) 0.03 0.00 - 0.11 K/uL    NRBC (Absolute) 0.00 K/uL   Comp Metabolic Panel (CMP)    Collection Time: 01/23/20  6:01 AM   Result Value Ref Range    Sodium 144 135 - 145 mmol/L    Potassium 3.6 3.6 - 5.5 " mmol/L    Chloride 110 96 - 112 mmol/L    Co2 20 20 - 33 mmol/L    Anion Gap 14.0 (H) 0.0 - 11.9    Glucose 104 (H) 65 - 99 mg/dL    Bun 17 8 - 22 mg/dL    Creatinine 1.03 0.50 - 1.40 mg/dL    Calcium 10.0 8.5 - 10.5 mg/dL    AST(SGOT) 25 12 - 45 U/L    ALT(SGPT) 26 2 - 50 U/L    Alkaline Phosphatase 41 30 - 99 U/L    Total Bilirubin 0.8 0.1 - 1.5 mg/dL    Albumin 3.4 3.2 - 4.9 g/dL    Total Protein 6.7 6.0 - 8.2 g/dL    Globulin 3.3 1.9 - 3.5 g/dL    A-G Ratio 1.0 g/dL   Magnesium    Collection Time: 01/23/20  6:01 AM   Result Value Ref Range    Magnesium 2.0 1.5 - 2.5 mg/dL   Vitamin D, 25-hydroxy (blood)    Collection Time: 01/23/20  6:01 AM   Result Value Ref Range    25-Hydroxy   Vitamin D 25 42 30 - 100 ng/mL   DIGOXIN    Collection Time: 01/23/20  6:01 AM   Result Value Ref Range    Digoxin 0.9 0.8 - 2.0 ng/mL   ESTIMATED GFR    Collection Time: 01/23/20  6:01 AM   Result Value Ref Range    GFR If African American >60 >60 mL/min/1.73 m 2    GFR If Non African American >60 >60 mL/min/1.73 m 2       Current Facility-Administered Medications   Medication Frequency   • modafinil (PROVIGIL) tablet 100 mg QAM   • Respiratory Care per Protocol Continuous RT   • Pharmacy Consult Request ...Pain Management Review 1 Each PHARMACY TO DOSE   • acetaminophen (TYLENOL) tablet 650 mg Q4HRS PRN   • senna-docusate (PERICOLACE or SENOKOT S) 8.6-50 MG per tablet 2 Tab BID    And   • polyethylene glycol/lytes (MIRALAX) PACKET 1 Packet QDAY PRN    And   • magnesium hydroxide (MILK OF MAGNESIA) suspension 30 mL QDAY PRN    And   • bisacodyl (DULCOLAX) suppository 10 mg QDAY PRN   • artificial tears ophthalmic solution 1 Drop PRN   • benzocaine-menthol (CEPACOL) lozenge 1 Lozenge Q2HRS PRN   • mag hydrox-al hydrox-simeth (MAALOX PLUS ES or MYLANTA DS) suspension 20 mL Q2HRS PRN   • ondansetron (ZOFRAN ODT) dispertab 4 mg 4X/DAY PRN    Or   • ondansetron (ZOFRAN) syringe/vial injection 4 mg 4X/DAY PRN   • traZODone (DESYREL) tablet  50 mg QHS PRN   • sodium chloride (OCEAN) 0.65 % nasal spray 2 Spray PRN   • melatonin tablet 3 mg HS PRN   • aspirin EC (ECOTRIN) tablet 81 mg DAILY   • apixaban (ELIQUIS) tablet 5 mg BID   • lactulose 20 GM/30ML solution 30 mL QDAY PRN   • docusate sodium (ENEMEEZ) enema 283 mg QDAY PRN   • atorvastatin (LIPITOR) tablet 40 mg Q EVENING   • bicalutamide (CASODEX) tablet 50 mg DAILY   • digoxin (LANOXIN) tablet 125 mcg DAILY AT 1800   • metoprolol (LOPRESSOR) tablet 75 mg TWICE DAILY   • potassium chloride SA (Kdur) tablet 20 mEq DAILY   • lisinopril (PRINIVIL) tablet 2.5 mg Q DAY   • albuterol inhaler 2 Puff Q4HRS PRN   • hydrocortisone 1 % cream 4X/DAY   • loratadine (CLARITIN) tablet 10 mg DAILY       Orders Placed This Encounter   Procedures   • Diet Order Cardiac (medications crushed, floated in puree.)     Standing Status:   Standing     Number of Occurrences:   1     Order Specific Question:   Diet:     Answer:   Cardiac [6]     Comments:   medications crushed, floated in puree.     Order Specific Question:   Texture/Fiber modifications:     Answer:   Dysphagia 1(Pureed)specify fluid consistency(question 6) [1]     Order Specific Question:   Consistency/Fluid modifications:     Answer:   [place] Thick [2]       Assessment:  Active Hospital Problems    Diagnosis   • *Cerebrovascular accident (CVA) due to embolism (HCC)   • Atrial fibrillation (HCC)   • Prostate cancer (HCC)   • Hypertension   • Systolic CHF (HCC)   • Dysarthria   • Dysphagia   • CAD (coronary artery disease)   • Rash of back     This patient is a 89 y.o. male admitted for acute inpatient rehabilitation with Cerebrovascular accident (CVA) due to embolism (HCC).    Medical Decision Making and Plan:    Bilateral strokes  Largest R MCA   Cardioembolic  Left pronator drift  Left facial droop  Dysarthria  Dysphagia  Cognitive deficits  Continue full rehab program  PT/OT/SLP, 1 hr each discipline, 5 days per week  Continue Eliquis and statin for  secondary stroke prophylaxis  Speech therapy to advance diet  Start Provigil for neurostimulation    Bowel  Continue bowel meds  Last BM 1/22    Bladder  Check PVRs - 65, 36  ICP for over 400 cc  Scheduled toileting    DVT prophylaxis  Eliquis    Appreciate the assistance of the hospitalist with his medical comorbidities:     Hypertension  Atrial fibrillation  Systolic CHF  History of prostate cancer  Coronary artery disease  Rash on back    Total time:  35 minutes.  I spent greater than 50% of the time for patient care, counseling, and coordination on this date, including patient face-to face time, unit/floor time with review of records/pertinent lab data and studies, as well as discussing diagnostic evaluation/work up, planned therapeutic interventions, and future disposition of care, as per the interval events/subjective and the assessment and plan as noted above.      Gauri Fortune M.D.   Physical Medicine and Rehabilitation

## 2020-01-23 NOTE — ASSESSMENT & PLAN NOTE
S/P RVR at [facility]  On Digoxin w/ non-toxic drug level  Anticoagulated on Eliquis

## 2020-01-23 NOTE — CARE PLAN
Problem: Inadequate nutrient intake  Goal: Patient to consume greater than or equal to 50% of meals  Outcome: NOT MET

## 2020-01-23 NOTE — REHAB-DIETARY IDT TEAM NOTE
"Dietary   Nutrition  Dietary Problems     Problem: Inadequate nutrient intake     Description:     Goal: Patient to consume greater than or equal to 50% of meals     Description:                   Nutrition services: Day 1 of admit.  Yousif Kimble is a 89 y.o. male with admitting DX of Stroke.  Pt seen for MST score of 3 per nutrition screen for unsure re: weight loss, poor oral intake.    Assessment:  Height: 172.7 cm (5' 8\")  Weight: 66 kg (145 lb 8.1 oz)  Body mass index is 22.12 kg/m²., BMI classification: Normal  Diet/Intake: Cardiac, Dysphagia 1, [place] thick.  PO intake 1/22 dinner <25%.  1/23 breakfast and lunch = 10%.    Evaluation:   1. Pt with poor oral intake.  Spoke with pt but unable to obtain much information.  Pt stated he was eating well.  He declined offer of Boost.  He did appear interested in ice cream-will offer magic cups.  2. Pt with hx of CHF, HTN, CAD.  Cardiac diet restriction ordered.  Discussed with Dr Alford.  She agreed to liberalize diet some, but wants to continue sodium restriction at this time.  Supplements ok per MD.  3. Noted pt unsure re: weight loss per nutrition screen.  No weight history in chart and unable to obtain usual weight at this time.  4. Labs 1/23 include Glu 104, Alb 3.4.  5. Medications include lipitor and senna.  6. LBM 1/23.    Malnutrition Risk: Unable to fully assess at this time.  Pt at risk with current poor PO intake.    Recommendations/Plan:  1. 2 gm Sodium, Dysphagia 1 diet with nectar thick liquids.  2. Offer magic cup BID   3. Encourage intake of meals and supplements.  4. Document intake of all meals and supplements as % taken in ADL's to provide interdisciplinary communication across all shifts.   5. Monitor weight.  6. Nutrition rep will continue to see patient for ongoing meal and snack preferences.     RD following.      Section completed by:  Barbra Lees R.D.     "

## 2020-01-24 ENCOUNTER — APPOINTMENT (OUTPATIENT)
Dept: PAIN MANAGEMENT | Facility: REHABILITATION | Age: 85
DRG: 056 | End: 2020-01-24
Attending: PHYSICAL MEDICINE & REHABILITATION
Payer: MEDICARE

## 2020-01-24 ENCOUNTER — APPOINTMENT (OUTPATIENT)
Dept: RADIOLOGY | Facility: REHABILITATION | Age: 85
DRG: 056 | End: 2020-01-24
Attending: PHYSICAL MEDICINE & REHABILITATION
Payer: MEDICARE

## 2020-01-24 LAB — NT-PROBNP SERPL IA-MCNC: ABNORMAL PG/ML (ref 0–125)

## 2020-01-24 PROCEDURE — 99232 SBSQ HOSP IP/OBS MODERATE 35: CPT | Performed by: PHYSICAL MEDICINE & REHABILITATION

## 2020-01-24 PROCEDURE — 770010 HCHG ROOM/CARE - REHAB SEMI PRIVAT*

## 2020-01-24 PROCEDURE — 97129 THER IVNTJ 1ST 15 MIN: CPT

## 2020-01-24 PROCEDURE — 92611 MOTION FLUOROSCOPY/SWALLOW: CPT

## 2020-01-24 PROCEDURE — 97530 THERAPEUTIC ACTIVITIES: CPT

## 2020-01-24 PROCEDURE — 97116 GAIT TRAINING THERAPY: CPT

## 2020-01-24 PROCEDURE — 97112 NEUROMUSCULAR REEDUCATION: CPT

## 2020-01-24 PROCEDURE — 700102 HCHG RX REV CODE 250 W/ 637 OVERRIDE(OP): Performed by: PHYSICAL MEDICINE & REHABILITATION

## 2020-01-24 PROCEDURE — 92526 ORAL FUNCTION THERAPY: CPT

## 2020-01-24 PROCEDURE — 74230 X-RAY XM SWLNG FUNCJ C+: CPT

## 2020-01-24 PROCEDURE — A9270 NON-COVERED ITEM OR SERVICE: HCPCS | Performed by: PHYSICAL MEDICINE & REHABILITATION

## 2020-01-24 PROCEDURE — 99232 SBSQ HOSP IP/OBS MODERATE 35: CPT | Performed by: HOSPITALIST

## 2020-01-24 RX ORDER — LANOLIN ALCOHOL/MO/W.PET/CERES
3 CREAM (GRAM) TOPICAL
Status: DISCONTINUED | OUTPATIENT
Start: 2020-01-24 | End: 2020-02-08 | Stop reason: HOSPADM

## 2020-01-24 RX ORDER — TRAZODONE HYDROCHLORIDE 50 MG/1
25 TABLET ORAL
Status: DISCONTINUED | OUTPATIENT
Start: 2020-01-24 | End: 2020-01-27

## 2020-01-24 RX ADMIN — HYDROCORTISONE 1 EACH: 1 CREAM TOPICAL at 16:22

## 2020-01-24 RX ADMIN — HYDROCORTISONE 1 EACH: 1 CREAM TOPICAL at 20:44

## 2020-01-24 RX ADMIN — METOPROLOL TARTRATE 75 MG: 25 TABLET ORAL at 17:52

## 2020-01-24 RX ADMIN — ASPIRIN 81 MG 81 MG: 81 TABLET ORAL at 09:44

## 2020-01-24 RX ADMIN — LISINOPRIL 5 MG: 5 TABLET ORAL at 05:18

## 2020-01-24 RX ADMIN — DIGOXIN 125 MCG: 125 TABLET ORAL at 17:52

## 2020-01-24 RX ADMIN — APIXABAN 5 MG: 5 TABLET, FILM COATED ORAL at 20:42

## 2020-01-24 RX ADMIN — LORATADINE 10 MG: 10 TABLET ORAL at 09:45

## 2020-01-24 RX ADMIN — HYDROCORTISONE: 1 CREAM TOPICAL at 09:47

## 2020-01-24 RX ADMIN — ATORVASTATIN CALCIUM 40 MG: 40 TABLET, FILM COATED ORAL at 20:41

## 2020-01-24 RX ADMIN — HYDROCORTISONE: 1 CREAM TOPICAL at 17:00

## 2020-01-24 RX ADMIN — TRAZODONE HYDROCHLORIDE 25 MG: 50 TABLET ORAL at 20:41

## 2020-01-24 RX ADMIN — MELATONIN TAB 3 MG 3 MG: 3 TAB at 20:42

## 2020-01-24 RX ADMIN — BICALUTAMIDE 50 MG: 50 TABLET, FILM COATED ORAL at 09:45

## 2020-01-24 RX ADMIN — METOPROLOL TARTRATE 75 MG: 25 TABLET ORAL at 05:18

## 2020-01-24 RX ADMIN — MODAFINIL 100 MG: 100 TABLET ORAL at 09:45

## 2020-01-24 RX ADMIN — APIXABAN 5 MG: 5 TABLET, FILM COATED ORAL at 09:46

## 2020-01-24 RX ADMIN — SENNOSIDES AND DOCUSATE SODIUM 2 TABLET: 8.6; 5 TABLET ORAL at 20:43

## 2020-01-24 NOTE — CARE PLAN
Problem: Safety  Goal: Will remain free from injury  Outcome: PROGRESSING AS EXPECTED  Note:   Patient demonstrates good safety technique this shift.  Asks for assistance when needed and does not attempt self transfer.  Able to verbalize needs.  Will continue to monitor.      Problem: Bowel/Gastric:  Goal: Normal bowel function is maintained or improved  Outcome: PROGRESSING AS EXPECTED  Note:   Patient having regular bowel movements; last BM 1/24/20  Denies s/s constipation; bowel meds available if needed.

## 2020-01-24 NOTE — REHAB-PHARMACY IDT TEAM NOTE
Pharmacy   Pharmacy  Antibiotics/Antifungals/Antivirals:  Medication:      Active Orders (From admission, onward)    None        Route:         n/a  Stop Date:  n/a  Reason:   Antihypertensives/Cardiac:  Medication:    Orders (72h ago, onward)     Start     Ordered    01/24/20 0600  lisinopril (PRINIVIL) tablet 5 mg  Q DAY      01/23/20 1439    01/23/20 1800  digoxin (LANOXIN) tablet 125 mcg  DAILY      01/22/20 1327    01/23/20 0600  lisinopril (PRINIVIL) tablet 2.5 mg  Q DAY,   Status:  Discontinued      01/22/20 1327    01/22/20 2100  atorvastatin (LIPITOR) tablet 40 mg  EVERY EVENING      01/22/20 1327    01/22/20 1800  metoprolol (LOPRESSOR) tablet 75 mg  TWICE DAILY      01/22/20 1327              Patient Vitals for the past 24 hrs:   BP Pulse   01/24/20 0700 137/74 67   01/24/20 0518 (!) 164/96 65   01/23/20 1900 133/87 80   01/23/20 1400 144/79 98     Anticoagulation:  Medication:  Eliquis, Aspirin  INR:      Other key medications: bicalutamide, loratidine, modafinil  A review of the medication list reveals no issues at this time. Patient is currently on antihypertensive(s). Recommend home blood pressure monitoring/recording if antihypertensive(s) regimen(s) continue.    Section completed by:  Carito Pruitt [facility]

## 2020-01-24 NOTE — PROGRESS NOTES
7/9/2020    Dear Dr. Ashley Hernandez would like to refer you Marycarmenyovani Bautista.     Referring Provider: Brianna Mart MD    Fax: [phone]    As soon as the patient is seen please complete the form below and fax to the referring provider without a cover sh Received patient during shift change, report rec'd from day shift RN. Resting in bed, VS stable on room air. Per report incontinent of B&B, min assist for transfers. A&O x 4, able to make needs known. Bed in low position, call light within reach.

## 2020-01-24 NOTE — THERAPY
Occupational Therapy  Daily Treatment     Patient Name: Yousif Kimble  Age:  89 y.o., Sex:  male  Medical Record #: [id number]  Today's Date: 1/24/2020     Precautions  Precautions: Fall Risk, Swallow Precautions ( See Comments), Other (See Comments)  Comments: Dysarthria, NTL, puréed, shortness of breath with activity    Subjective    Patient agreeable to participate in OT.      Objective     01/24/20 1331   Precautions   Precautions Fall Risk;Swallow Precautions ( See Comments);Other (See Comments)   Comments Dysarthria, NTL, puréed, shortness of breath with activity   Cognition    Speech/ Communication Dysarthric   Level of Consciousness Alert   New Learning Impaired   Attention Impaired   Sequencing Impaired   Comments Patient completed 4 design block/ pattern card puzzles in standing. Required max VCs to complete first 2 puzzles and demo'd ability to complete puzzles 3 / 4 with mod VCs.   Sitting Lower Body Exercises   Nustep Resistance Level 2  (10 minutes, 444 steps/0.24 miles)   Dynavision   Patient Position Standing   Application Attention regulation;Bilateral integration;Self-awareness;Visual-motor integration   Mode A   Time per trial (sec) 120   Number of Rings 4   Quadrants LUQ;LLQ;RUQ;RLQ   Number of Hits 19   Average Reaction Time 6.32   Clinical Observations Standing balance impaired;Other  (Decreased attention to lower 2 quadrants noted)   Interdisciplinary Plan of Care Collaboration   IDT Collaboration with  Physical Therapist   Patient Position at End of Therapy Seated;Self Releasing Lap Belt Applied   Collaboration Comments Transferred care to PT.    OT Total Time Spent   OT Individual Total Time Spent (Mins) 60   OT Charge Group   OT Cognitive Skill Development First 15 Minutes 1   OT Neuromuscular Re-education / Balance 2   OT Therapy Activity 1     Dynavision task: Patient completed Dynavision activity x2; See results for trial # 1 above. Trial #2 completed in sitting position, lower 2 quadrants  only; total hits-28, average reaction time 4.29 sec.     Patient oriented to month, year and city; not oriented to day of the week.     Assessment    Patient tolerated OT session well with focus on cognition, attention, standing balance and endurance. Decreased attention to lower 2 quadrants noted during dynavision trial #1; Improved reaction time and performance noted when task completed in sitting, with lower two quadrants activated only. Patient's functional performance primarily limited by cognitive deficits (ie slowed processing, decreased functional problem solving, decreased attention), decreased endurance, and decreased functional mobility/balance.     Plan    ADLs/IADLs, incorporate energy conservation strategies into functional tasks, general strengthening/endurance training, balance, functional transfers/functional mobility, functional cognition

## 2020-01-24 NOTE — THERAPY
01/23/20 1359   Prior Living Situation   Prior Services Home-Independent;None   Housing / Facility 2 Story House  (Patient lives on the entry level of the home)   Steps Into Home 2   Steps In Home   (Flight of stairs)   Rail Unable To Determine At This Time   Elevator No   Bathroom Set up Walk In Shower   Equipment Owned None   Lives with - Patient's Self Care Capacity Other (Comments)  (Lives with his brother)   IRF-GENARO:  Prior Functioning: Everyday Activities   Self Care Independent   Indoor Mobility (Ambulation) Independent   Stairs Independent   Functional Cognition Independent   Prior Device Use None of the given options   Pain 0 - 10 Group   Therapist Pain Assessment 0   Cognition    Cognition / Consciousness X   Speech/ Communication Dysarthric;Hard of Hearing   Level of Consciousness Alert   Ability To Follow Commands 1 Step   Safety Awareness Impaired   New Learning Impaired   Attention Impaired   Sequencing Impaired   Passive ROM Lower Body   Passive ROM Lower Body WDL   Active ROM Lower Body    Active ROM Lower Body  WDL   Strength Lower Body   Lower Body Strength  WDL   Sensation Lower Body   Lower Extremity Sensation   WDL   Lower Body Muscle Tone   Lower Body Muscle Tone  WDL   Balance Assessment   Sitting Balance (Static) Fair +   Sitting Balance (Dynamic) Fair   Standing Balance (Static) Fair -   Standing Balance (Dynamic) Fair -   Weight Shift Sitting Fair   Weight Shift Standing Fair   Bed Mobility    Supine to Sit Contact Guard Assist   Sit to Supine Contact Guard Assist   Sit to Stand Contact Guard Assist   Scooting Contact Guard Assist   Rolling Supervised   Neurological Concerns   Neurological Concerns No   Coordination Lower Body    Other Right Impaired   Other Left Impaired   IRF-GENARO:  Roll Left and Right   Assistance Needed Supervision;Verbal cues   Physical Assistance Level No physical assistance or only touching/steadying assist   CARE Score 4   Discharge Goal:  Assistance Needed  Independent   Discharge Goal:  Physical Assistance Level No physical assistance or only touching/steadying assist   Discharge Goal:  Score 6   IRF-GENARO:  Sit to Lying   Assistance Needed Incidental touching;Verbal cues;Adaptive equipment   Physical Assistance Level No physical assistance or only touching/steadying assist   CARE Score 4   Discharge Goal:  Assistance Needed Independent   Discharge Goal:  Physical Assistance Level No physical assistance or only touching/steadying assist   Discharge Goal:  Score 6   IRF-GENARO:  Lying to Sitting on Side of Bed   Assistance Needed Incidental touching;Verbal cues;Adaptive equipment   Physical Assistance Level No physical assistance or only touching/steadying assist   CARE Score 4   Discharge Goal:  Assistance Needed Independent   Discharge Goal:  Physical Assistance Level No physical assistance or only touching/steadying assist   Discharge Goal:  Score 6   IRF-GENARO:  Sit to Stand   Assistance Needed Physical assistance;Verbal cues;Supervision;Adaptive equipment   Physical Assistance Level Less than 25%   CARE Score 3   Discharge Goal:  Assistance Needed Independent;Adaptive equipment   Discharge Goal:  Physical Assistance Level No physical assistance or only touching/steadying assist   Discahrge Goal:  Score 6   IRF-GENARO:  Chair/Bed-to-Chair Transfer   Assistance Needed Physical assistance;Verbal cues;Supervision;Adaptive equipment   Physical Assistance Level Less than 25%   CARE Score 3   Discharge Goal:  Assistance Needed Independent;Adaptive equipment   Discharge Goal:  Physical Assistance Level No physical assistance or only touching/steadying assist   Discharge Goal:  Score 6   IRF-GENARO:  Toilet Transfer   Reason if not Attempted Activity not applicable   CARE Score 9   Discharge Goal:  Assistance Needed Independent;Adaptive equipment   Discharge Goal:  Physical Assistance Level No physical assistance or only touching/steadying assist   Discahrge Goal:  Score 6   IRF-GENARO:   Car Transfer   Assistance Needed Incidental touching;Verbal cues;Supervision;Adaptive equipment   Physical Assistance Level No physical assistance or only touching/steadying assist   CARE Score 4   Discharge Goal:  Assistance Needed Independent;Adaptive equipment   Discharge Goal:  Physical Assistance Level No physical assistance or only touching/steadying assist   Discharge Goal:  Score 6   IRF GENARO:  Walking   Does the Patient Walk? Yes   Is Walking a Clinically Indicated Goal? Yes   IRF GENARO:  Walk 10 Feet   Assistance Needed Physical assistance;Verbal cues;Supervision;Adaptive equipment   Physical Assistance Level Less than 25%   CARE Score 3   Discharge Goal:  Assistance Needed Independent;Adaptive equipment   Discharge Goal:  Physical Assistance Level No physical assistance or only touching/steadying assist   Discharge Goal:  Score 6   IRF-GENARO:  Walk 50 Feet with Two Turns   Reason if not Attempted Medical concerns   CARE Score 88   Discharge Goal:  Assistance Needed Independent;Adaptive equipment   Discharge Goal:  Physical Assistance Level No physical assistance or only touching/steadying assist   Discharge Goal:  Score 6   IRF-GENARO:  Walk 150 Feet   Reason if not Attempted Medical concerns   CARE Score 88   Discharge Goal:  Assistance Needed Independent;Adaptive equipment   Discharge Goal:  Physical Assistance Level No physical assistance or only touching/steadying assist   Discharge Goal:  Score 6   IRF GENARO:  Walking 10 Feet on Uneven Surfaces   Reason if not Attempted Safety concerns   CARE Score 88   Discharge Goal:  Assistance Needed Independent;Adaptive equipment   Discharge Goal:  Physical Assistance Level No physical assistance or only touching/steadying assist   Discharge Goal:  Score 6   IRF GENARO:  1 Step (Curb)   Assistance Needed Physical assistance;Verbal cues;Supervision   Physical Assistance Level Less than 25%   CARE Score 3   Discharge Goal:  Assistance Needed Independent;Adaptive equipment    Discharge Goal:  Physical Assistance Level No physical assistance or only touching/steadying assist   Discharge Goal:  Score 6   IRF-GENARO:  4 Steps   Assistance Needed Incidental touching;Verbal cues;Supervision;Adaptive equipment   Physical Assistance Level No physical assistance or only touching/steadying assist   CARE Score 4   Discharge Goal:  Assistance Needed Independent;Adaptive equipment   Discharge Goal:  Physical Assistance Level No physical assistance or only touching/steadying assist   Discharge Goal:  Score 6   IRF GENARO:  12 Steps   Reason if not Attempted Medical concerns   CARE Score 88   Discharge Goal:  Assistance Needed Independent;Adaptive equipment   Discharge Goal:  Physical Assistance Level No physical assistance or only touching/steadying assist   Discharge Goal:  Score 6   IRF GENARO:  Picking Up Object   Reason if not Attempted Safety concerns   CARE Score 88   Discharge Goal:  Assistance Needed Independent;Adaptive equipment   Discharge Goal:  Physical Assistance Level No physical assistance or only touching/steadying assist   Discharge Goal:  Score 6   IRF-GENARO:  Wheel 50 Feet with Two Turns   Indicate the Type of Wheelchair or Scooter Used Manual   Assistance Needed Incidental touching;Verbal cues;Adaptive equipment   Physical Assistance Level Less than 25%   CARE Score 3   Discharge Goal:  Assistance Needed Independent   Discharge Goal:  Physical Assistance Level No physical assistance or only touching/steadying assist   Discharge Goal:  Score 6   IRF-GENARO:  Wheel 150 Feet   Reason if not Attempted Medical concerns   CARE Score 88   Discharge Goal:  Assistance Needed Independent   Discharge Goal:  Physical Assistance Level No physical assistance or only touching/steadying assist   Discharge Goal:  Score 6   Problem List    Problems Pain;Impaired Transfers;Impaired Ambulation;Functional ROM Deficit;Functional Strength Deficit;Impaired Balance;Impaired Coordination;Decreased Activity  Tolerance;Safety Awareness Deficits / Cognition;Motor Planning / Sequencing;Other (Comments)  (Fall risk )   Precautions   Precautions Fall Risk;Swallow Precautions ( See Comments);Other (See Comments)   Comments NTL, Purred   Current Discharge Plan   Current Discharge Plan Return to Prior Living Situation   Interdisciplinary Plan of Care Collaboration   IDT Collaboration with  Occupational Therapist   Collaboration Comments CLOF   Benefit   Therapy Benefit Patient Would Benefit from Inpatient Rehabilitation Physical Therapy to Maximize Functional Amelia with ADLs, IADLs and Mobility.   PT Total Time Spent   PT Individual Total Time Spent (Mins) 60   PT Charge Group   Charges Yes   PT Therapeutic Activities 1   PT Evaluation PT Evaluation Mod   Physical Therapy   Initial Evaluation     Patient Name: Yousif Kimble  Age:  89 y.o., Sex:  male  Medical Record #: [id number]  Today's Date: 1/23/2020     Subjective    The patient agreed to the PT evaluation.  He had no complaints of lightheadedness or significant pain.     Objective    The patient participated in the PT evaluation with the summary report provided.    FIM Bed/Chair/Wheelchair Transfers Score: 4 - Minimal Assistance  Bed/Chair/Wheelchair Transfers Description:  Increased time, Supervision for safety, Verbal cueing, Adaptive equipment(SBA supine to/from sit, CGA/min assist stand.-Pivot)    FIM Walking Score:  1 - Total Assistance  Walking Description:  Extra time, Safety concerns, Verbal cueing, Supervision for safety, Requires incidental assist(No device CGA/min assist with gait belt, 33 FT x2)    FIM Wheelchair Score:  2 - Max Assistance  Wheelchair Description:  Extra time, Requires incidental assist, Verbal cueing    FIM Stairs Score:     Stairs Description:  Extra time, Safety concerns, Verbal cueing, Supervision for safety, Ascends/descends 4 to 11 steps, Hand rails    Assessment  Patient is 89 y.o. 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  Additional factors influencing patient status / progress (ie: cognitive factors, co-morbidities, social support, etc): Impaired bed mobility, transfers, balance and coordination, gait, fall risk.      Plan  Recommend Physical Therapy  minutes per day 5-7 days per week for 3 weeks for the following treatments:  PT E Stim Attended, PT Gait Training, PT Therapeutic Exercises, PT Neuro Re-Ed/Balance, PT Therapeutic Activity and PT Evaluation.    Goals:  Long term and short term goals have been discussed with patient and they are in agreement.    Physical Therapy Problems     Problem: Mobility     Dates: Start: 01/23/20       Description:     Goal: STG-Within one week, patient will     Dates: Start: 01/23/20       Description: 1) Individualized goal: Gait fww, CGA, 125 FT, 1 week  2) Interventions:  PT Gait Training, PT Therapeutic Exercises, PT Neuro Re-Ed/Balance and PT Therapeutic Activity                   Problem: Mobility Transfers     Dates: Start: 01/23/20       Description:     Goal: STG-Within one week, patient will transfer bed to chair     Dates: Start: 01/23/20       Description: 1) Individualized goal: Transfer bed to/from chair fww SBA 1 week  2) Interventions:  PT Gait Training, PT Therapeutic Exercises, PT Neuro Re-Ed/Balance and PT Therapeutic Activity             Goal: STG-Within one week, patient will move supine to sit     Dates: Start: 01/23/20       Description: 1) Individualized goal: Transfer supine to/from sit SPV 1 week  2) Interventions:  PT Therapeutic Exercises, PT Neuro Re-Ed/Balance and PT Therapeutic Activity                   Problem: PT-Long Term Goals     Dates: Start: 01/23/20       Description:     Goal: LTG-By discharge, patient will ambulate     Dates: Start: 01/23/20       Description: 1) Individualized goal: Gait fww/ FT, modified independent at  discharge  2) Interventions:  PT Gait Training, PT Therapeutic Exercises, PT Neuro Re-Ed/Balance and PT Therapeutic Activity             Goal: LTG-By discharge, patient will transfer one surface to another     Dates: Start: 01/23/20       Description: Transfer1) Individualized goal: Transfer one surface to another FWW/SPC modified independent at discharge  2) Interventions:  PT Gait Training, PT Therapeutic Exercises, PT Neuro Re-Ed/Balance and PT Therapeutic Activity             Goal: LTG-By discharge, patient will ambulate up/down 4-6 stairs     Dates: Start: 01/23/20       Description:   1) Individualized goal: Up/down 4-6 stairs, handrails, supervision at discharge  2) Interventions:  PT Gait Training, PT Therapeutic Exercises, PT Neuro Re-Ed/Balance and PT Therapeutic Activity             Goal: LTG-By discharge, patient will transfer in/out of a car     Dates: Start: 01/23/20       Description: 1) Individualized goal: Car transfer fww/spc supervision at discharge  2) Interventions:  PT Gait Training, PT Therapeutic Exercises, PT Neuro Re-Ed/Balance and PT Therapeutic Activity

## 2020-01-24 NOTE — DISCHARGE PLANNING
CASE MANAGEMENT INITIAL ASSESSMENT    Admit Date:  1/22/2020     I met with patient to discuss role of case management / discharge planning / team conference. Patient is a  89 y.o. male transferred from [facility].  He has been able to provide some information but I have also contacted his brother to complete demographics.  His admitting physician for rehab is Dr. Fortune.    Diagnosis: 01.3 bilateral involvement  Stroke (cerebrum) (HCC)    Co-morbidities:   Patient Active Problem List    Diagnosis Date Noted   • Cerebrovascular accident (CVA) due to embolism (HCC) 01/22/2020   • Atrial fibrillation (HCC) 01/22/2020   • Prostate cancer (HCC) 01/22/2020   • Hypertension 01/22/2020   • Systolic CHF (HCC) 01/22/2020   • Dysarthria 01/22/2020   • Dysphagia 01/22/2020   • CAD (coronary artery disease) 01/22/2020   • Rash of back 01/22/2020     Prior Living Situation:  Housing / Facility: [address] [place] [address]  Lives with - Patient's Self Care Capacity: Other (Comments)(Lives with his brother)    Prior Level of Function:  Medication Management: Independent  Home Management: Independent  Shopping: Other (Comments)(Brother primarily does grocery shopping)  Prior Level Of Mobility: Independent Without Device in Community  Driving / Transportation: Driving Independent    Support Systems:  Primary : Brother is Ward Doss at [phone]  Other support systems:      Previous Services Utilized:   Equipment Owned: None  Prior Services: Home-Independent    Other Information:  Occupation (Pre-Hospital Vocational): Retired Due To Age  Primary Payor Source: Medicare A, Medicare B  Secondary Payor Source: Supplemental Insurance  Primary Care Practitioner : Katie Boggs    Additional Case Management Questions:  Have you ever received case management services for yourself or a family member? no    Do you feel you have and an understanding of what services  provide? This was reviewed with patient's brother    Do you  have any additional questions regarding case management?    no      CASE MANAGEMENT PLAN OF CARE   Individualized Goals:   1. I will confirm status of needed follow up for anticoagulation  2. Patient hopes to be more independent before he goes home    Barriers:   1. Functional deficits following stroke.    Patient / Family Goal:  Patient / Family Goal: Patient hopes to return home with his brother.  Per his brother he was totally independent before.  He has a PCP and Urologist in [place] but was not followed by other physicians.  His brother is supportive.  He does not work but is not home at all times.    Plan:  1. Continue to follow patient through hospitalization and provide discharge planning in collaboration with patient, family, physicians and ancillary services.     2. Utilize community resources to ensure a safe discharge.

## 2020-01-24 NOTE — CARE PLAN
Problem: Safety  Goal: Will remain free from injury  Outcome: PROGRESSING AS EXPECTED     Problem: Infection  Goal: Will remain free from infection  Outcome: PROGRESSING AS EXPECTED     Problem: Venous Thromboembolism (VTW)/Deep Vein Thrombosis (DVT) Prevention:  Goal: Patient will participate in Venous Thrombosis (VTE)/Deep Vein Thrombosis (DVT)Prevention Measures  Outcome: PROGRESSING AS EXPECTED     Problem: Respiratory:  Goal: Respiratory status will improve  Outcome: PROGRESSING AS EXPECTED

## 2020-01-24 NOTE — PROGRESS NOTES
"Rehab Progress Note     Date of Service: 1/24/2020  Chief Complaint: follow up stroke    Interval Events (Subjective)    Patient seen and examined in his room today.  He is very sleepy and falling asleep in his wheelchair.  When I advised him he will have more therapy and 15 minutes he reports he already did some walking today.  Advised he will be doing occupational therapy.  He denies any pain.  He reports he did not sleep well last night and would like to take something for this.  This was confirmed by looking at the hourly observation.  He had no trouble sleeping at home.  He has no other complaints.    Objective:  VITAL SIGNS: /74   Pulse 67   Temp 36.6 °C (97.8 °F) (Oral)   Resp 16   Ht 1.727 m (5' 8\")   Wt 66 kg (145 lb 8.1 oz)   SpO2 94%   BMI 22.12 kg/m²   Gen: alert, no apparent distress  CV: regular rate and rhythm, no murmurs, no peripheral edema  Resp: clear to ascultation bilaterally, normal respiratory effort  GI: soft, non-tender abdomen, bowel sounds present  Neuro: notable for delayed responses, left facial weakness    Recent Results (from the past 72 hour(s))   CBC with Differential    Collection Time: 01/23/20  6:01 AM   Result Value Ref Range    WBC 9.5 4.8 - 10.8 K/uL    RBC 4.38 (L) 4.70 - 6.10 M/uL    Hemoglobin 14.4 14.0 - 18.0 g/dL    Hematocrit 43.1 42.0 - 52.0 %    MCV 98.4 (H) 81.4 - 97.8 fL    MCH 32.9 27.0 - 33.0 pg    MCHC 33.4 (L) 33.7 - 35.3 g/dL    RDW 47.9 35.9 - 50.0 fL    Platelet Count 263 164 - 446 K/uL    MPV 10.5 9.0 - 12.9 fL    Neutrophils-Polys 40.30 (L) 44.00 - 72.00 %    Lymphocytes 47.60 (H) 22.00 - 41.00 %    Monocytes 9.70 0.00 - 13.40 %    Eosinophils 1.80 0.00 - 6.90 %    Basophils 0.30 0.00 - 1.80 %    Immature Granulocytes 0.30 0.00 - 0.90 %    Nucleated RBC 0.00 /100 WBC    Neutrophils (Absolute) 3.81 1.82 - 7.42 K/uL    Lymphs (Absolute) 4.51 1.00 - 4.80 K/uL    Monos (Absolute) 0.92 (H) 0.00 - 0.85 K/uL    Eos (Absolute) 0.17 0.00 - 0.51 K/uL    " Baso (Absolute) 0.03 0.00 - 0.12 K/uL    Immature Granulocytes (abs) 0.03 0.00 - 0.11 K/uL    NRBC (Absolute) 0.00 K/uL   Comp Metabolic Panel (CMP)    Collection Time: 01/23/20  6:01 AM   Result Value Ref Range    Sodium 144 135 - 145 mmol/L    Potassium 3.6 3.6 - 5.5 mmol/L    Chloride 110 96 - 112 mmol/L    Co2 20 20 - 33 mmol/L    Anion Gap 14.0 (H) 0.0 - 11.9    Glucose 104 (H) 65 - 99 mg/dL    Bun 17 8 - 22 mg/dL    Creatinine 1.03 0.50 - 1.40 mg/dL    Calcium 10.0 8.5 - 10.5 mg/dL    AST(SGOT) 25 12 - 45 U/L    ALT(SGPT) 26 2 - 50 U/L    Alkaline Phosphatase 41 30 - 99 U/L    Total Bilirubin 0.8 0.1 - 1.5 mg/dL    Albumin 3.4 3.2 - 4.9 g/dL    Total Protein 6.7 6.0 - 8.2 g/dL    Globulin 3.3 1.9 - 3.5 g/dL    A-G Ratio 1.0 g/dL   Magnesium    Collection Time: 01/23/20  6:01 AM   Result Value Ref Range    Magnesium 2.0 1.5 - 2.5 mg/dL   Vitamin D, 25-hydroxy (blood)    Collection Time: 01/23/20  6:01 AM   Result Value Ref Range    25-Hydroxy   Vitamin D 25 42 30 - 100 ng/mL   DIGOXIN    Collection Time: 01/23/20  6:01 AM   Result Value Ref Range    Digoxin 0.9 0.8 - 2.0 ng/mL   ESTIMATED GFR    Collection Time: 01/23/20  6:01 AM   Result Value Ref Range    GFR If African American >60 >60 mL/min/1.73 m 2    GFR If Non African American >60 >60 mL/min/1.73 m 2   proBrain Natriuretic Peptide, NT    Collection Time: 01/23/20  6:01 AM   Result Value Ref Range    NT-proBNP 91749 (H) 0 - 125 pg/mL       Current Facility-Administered Medications   Medication Frequency   • modafinil (PROVIGIL) tablet 100 mg QAM   • lisinopril (PRINIVIL) tablet 5 mg Q DAY   • aspirin (ASA) chewable tab 81 mg DAILY   • Respiratory Care per Protocol Continuous RT   • Pharmacy Consult Request ...Pain Management Review 1 Each PHARMACY TO DOSE   • acetaminophen (TYLENOL) tablet 650 mg Q4HRS PRN   • senna-docusate (PERICOLACE or SENOKOT S) 8.6-50 MG per tablet 2 Tab BID    And   • polyethylene glycol/lytes (MIRALAX) PACKET 1 Packet QDAY PRN     And   • magnesium hydroxide (MILK OF MAGNESIA) suspension 30 mL QDAY PRN    And   • bisacodyl (DULCOLAX) suppository 10 mg QDAY PRN   • artificial tears ophthalmic solution 1 Drop PRN   • benzocaine-menthol (CEPACOL) lozenge 1 Lozenge Q2HRS PRN   • mag hydrox-al hydrox-simeth (MAALOX PLUS ES or MYLANTA DS) suspension 20 mL Q2HRS PRN   • ondansetron (ZOFRAN ODT) dispertab 4 mg 4X/DAY PRN    Or   • ondansetron (ZOFRAN) syringe/vial injection 4 mg 4X/DAY PRN   • traZODone (DESYREL) tablet 50 mg QHS PRN   • sodium chloride (OCEAN) 0.65 % nasal spray 2 Spray PRN   • melatonin tablet 3 mg HS PRN   • apixaban (ELIQUIS) tablet 5 mg BID   • lactulose 20 GM/30ML solution 30 mL QDAY PRN   • docusate sodium (ENEMEEZ) enema 283 mg QDAY PRN   • atorvastatin (LIPITOR) tablet 40 mg Q EVENING   • digoxin (LANOXIN) tablet 125 mcg DAILY AT 1800   • metoprolol (LOPRESSOR) tablet 75 mg TWICE DAILY   • albuterol inhaler 2 Puff Q4HRS PRN   • hydrocortisone 1 % cream 4X/DAY   • loratadine (CLARITIN) tablet 10 mg DAILY       Orders Placed This Encounter   Procedures   • Diet Order 2 Gram Sodium (medications crushed, floated in puree.)     Standing Status:   Standing     Number of Occurrences:   1     Order Specific Question:   Diet:     Answer:   2 Gram Sodium [7]     Comments:   medications crushed, floated in puree.     Order Specific Question:   Texture/Fiber modifications:     Answer:   Dysphagia 1(Pureed)specify fluid consistency(question 6) [1]     Order Specific Question:   Consistency/Fluid modifications:     Answer:   [place] Thick [2]       Assessment:  Active Hospital Problems    Diagnosis   • *Cerebrovascular accident (CVA) due to embolism (HCC)   • Atrial fibrillation (HCC)   • Prostate cancer (HCC)   • Hypertension   • Systolic CHF (HCC)   • Dysarthria   • Dysphagia   • CAD (coronary artery disease)   • Rash of back     This patient is a 89 y.o. male admitted for acute inpatient rehabilitation with Cerebrovascular accident  (CVA) due to embolism (HCC).    Therapy update 1/24/2020  Supervision eating  Supervision grooming  Min assist bathing  Min assist upper body dressing  Min assist lower body dressing  Min assist toileting  Mod assistbladder  Mod assist bowel  Min assist bed/chair transfer  Min assist toilet transfer  Min assist tub/shower transfer  Max assist ambulation  Max assist wheelchair propulsion  Not tested stairs  Min assist comprehension  Min assist expression  Supervision social interaction  Max assist problem solving  Max assist memory    We will have his first weekly conference on Monday to discuss a discharge date.      Medical Decision Making and Plan:    Bilateral strokes  Largest R MCA   Cardioembolic  Left pronator drift  Left facial droop  Dysarthria  Dysphagia  Cognitive deficits  Continue full rehab program  PT/OT/SLP, 1 hr each discipline, 5 days per week  Continue Eliquis and statin for secondary stroke prophylaxis  Speech therapy to advance diet  ContinueProvigil for neurostimulation    Insomnia  Schedule melatonin and low dose trazodone    Bowel  Continue bowel meds  Last BM 1/24    Bladder  Checked PVRs - 65, 36  Not retaining  ICP for over 400 cc  Scheduled toileting    DVT prophylaxis  Eliquis    Appreciate the assistance of the hospitalist with his medical comorbidities:     Hypertension  Atrial fibrillation  Systolic CHF  Elevated Pro-BNP  History of prostate cancer, Casadex on hold as it cannot be crushed  Coronary artery disease  Rash on back    Total time:  26 minutes.  I spent greater than 50% of the time for patient care, counseling, and coordination on this date, including patient face-to face time, unit/floor time with review of records/pertinent lab data and studies, as well as discussing diagnostic evaluation/work up, planned therapeutic interventions, and future disposition of care, as per the interval events/subjective and the assessment and plan as noted above.    I have performed a physical  exam, reviewed and updated ROS, as well as the assessment and plan today 1/24/2020. In review of note from 1/23/2020 there are no new changes except as documented above.      Gauri Fortune M.D.   Physical Medicine and Rehabilitation

## 2020-01-24 NOTE — THERAPY
Speech Language Pathology   Initial Assessment     Patient Name: Yousif Kimble  AGE:  89 y.o., SEX:  male  Medical Record #: [id number]  Today's Date: 1/23/2020     Subjective    Patient drowsy and very slow to respond and initiate.  He required multiple prompts ( verbal and tactile cues) to wake during this session.   Otherwise was pleasant and cooperative.     Objective     01/23/20 0801   Prior Living Situation   Prior Services None;Home-Independent   Housing / Facility 2 Story House   Lives with - Patient's Self Care Capacity Other (Comments)  (lives with brother.)   Prior Level Of Function   Communication Within Functional Limits   Swallow Within Functional Limits   Dentition Poor Quality    Dentures None   Hearing Impaired Both Ears   Hearing Aid None   Vision Reading    Patient's Primary Language English   Occupation (Pre-Hospital Vocational) Retired Due To Age   Comments retired from uniform/laundary business.   Cognition   Cognitive-Linguistic (WDL) X   Orientation  Minimal (4)   Prospective Memory Severe (2)   Functional Memory Activities Severe (2)   Speech Mechanisms / Voice Production   Speech Mechanisms / Voice Production (WDL) X   Voice Quality Hoarse;Reduced Intensity   Facial Weakness Left Moderate (3)   Articulatory Precision Moderate (3)   Sentence Level Intelligibility Moderate (3)   Laryngeal Function   Laryngeal Function (WDL) X   Voice Quality Moderate   Volutional Cough Moderate   Excursion Upon Swallow Weak ;Complete   Swallowing   Swallowing (WDL) X   Ice Chips Moderate   Puree Minimal   Single Swallow Thin / Nectar (Cup) Minimal   Dysphagia Strategies / Recommendations   Strategies / Interventions Recommended (Yes / No) Yes   Compensatory Strategies Multiple Swallows;Other (Comment)  (Effortful swallow)   Diet / Liquid Recommendation Nectar Thick Liquid;Dysphagia I   Medication Administration  Crush all Medications in Puree   Therapy Interventions Dysphagia Therapy By Speech Language  Pathologist;Therapeutic Dining For Meals;MBSS Evaluation   Barriers To Oral Feeding   Barriers To Oral Feeding Generalized Weakness;Lethargic / Inadequate Alertness  (low initiation and very delayed responses.)   IRF-GENARO:  Swallowing/Nutritional Status   Swallowing/Nutritional Status Modified food consistency   Outcome Measures   Outcome Measures Utilized MASA;SCCAN   MASA ([place] Assessment of Swallowing Ability)   Alertness 8   Cooperation 10   Auditory Comprehension 6   Respiration 10   Respiratory Rate for Swallow 5   Dysphasia 4   Dyspraxia 5   Dysarthria 3   Saliva 3   Lip Seal 3   Tongue Movement 8   Tongue Strength 5   Tongue Coordination 8   Oral Preparation 8   Gag 5   Palate 8   Bolus Clearance 8   Oral Transit 6   Cough Reflex 3   Voluntary Cough 5   Voice 8   Trache 10   Pharygneal Phase 8   Pharyngeal Response 10   Diet Recommendations Puree   Fluid Recommendations Regular   Swallow Integrity Possible dysphagia/aspiration   Total Score 157   Dysphagia Severity Moderate dysphagia   Aspiration Severity Mild aspiration   SCCAN (Scales of Cognitive and Communicative Ability for Neurorehabilitation)   Orientation - Raw Score 9   Orientation - Scale Performance Score 75   Memory - Raw Score 2   Memory - Scale Performance Score 11   Speech Comprehension - Raw Score 10   Speech Comprehension - Scale Performance Score 77   Problem List   Problem List Cognitive-Linguistic Deficits;Dysphagia;Dysarthia;Hearing Deficit;Attention Deficit;Visual Perception Deficit;Memory Deficit;Verbal Problem Solving Deficits;Executive Function Deficit;Impaired Safety;Impaired Judgement   Current Discharge Plan   Current Discharge Plan Return to Prior Living Situation   Benefit   Therapy Benefit Patient would benefit from Inpatient Rehab Speech-Language Pathology to address above identified deficits.   Speech Language Pathologist Assigned   Assigned SLP / Pager # LM t-dine 60+ ( sw/cog)   SLP Total Time Spent   SLP Individual Total  Time Spent (Mins) 90   SLP Charge Group   Charges Yes       FIM Eating Score:  5 - Standby Prompting/Supervision or Set-up  Eating Description:  Modified diet, Increased time, Supervision for safety, Verbal cueing, Set-up of equipment or meal/tube feeding    FIM Comprehension Score:  4 - Minimal Assistance  Comprehension Description:  Verbal cues, Magnifying glass([place], patient does not have hearing aids.)    FIM Expression Score:  4 - Minimal Assistance  Expression Description:  Verbal cueing(Patient requires an extra ordinary amount of time to process and respond,  prompts are also needed.)    FIM Social Interaction Score:  5 - Stand-by Prompting/Supervision or Set-up  Social Interaction Description:  Increased time, Verbal cues, Schedule    FIM Problem Solving Score:  2 - Max Assistance  Problem Solving Description:  Verbal cueing, Therapy schedule, Bed/chair alarm, Seat belt, Increased time    FIM Memory Score:  2 - Max Assistance  Memory Description:  Verbal cueing, Therapy schedule, Bed/chair alarm, Seat belt    Assessment    Patient is 89 y.o. male with a diagnosis of large territory stroke in large portions of the right frontal lobe, the anterior aspect of right parietal lobe,  And lateral aspect of right temporal lobe. Small infarct also in the left parietal lobe. Patient demonstrates left side weakness,dysphagia, and dysarthria  Additional factors influencing patient status/progress (ie: cognitive factors, co-morbidities, social support, etc): Patient lives with his brother and PLOF was independent.  Patient reports that he was driving, managing own finances and medications before his stroke.    Patient was seen for a clinical swallow evaluation during a meal. His meal tray delivery was delayed. He was drowsy, slow to initiate, slow to respond, through out.   He initially displayed a weak swallow with some reduced range of hyo-laryngeal excursion.  He was receptive to cues for effortful double swallow and  performed stronger swallows with complete range of hyo- laryngeal excursion  through remainder of meal with direct cuing.   No overt s/sx of aspiration noted with intake of NTL by cup and dysphagia 1 textures.   Intake was very slow with constant cuing required to attend to meal.  After 60 min, patient had taken 300 cc fluid and 10% of meal.  Discussed patient burden of care to take a limited amount of meal over 60 min with physician.    Patient displayed left side weakness of lip retraction and tongue.   Oral transport was delayed  5+ sec.  He displayed reduced awareness of leakage from lips on left side.   His voluntary cough was weak, but displayed no coughing post swallow of puree, nectar thick liquids.  He did demonstrate a weak cough post swallow of ice chip, and with oral care after meal.   Recommend continue with Dysphagia 1 diet textures and nectar thick liquids with meals in T-dine.  Oral care after intake, medications crushed floated with puree. Recommend MBSS with goals as appropriate.    Patient initiated SCCAN but was unable to complete to to patient's drowsiness, delayed processing and responses, and time constraints.    Thus far patient has demonstrated the following percentage scores for subtests: Orientation 75, Memory 11,  Speech Comprehension 77.  SCCAN to be completed in following sessions with goals as appropriate.         Plan  Recommend Speech Therapy 30-60 minutes per day 5-6 days per week for 3 weeks for the following treatments:  SLP Speech Language Treatment, SLP Swallowing Dysfunction Treatment, SLP Video Swallow Evaluation, SLP Self Care / ADL Training , SLP Sensory Integration Techniques , SLP Cognitive Skill Development and SLP Group Treatment.    Goals:  Long term and short term goals have been discussed with patient and they are in agreement.    Speech Therapy Problems     Problem: Expression STGs     Dates: Start: 01/23/20       Description:     Goal: STG-Within one week, patient  will express     Dates: Start: 01/23/20       Description: 1) Individualized goal:  In 4-6 word sentences using compensatory speaking strategies ( open mouth, effortful articulation,  Increased breath support and volume, pause between words) with 75% acc with mod cues to improve.  2) Interventions:  SLP Speech Language Treatment and SLP Group Treatment                   Problem: Problem Solving STGs     Dates: Start: 01/23/20       Description:     Goal: STG-Within one week, patient will     Dates: Start: 01/23/20       Description: 1) Individualized goal:  Complete SCCAN with goals as appropriate.  2) Interventions:  SLP Cognitive Skill Development                   Problem: Speech/Swallowing LTGs     Dates: Start: 01/23/20       Description:     Goal: LTG-By discharge, patient will safely swallow     Dates: Start: 01/23/20       Description: 1) Individualized goal:  Regular diet textures and thin liquids for 2/2 sessions, with no s/sx of aspiration.  2) Interventions:  SLP Swallowing Dysfunction Treatment, SLP Video Swallow Evaluation, SLP Sensory Integration Techniques  and SLP Group Treatment             Goal: LTG-By discharge, patient will solve basic problems     Dates: Start: 01/23/20       Description: 1) Individualized goal:  For transfers, and safety problem solving for home environment with 80% acc with spv.  2) Interventions:  SLP Self Care / ADL Training , SLP Cognitive Skill Development and SLP Group Treatment             Goal: LTG-By discharge, patient will express     Dates: Start: 01/23/20       Description: 1) Individualized goal:  Verbally in structured conversation with 100% intelligibility with use of compensatory speaking strategies with min cues to spv.  2) Interventions:  SLP Speech Language Treatment and SLP Group Treatment                   Problem: Swallowing STGs     Dates: Start: 01/23/20       Description:     Goal: STG-Within one week, patient will safely swallow     Dates: Start:  01/23/20       Description: 1) Individualized goal:  Therapeutic trials of dysphagia 2 and thin liquids in isolation as appropriate, with no s/sx of aspiration for 2/2 sessions.  2) Interventions:  SLP Swallowing Dysfunction Treatment, SLP Video Swallow Evaluation and SLP Group Treatment             Goal: STG-Within one week, patient will     Dates: Start: 01/23/20       Description: 1) Individualized goal:  MBSS with goals as appropriate.  2) Interventions:  SLP Video Swallow Evaluation

## 2020-01-24 NOTE — THERAPY
Speech Language Pathology  Daily Treatment     Patient Name: Yousif Kimble  Age:  89 y.o., Sex:  male  Medical Record #: [id number]  Today's Date: 1/24/2020     Subjective    Pt seen immediately following MBSS. Pt continues to appear fatigued, frequently closing eyes during session with tactile cues for attention to task. Pt requesting to return to bed at end of session.      Objective     01/24/20 0904   Dysphagia    Other Treatments review of MBSS immediately following with ongoing dysphagia education   Diet / Liquid Recommendation Nectar Thick Liquid;Dysphagia II   Nutritional Liquid Intake Rating Scale 2   Nutritional Food Intake Rating Scale 5   SCCAN (Scales of Cognitive and Communicative Ability for Neurorehabilitation)   Oral Expression - Raw Score 12   Oral Expression - Scale Performance Score 63   Interdisciplinary Plan of Care Collaboration   Patient Position at End of Therapy In Bed;Call Light within Reach   SLP Total Time Spent   SLP Individual Total Time Spent (Mins) 30   Charge Group   Charges Yes   SLP Swallowing Dysfunction Treatment Swallowing Dysfunction Treatment   SLP Cognitive Skill Development First 15 Minutes 1       Assessment    Dysphagia education and review of MBSS immediately following study. Pt frequently closing eyes during review of study with tactile cues to maintain attention. Pt would benefit from ongoing education to ensure retention in functional settings. Oral care completed set up at sink - pt requesting use of suction machine here and at home, however, no suction in room. Pt exhibited coughing episode following oral care, cough strength is weak at this time. Ongoing administration of SCCAN with extra time needed. Pt scored 63% on oral expression subtest. Administration to remain ongoing over weekend to obtain overall score/severity.     Plan    Complete administration of SCCAN. Start Synchrony to target effortful swallows, timing of swallows with trials of 5mL thin liquids. Please  complete oral care before and after trials.

## 2020-01-24 NOTE — REHAB-CM IDT TEAM NOTE
Case Management    DC Planning  DC destination/dispostion:  Patient lives in a two-level home with his brother.  Patient primarily lives on one level.      Referrals: Neurology and cardiology.      DC Needs: Patient currently has no DME at home and uses a walker here.  I anticipate he will be a good candidate for home health therapy.  He may need anticoagulation FU for new diagnosis of AFIB. His brother will need family training.  I will await additional recommendations.      Barriers to discharge: None.       Strengths: Has good support.       Section completed by:  Annette Benitez, MS, LSW

## 2020-01-25 PROCEDURE — 99232 SBSQ HOSP IP/OBS MODERATE 35: CPT | Performed by: HOSPITALIST

## 2020-01-25 PROCEDURE — 92526 ORAL FUNCTION THERAPY: CPT

## 2020-01-25 PROCEDURE — 700102 HCHG RX REV CODE 250 W/ 637 OVERRIDE(OP): Performed by: HOSPITALIST

## 2020-01-25 PROCEDURE — 97116 GAIT TRAINING THERAPY: CPT

## 2020-01-25 PROCEDURE — 97535 SELF CARE MNGMENT TRAINING: CPT

## 2020-01-25 PROCEDURE — 770010 HCHG ROOM/CARE - REHAB SEMI PRIVAT*

## 2020-01-25 PROCEDURE — 700102 HCHG RX REV CODE 250 W/ 637 OVERRIDE(OP): Performed by: PHYSICAL MEDICINE & REHABILITATION

## 2020-01-25 PROCEDURE — A9270 NON-COVERED ITEM OR SERVICE: HCPCS | Performed by: PHYSICAL MEDICINE & REHABILITATION

## 2020-01-25 PROCEDURE — 97530 THERAPEUTIC ACTIVITIES: CPT

## 2020-01-25 PROCEDURE — 97112 NEUROMUSCULAR REEDUCATION: CPT

## 2020-01-25 PROCEDURE — A9270 NON-COVERED ITEM OR SERVICE: HCPCS | Performed by: HOSPITALIST

## 2020-01-25 RX ADMIN — MODAFINIL 100 MG: 100 TABLET ORAL at 08:59

## 2020-01-25 RX ADMIN — APIXABAN 5 MG: 5 TABLET, FILM COATED ORAL at 20:44

## 2020-01-25 RX ADMIN — METOPROLOL TARTRATE 75 MG: 25 TABLET ORAL at 17:50

## 2020-01-25 RX ADMIN — APIXABAN 5 MG: 5 TABLET, FILM COATED ORAL at 08:59

## 2020-01-25 RX ADMIN — HYDROCORTISONE: 1 CREAM TOPICAL at 14:29

## 2020-01-25 RX ADMIN — LISINOPRIL 5 MG: 5 TABLET ORAL at 05:17

## 2020-01-25 RX ADMIN — SENNOSIDES AND DOCUSATE SODIUM 2 TABLET: 8.6; 5 TABLET ORAL at 20:44

## 2020-01-25 RX ADMIN — ASPIRIN 81 MG 81 MG: 81 TABLET ORAL at 08:59

## 2020-01-25 RX ADMIN — HYDROCORTISONE: 1 CREAM TOPICAL at 20:45

## 2020-01-25 RX ADMIN — TRAZODONE HYDROCHLORIDE 25 MG: 50 TABLET ORAL at 20:44

## 2020-01-25 RX ADMIN — MELATONIN TAB 3 MG 3 MG: 3 TAB at 20:44

## 2020-01-25 RX ADMIN — ATORVASTATIN CALCIUM 40 MG: 40 TABLET, FILM COATED ORAL at 20:44

## 2020-01-25 RX ADMIN — HYDROCORTISONE: 1 CREAM TOPICAL at 17:53

## 2020-01-25 RX ADMIN — LORATADINE 10 MG: 10 TABLET ORAL at 08:59

## 2020-01-25 RX ADMIN — HYDROCORTISONE: 1 CREAM TOPICAL at 08:59

## 2020-01-25 RX ADMIN — METOPROLOL TARTRATE 75 MG: 25 TABLET ORAL at 05:16

## 2020-01-25 RX ADMIN — DIGOXIN 125 MCG: 125 TABLET ORAL at 17:50

## 2020-01-25 ASSESSMENT — ENCOUNTER SYMPTOMS
NAUSEA: 0
COUGH: 0
BRUISES/BLEEDS EASILY: 0
EYES NEGATIVE: 1
PALPITATIONS: 0
ABDOMINAL PAIN: 0
VOMITING: 0
SHORTNESS OF BREATH: 0
POLYDIPSIA: 0
FEVER: 0
CHILLS: 0

## 2020-01-25 ASSESSMENT — PATIENT HEALTH QUESTIONNAIRE - PHQ9
2. FEELING DOWN, DEPRESSED, IRRITABLE, OR HOPELESS: NOT AT ALL
1. LITTLE INTEREST OR PLEASURE IN DOING THINGS: NOT AT ALL
SUM OF ALL RESPONSES TO PHQ9 QUESTIONS 1 AND 2: 0

## 2020-01-25 NOTE — THERAPY
Occupational Therapy  Daily Treatment     Patient Name: Yousif Kimble  Age:  89 y.o., Sex:  male  Medical Record #: [id number]  Today's Date: 1/25/2020     Precautions  Precautions: Fall Risk, Swallow Precautions ( See Comments)  Comments: Dysarthria, NTL, puréed, shortness of breath with activity    Safety   ADL Safety : Requires Supervision for Safety, Requires Physical Assist for Safety  Bathroom Safety: Requires Supervision for Safety, Requires Physical Assist for Safety    Subjective    Pt able to nod head yes/no appropriately throughout session when asked simple questions. Pt able to point and use hand gestures to make needs known I.e. for therapist to grab pt a drink, pants and shirt.      Objective       01/25/20 0701   Precautions   Precautions Fall Risk;Swallow Precautions ( See Comments)   Comments Dysarthria, NTL, puréed, shortness of breath with activity   Safety    ADL Safety  Requires Supervision for Safety;Requires Physical Assist for Safety   Bathroom Safety Requires Supervision for Safety;Requires Physical Assist for Safety   Vitals   Pulse Oximetry 95 %   O2 Delivery None (Room Air)   Pain   Intervention Declines   Pain 0 - 10 Group   Therapist Pain Assessment 0   Cognition    Ability To Follow Commands 1 Step   Safety Awareness Impaired   Attention Impaired   Bed Mobility    Supine to Sit Stand by Assist   Sit to Supine Minimal Assist   Scooting Stand by Assist   Rolling Supervised   Comments CGA for sit to stand from EOB    Interdisciplinary Plan of Care Collaboration   Patient Position at End of Therapy Seated;Self Releasing Lap Belt Applied  (in T-dine )   OT Total Time Spent   OT Individual Total Time Spent (Mins) 60   OT Charge Group   OT Self Care / ADL 4       FIM Grooming Score:  5 - Standby Prompting/Supervision or Set-up  Grooming Description:  Increased time, Supervision for safety, Verbal cueing(requires cues for brushing back of head and cleaning eyes/beard when washing face )    FIM  Upper Body Dressin - Minimal Assistance  Upper Body Dressing Description:  Increased time, Supervision for safety, Verbal cueing, Initial preparation for task, Requires assistance with closures(able to don front opening shirt seated at EOB with increased time and assist to get second arm into hole. Pt completed 4/6 buttons)    FIM Lower Body Dressing Score:  4 - Minimal Assistance  Lower Body Dressing Description:  Increased time, Verbal cueing, Supervision for safety, Initial preparation for task, Requires minimal contact(assist to thread pants over B feet and CGA during standing to don pants over hips. )    Attempted w/c mobility down hallway, pt with slow heidy and difficulty turning and keeping w/c straight- continually running into wall on L side- required max cues and max a to keep w/c straight. Pt only completed 15 ft of w/c mobility prior to fatigue.     Assessment    Pt tolerated session fair. Pt requires increased time for all ADL tasks 2/2 fatigue and SOB. O2 levels checked and WFL. Pt required frequent rest breaks during UBD and LBD seated at EOB and needed to lay back down to rest 1x for 5 minutes. No c/o pain throughout session.     Plan    ADLs/IADLs, incorporate energy conservation strategies into functional tasks, general strengthening/endurance training, balance, functional transfers/functional mobility, functional cognition

## 2020-01-25 NOTE — CARE PLAN
Problem: Safety  Goal: Will remain free from injury  Outcome: PROGRESSING AS EXPECTED     Problem: Infection  Goal: Will remain free from infection  Outcome: PROGRESSING AS EXPECTED     Problem: Venous Thromboembolism (VTW)/Deep Vein Thrombosis (DVT) Prevention:  Goal: Patient will participate in Venous Thrombosis (VTE)/Deep Vein Thrombosis (DVT)Prevention Measures  Outcome: PROGRESSING AS EXPECTED     Problem: Bowel/Gastric:  Goal: Normal bowel function is maintained or improved  Outcome: PROGRESSING AS EXPECTED     Problem: Skin Integrity  Goal: Risk for impaired skin integrity will decrease  Outcome: PROGRESSING AS EXPECTED

## 2020-01-25 NOTE — THERAPY
Speech Language Pathology  Daily Treatment     Patient Name: Yousif Kimble  Age:  89 y.o., Sex:  male  Medical Record #: [id number]  Today's Date: 1/25/2020     Subjective  This single note will serve as a summary for two separate therapy encounters this date: 0801 and 1131.     Patient received sitting upright in wheelchair in T-dine. Patient is awake, alert, and cooperative throughout. Oral care before and after meal completed. Transferred to bed with HOB elevated for aspiration precautions.      Objective   01/25/20 0801   Dysphagia    Positioning / Behavior Modification Cough / Clear after Swallow;Modulate Rate or Bite Size;Multiple Swallows;Self Monitoring   Other Treatments repeat review of MBSS results   Diet / Liquid Recommendation Nectar Thick Liquid;Dysphagia II   Nutritional Liquid Intake Rating Scale 2   Nutritional Food Intake Rating Scale 5   Interdisciplinary Plan of Care Collaboration   IDT Collaboration with  Nursing;Therapy MyActivityPal   Patient Position at End of Therapy Seated;In Bed;Bed Alarm On;Call Light within Reach;Tray Table within Reach;Other (Comments)  (RN at bedside to provide care)   Collaboration Comments Transferred care to RN.     SLP Total Time Spent   SLP Individual Total Time Spent (Mins) 60   Charge Group   SLP Swallowing Dysfunction Treatment Swallowing Dysfunction Treatment        01/25/20 1131   Dysphagia    Positioning / Behavior Modification Cough / Clear after Swallow;Self Monitoring;Modulate Rate or Bite Size;Multiple Swallows;Effortful Swallow   Diet / Liquid Recommendation Dysphagia II;[place] Thick Liquid   Nutritional Liquid Intake Rating Scale 2   Nutritional Food Intake Rating Scale 5   Interdisciplinary Plan of Care Collaboration   IDT Collaboration with  Therapy MyActivityPal   Patient Position at End of Therapy Seated;Chair Alarm On;Other (Comments)   Collaboration Comments Patient contining lunchtime meal at end of session.  Handed off to therapy tech, who provided 1:1 assistance for  remainder of meal.  Instructed therapy tech to assist with post-meal oral care.     SLP Total Time Spent   SLP Individual Total Time Spent (Mins) 30   Charge Group   SLP Swallowing Dysfunction Treatment Swallowing Dysfunction Treatment       Assessment  Patient participated in 1:1 direct dysphagia intervention with emphasis on compensatory swallow strategy training.  Patient consumed very minimal amounts of dysphagia II and nectar thick liquids meal tray at breakfast with slight improvement during lunchtime meal.      Noted mild post-swallow oral residue with solids, however residue cleared with cues for repeat swallow or lingual sweep.  Patient demonstrated the ability to sequence compensatory swallow strategies, namely multiple swallow and effortful swallow, with consistent verbal cues.  Patient does not implement strategies without verbal cues for each bolus presentation.  Patient with intermittent throat clearing throughout; history of silent aspiration per MBSS report (thin liquids). Recommend continue with present diet orders and T-dine.      Plan  Continue SLP plan of care targeting dysphagia rehabilitation (continued evaluation of safe diet tolerance, compensatory swallow strategy training, dysphagia rehabilitation via sEMG biofeedback), continue SCCAN administration.

## 2020-01-25 NOTE — THERAPY
Physical Therapy   Daily Treatment     Patient Name: Yousif Kimble  Age:  89 y.o., Sex:  male  Medical Record #: [id number]  Today's Date: 1/24/2020     Precautions  Precautions: Fall Risk, Swallow Precautions ( See Comments)  Comments: Dysarthria, NTL, puréed, shortness of breath with activity    Subjective    Pt seated in therapy gym, agreeable to PT      Objective       01/24/20 1431   Precautions   Precautions Fall Risk;Swallow Precautions ( See Comments)   Comments Dysarthria, NTL, puréed, shortness of breath with activity   Sitting Lower Body Exercises   Sit to Stand 1 set of 10  (1x10 and 1x5 with frequent verbal cuing and encouragement)   Other Exercises 1 set of sit <> stand from WC and 1 set from chair with arm-rests, verbal cuing for hand placement and sequencing as well as demonstration   Interdisciplinary Plan of Care Collaboration   Patient Position at End of Therapy In Bed;Call Light within Reach;Tray Table within Reach   PT Total Time Spent   PT Individual Total Time Spent (Mins) 30   PT Charge Group   PT Gait Training 1   PT Therapeutic Activities 1       FIM Bed/Chair/Wheelchair Transfers Score: 5 - Standby Prompting/Supervision or Set-up  Bed/Chair/Wheelchair Transfers Description:  Set-up of equipment, Verbal cueing(WC > supine in bed, SBA with use of bed rail)    FIM Walking Score:  2 - Max Assistance  Walking Description:  Extra time, Verbal cueing, Requires incidental assist(125 ft x2 with FWW, CGA and verbal cuing for posture and proximity to FWW)      Assessment    Pt fatigued quickly this session. Required verbal cuing for each sit <> stand during second set of activity.     Plan    Neuromuscular reeducation/balance training, safety education, sequencing activities, gait training, endurance training

## 2020-01-25 NOTE — THERAPY
"Speech Language Pathology  Video Swallow     Patient Name:  Yousif Kimble  AGE:  89 y.o., SEX:  male  Medical Record #:  [id number]  Today's Date: 1/24/2020     Objective    Pt admitted to [facility] with bilateral strokes the largest in the right frontal lobe.  He also had small strokes in the right parietal temporal and occipital lobe as well as the left occipital lobe. Pt refusing NG tube, admitted on Dys 1/NTL with overt s/sx of pen/asp with ice chip and with oral care, warranting instrumental swallow study for further evaluation.     Assessment     01/24/20 0834   History / Background Information   Prior Level of Function for Eating / Swallowing Pt reports eating regular textures/thin liquids   Diagnosis bilateral strokes the largest in the right frontal lobe.  He also had small strokes in the right parietal temporal and occipital lobe as well as the left occipital lobe   Onset Date Of Dysphagia 1/22/20   Dysphagia Symptoms Warranting Video Swallow Pt with overt s/sx with ice chips and oral care during CSE   General Anatomy / Physiology Tissue prominence at the level of C5   \"Dry\" / Saliva Swallow Observations not tested   Dentition Poor Quality    Procedure   Patient Seated in  w/c   Seated at (Degrees) 90   Views Completed Lateral   Consistencies / Presentation Method   Consistencies / Presentation Method Tested   Puree Teaspoon   Nectar Thick Liquids Teaspoon;Cup  (5/10mL, cup)   Thin Liquids Teaspoon;Cup  (5/10mL, cup)   Mechanical Soft / Mixed Teaspoon   Solid   (bite size cookie)   Barium Tablet Cup  (thin liquid wash )   Oral Phase   Oral Phase X   Puree Oral Holding;Oral Residue After the Swallow;Premature Spillage Into Valleculae   Nectar Thick Liquids Premature Spillage Into Valleculae;Premature Spillage Into Lateral Channels;Premature Spillage Into Pyriform Sinus;Oral Holding;Oral Residue After the Swallow   Thin Liquids Oral Residue After the Swallow;Premature Spillage Into Valleculea;Premature Spillage Into " Lateral Channels;Premature Spillage Into  Pyriform Sinus   Mechanical Soft / Mixed Oral Residue After the Swallow;Premature Spillage Into Valleculea;Premature Spillage Into Lateral Channels   Solid Oral Residue After the Swallow;Premature Spillage Into Valleculea;Premature Spillage Into Lateral Channels   Barium Tablet Impaired Anterior / Posterior Bolus Movement   Pharyngeal Phase   Pharyngeal Phase X   Puree Residue In Valleculae;Residue On Posterior Pharyngeal Wall;Residue In Pyriform Sinus   Nectar Thick Liquids Residue In Valleculae;Reduced Tongue Base Retraction   Thin Liquids Reduced Tongue Base Retraction;Residue In Valleculae;Residue In Pyriform Sinus;Residue On Posterior Pharyngeal Wall;Penetration During Swallow;Aspiration During Swallow;Absent Cough Response to Penetration / Aspiration   Mechanical Soft / Mixed Residue In Valleculae;Residue In Pyriform Sinus;Residue On Posterior Pharyngeal Wall;Reduced Tongue Base Retraction    Solid Reduced Tongue Base Retraction ;Residue In Valleculae;Residue In Pyriform Sinus;Residue On Posterior Pharyngeal Wall   Barium Tablet Other (See Comments)  (capsule resting in vallecular space, requiring 2nd swallow )   Additional Comments Pt with piecemeal deglutition for liquid boluses. Pt requiring 3 swallows to fully clear cup sip NTL and thin liquids. Pt with silent aspiration of thin liquids on 2nd swallow    Esophageal Phase   Esophageal Phase WDL   Additional Comments  esophagus not scanned, however, UES patent for all consistencies   Compensatory Strategies Attempted   Compensatory Strategies Attempted Yes   Multiple Swallows effective to aid in total clearance of bolus from oral cavity and aided in clearance of majority of residue in oral cavity and pharynx   Effortful Swallows somewhat effective when paired with serial swallows   Cough / Clear After Swallow minimally effective at clearing aspirated contents above the vocal folds. Pt required cued cough and strength  is weak, minimally productive   Three Second Prep somewhat effective, however, pt was unable to swallow entire bolus at once. Piecemeal deglutition noted   Impression   Dysphagia Present Yes   Oral - Pharyngeal Moderate Impairment   Additional Comments Trials administered this date: NTL via 5/10 mL, cup, thin liquids via 5/10mL, cup. Puree via 5mL. Soft solids via tsp, Hard solids: bite size cookie. Barium capsule with thin liquid wash. Pt presents with premature spillage of liquid consistencies to pyriforms with oral holding and piecemeal deglutition of cup sip NTL and thin liquid boluses. Silent aspiration on cup sip thin liquids when pt completing second swallow of bolus. Cued cough response unable to fully ejected aspirated contents from trachea. It should be noted pt's cued cough is weak and minimally productive. Oral holding with puree, however, pt was able to clear bolus with minimal vallecular residue. 2nd and 3rd swallows required to clear oral residue. Pt appeard to clear more of bolus with soft and hard solids in initial swallow from oral cavity. Continues to benefit from serial swallows to clear residue. Barium capsule: pt demonstrated delayed A-P transport, capsule lodged in vallecular space with pt completing serial swallow to clear. At this time it is recommended meds continue to be crushed. Pt to advance to Dys 2 textures, remain on NTL during meals with trials of thin liquids with SLP. Pt would also benefit from sEMG for visual biofeedback targeting effortful swallows. Pt to remain in therapeutic dining for ongoing supervision and training in use of compensatory strategies.    Prognosis   Prognosis for Improvement Fair   Barriers to Improvement fatigue, reduced sensation   Positive Indicators for Improvement Pt is able to follow directions    Recommendations   Diet / Liquid Recommendation Nectar Thick Liquid;Dysphagia II   Medication Administration  Crush all Medications in Puree   Strategies /  Precautions Supervision Required;Small Bites;Small Sips;No Straws;No Talking while Eating;Multiple Swallows;Effortful Swallow;Sitting Upright at 90 Degrees while Eating;Stay Upright at Least 30 Minutes After Meals;Oral Care After Meals;Monitor Temperature and Lung Sounds   Interventions Dysphagia Therapy by SLP;Compensatory Safe Swallow Strategy Training;Therapeutic Dining Program for Meals;Surface Electromyographic Biofeedback (SEMG);Patient / Caregiver Education / Training   SLP Contact Information (Name / Extension) Anya Barriga MS, CCC-SLP, extension [phone]   Video Tape Number [id number]   Interdisciplinary Plan of Care Collaboration   Patient Position at End of Therapy Seated  (pt with SLP immediately after study)   SLP Total Time Spent   SLP Individual Total Time Spent (Mins) 30   Charge Group   SLP Video Swallow / FEES Videofluoroscopic Evaluation       Plan    Advance to Dys 2/NTL, meds crushed. Initiate sEMG with trials of thins via 5mL. Pt to remain in therapeutic dining for supervision and ongoing training in use of compensatory strategies.

## 2020-01-25 NOTE — PROGRESS NOTES
Received patient during shift change, report rec'd from day shift RN. Resting in bed, VS stable on room air. Incontinent of B&B, min assist for transfers. A&O x 4, able to make needs known. Bed in low position, call light within reach.

## 2020-01-25 NOTE — PROGRESS NOTES
Hospital Medicine Daily Progress Note        Chief Complaint  AFib, CHF, HTN    Interval Problem Update  Pt seen and examined in T-dine, more awake and alert today.    Review of Systems  Review of Systems   Constitutional: Negative for chills and fever.   HENT: Negative.    Eyes: Negative.    Respiratory: Negative for cough and shortness of breath.    Cardiovascular: Negative for chest pain and palpitations.   Gastrointestinal: Negative for abdominal pain, nausea and vomiting.   Skin: Positive for rash.   Endo/Heme/Allergies: Negative for polydipsia. Does not bruise/bleed easily.        Physical Exam  Temp:  [36.3 °C (97.4 °F)-36.8 °C (98.2 °F)] 36.3 °C (97.4 °F)  Pulse:  [65-98] 98  Resp:  [16-18] 16  BP: (128-142)/(74-94) 136/80  SpO2:  [95 %-97 %] 96 %    Physical Exam  Constitutional:       General: He is not in acute distress.     Appearance: Normal appearance. He is not ill-appearing.   HENT:      Head: Normocephalic and atraumatic.      Right Ear: External ear normal.      Left Ear: External ear normal.      Nose: Nose normal.   Eyes:      General:         Right eye: No discharge.         Left eye: No discharge.      Extraocular Movements: Extraocular movements intact.      Conjunctiva/sclera: Conjunctivae normal.   Neck:      Musculoskeletal: Normal range of motion and neck supple.   Cardiovascular:      Rate and Rhythm: Normal rate and regular rhythm.   Pulmonary:      Effort: No respiratory distress.      Breath sounds: No wheezing.      Comments: Decreased BS  Abdominal:      General: Bowel sounds are normal. There is no distension.      Palpations: Abdomen is soft.      Tenderness: There is no tenderness.   Musculoskeletal:      Right lower leg: No edema.      Left lower leg: No edema.   Skin:     General: Skin is warm and dry.   Neurological:      Mental Status: He is alert.      Comments: awake         Fluids    Intake/Output Summary (Last 24 hours) at 1/25/2020 1527  Last data filed at 1/25/2020  1200  Gross per 24 hour   Intake 220 ml   Output --   Net 220 ml       Laboratory  Recent Labs     01/23/20  0601   WBC 9.5   RBC 4.38*   HEMOGLOBIN 14.4   HEMATOCRIT 43.1   MCV 98.4*   MCH 32.9   MCHC 33.4*   RDW 47.9   PLATELETCT 263   MPV 10.5     Recent Labs     01/23/20  0601   SODIUM 144   POTASSIUM 3.6   CHLORIDE 110   CO2 20   GLUCOSE 104*   BUN 17   CREATININE 1.03   CALCIUM 10.0                   Assessment/Plan  * Cerebrovascular accident (CVA) due to embolism (HCC)- (present on admission)  Assessment & Plan  On Eliquis, ASA, and Lipitor  Also on Provigil    Rash of back  Assessment & Plan  Continue therapeutic trial of Claritin and topical steroid    CAD (coronary artery disease)  Assessment & Plan  On maximal medical management w/ ASA, Lipitor, Lisinopril, and Metoprolol    Systolic CHF (HCC)- (present on admission)  Assessment & Plan  Echo (from [facility]) EF 25-30%, mod MR, and RVSP 40-45 mmHg  Closely monitor volume status  Follow BNP    Hypertension- (present on admission)  Assessment & Plan  Blood pressure trends improved w/ increased Lisinopril  Continue current dose Metoprolol    Prostate cancer (HCC)- (present on admission)  Assessment & Plan  Casodex now on hold as unable to be crushed  Outpt Urology F/U    Atrial fibrillation (HCC)- (present on admission)  Assessment & Plan  S/P RVR at [facility]  On Digoxin, drug level non-toxic  Anticoagulated on Eliquis  Check F/U labs in am     DNR

## 2020-01-25 NOTE — PROGRESS NOTES
Hospital Medicine Daily Progress Note        Chief Complaint  AFib, CHF, HTN    Interval Problem Update  Pt sleepy today; otherwise, no new issues.    Review of Systems  Review of Systems   Constitutional: Negative for chills and fever.   HENT: Negative.    Eyes: Negative.    Respiratory: Negative for cough and shortness of breath.    Cardiovascular: Negative for chest pain and palpitations.   Gastrointestinal: Negative for abdominal pain, nausea and vomiting.   Skin: Positive for rash.   Endo/Heme/Allergies: Negative for polydipsia. Does not bruise/bleed easily.        Physical Exam  Temp:  [36.3 °C (97.4 °F)-36.8 °C (98.2 °F)] 36.3 °C (97.4 °F)  Pulse:  [65-98] 98  Resp:  [16-18] 16  BP: (128-142)/(74-94) 136/80  SpO2:  [95 %-97 %] 96 %    Physical Exam  Constitutional:       General: He is not in acute distress.     Appearance: Normal appearance. He is not ill-appearing.   HENT:      Head: Normocephalic and atraumatic.      Right Ear: External ear normal.      Left Ear: External ear normal.      Nose: Nose normal.   Eyes:      General:         Right eye: No discharge.         Left eye: No discharge.      Extraocular Movements: Extraocular movements intact.      Conjunctiva/sclera: Conjunctivae normal.   Neck:      Musculoskeletal: Normal range of motion and neck supple.   Cardiovascular:      Rate and Rhythm: Normal rate and regular rhythm.   Pulmonary:      Effort: No respiratory distress.      Breath sounds: No wheezing.      Comments: Decreased BS  Abdominal:      General: Bowel sounds are normal. There is no distension.      Palpations: Abdomen is soft.      Tenderness: There is no tenderness.   Musculoskeletal:      Right lower leg: No edema.      Left lower leg: No edema.   Skin:     General: Skin is warm and dry.   Neurological:      Mental Status: He is alert.      Comments: somnolent         Fluids    Intake/Output Summary (Last 24 hours) at 1/25/2020 1523  Last data filed at 1/25/2020 1200  Gross per 24  hour   Intake 220 ml   Output --   Net 220 ml       Laboratory  Recent Labs     01/23/20  0601   WBC 9.5   RBC 4.38*   HEMOGLOBIN 14.4   HEMATOCRIT 43.1   MCV 98.4*   MCH 32.9   MCHC 33.4*   RDW 47.9   PLATELETCT 263   MPV 10.5     Recent Labs     01/23/20  0601   SODIUM 144   POTASSIUM 3.6   CHLORIDE 110   CO2 20   GLUCOSE 104*   BUN 17   CREATININE 1.03   CALCIUM 10.0                   Assessment/Plan  * Cerebrovascular accident (CVA) due to embolism (HCC)- (present on admission)  Assessment & Plan  On Eliquis, ASA, and Lipitor  Also on Provigil    Rash of back  Assessment & Plan  Continue therapeutic trial of Claritin and topical steroid    CAD (coronary artery disease)  Assessment & Plan  On maximal medical management w/ ASA, Lipitor, Lisinopril, and Metoprolol    Systolic CHF (HCC)- (present on admission)  Assessment & Plan  Echo (from [facility]) EF 25-30%, mod MR, and RVSP 40-45 mmHg  Closely monitor volume status    Hypertension- (present on admission)  Assessment & Plan  Blood pressure trends improved w/ increased Lisinopril  Continue current dose Metoprolol    Prostate cancer (HCC)- (present on admission)  Assessment & Plan  Casodex now on hold as unable to be crushed  Outpt Urology F/U    Atrial fibrillation (HCC)- (present on admission)  Assessment & Plan  S/P RVR at [facility]  On Digoxin, drug level non-toxic  Anticoagulated on Eliquis     DNR

## 2020-01-26 PROBLEM — R79.89 AZOTEMIA: Status: ACTIVE | Noted: 2020-01-26

## 2020-01-26 LAB
ANION GAP SERPL CALC-SCNC: 9 MMOL/L (ref 0–11.9)
BUN SERPL-MCNC: 21 MG/DL (ref 8–22)
CALCIUM SERPL-MCNC: 9.2 MG/DL (ref 8.5–10.5)
CHLORIDE SERPL-SCNC: 112 MMOL/L (ref 96–112)
CO2 SERPL-SCNC: 26 MMOL/L (ref 20–33)
CREAT SERPL-MCNC: 1.18 MG/DL (ref 0.5–1.4)
ERYTHROCYTE [DISTWIDTH] IN BLOOD BY AUTOMATED COUNT: 49.4 FL (ref 35.9–50)
GLUCOSE SERPL-MCNC: 103 MG/DL (ref 65–99)
HCT VFR BLD AUTO: 40.7 % (ref 42–52)
HGB BLD-MCNC: 13.1 G/DL (ref 14–18)
MCH RBC QN AUTO: 32.1 PG (ref 27–33)
MCHC RBC AUTO-ENTMCNC: 32.2 G/DL (ref 33.7–35.3)
MCV RBC AUTO: 99.8 FL (ref 81.4–97.8)
NT-PROBNP SERPL IA-MCNC: ABNORMAL PG/ML (ref 0–125)
PLATELET # BLD AUTO: 282 K/UL (ref 164–446)
PMV BLD AUTO: 10.5 FL (ref 9–12.9)
POTASSIUM SERPL-SCNC: 3.7 MMOL/L (ref 3.6–5.5)
RBC # BLD AUTO: 4.08 M/UL (ref 4.7–6.1)
SODIUM SERPL-SCNC: 147 MMOL/L (ref 135–145)
WBC # BLD AUTO: 9.6 K/UL (ref 4.8–10.8)

## 2020-01-26 PROCEDURE — 92526 ORAL FUNCTION THERAPY: CPT

## 2020-01-26 PROCEDURE — 83880 ASSAY OF NATRIURETIC PEPTIDE: CPT

## 2020-01-26 PROCEDURE — A9270 NON-COVERED ITEM OR SERVICE: HCPCS | Performed by: PHYSICAL MEDICINE & REHABILITATION

## 2020-01-26 PROCEDURE — 85027 COMPLETE CBC AUTOMATED: CPT

## 2020-01-26 PROCEDURE — 36415 COLL VENOUS BLD VENIPUNCTURE: CPT

## 2020-01-26 PROCEDURE — 99232 SBSQ HOSP IP/OBS MODERATE 35: CPT | Performed by: HOSPITALIST

## 2020-01-26 PROCEDURE — 700102 HCHG RX REV CODE 250 W/ 637 OVERRIDE(OP): Performed by: PHYSICAL MEDICINE & REHABILITATION

## 2020-01-26 PROCEDURE — 80048 BASIC METABOLIC PNL TOTAL CA: CPT

## 2020-01-26 PROCEDURE — 700105 HCHG RX REV CODE 258: Performed by: HOSPITALIST

## 2020-01-26 PROCEDURE — 770010 HCHG ROOM/CARE - REHAB SEMI PRIVAT*

## 2020-01-26 RX ORDER — SODIUM CHLORIDE 450 MG/100ML
500 INJECTION, SOLUTION INTRAVENOUS ONCE
Status: COMPLETED | OUTPATIENT
Start: 2020-01-26 | End: 2020-01-26

## 2020-01-26 RX ADMIN — SODIUM CHLORIDE 500 ML: 4.5 INJECTION, SOLUTION INTRAVENOUS at 15:58

## 2020-01-26 RX ADMIN — ASPIRIN 81 MG 81 MG: 81 TABLET ORAL at 08:53

## 2020-01-26 RX ADMIN — DIGOXIN 125 MCG: 125 TABLET ORAL at 17:34

## 2020-01-26 RX ADMIN — HYDROCORTISONE 1 EACH: 1 CREAM TOPICAL at 20:55

## 2020-01-26 RX ADMIN — MELATONIN TAB 3 MG 3 MG: 3 TAB at 20:47

## 2020-01-26 RX ADMIN — APIXABAN 5 MG: 5 TABLET, FILM COATED ORAL at 08:53

## 2020-01-26 RX ADMIN — TRAZODONE HYDROCHLORIDE 25 MG: 50 TABLET ORAL at 20:47

## 2020-01-26 RX ADMIN — MODAFINIL 100 MG: 100 TABLET ORAL at 08:53

## 2020-01-26 RX ADMIN — LISINOPRIL 5 MG: 5 TABLET ORAL at 05:37

## 2020-01-26 RX ADMIN — APIXABAN 5 MG: 5 TABLET, FILM COATED ORAL at 20:48

## 2020-01-26 RX ADMIN — ATORVASTATIN CALCIUM 40 MG: 40 TABLET, FILM COATED ORAL at 20:48

## 2020-01-26 RX ADMIN — METOPROLOL TARTRATE 75 MG: 25 TABLET ORAL at 05:37

## 2020-01-26 RX ADMIN — LORATADINE 10 MG: 10 TABLET ORAL at 08:53

## 2020-01-26 RX ADMIN — HYDROCORTISONE: 1 CREAM TOPICAL at 13:00

## 2020-01-26 RX ADMIN — HYDROCORTISONE: 1 CREAM TOPICAL at 17:37

## 2020-01-26 RX ADMIN — HYDROCORTISONE: 1 CREAM TOPICAL at 08:55

## 2020-01-26 RX ADMIN — METOPROLOL TARTRATE 75 MG: 25 TABLET ORAL at 17:34

## 2020-01-26 ASSESSMENT — ENCOUNTER SYMPTOMS
EYES NEGATIVE: 1
POLYDIPSIA: 0
NAUSEA: 0
CHILLS: 0
VOMITING: 0
PALPITATIONS: 0
FEVER: 0
COUGH: 0
BRUISES/BLEEDS EASILY: 0
SHORTNESS OF BREATH: 0
ABDOMINAL PAIN: 0

## 2020-01-26 ASSESSMENT — PATIENT HEALTH QUESTIONNAIRE - PHQ9
SUM OF ALL RESPONSES TO PHQ9 QUESTIONS 1 AND 2: 0
2. FEELING DOWN, DEPRESSED, IRRITABLE, OR HOPELESS: NOT AT ALL
1. LITTLE INTEREST OR PLEASURE IN DOING THINGS: NOT AT ALL

## 2020-01-26 NOTE — CARE PLAN
Problem: Safety  Goal: Will remain free from injury  Note:   Patient educated on the importance of using call light for assistance, patient verbalized understanding. Call light and belongings within reach, bed in lowest and locked position, bed rails up x3, alarms in place and non skid socks on. Hourly rounding in place.      Problem: Bowel/Gastric:  Goal: Normal bowel function is maintained or improved  Note:   Last BM 1/25/2020, denies s/s of constipation.Bowel sounds normoactive in all 4 quadrants, abdomen soft and non-tender.

## 2020-01-26 NOTE — PROGRESS NOTES
[facility] Medicine Daily Progress Note        Chief Complaint  AFib, CHF, HTN    Interval Problem Update  Pt seen and examined during therapies.  Labs reviewed.    Review of Systems  Review of Systems   Constitutional: Negative for chills and fever.   HENT: Negative.    Eyes: Negative.    Respiratory: Negative for cough and shortness of breath.    Cardiovascular: Negative for chest pain and palpitations.   Gastrointestinal: Negative for abdominal pain, nausea and vomiting.   Skin: Positive for rash.   Endo/Heme/Allergies: Negative for polydipsia. Does not bruise/bleed easily.        Physical Exam  Temp:  [36.6 °C (97.8 °F)-36.7 °C (98.1 °F)] 36.7 °C (98.1 °F)  Pulse:  [68-98] 73  Resp:  [17-20] 20  BP: (111-127)/(68-91) 127/91  SpO2:  [91 %] 91 %    Physical Exam  Vitals signs reviewed.   Constitutional:       General: He is not in acute distress.     Appearance: Normal appearance. He is not ill-appearing.   HENT:      Head: Normocephalic and atraumatic.      Right Ear: External ear normal.      Left Ear: External ear normal.      Nose: Nose normal.   Eyes:      General:         Right eye: No discharge.         Left eye: No discharge.      Extraocular Movements: Extraocular movements intact.      Conjunctiva/sclera: Conjunctivae normal.   Neck:      Musculoskeletal: Normal range of motion and neck supple.   Cardiovascular:      Rate and Rhythm: Normal rate and regular rhythm.   Pulmonary:      Effort: No respiratory distress.      Breath sounds: No wheezing.      Comments: Decreased BS  Abdominal:      General: Bowel sounds are normal. There is no distension.      Palpations: Abdomen is soft.      Tenderness: There is no tenderness.   Musculoskeletal:      Right lower leg: No edema.      Left lower leg: No edema.   Skin:     General: Skin is warm and dry.   Neurological:      Mental Status: He is alert.      Comments: awake         Fluids    Intake/Output Summary (Last 24 hours) at 1/26/2020 1511  Last data filed at  1/26/2020 1200  Gross per 24 hour   Intake 510 ml   Output --   Net 510 ml       Laboratory  Recent Labs     01/26/20  0529   WBC 9.6   RBC 4.08*   HEMOGLOBIN 13.1*   HEMATOCRIT 40.7*   MCV 99.8*   MCH 32.1   MCHC 32.2*   RDW 49.4   PLATELETCT 282   MPV 10.5     Recent Labs     01/26/20  0529   SODIUM 147*   POTASSIUM 3.7   CHLORIDE 112   CO2 26   GLUCOSE 103*   BUN 21   CREATININE 1.18   CALCIUM 9.2                   Assessment/Plan  * Cerebrovascular accident (CVA) due to embolism (HCC)- (present on admission)  Assessment & Plan  On Eliquis, ASA, and Lipitor  Also on Provigil    Azotemia  Assessment & Plan  Also has mild hypernatremia  Will give cautious IVF hydration in the form of 1/2 NS  Follow renal function    Rash of back  Assessment & Plan  Stop Claritin for possible adverse effect of somnolence  Continue topical steroid    CAD (coronary artery disease)  Assessment & Plan  On maximal medical management w/ ASA, Lipitor, Lisinopril, and Metoprolol    Systolic CHF (HCC)- (present on admission)  Assessment & Plan  Echo (from [facility]) EF 25-30%, mod MR, and RVSP 40-45 mmHg  Closely monitor volume status    Hypertension- (present on admission)  Assessment & Plan  On Lisinopril and Metoprolol  Observe blood pressure trends    Prostate cancer (HCC)- (present on admission)  Assessment & Plan  Casodex now on hold as unable to be crushed  Outpt Urology F/U    Atrial fibrillation (HCC)- (present on admission)  Assessment & Plan  S/P RVR at [facility]  On Digoxin, drug level non-toxic  Anticoagulated on Eliquis     DNR

## 2020-01-26 NOTE — THERAPY
Physical Therapy   Daily Treatment     Patient Name: Yousif Kimble  Age:  89 y.o., Sex:  male  Medical Record #: [id number]  Today's Date: 1/25/2020     Precautions  Precautions: (P) Fall Risk, Swallow Precautions ( See Comments)  Comments: (P) dysarthria, NTL, pureed, SOB w/ activity    Subjective    Pt found sleeping in bed, agreeable and ready for therapy.     Objective       01/25/20 1501   Precautions   Precautions Fall Risk;Swallow Precautions ( See Comments)   Comments dysarthria, NTL, pureed, SOB w/ activity   Vitals   Pulse (!) 116   Patient BP Position Sitting   Blood Pressure  150/88   Pulse Oximetry 92 %   Cognition    Speech/ Communication Delayed Responses;Gestures   Level of Consciousness Alert   Ability To Follow Commands 1 Step   Sequencing Impaired   Initiation Other (See Comments)   Comments Pt reuqires increased time to process, very delayed responses.  Mostly gestures for responding to questions, requires encouragement to perform ea activity   Neuro-Muscular Treatments   Comments Standing balance in // bars: airex foam pad EO x3 sec, without UE support, vc's for 4 point balance on feet x10 sec following   PT Total Time Spent   PT Individual Total Time Spent (Mins) 60   PT Charge Group   PT Gait Training 2   PT Neuromuscular Re-Education / Balance 1   PT Therapeutic Activities 1       FIM Bed/Chair/Wheelchair Transfers Score: 4 - Minimal Assistance  Bed/Chair/Wheelchair Transfers Description:  Increased time, Supervision for safety, Verbal cueing, Set-up of equipment(sup>sit EOB with elevated head SBA, bed<>w/c SPT CGA,)    FIM Walking Score:  2 - Max Assistance  Walking Description:  (Pt amb with FWW x110', x140' CGA/[place] for balance, vc's throughout for upright posture, safety wtih walker and L side attention.)      Assessment    Pt appears depressed, very withdrawn, limited engagement verbally w/ therapy.  Limited by postural control in upright and endurance.     Plan    Standing neuro  re-ed/ant/post midline, strengthening, endurance, gait training

## 2020-01-26 NOTE — ASSESSMENT & PLAN NOTE
Also has mild hypernatremia  Not improved w/ 1/2 NS x 500 mL  Will give D5W x 500 mL  Follow renal function

## 2020-01-26 NOTE — CARE PLAN
Problem: Bowel/Gastric:  Goal: Normal bowel function is maintained or improved  Outcome: PROGRESSING SLOWER THAN EXPECTED  Note:   Patient c/o having loose stool. Hold bowel med this am. Will continue to monitor.      Problem: Pain Management  Goal: Pain level will decrease to patient's comfort goal  Outcome: PROGRESSING AS EXPECTED  Note:   Patient denied any pain or discomfort during shift.

## 2020-01-26 NOTE — CONSULTS
DATE OF SERVICE:  01/24/2020    BEHAVIORAL MEDICINE EVALUATION    BRIEF HISTORY OF PRESENTING COMPLAINTS:  The patient is an 89-year-old white    male who was referred for a behavioral medicine evaluation by Dr. Fortune.  The patient was transferred to rehab from acute where he was treated   for CVA due to embolism.  The patient was stabilized acutely and then sent to   rehab to address his general debility.    PAST MEDICAL HISTORY:  Significant for arrhythmia, cancer, congestive heart   failure, falls, hyperlipidemia, hypertension, myocardial infarct, pneumonia,   stroke, urinary incontinence.    PSYCHOLOGICAL STATUS:  MENTAL STATUS EXAMINATION:  The patient is a well-nourished, thinly built male   of short to medium stature who appeared his stated age of 89.  At   presentation, the patient was alert.  He was sitting upright in his bed when   approached.  The patient oriented marginally well to my presence.  The patient   was kempt in appearance.  He was dressed casually in street attire that was   appropriate to his age and setting.  The patient's appearance was remarkable   for white hair and beard.    The patient was oriented to the year and to the month, but not the day of the   month.  He understood that he was in a hospital in [place].  The patient's speech   was logical and goal oriented, but he spoke very little.  His speech was soft   and dysarthric.  The patient's concentration and memory functioning were   diminished.    The patient's affect was constricted, stable and mildly intense.  He related   only marginally well.  His mood appeared dysphoric, but appropriate to the   context.    There was no evidence of delusional or perceptual disturbance.  Also, the   patient showed no unusual pain or motor behavior during the interview.    SPECIFIC BEHAVIORAL COMPLAINTS:  The patient denied any clinically significant   symptoms of a negative mood.  He reported no strong feelings of depression,   anxiety or  anger.  He did admit to very low levels of energy and poor   appetite.  He reported no pain.  He said his sleep is broken.    The patient also denied any interpersonal discord or discomfort, family   disharmony or problems managing his day-to-day stressors prior to his   hospitalization.    The patient also reported no ETOH or other drug abuse or any use of tobacco.    PSYCHIATRIC HISTORY:  The patient denied any history significant for   psychiatric disturbance or treatment including in or outpatient care.    PSYCHOMETRIC TESTING:  The patient was administered 2 psychometric tests and 2   screening instruments.  The PS/PC-R revealed no clinically significant   symptoms of a negative mood.  His CDR survey showed no problems with level of   consciousness.  His attention; however, was diminished and his thinking seemed   impaired for reasoning, somewhat impoverished and limited in scope.  The   patient was oriented, but not precisely to the date.  The patient's speech was   very sparse and somewhat dysarthric.  The patient revealed deficits in memory   functioning.  He also reported problems with behavioral activation and basic   self-care.  Finally, the patient denied any mood disturbance, but he did admit   to loss of vigor, sleep disturbance, and poor appetite.    The patient was screened for any elder abuse or risk of suicide.  There is no   strong evidence for either problem.    SOCIAL HISTORY:  The patient is  and retired.  He said he lost his wife   in 2012 to cancer.  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  The patient lives   alone in [place], [place].    IMPRESSION:  Cognitive disorder, not otherwise specified.    RECOMMENDATIONS:  The patient will be followed for status and supportive care.       ____________________________________     BAYRON MORGAN, PHD    JEISON / STEFANO    DD:  01/26/2020 11:19:33  DT:  01/26/2020 15:54:14    D#:  [id number]  Job#:  [id number]

## 2020-01-26 NOTE — THERAPY
Speech Language Pathology  Daily Treatment     Patient Name: Yousif Kimble  Age:  89 y.o., Sex:  male  Medical Record #: [id number]  Today's Date: 1/26/2020     Subjective    Pt pleasant and cooperative during tx.       Objective       01/26/20 0831   Dysphagia    Positioning / Behavior Modification Modulate Rate or Bite Size;Multiple Swallows   Nutritional Liquid Intake Rating Scale 2   Nutritional Food Intake Rating Scale 5   SLP Total Time Spent   SLP Individual Total Time Spent (Mins) 30   Charge Group   SLP Swallowing Dysfunction Treatment Swallowing Dysfunction Treatment       FIM Eating Score:  5 - Standby Prompting/Supervision or Set-up  Eating Description:  Modified diet, Supervision for safety, Verbal cueing, Set-up of equipment or meal/tube feeding      Assessment    Pt assessed with dys2 textures and NTL.  Pt presented with L anterior spillage following dys2 textures. Pt presented with mild-moderate residue in L lateral sulcus.  Pt benefits from multiple swallows and NTL wash to decrease residue.  Pt benefits from mod verbal cues to initiate feeding and to follow swallow strategies.      Plan    Continue to assess diet tolerance, and use of swallow strategies.

## 2020-01-27 LAB
ANION GAP SERPL CALC-SCNC: 8 MMOL/L (ref 0–11.9)
BUN SERPL-MCNC: 20 MG/DL (ref 8–22)
CALCIUM SERPL-MCNC: 9.4 MG/DL (ref 8.5–10.5)
CHLORIDE SERPL-SCNC: 111 MMOL/L (ref 96–112)
CO2 SERPL-SCNC: 27 MMOL/L (ref 20–33)
CREAT SERPL-MCNC: 1.26 MG/DL (ref 0.5–1.4)
GLUCOSE SERPL-MCNC: 109 MG/DL (ref 65–99)
POTASSIUM SERPL-SCNC: 3.5 MMOL/L (ref 3.6–5.5)
SODIUM SERPL-SCNC: 146 MMOL/L (ref 135–145)

## 2020-01-27 PROCEDURE — 700102 HCHG RX REV CODE 250 W/ 637 OVERRIDE(OP): Performed by: PHYSICAL MEDICINE & REHABILITATION

## 2020-01-27 PROCEDURE — 99233 SBSQ HOSP IP/OBS HIGH 50: CPT | Performed by: PHYSICAL MEDICINE & REHABILITATION

## 2020-01-27 PROCEDURE — 97530 THERAPEUTIC ACTIVITIES: CPT

## 2020-01-27 PROCEDURE — 97110 THERAPEUTIC EXERCISES: CPT

## 2020-01-27 PROCEDURE — 97535 SELF CARE MNGMENT TRAINING: CPT

## 2020-01-27 PROCEDURE — 97130 THER IVNTJ EA ADDL 15 MIN: CPT

## 2020-01-27 PROCEDURE — 92526 ORAL FUNCTION THERAPY: CPT

## 2020-01-27 PROCEDURE — 770010 HCHG ROOM/CARE - REHAB SEMI PRIVAT*

## 2020-01-27 PROCEDURE — 36415 COLL VENOUS BLD VENIPUNCTURE: CPT

## 2020-01-27 PROCEDURE — 97116 GAIT TRAINING THERAPY: CPT

## 2020-01-27 PROCEDURE — A9270 NON-COVERED ITEM OR SERVICE: HCPCS | Performed by: PHYSICAL MEDICINE & REHABILITATION

## 2020-01-27 PROCEDURE — 97129 THER IVNTJ 1ST 15 MIN: CPT

## 2020-01-27 PROCEDURE — 80048 BASIC METABOLIC PNL TOTAL CA: CPT

## 2020-01-27 PROCEDURE — 99232 SBSQ HOSP IP/OBS MODERATE 35: CPT | Performed by: HOSPITALIST

## 2020-01-27 RX ORDER — TRAZODONE HYDROCHLORIDE 50 MG/1
50 TABLET ORAL
Status: DISCONTINUED | OUTPATIENT
Start: 2020-01-27 | End: 2020-01-28

## 2020-01-27 RX ADMIN — APIXABAN 5 MG: 5 TABLET, FILM COATED ORAL at 08:07

## 2020-01-27 RX ADMIN — HYDROCORTISONE: 1 CREAM TOPICAL at 18:30

## 2020-01-27 RX ADMIN — TRAZODONE HYDROCHLORIDE 50 MG: 50 TABLET ORAL at 21:00

## 2020-01-27 RX ADMIN — HYDROCORTISONE: 1 CREAM TOPICAL at 14:51

## 2020-01-27 RX ADMIN — HYDROCORTISONE 1 EACH: 1 CREAM TOPICAL at 21:05

## 2020-01-27 RX ADMIN — METOPROLOL TARTRATE 75 MG: 25 TABLET ORAL at 05:07

## 2020-01-27 RX ADMIN — HYDROCORTISONE: 1 CREAM TOPICAL at 09:40

## 2020-01-27 RX ADMIN — LISINOPRIL 5 MG: 5 TABLET ORAL at 05:07

## 2020-01-27 RX ADMIN — MELATONIN TAB 3 MG 3 MG: 3 TAB at 20:59

## 2020-01-27 RX ADMIN — METOPROLOL TARTRATE 75 MG: 25 TABLET ORAL at 18:25

## 2020-01-27 RX ADMIN — DIGOXIN 125 MCG: 125 TABLET ORAL at 18:26

## 2020-01-27 RX ADMIN — ATORVASTATIN CALCIUM 40 MG: 40 TABLET, FILM COATED ORAL at 20:59

## 2020-01-27 RX ADMIN — MODAFINIL 100 MG: 100 TABLET ORAL at 08:07

## 2020-01-27 RX ADMIN — APIXABAN 5 MG: 5 TABLET, FILM COATED ORAL at 21:00

## 2020-01-27 RX ADMIN — ASPIRIN 81 MG 81 MG: 81 TABLET ORAL at 08:07

## 2020-01-27 NOTE — THERAPY
Speech Language Pathology  Daily Treatment     Patient Name: Yousif Kimble  Age:  89 y.o., Sex:  male  Medical Record #: [id number]  Today's Date: 1/27/2020     Subjective    Patient pleasant and cooperative.  Slow to process, and initiate.     Objective       01/27/20 1001   SCCAN (Scales of Cognitive and Communicative Ability for Neurorehabilitation)   Reading Comprehension - Raw Score 7   Reading Comprehension - Scale Performance Score 58   Writing - Raw Score 6   Writing - Scale Performance Score 86   Attention - Raw Score 4   Attention - Scale Performance Score 25   Problem Solving - Raw Score 10   Problem Solving - Scale Performance Score 43   SCCAN Total Raw Score 51   SCCAN Degree of Severity Moderate Impairment   SLP Total Time Spent   SLP Individual Total Time Spent (Mins) 30   Charge Group   SLP Cognitive Skill Development First 15 Minutes 1   SLP Cognitive Skill Development Additional 15 Minutes 1       FIM Eating Score:  5 - Standby Prompting/Supervision or Set-up  Eating Description:       FIM Comprehension Score:  4 - Minimal Assistance  Comprehension Description:       FIM Expression Score:  4 - Minimal Assistance  Expression Description:       FIM Social Interaction Score:  5 - Stand-by Prompting/Supervision or Set-up  Social Interaction Description:       FIM Problem Solving Score:  2 - Max Assistance  Problem Solving Description:       FIM Memory Score:  2 - Max Assistance  Memory Description:         Assessment    Patient completed SCCAN, achieving a total raw score of 51. He achieved percentage scores for the following subtests:  Oral expression 63, Orientation 75, Memory 11, Speech Comprehension 77, Reading comprehension 58, Writing 86 ( writing micrographic), Attention 25, Problem solving 43    Plan    Target selective attention and simple safety problem solving.

## 2020-01-27 NOTE — THERAPY
"Speech Language Pathology  Daily Treatment     Patient Name: Yousif Kimble  Age:  89 y.o., Sex:  male  Medical Record #: [id number]  Today's Date: 1/27/2020     Subjective    Patient pleasant and cooperative, but poor appetite.  Patient offered many alternatives for meal, but non appealed to him.  When asked what he likes to eat for lunch, he reported \"booze\"     Objective       01/27/20 1131   Dysphagia    Positioning / Behavior Modification Modulate Rate or Bite Size;Multiple Swallows   Nutritional Liquid Intake Rating Scale 2   Nutritional Food Intake Rating Scale 5   SLP Total Time Spent   SLP Individual Total Time Spent (Mins) 30   Charge Group   SLP Swallowing Dysfunction Treatment Swallowing Dysfunction Treatment           Assessment    Patient declined to eat any of his dysphagia 2 textures.  The only intake occurred with health shake, with max cues to persist. No overt s/sx of aspiration post swallow seen with this item.  Patient required max cues for consistent use of compensatory swallow strategies.    Plan    Therapeutic trials of thin liquids in isolation.    "

## 2020-01-27 NOTE — CARE PLAN
Problem: Bowel/Gastric:  Goal: Normal bowel function is maintained or improved  Outcome: PROGRESSING AS EXPECTED  Note:   Patient c/o having loose stool. Held bowel med this am. LBM was today.      Problem: Pain Management  Goal: Pain level will decrease to patient's comfort goal  Outcome: PROGRESSING AS EXPECTED  Note:   Patient denied any pain or discomfort during shift.

## 2020-01-27 NOTE — REHAB-COLLABORATIVE ONGOING IDT TEAM NOTE
Weekly Interdisciplinary Team Conference Note    Weekly Interdisciplinary Team Conference # 1  Date:  1/27/2020    Clinicians present and reporting at team conference include the following:   MD: Gauri Fortune MD   RN:  Mita Le RN    PT:   Kei Price PT, MPT, MEd  OT:  Anya Trejo MS, OTR/L    ST:  Lauren Wright MS, CCC-SLP, CBIS  CM:  Adele Sherwood RN [facility]  REC:  None  RT:  None  RPh:  Lauro Seay [facility]  Other:   None  All reporting clinicians have a working knowledge of this patient's plan of care.    Targeted DC Date:  2/8/2020     Medical    Patient Active Problem List    Diagnosis Date Noted   • Azotemia 01/26/2020   • Cerebrovascular accident (CVA) due to embolism (HCC) 01/22/2020   • Atrial fibrillation (HCC) 01/22/2020   • Prostate cancer (HCC) 01/22/2020   • Hypertension 01/22/2020   • Systolic CHF ([facility]) 01/22/2020   • Dysarthria 01/22/2020   • Dysphagia 01/22/2020   • CAD (coronary artery disease) 01/22/2020   • Rash of back 01/22/2020     Results     Procedure Component Value Ref Range Date/Time    ESTIMATED GFR [[id number]]  (Abnormal) Collected:  01/27/20 0621    Order Status:  Completed Lab Status:  Final result Updated:  01/27/20 0725     GFR If African American >60 >60 mL/min/1.73 m 2      GFR If Non African American 54 >60 mL/min/1.73 m 2     Basic Metabolic Panel [[id number]]  (Abnormal) Collected:  01/27/20 0621    Order Status:  Completed Lab Status:  Final result Updated:  01/27/20 0724    Specimen:  Blood      Sodium 146 135 - 145 mmol/L      Potassium 3.5 3.6 - 5.5 mmol/L      Chloride 111 96 - 112 mmol/L      Co2 27 20 - 33 mmol/L      Glucose 109 65 - 99 mg/dL      Bun 20 8 - 22 mg/dL      Creatinine 1.26 0.50 - 1.40 mg/dL      Calcium 9.4 8.5 - 10.5 mg/dL      Anion Gap 8.0 0.0 - 11.9         Nursing  Diet/Nutrition:  Dysphagia II, Nectar Thick Liquids and Other:2 Gram Sodium  Medication Administration:  Crush all Medications in Puree  % consumed at meals in last 24 hours:  Consumed  <25% of meals per documentation.  Meal/Snack Consumption for the past 24 hrs:   Oral Nutrition   01/26/20 1800 0% Consumed   01/26/20 1200 Lunch;0% Consumed   01/26/20 0800 Breakfast;Less than 25% Consumed     Snack schedule:  None  Supplement:  none  Appetite:  Poor  Fluid Intake/Output in past 24 hours: In: 570 [P.O.:570]  Out: -   Admit Weight:  Weight: 66 kg (145 lb 8.1 oz)  Weight Last 7 Days   [64 kg (141 lb)-66 kg (145 lb 8.1 oz)] 64 kg (141 lb) (01/26 0953)    Pain Issues:  Denies      Bowel:    Bowel Assist Initial Score:  3 - Moderate Assistance  Bowel Assist Current Score:  2 - Max Assistance  Bowl Accidents in last 7 days:     Last bowel movement: 01/26/20  Stool Description: Large, Soft, Incontinence(incontinence)     Usual bowel pattern:  daily  Scheduled bowel medications:  senna-docusate (PERICOLACE or SENOKOT S)   PRN bowel medications used in last 24 hours:  None  Nursing Interventions:  Increased time, Scheduled medication, Supervision, Verbal cueing, Set-up, Positioning on commode/toilet, Brief  Effectiveness of bowel program:   fair=occasional unpredictable, incontinent emptying of bowel (less than 1 time day)  Bladder:    Bladder Assist Initial Score:  3 - Moderate Assistance  Bladder Assist Current Score:  2 - Max Assistance  Bladder Accidents in last 7 days:     PVR range for past 24-48 hours: -- ()  Intermittent Catheterization:  0  Medications affecting bladder:  None    Time void schedule/voiding pattern:  Time void every 3 hours during the day and every 4 hours at night  Interventions:  Increased time, Supervision, Verbal cueing, Pad, Brief, Time void patient initiated  Effectiveness of bladder training:  Poor= > 1 incontinent emptying of bladder      Wound: None        Sleep/wake cycle:   Average hours slept:  Sleeps 4-6 hours without waking  Sleep medication usage:  Melatonin 3mg    Patient/Family Training/Education:  Aspiration Precautions, Bladder Management/Training, Bowel  Management/Training, Diet/Nutrition, Fall Prevention, General Self Care, Medication Management, Safe Transfers, Safety and Skin Care  Discharge Supply Recommendations:  Blood Pressure Monitor  Strengths: Willingly participates in therapeutic activities, Able to follow instructions, Supportive family, Pleasant and cooperative, Good carryover of learning, Motivated for self care and independence and Good endurance   Barriers:   Aspiration risk, Bladder incontinence, Bowel incontinence, Fatigue, Generalized weakness and Impulsive       Nursing Problems     Problem: Bowel/Gastric:     Description:     Goal: Normal bowel function is maintained or improved     Description:           Goal: Will not experience complications related to bowel motility     Description:                 Problem: Communication     Description:     Goal: The ability to communicate needs accurately and effectively will improve     Description:                 Problem: Discharge Barriers/Planning     Description:     Goal: Patient's continuum of care needs will be met     Description:                 Problem: Infection     Description:     Goal: Will remain free from infection     Description:                 Problem: Knowledge Deficit     Description:     Goal: Knowledge of disease process/condition, treatment plan, diagnostic tests, and medications will improve     Description:           Goal: Knowledge of the prescribed therapeutic regimen will improve     Description:                 Problem: Pain Management     Description:     Goal: Pain level will decrease to patient's comfort goal     Description:                 Problem: Respiratory:     Description:     Goal: Respiratory status will improve     Description:                 Problem: Safety     Description:     Goal: Will remain free from injury     Description:           Goal: Will remain free from falls     Description:                 Problem: Skin Integrity     Description:     Goal:  Risk for impaired skin integrity will decrease     Description:                 Problem: Urinary Elimination:     Description:     Goal: Ability to reestablish a normal urinary elimination pattern will improve     Description:                 Problem: Venous Thromboembolism (VTW)/Deep Vein Thrombosis (DVT) Prevention:     Description:     Goal: Patient will participate in Venous Thrombosis (VTE)/Deep Vein Thrombosis (DVT)Prevention Measures     Description:                        Long Term Goals:   At discharge patient will be able to function safely at home and in the community with support.    Section completed by:  Gwendolyn Alves R.N.           Mobility  Bed mobility:   SBA/SPV supine to/from sit  Bed /Chair/Wheelchair Transfer Initial:  3 - Moderate Assistance  Bed /Chair/Wheelchair Transfer Current:  4 - Minimal Assistance CGA for safety   Bed/Chair/Wheelchair Transfer Description:  Increased time, Supervision for safety, Verbal cueing, Set-up of equipment(sup>sit EOB with elevated head SBA, bed<>w/c SPT CGA,)  Walk Initial:  1 - Total Assistance  Walk Current:  2 - Max Assistance  FWW CGA and verbal cues for posture, 110-140 FT   Walk Description:  (Pt amb with FWW x110', x140' CGA/[place] for balance, vc's throughout for upright posture, safety wtih walker and L side attention.)  Wheelchair Initial:  2 - Max Assistance  Wheelchair Current:  2 - Max Assistance   Wheelchair Description:  Extra time, Requires incidental assist, Verbal cueing  Stairs Initial:     Stairs Current:     Stairs Description: Extra time, Safety concerns, Verbal cueing, Supervision for safety, Ascends/descends 4 to 11 steps, Hand rails  Patient/Family Training/Education: In progress  DME/DC Recommendations:  TBD  Strengths:  Making steady progress towards goals, Motivated for self care and independence, Pleasant and cooperative and Willingly participates in therapeutic activities  Barriers:   Other: Impaired tolerance for  activity/endurance, shortness of breath with activity, impaired bed mobility, transfers, gait, balance, fall risk  # of short term goals set= 3  # of short term goals met= 1  Physical Therapy Problems     Problem: Mobility     Dates: Start: 01/23/20       Description:     Goal: STG-Within one week, patient will     Dates: Start: 01/23/20       Description: 1) Individualized goal: Gait fww, CGA, 125 FT, 1 week  2) Interventions:  PT Gait Training, PT Therapeutic Exercises, PT Neuro Re-Ed/Balance and PT Therapeutic Activity                   Problem: Mobility Transfers     Dates: Start: 01/23/20       Description:     Goal: STG-Within one week, patient will transfer bed to chair     Dates: Start: 01/23/20       Description: 1) Individualized goal: Transfer bed to/from chair fww SBA 1 week  2) Interventions:  PT Gait Training, PT Therapeutic Exercises, PT Neuro Re-Ed/Balance and PT Therapeutic Activity             Goal: STG-Within one week, patient will move supine to sit     Dates: Start: 01/23/20       Description: 1) Individualized goal: Transfer supine to/from sit SPV 1 week  2) Interventions:  PT Therapeutic Exercises, PT Neuro Re-Ed/Balance and PT Therapeutic Activity                   Problem: PT-Long Term Goals     Dates: Start: 01/23/20       Description:     Goal: LTG-By discharge, patient will ambulate     Dates: Start: 01/23/20       Description: 1) Individualized goal: Gait fww/ FT, modified independent at discharge  2) Interventions:  PT Gait Training, PT Therapeutic Exercises, PT Neuro Re-Ed/Balance and PT Therapeutic Activity             Goal: LTG-By discharge, patient will transfer one surface to another     Dates: Start: 01/23/20       Description: Transfer1) Individualized goal: Transfer one surface to another FWW/SPC modified independent at discharge  2) Interventions:  PT Gait Training, PT Therapeutic Exercises, PT Neuro Re-Ed/Balance and PT Therapeutic Activity             Goal: LTG-By  discharge, patient will ambulate up/down 4-6 stairs     Dates: Start: 01/23/20       Description:   1) Individualized goal: Up/down 4-6 stairs, handrails, supervision at discharge  2) Interventions:  PT Gait Training, PT Therapeutic Exercises, PT Neuro Re-Ed/Balance and PT Therapeutic Activity             Goal: LTG-By discharge, patient will transfer in/out of a car     Dates: Start: 01/23/20       Description: 1) Individualized goal: Car transfer fww/spc supervision at discharge  2) Interventions:  PT Gait Training, PT Therapeutic Exercises, PT Neuro Re-Ed/Balance and PT Therapeutic Activity                           Section completed by:  Kei Price MSPT    Activities of Daily Living  Eating Initial:  5 - Standby Prompting/Supervision or Set-up  Eating Current:  5 - Standby Prompting/Supervision or Set-up   Eating Description:  Modified diet, Supervision for safety, Verbal cueing, Set-up of equipment or meal/tube feeding  Grooming Initial:  5 - Standby Prompting/Supervision or Set-up  Grooming Current:  5 - Standby Prompting/Supervision or Set-up   Grooming Description:  Supervision for safety, Verbal cueing(verbal cues to attend to cleanliness of beard, setup to comb hair)  Bathing Initial:  4 - Minimal Assistance  Bathing Current:  5 - Standby Prompting/Supervision or Set-up   Bathing Description:  Grab bar, Supervision for safety, Verbal cueing, Increased time, Hand held shower  Upper Body Dressing Initial:  4 - Minimal Assistance  Upper Body Dressing Current:  5 - Standby Prompting/Supervision or Set-up   Upper Body Dressing Description:  Set-up of equipment(doffed/donned long-sleeve t-shirt in sitting position)  Lower Body Dressing Initial:  4 - Minimal Assistance  Lower Body Dressing Current:  4 - Minimal Assistance   Lower Body Dressing Description:  4 - Minimal Assistance  Toileting Initial:  2 - Max Assistance  Toileting Current:  4 - Minimal Assistance   Toileting Description:  Grab bar, Increased  time, Supervision for safety  Toilet Transfer Initial:  3 - Moderate Assistance  Toilet Transfer Current:  4 - Minimal Assistance   Toilet Transfer Description:  4 - Minimal Assistance  Tub / Shower Transfer Initial:  4 - Minimal Assistance  Tub / Shower Transfer Current:  4 - Minimal Assistance   Tub / Shower Transfer Description:  Grab bar(stand pivot w/c <> shower chair via grab bar)  IADL:  Not yet addressed   Family Training/Education:  Not yet addressed, lives with brother   DME/DC Recommendations:  Grab bar installation in upstairs/primary shower and next to toilet, may benefit from raised toilet seat, has built in shower seat.  Tub shower downstairs does have grab bars already but is his brother's primarily.     Strengths:  Making steady progress towards goals, Manages pain appropriately, Pleasant and cooperative and Willingly participates in therapeutic activities  Barriers:  Decreased endurance, Generalized weakness, Limited mobility, Poor activity tolerance, Poor balance, Poor carryover of learning and Other: delayed processing, impaired attention, L lateral lean     # of short term goals set= 3    # of short term goals met= 2     Occupational Therapy Goals     Problem: Dressing     Dates: Start: 01/23/20       Description:     Goal: STG-Within one week, patient will dress LB     Dates: Start: 01/23/20       Description: 1) Individualized Goal: CGA  2) Interventions: OT Group Therapy, OT Self Care/ADL, OT Cognitive Skill Dev, OT Community Reintegration, OT Manual Ther Technique, OT Neuro Re-Ed/Balance, OT Sensory Int Techniques, OT Therapeutic Activity, OT Evaluation and OT Therapeutic Exercise                   Problem: OT Long Term Goals     Dates: Start: 01/23/20       Description:     Goal: LTG-By discharge, patient will complete basic self care tasks     Dates: Start: 01/23/20       Description: 1) Individualized Goal: Supervision    2) Interventions: OT Group Therapy, OT Self Care/ADL, OT Cognitive  Skill Dev, OT Community Reintegration, OT Manual Ther Technique, OT Neuro Re-Ed/Balance, OT Sensory Int Techniques, OT Therapeutic Activity, OT Evaluation and OT Therapeutic Exercise             Goal: LTG-By discharge, patient will perform bathroom transfers     Dates: Start: 01/23/20       Description: 1) Individualized Goal: Supervision    2) Interventions: OT Group Therapy, OT Self Care/ADL, OT Cognitive Skill Dev, OT Community Reintegration, OT Manual Ther Technique, OT Neuro Re-Ed/Balance, OT Sensory Int Techniques, OT Therapeutic Activity, OT Evaluation and OT Therapeutic Exercise                         Section completed by:  Anya Trejo MS,OTR/L    Cognitive Linquistic Functions  Comprehension Initial:  4 - Minimal Assistance  Comprehension Current:  4 - Minimal Assistance   Comprehension Description:  Verbal cues, Magnifying glass([place], patient does not have hearing aids.)  Expression Initial:  4 - Minimal Assistance  Expression Current:  4 - Minimal Assistance   Expression Description:  Verbal cueing(Patient requires an extra ordinary amount of time to process and respond,  prompts are also needed.)  Social Interaction Initial:  5 - Stand-by Prompting/Supervision or Set-up  Social Interaction Current:  5 - Stand-by Prompting/Supervision or Set-up   Social Interaction Description:  Increased time, Verbal cues, Schedule  Problem Solving Initial:  2 - Max Assistance  Problem Solving Current:  2 - Max Assistance   Problem Solving Description:  Verbal cueing, Therapy schedule, Bed/chair alarm, Seat belt, Increased time  Memory Initial:  2 - Max Assistance  Memory Current:  2 - Max Assistance   Memory Description:  Verbal cueing, Therapy schedule, Bed/chair alarm, Seat belt  Executive Functioning / Organization Initial:     Executive Functioning / Organization Current:    2 - Max Assistance   Executive Functioning Desciption:  Verbal cues, extra time  Swallowing  Swallowing Status:Patient is safely tolerating  dysphagia 2 with no overt s/sx of aspiration when he takes them, however, intake has been limited by poor appetite.  Patient does display left bucall pocketing and occasional anterior leakage, but he is able to clear with liquid wash or second swallow.  Patient does not initiate use of swallow strategies without prompting.  Patient has had limited trials of thin liquids in isolation.  Orders Placed This Encounter   Procedures   • Diet Order Regular (medications crushed, floated in puree.; please fortify all foods that can be fortified )     Standing Status:   Standing     Number of Occurrences:   1     Order Specific Question:   Diet:     Answer:   Regular [1]     Comments:   medications crushed, floated in puree.; please fortify all foods that can be fortified      Order Specific Question:   Texture/Fiber modifications:     Answer:   Dysphagia 2(Pureed/Chopped)specify fluid consistency(question 6) [2]     Order Specific Question:   Consistency/Fluid modifications:     Answer:   Nectar Thick [2]     Behavior Modification Plan  Keep instructions simple/concrete, Give clear feedback, Set clear goals, Redirect to task/topic, Decrease the chance of failure by offering activities that are within the patient's abilities and Analyze tasks (break down into smaller steps)  Assistive Technology  Low tech: Calendar, planner, schedule, alarms/timers, pill organizer, post-it notes, lists  Family Training/Education:  Ongoing with patient   DC Recommendations:   Anticipate patient will benefit from additional ST services upon discharge from this facility.  Strengths:  Able to follow instructions, Making steady progress towards goals, Pleasant and cooperative and Willingly participates in therapeutic activities  Barriers:  Hemiplegia, Poor appetite/intake, Poor carryover of learning and Other: low initiation and slow speed of processing.  # of short term goals set=  4  # of short term goals met=  2  Speech Therapy Problems      Problem: Expression STGs     Dates: Start: 01/23/20       Description:     Goal: STG-Within one week, patient will express     Dates: Start: 01/23/20       Description: 1) Individualized goal:  In 4-6 word sentences using compensatory speaking strategies ( open mouth, effortful articulation,  Increased breath support and volume, pause between words) with 75% acc with mod cues to improve.  2) Interventions:  SLP Speech Language Treatment and SLP Group Treatment       Note:     Goal Note filed on 01/27/20 1317 by Lauren Wright MS,CCC-SLP    Not yet addressed.                        Problem: Problem Solving STGs     Dates: Start: 01/27/20       Description:     Goal: STG-Within one week, patient will     Dates: Start: 01/27/20       Description: 1) Individualized goal:  Selective attention tasks with 75% acc with min A to improve to 100%  2) Interventions:  SLP Speech Language Treatment, SLP Self Care / ADL Training , SLP Cognitive Skill Development and SLP Group Treatment                   Problem: Speech/Swallowing LTGs     Dates: Start: 01/23/20       Description:     Goal: LTG-By discharge, patient will safely swallow     Dates: Start: 01/23/20       Description: 1) Individualized goal:  Regular diet textures and thin liquids for 2/2 sessions, with no s/sx of aspiration.  2) Interventions:  SLP Swallowing Dysfunction Treatment, SLP Video Swallow Evaluation, SLP Sensory Integration Techniques  and SLP Group Treatment             Goal: LTG-By discharge, patient will solve basic problems     Dates: Start: 01/23/20       Description: 1) Individualized goal:  For transfers, and safety problem solving for home environment with 80% acc with spv.  2) Interventions:  SLP Self Care / ADL Training , SLP Cognitive Skill Development and SLP Group Treatment             Goal: LTG-By discharge, patient will express     Dates: Start: 01/23/20       Description: 1) Individualized goal:  Verbally in structured conversation with  "100% intelligibility with use of compensatory speaking strategies with min cues to spv.  2) Interventions:  SLP Speech Language Treatment and SLP Group Treatment                   Problem: Swallowing STGs     Dates: Start: 01/23/20       Description:     Goal: STG-Within one week, patient will safely swallow     Dates: Start: 01/23/20       Description: 1) Individualized goal:  Therapeutic trials of dysphagia 2 and thin liquids in isolation as appropriate, with no s/sx of aspiration for 2/2 sessions.  2) Interventions:  SLP Swallowing Dysfunction Treatment, SLP Video Swallow Evaluation and SLP Group Treatment       Note:     Goal Note filed on 01/27/20 1317 by Lauren Wright MS,CCC-SLP    Patient is safely tolerating dysphagia 2 with no overt s/sx of aspiration when he takes them, however, intake has been limited by poor appetite.  Patient does display left bucall pocketing and occasional anterior leakage, but he is able to clear with liquid wash or second swallow.  Patient does not initiate use of swallow strategies without prompting.  Patient has had limited trials of thin liquids in isolation.                               Section completed by:  Lauren Wright MS,CCC-SLP       Nutrition  Dietary Problems     Problem: Inadequate nutrient intake     Description:     Goal: Patient to consume greater than or equal to 50% of meals     Description:                     Mr. Kimble is seen in his room watching TV.  Pt eating some NTL with a spoon during visit.  He is alert and oriented today although slow to repsond.  He confirms that he lives with his brother at baseline.  He states he has had a very poor appetite for \"years.\"  He states this has been prior to his prostate CA.    Patient states usual intake at home is usually 3 meals per day.  Small portions noted.  He and his brother do have \"cocktails after lunch.\"    Patient endorses fair sleep and very poor energy.    Unsure of usual body weight and states he's " "\"always been thin.\"    Results of MBSS noted.  Ongoing work with speech.  Dentition is fair.  Mouth is very dry.  Patient denies any GI/ issues.  Note diarrhea per RN notes with bowel medications being held.  Patient s/p IVF d/t evident dehydration via reflected via hypernatremia.  Mucous membrane is dry with thick secretions.     Diet: 2gm Na+/ Dysphagia 2/ NTL  Appetite: Poor per patient  % Intake x 72 hours: <50% of meals - patient confirms this    Pertinent Labs: Na+ 146, K+ 3.5, Serum Glucose 109, BTNP 20326   Pertinent Medications: Eliquis, Aspirin, Atorvastatin, Digoxin, Hdyrocortisone cream, Lisinopril, Melatonin, Metoprolol, Provigil, Senna (held), Trazodone  PRN Medications: noted  IVF: 1/2 NS x 500mL 1/26/2020    Weight: 141 lbs./ 64 kg- down 2kg x 4 days/ 3%  Skin: CDI/ poor turgor/ very dry - no edema/ rash to back noted    Vitals: WNL/RA  GI: diarrhea noted per RN charting; BM 1/26  : yellow UOP per charting  I/Os: +570mL x 24 hours with no output recorded    Malnutrition: patient likely meets criteria for chronic/severe malnutrition with intake <75% of nutrient needs >1 month, patient's cachectic/ frail appearance with bony prominences visible, absence of subcutaneous body fat to upper and lower extremities; unable to quantify weight loss     Plan: Liberalize diet to Dysphagia 2/ NTL.  Add Boost VHC vanilla milkshake to all trays- pt confirms he has a sweet tooth.  Diet upgrades per SLP.  Consider appetite stimulant as patient with poor PO for \"years\" with hypermetabolic disorder ( cancer).  Recommend trial of Mirtazapine as patient confirms poor sleep/ mood/ energy level.  Fortify all foods to increase overall kcal consumption.  Nutrition rep following daily for menu/ meal prefs.            Section completed by:  Marcela Lane R.D.    REHAB-Pharmacy IDT Team Note by Carito Pruitt RPH at 1/24/2020 11:40 AM  Version 1 of 1    Author:  Carito Pruitt RPH Service:  -- Author Type:  " Pharmacist    Filed:  1/24/2020 11:41 AM Date of Service:  1/24/2020 11:40 AM Status:  Signed    :  Carito Pruitt RPH (Pharmacist)         Pharmacy   Pharmacy  Antibiotics/Antifungals/Antivirals:  Medication:      Active Orders (From admission, onward)    None        Route:         n/a  Stop Date:  n/a  Reason:   Antihypertensives/Cardiac:  Medication:    Orders (72h ago, onward)     Start     Ordered    01/24/20 0600  lisinopril (PRINIVIL) tablet 5 mg  Q DAY      01/23/20 1439    01/23/20 1800  digoxin (LANOXIN) tablet 125 mcg  DAILY      01/22/20 1327    01/23/20 0600  lisinopril (PRINIVIL) tablet 2.5 mg  Q DAY,   Status:  Discontinued      01/22/20 1327    01/22/20 2100  atorvastatin (LIPITOR) tablet 40 mg  EVERY EVENING      01/22/20 1327    01/22/20 1800  metoprolol (LOPRESSOR) tablet 75 mg  TWICE DAILY      01/22/20 1327              Patient Vitals for the past 24 hrs:   BP Pulse   01/24/20 0700 137/74 67   01/24/20 0518 (!) 164/96 65   01/23/20 1900 133/87 80   01/23/20 1400 144/79 98     Anticoagulation:  Medication:  Eliquis, Aspirin  INR:      Other key medications: bicalutamide, loratidine, modafinil  A review of the medication list reveals no issues at this time. Patient is currently on antihypertensive(s). Recommend home blood pressure monitoring/recording if antihypertensive(s) regimen(s) continue.    Section completed by:  Carito Pruitt RPH[[email or web address]]        Attribution Key     TK.[email or web address] - Carito Pruitt RPH on 1/24/2020 11:40 AM                  DC Planning  DC destination/dispostion:  Patient lives with his brother in a 2 level home.  Patient stays on one level.    Referrals: Brother would like resources for private hired assistance.    DC Needs: Patient will need home health and his brother is interested in hired resourses.  He has no dme from prior and will likely need a fww and other dme TBD.    Barriers to discharge:   Needs to be pretty independent at home.    Strengths: Brother is able  to assist to some degree.    Section completed by:  Adele Sherwood R.N.      Physician Summary  Gauri Fortune MD participated and led team conference discussion.

## 2020-01-27 NOTE — THERAPY
"Occupational Therapy  Daily Treatment     Patient Name: Yousif Kimble  Age:  89 y.o., Sex:  male  Medical Record #: [id number]  Today's Date: 2020     Precautions  Precautions: (P) Fall Risk, Swallow Precautions ( See Comments)  Comments: (P) dysarthria, NTL, pureed, SOB w/ activity    Safety   ADL Safety : (P) Requires Supervision for Safety, Requires Physical Assist for Safety  Bathroom Safety: (P) Requires Supervision for Safety, Requires Physical Assist for Safety  Comments: (P) Requires frequent rest breaks due to SOB, close SBA to min A due to L lean    Subjective    \"I think I could probably go home tomorrow\"     Objective       20 0831   Precautions   Precautions Fall Risk;Swallow Precautions ( See Comments)   Comments dysarthria, NTL, pureed, SOB w/ activity   Safety    ADL Safety  Requires Supervision for Safety;Requires Physical Assist for Safety   Bathroom Safety Requires Supervision for Safety;Requires Physical Assist for Safety   Comments Requires frequent rest breaks due to SOB, close SBA to min A due to L lean   OT Total Time Spent   OT Individual Total Time Spent (Mins) 60   OT Charge Group   OT Self Care / ADL 4       FIM Grooming Score:  5 - Standby Prompting/Supervision or Set-up  Grooming Description:  Supervision for safety, Verbal cueing(verbal cues to attend to cleanliness of beard, setup to comb hair)    FIM Bathing Score:  5 - Standby Prompting/Supervision or Set-up  Bathing Description:       FIM Upper Body Dressin - Standby Prompting/Supervision or Set-up  Upper Body Dressing Description:  Set-up of equipment(doffed/donned long-sleeve t-shirt in sitting position)    FIM Lower Body Dressing Score:  4 - Minimal Assistance  Lower Body Dressing Description:  (min A for standing balance and to thread L foot into pull-up, presents with consistent L lean in sitting and standing)    FIM Tub/Shower Transfers Score:  4 - Minimal Assistance  Tub/Shower Transfers Description:  Grab " bar(stand pivot w/c <> shower chair via grab bar)      Assessment    Pt tolerated session well, becomes short of breath with all activity necessitating consistent rest breaks, O2 stable on RA throughout. He presents with consistent L lean in sitting and standing, requires verbal and tactile cues to maintain upright, SBA to Min A for safety during ADL tasks. Primary limiters continue to be decreased endurance, frequent SOB, impaired sitting/standing balance, impaired attention, delayed processing, and impaired insight into deficits.     Plan    ADLs/IADLs, incorporate energy conservation strategies into functional tasks, general strengthening/endurance training, balance, functional transfers/functional mobility, functional cognition

## 2020-01-27 NOTE — REHAB-PT IDT TEAM NOTE
Physical Therapy   Mobility  Bed mobility:   SBA/SPV supine to/from sit  Bed /Chair/Wheelchair Transfer Initial:  3 - Moderate Assistance  Bed /Chair/Wheelchair Transfer Current:  4 - Minimal Assistance CGA for safety   Bed/Chair/Wheelchair Transfer Description:  Increased time, Supervision for safety, Verbal cueing, Set-up of equipment(sup>sit EOB with elevated head SBA, bed<>w/c SPT CGA,)  Walk Initial:  1 - Total Assistance  Walk Current:  2 - Max Assistance  FWW CGA and verbal cues for posture, 110-140 FT   Walk Description:  (Pt amb with FWW x110', x140' CGA/[place] for balance, vc's throughout for upright posture, safety wtih walker and L side attention.)  Wheelchair Initial:  2 - Max Assistance  Wheelchair Current:  2 - Max Assistance   Wheelchair Description:  Extra time, Requires incidental assist, Verbal cueing  Stairs Initial:     Stairs Current:     Stairs Description: Extra time, Safety concerns, Verbal cueing, Supervision for safety, Ascends/descends 4 to 11 steps, Hand rails  Patient/Family Training/Education: In progress  DME/DC Recommendations:  TBD  Strengths:  Making steady progress towards goals, Motivated for self care and independence, Pleasant and cooperative and Willingly participates in therapeutic activities  Barriers:   Other: Impaired tolerance for activity/endurance, shortness of breath with activity, impaired bed mobility, transfers, gait, balance, fall risk  # of short term goals set= 3  # of short term goals met= 1  Physical Therapy Problems     Problem: Mobility     Dates: Start: 01/23/20       Description:     Goal: STG-Within one week, patient will     Dates: Start: 01/23/20       Description: 1) Individualized goal: Gait fww, CGA, 125 FT, 1 week  2) Interventions:  PT Gait Training, PT Therapeutic Exercises, PT Neuro Re-Ed/Balance and PT Therapeutic Activity                   Problem: Mobility Transfers     Dates: Start: 01/23/20       Description:     Goal: STG-Within one week,  patient will transfer bed to chair     Dates: Start: 01/23/20       Description: 1) Individualized goal: Transfer bed to/from chair fww SBA 1 week  2) Interventions:  PT Gait Training, PT Therapeutic Exercises, PT Neuro Re-Ed/Balance and PT Therapeutic Activity             Goal: STG-Within one week, patient will move supine to sit     Dates: Start: 01/23/20       Description: 1) Individualized goal: Transfer supine to/from sit SPV 1 week  2) Interventions:  PT Therapeutic Exercises, PT Neuro Re-Ed/Balance and PT Therapeutic Activity                   Problem: PT-Long Term Goals     Dates: Start: 01/23/20       Description:     Goal: LTG-By discharge, patient will ambulate     Dates: Start: 01/23/20       Description: 1) Individualized goal: Gait fww/ FT, modified independent at discharge  2) Interventions:  PT Gait Training, PT Therapeutic Exercises, PT Neuro Re-Ed/Balance and PT Therapeutic Activity             Goal: LTG-By discharge, patient will transfer one surface to another     Dates: Start: 01/23/20       Description: Transfer1) Individualized goal: Transfer one surface to another FWW/SPC modified independent at discharge  2) Interventions:  PT Gait Training, PT Therapeutic Exercises, PT Neuro Re-Ed/Balance and PT Therapeutic Activity             Goal: LTG-By discharge, patient will ambulate up/down 4-6 stairs     Dates: Start: 01/23/20       Description:   1) Individualized goal: Up/down 4-6 stairs, handrails, supervision at discharge  2) Interventions:  PT Gait Training, PT Therapeutic Exercises, PT Neuro Re-Ed/Balance and PT Therapeutic Activity             Goal: LTG-By discharge, patient will transfer in/out of a car     Dates: Start: 01/23/20       Description: 1) Individualized goal: Car transfer fww/spc supervision at discharge  2) Interventions:  PT Gait Training, PT Therapeutic Exercises, PT Neuro Re-Ed/Balance and PT Therapeutic Activity                           Section completed by:  Kei  CATRINA Price

## 2020-01-27 NOTE — REHAB-CM IDT TEAM NOTE
Case Management    DC Planning  DC destination/dispostion:  Patient lives with his brother in a 2 level home.  Patient stays on one level.    Referrals: Brother would like resources for private hired assistance.    DC Needs: Patient will need home health and his brother is interested in hired resourses.  He has no dme from prior and will likely need a fww and other dme TBD.    Barriers to discharge:   Needs to be pretty independent at home.    Strengths: Brother is able to assist to some degree.    Section completed by:  Adele Sherwood R.N.

## 2020-01-27 NOTE — PROGRESS NOTES
"Rehab Progress Note     Date of Service: 1/27/2020  Chief Complaint: follow up stroke    Interval Events (Subjective)    Patient seen and examined in his room today.  He denies any pain.  He does report poor sleep however.  He reports he falls asleep okay but then wakes up and cannot go back to sleep. He thinks that therapy is going okay.  Notes were reviewed, including the dietitian notes which reports chronic severe malnutrition.  Today is his weekly conference.     Objective:  VITAL SIGNS: /82   Pulse 80   Temp 36.2 °C (97.1 °F) (Temporal)   Resp 17   Ht 1.727 m (5' 8\")   Wt 64 kg (141 lb)   SpO2 98%   BMI 21.44 kg/m²   Gen: alert, no apparent distress, thin  CV: regular rate and rhythm, no murmurs, no peripheral edema  Resp: clear to ascultation bilaterally, normal respiratory effort  GI: soft, non-tender abdomen, bowel sounds present  Neuro: notable for dysarthria, left facial droop    Recent Results (from the past 72 hour(s))   CBC WITHOUT DIFFERENTIAL    Collection Time: 01/26/20  5:29 AM   Result Value Ref Range    WBC 9.6 4.8 - 10.8 K/uL    RBC 4.08 (L) 4.70 - 6.10 M/uL    Hemoglobin 13.1 (L) 14.0 - 18.0 g/dL    Hematocrit 40.7 (L) 42.0 - 52.0 %    MCV 99.8 (H) 81.4 - 97.8 fL    MCH 32.1 27.0 - 33.0 pg    MCHC 32.2 (L) 33.7 - 35.3 g/dL    RDW 49.4 35.9 - 50.0 fL    Platelet Count 282 164 - 446 K/uL    MPV 10.5 9.0 - 12.9 fL   Basic Metabolic Panel    Collection Time: 01/26/20  5:29 AM   Result Value Ref Range    Sodium 147 (H) 135 - 145 mmol/L    Potassium 3.7 3.6 - 5.5 mmol/L    Chloride 112 96 - 112 mmol/L    Co2 26 20 - 33 mmol/L    Glucose 103 (H) 65 - 99 mg/dL    Bun 21 8 - 22 mg/dL    Creatinine 1.18 0.50 - 1.40 mg/dL    Calcium 9.2 8.5 - 10.5 mg/dL    Anion Gap 9.0 0.0 - 11.9   proBrain Natriuretic Peptide, NT    Collection Time: 01/26/20  5:29 AM   Result Value Ref Range    NT-proBNP 71046 (H) 0 - 125 pg/mL   ESTIMATED GFR    Collection Time: 01/26/20  5:29 AM   Result Value Ref Range "    GFR If African American >60 >60 mL/min/1.73 m 2    GFR If Non African American 58 (A) >60 mL/min/1.73 m 2   Basic Metabolic Panel    Collection Time: 01/27/20  6:21 AM   Result Value Ref Range    Sodium 146 (H) 135 - 145 mmol/L    Potassium 3.5 (L) 3.6 - 5.5 mmol/L    Chloride 111 96 - 112 mmol/L    Co2 27 20 - 33 mmol/L    Glucose 109 (H) 65 - 99 mg/dL    Bun 20 8 - 22 mg/dL    Creatinine 1.26 0.50 - 1.40 mg/dL    Calcium 9.4 8.5 - 10.5 mg/dL    Anion Gap 8.0 0.0 - 11.9   ESTIMATED GFR    Collection Time: 01/27/20  6:21 AM   Result Value Ref Range    GFR If African American >60 >60 mL/min/1.73 m 2    GFR If Non African American 54 (A) >60 mL/min/1.73 m 2       Current Facility-Administered Medications   Medication Frequency   • traZODone (DESYREL) tablet 50 mg QHS   • melatonin tablet 3 mg QHS   • modafinil (PROVIGIL) tablet 100 mg QAM   • lisinopril (PRINIVIL) tablet 5 mg Q DAY   • aspirin (ASA) chewable tab 81 mg DAILY   • Respiratory Care per Protocol Continuous RT   • Pharmacy Consult Request ...Pain Management Review 1 Each PHARMACY TO DOSE   • acetaminophen (TYLENOL) tablet 650 mg Q4HRS PRN   • senna-docusate (PERICOLACE or SENOKOT S) 8.6-50 MG per tablet 2 Tab BID    And   • polyethylene glycol/lytes (MIRALAX) PACKET 1 Packet QDAY PRN    And   • magnesium hydroxide (MILK OF MAGNESIA) suspension 30 mL QDAY PRN    And   • bisacodyl (DULCOLAX) suppository 10 mg QDAY PRN   • artificial tears ophthalmic solution 1 Drop PRN   • benzocaine-menthol (CEPACOL) lozenge 1 Lozenge Q2HRS PRN   • mag hydrox-al hydrox-simeth (MAALOX PLUS ES or MYLANTA DS) suspension 20 mL Q2HRS PRN   • ondansetron (ZOFRAN ODT) dispertab 4 mg 4X/DAY PRN    Or   • ondansetron (ZOFRAN) syringe/vial injection 4 mg 4X/DAY PRN   • sodium chloride (OCEAN) 0.65 % nasal spray 2 Spray PRN   • apixaban (ELIQUIS) tablet 5 mg BID   • lactulose 20 GM/30ML solution 30 mL QDAY PRN   • docusate sodium (ENEMEEZ) enema 283 mg QDAY PRN   • atorvastatin  (LIPITOR) tablet 40 mg Q EVENING   • digoxin (LANOXIN) tablet 125 mcg DAILY AT 1800   • metoprolol (LOPRESSOR) tablet 75 mg TWICE DAILY   • albuterol inhaler 2 Puff Q4HRS PRN   • hydrocortisone 1 % cream 4X/DAY       Orders Placed This Encounter   Procedures   • Diet Order Regular (medications crushed, floated in puree.; please fortify all foods that can be fortified )     Standing Status:   Standing     Number of Occurrences:   1     Order Specific Question:   Diet:     Answer:   Regular [1]     Comments:   medications crushed, floated in puree.; please fortify all foods that can be fortified      Order Specific Question:   Texture/Fiber modifications:     Answer:   Dysphagia 2(Pureed/Chopped)specify fluid consistency(question 6) [2]     Order Specific Question:   Consistency/Fluid modifications:     Answer:   Herrin Thick [2]       Assessment:  Active Hospital Problems    Diagnosis   • *Cerebrovascular accident (CVA) due to embolism (HCC)   • Atrial fibrillation (HCC)   • Prostate cancer (HCC)   • Hypertension   • Systolic CHF (HCC)   • Dysarthria   • Dysphagia   • CAD (coronary artery disease)   • Rash of back     This patient is a 89 y.o. male admitted for acute inpatient rehabilitation with Cerebrovascular accident (CVA) due to embolism (HCC).    I led and attended the weekly conference today, and agree with the IDT conference documentation and plan of care as noted below.    Date of conference: 1/27/2020    Goals and barriers: See IDT note.    Biggest barriers: incontinent of bowel/bladder, back rash, poor appetite, dysphagia, impaired posture with walker, shortness of breath with activity, moderate cognitive deficits, impaired attention    Therapy update 1/27/2020  Supervision eating  Supervision grooming  Supervision bathing  Supervision upper body dressing  Min assist lower body dressing  Min assist toileting  Max assistbladder  Max assist bowel  Min assist bed/chair transfer  Min assist toilet  transfer  Min assist tub/shower transfer  Max assist ambulation  Max assist wheelchair propulsion  Not tested stairs  Min assist comprehension  Min assist expression  Supervision social interaction  Max assist problem solving  Max assist memory    CM/social support: brother can only provide intermittent assistance, needs life alert    Anticipated DC date: 2/8/2020    Home health: PT/OT/SLP/RN    Equip: FWW    Follow up: PCP, rehab clinic    Medical Decision Making and Plan:    Bilateral strokes  Largest R MCA   Cardioembolic  Left pronator drift  Left facial droop  Left lateral lean  Dysarthria  Dysphagia, improved  Cognitive deficits  Continue full rehab program  PT/OT/SLP, 1 hr each discipline, 5 days per week  Continue Eliquis and statin for secondary stroke prophylaxis  Speech therapy to advance diet  Continue Provigil for neurostimulation    Insomnia  Schedule melatonin and low dose trazodone  Increase trazodone    Severe protein calorie malnutrition  Dietician consult  Supplements  Consider trial of mirtazapine    Bowel  Continue bowel meds  Last BM 1/26    Bladder  Checked PVRs - 65, 36  Not retaining  ICP for over 400 cc  Scheduled toileting    DVT prophylaxis  Eliquis    Appreciate the assistance of the hospitalist with his medical comorbidities:     Hypertension, metoprolol, lisinopril  Atrial fibrillation, digoxin, metoprolol  Systolic CHF  Elevated Pro-BNP  History of prostate cancer, Casadex on hold as it cannot be crushed  Coronary artery disease, aspirin  Rash on back, hydrocortisone cream    Total time:  40 minutes.  I spent greater than 50% of the time for patient care, counseling, and coordination on this date, including patient face-to face time, unit/floor time with review of records/pertinent lab data and studies, as well as discussing diagnostic evaluation/work up, planned therapeutic interventions, and future disposition of care, as per the interval events/subjective and the assessment and  plan as noted above.    Gauri Fortune M.D.   Physical Medicine and Rehabilitation

## 2020-01-27 NOTE — REHAB-SLP IDT TEAM NOTE
Speech Therapy   Cognitive Linquistic Functions  Comprehension Initial:  4 - Minimal Assistance  Comprehension Current:  4 - Minimal Assistance   Comprehension Description:  Verbal cues, Magnifying glass([place], patient does not have hearing aids.)  Expression Initial:  4 - Minimal Assistance  Expression Current:  4 - Minimal Assistance   Expression Description:  Verbal cueing(Patient requires an extra ordinary amount of time to process and respond,  prompts are also needed.)  Social Interaction Initial:  5 - Stand-by Prompting/Supervision or Set-up  Social Interaction Current:  5 - Stand-by Prompting/Supervision or Set-up   Social Interaction Description:  Increased time, Verbal cues, Schedule  Problem Solving Initial:  2 - Max Assistance  Problem Solving Current:  2 - Max Assistance   Problem Solving Description:  Verbal cueing, Therapy schedule, Bed/chair alarm, Seat belt, Increased time  Memory Initial:  2 - Max Assistance  Memory Current:  2 - Max Assistance   Memory Description:  Verbal cueing, Therapy schedule, Bed/chair alarm, Seat belt  Executive Functioning / Organization Initial:     Executive Functioning / Organization Current:    2 - Max Assistance   Executive Functioning Desciption:  Verbal cues, extra time  Swallowing  Swallowing Status:Patient is safely tolerating dysphagia 2 with no overt s/sx of aspiration when he takes them, however, intake has been limited by poor appetite.  Patient does display left bucall pocketing and occasional anterior leakage, but he is able to clear with liquid wash or second swallow.  Patient does not initiate use of swallow strategies without prompting.  Patient has had limited trials of thin liquids in isolation.  Orders Placed This Encounter   Procedures   • Diet Order Regular (medications crushed, floated in puree.; please fortify all foods that can be fortified )     Standing Status:   Standing     Number of Occurrences:   1     Order Specific Question:   Diet:      Answer:   Regular [1]     Comments:   medications crushed, floated in puree.; please fortify all foods that can be fortified      Order Specific Question:   Texture/Fiber modifications:     Answer:   Dysphagia 2(Pureed/Chopped)specify fluid consistency(question 6) [2]     Order Specific Question:   Consistency/Fluid modifications:     Answer:   Nectar Thick [2]     Behavior Modification Plan  Keep instructions simple/concrete, Give clear feedback, Set clear goals, Redirect to task/topic, Decrease the chance of failure by offering activities that are within the patient's abilities and Analyze tasks (break down into smaller steps)  Assistive Technology  Low tech: Calendar, planner, schedule, alarms/timers, pill organizer, post-it notes, lists  Family Training/Education:  Ongoing with patient   DC Recommendations:   Anticipate patient will benefit from additional ST services upon discharge from this facility.  Strengths:  Able to follow instructions, Making steady progress towards goals, Pleasant and cooperative and Willingly participates in therapeutic activities  Barriers:  Hemiplegia, Poor appetite/intake, Poor carryover of learning and Other: low initiation and slow speed of processing.  # of short term goals set=  4  # of short term goals met=  2  Speech Therapy Problems     Problem: Expression STGs     Dates: Start: 01/23/20       Description:     Goal: STG-Within one week, patient will express     Dates: Start: 01/23/20       Description: 1) Individualized goal:  In 4-6 word sentences using compensatory speaking strategies ( open mouth, effortful articulation,  Increased breath support and volume, pause between words) with 75% acc with mod cues to improve.  2) Interventions:  SLP Speech Language Treatment and SLP Group Treatment       Note:     Goal Note filed on 01/27/20 1317 by Lauren Wright MS,CCC-SLP    Not yet addressed.                        Problem: Problem Solving STGs     Dates: Start: 01/27/20        Description:     Goal: STG-Within one week, patient will     Dates: Start: 01/27/20       Description: 1) Individualized goal:  Selective attention tasks with 75% acc with min A to improve to 100%  2) Interventions:  SLP Speech Language Treatment, SLP Self Care / ADL Training , SLP Cognitive Skill Development and SLP Group Treatment                   Problem: Speech/Swallowing LTGs     Dates: Start: 01/23/20       Description:     Goal: LTG-By discharge, patient will safely swallow     Dates: Start: 01/23/20       Description: 1) Individualized goal:  Regular diet textures and thin liquids for 2/2 sessions, with no s/sx of aspiration.  2) Interventions:  SLP Swallowing Dysfunction Treatment, SLP Video Swallow Evaluation, SLP Sensory Integration Techniques  and SLP Group Treatment             Goal: LTG-By discharge, patient will solve basic problems     Dates: Start: 01/23/20       Description: 1) Individualized goal:  For transfers, and safety problem solving for home environment with 80% acc with spv.  2) Interventions:  SLP Self Care / ADL Training , SLP Cognitive Skill Development and SLP Group Treatment             Goal: LTG-By discharge, patient will express     Dates: Start: 01/23/20       Description: 1) Individualized goal:  Verbally in structured conversation with 100% intelligibility with use of compensatory speaking strategies with min cues to spv.  2) Interventions:  SLP Speech Language Treatment and SLP Group Treatment                   Problem: Swallowing STGs     Dates: Start: 01/23/20       Description:     Goal: STG-Within one week, patient will safely swallow     Dates: Start: 01/23/20       Description: 1) Individualized goal:  Therapeutic trials of dysphagia 2 and thin liquids in isolation as appropriate, with no s/sx of aspiration for 2/2 sessions.  2) Interventions:  SLP Swallowing Dysfunction Treatment, SLP Video Swallow Evaluation and SLP Group Treatment       Note:     Goal Note filed on  01/27/20 1317 by Lauren Wright MS,CCC-SLP    Patient is safely tolerating dysphagia 2 with no overt s/sx of aspiration when he takes them, however, intake has been limited by poor appetite.  Patient does display left bucall pocketing and occasional anterior leakage, but he is able to clear with liquid wash or second swallow.  Patient does not initiate use of swallow strategies without prompting.  Patient has had limited trials of thin liquids in isolation.                               Section completed by:  Lauren Wright MS,CCC-SLP

## 2020-01-27 NOTE — REHAB-DIETARY IDT TEAM NOTE
"Dietary   Nutrition  Dietary Problems     Problem: Inadequate nutrient intake     Description:     Goal: Patient to consume greater than or equal to 50% of meals     Description:                     Mr. Kimble is seen in his room watching TV.  Pt eating some NTL with a spoon during visit.  He is alert and oriented today although slow to repsond.  He confirms that he lives with his brother at baseline.  He states he has had a very poor appetite for \"years.\"  He states this has been prior to his prostate CA.    Patient states usual intake at home is usually 3 meals per day.  Small portions noted.  He and his brother do have \"cocktails after lunch.\"    Patient endorses fair sleep and very poor energy.    Unsure of usual body weight and states he's \"always been thin.\"    Results of MBSS noted.  Ongoing work with speech.  Dentition is fair.  Mouth is very dry.  Patient denies any GI/ issues.  Note diarrhea per RN notes with bowel medications being held.  Patient s/p IVF d/t evident dehydration via reflected via hypernatremia.  Mucous membrane is dry with thick secretions.     Diet: 2gm Na+/ Dysphagia 2/ NTL  Appetite: Poor per patient  % Intake x 72 hours: <50% of meals - patient confirms this    Pertinent Labs: Na+ 146, K+ 3.5, Serum Glucose 109, BTNP 88579   Pertinent Medications: Eliquis, Aspirin, Atorvastatin, Digoxin, Hdyrocortisone cream, Lisinopril, Melatonin, Metoprolol, Provigil, Senna (held), Trazodone  PRN Medications: noted  IVF: 1/2 NS x 500mL 1/26/2020    Weight: 141 lbs./ 64 kg- down 2kg x 4 days/ 3%  Skin: CDI/ poor turgor/ very dry - no edema/ rash to back noted    Vitals: WNL/RA  GI: diarrhea noted per RN charting; BM 1/26  : yellow UOP per charting  I/Os: +570mL x 24 hours with no output recorded    Malnutrition: patient likely meets criteria for chronic/severe malnutrition with intake <75% of nutrient needs >1 month, patient's cachectic/ frail appearance with bony prominences visible, absence of " "subcutaneous body fat to upper and lower extremities; unable to quantify weight loss     Plan: Liberalize diet to Dysphagia 2/ NTL.  Add Boost VHC vanilla milkshake to all trays- pt confirms he has a sweet tooth.  Diet upgrades per SLP.  Consider appetite stimulant as patient with poor PO for \"years\" with hypermetabolic disorder ( cancer).  Recommend trial of Mirtazapine as patient confirms poor sleep/ mood/ energy level.  Fortify all foods to increase overall kcal consumption.  Nutrition rep following daily for menu/ meal prefs.            Section completed by:  Marcela Lane R.D.     "

## 2020-01-27 NOTE — CARE PLAN
Problem: Safety  Goal: Will remain free from injury  Outcome: PROGRESSING SLOWER THAN EXPECTED  Note:   Pt does not use call light. He is impulsive. Reinforced to him the importance of calling before all of his transfers. He verbalized understanding but does not demonstrate. Bed alarm and hourly rounding for safety precautions. Will continue to monitor.     Problem: Bowel/Gastric:  Goal: Normal bowel function is maintained or improved  Outcome: PROGRESSING SLOWER THAN EXPECTED  Note:   Pt incontinent of bowel via diaper this shift. Cleansed and kept dry throughout the night. He refused his scheduled senna. Will continue to monitor.

## 2020-01-27 NOTE — REHAB-OT IDT TEAM NOTE
Occupational Therapy   Activities of Daily Living  Eating Initial:  5 - Standby Prompting/Supervision or Set-up  Eating Current:  5 - Standby Prompting/Supervision or Set-up   Eating Description:  Modified diet, Supervision for safety, Verbal cueing, Set-up of equipment or meal/tube feeding  Grooming Initial:  5 - Standby Prompting/Supervision or Set-up  Grooming Current:  5 - Standby Prompting/Supervision or Set-up   Grooming Description:  Supervision for safety, Verbal cueing(verbal cues to attend to cleanliness of beard, setup to comb hair)  Bathing Initial:  4 - Minimal Assistance  Bathing Current:  5 - Standby Prompting/Supervision or Set-up   Bathing Description:  Grab bar, Supervision for safety, Verbal cueing, Increased time, Hand held shower  Upper Body Dressing Initial:  4 - Minimal Assistance  Upper Body Dressing Current:  5 - Standby Prompting/Supervision or Set-up   Upper Body Dressing Description:  Set-up of equipment(doffed/donned long-sleeve t-shirt in sitting position)  Lower Body Dressing Initial:  4 - Minimal Assistance  Lower Body Dressing Current:  4 - Minimal Assistance   Lower Body Dressing Description:  4 - Minimal Assistance  Toileting Initial:  2 - Max Assistance  Toileting Current:  4 - Minimal Assistance   Toileting Description:  Grab bar, Increased time, Supervision for safety  Toilet Transfer Initial:  3 - Moderate Assistance  Toilet Transfer Current:  4 - Minimal Assistance   Toilet Transfer Description:  4 - Minimal Assistance  Tub / Shower Transfer Initial:  4 - Minimal Assistance  Tub / Shower Transfer Current:  4 - Minimal Assistance   Tub / Shower Transfer Description:  Grab bar(stand pivot w/c <> shower chair via grab bar)  IADL:  Not yet addressed   Family Training/Education:  Not yet addressed, lives with brother   DME/DC Recommendations:  Grab bar installation in upstairs/primary shower and next to toilet, may benefit from raised toilet seat, has built in shower seat.  Tub  shower downstairs does have grab bars already but is his brother's primarily.     Strengths:  Making steady progress towards goals, Manages pain appropriately, Pleasant and cooperative and Willingly participates in therapeutic activities  Barriers:  Decreased endurance, Generalized weakness, Limited mobility, Poor activity tolerance, Poor balance, Poor carryover of learning and Other: delayed processing, impaired attention, L lateral lean     # of short term goals set= 3    # of short term goals met= 2     Occupational Therapy Goals     Problem: Dressing     Dates: Start: 01/23/20       Description:     Goal: STG-Within one week, patient will dress LB     Dates: Start: 01/23/20       Description: 1) Individualized Goal: CGA  2) Interventions: OT Group Therapy, OT Self Care/ADL, OT Cognitive Skill Dev, OT Community Reintegration, OT Manual Ther Technique, OT Neuro Re-Ed/Balance, OT Sensory Int Techniques, OT Therapeutic Activity, OT Evaluation and OT Therapeutic Exercise                   Problem: OT Long Term Goals     Dates: Start: 01/23/20       Description:     Goal: LTG-By discharge, patient will complete basic self care tasks     Dates: Start: 01/23/20       Description: 1) Individualized Goal: Supervision    2) Interventions: OT Group Therapy, OT Self Care/ADL, OT Cognitive Skill Dev, OT Community Reintegration, OT Manual Ther Technique, OT Neuro Re-Ed/Balance, OT Sensory Int Techniques, OT Therapeutic Activity, OT Evaluation and OT Therapeutic Exercise             Goal: LTG-By discharge, patient will perform bathroom transfers     Dates: Start: 01/23/20       Description: 1) Individualized Goal: Supervision    2) Interventions: OT Group Therapy, OT Self Care/ADL, OT Cognitive Skill Dev, OT Community Reintegration, OT Manual Ther Technique, OT Neuro Re-Ed/Balance, OT Sensory Int Techniques, OT Therapeutic Activity, OT Evaluation and OT Therapeutic Exercise                         Section completed by:  Anya  MS Maya,OTR/L

## 2020-01-27 NOTE — CARE PLAN
Problem: Bathing  Goal: STG-Within one week, patient will bathe  Description  1) Individualized Goal:  CGA  2) Interventions:  OT Group Therapy, OT Self Care/ADL, OT Cognitive Skill Dev, OT Community Reintegration, OT Manual Ther Technique, OT Neuro Re-Ed/Balance, OT Sensory Int Techniques, OT Therapeutic Activity, OT Evaluation and OT Therapeutic Exercise     Outcome: MET     Problem: Dressing  Goal: STG-Within one week, patient will dress UB  Description  1) Individualized Goal: CGA  2) Interventions: OT Group Therapy, OT Self Care/ADL, OT Cognitive Skill Dev, OT Community Reintegration, OT Manual Ther Technique, OT Neuro Re-Ed/Balance, OT Sensory Int Techniques, OT Therapeutic Activity, OT Evaluation and OT Therapeutic Exercise     Outcome: MET     Problem: Dressing  Goal: STG-Within one week, patient will dress LB  Description  1) Individualized Goal: CGA  2) Interventions: OT Group Therapy, OT Self Care/ADL, OT Cognitive Skill Dev, OT Community Reintegration, OT Manual Ther Technique, OT Neuro Re-Ed/Balance, OT Sensory Int Techniques, OT Therapeutic Activity, OT Evaluation and OT Therapeutic Exercise     Outcome: PROGRESSING AS EXPECTED

## 2020-01-27 NOTE — CARE PLAN
Problem: Swallowing STGs  Goal: STG-Within one week, patient will safely swallow  Description  1) Individualized goal:  Therapeutic trials of dysphagia 2 and thin liquids in isolation as appropriate, with no s/sx of aspiration for 2/2 sessions.  2) Interventions:  SLP Swallowing Dysfunction Treatment, SLP Video Swallow Evaluation and SLP Group Treatment     Outcome: NOT MET  Note:   Patient is safely tolerating dysphagia 2 with no overt s/sx of aspiration when he takes them, however, intake has been limited by poor appetite.  Patient does display left bucall pocketing and occasional anterior leakage, but he is able to clear with liquid wash or second swallow.  Patient does not initiate use of swallow strategies without prompting.  Patient has had limited trials of thin liquids in isolation.     Problem: Expression STGs  Goal: STG-Within one week, patient will express  Description  1) Individualized goal:  In 4-6 word sentences using compensatory speaking strategies ( open mouth, effortful articulation,  Increased breath support and volume, pause between words) with 75% acc with mod cues to improve.  2) Interventions:  SLP Speech Language Treatment and SLP Group Treatment     Outcome: NOT MET  Note:   Not yet addressed.     Problem: Swallowing STGs  Goal: STG-Within one week, patient will  Description  1) Individualized goal:  MBSS with goals as appropriate.  2) Interventions:  SLP Video Swallow Evaluation     Outcome: MET  Note:   MBSS completed 1/24/20.  Please see report for full details.     Problem: Problem Solving STGs  Goal: STG-Within one week, patient will  Description  1) Individualized goal:  Complete SCCAN with goals as appropriate.  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

## 2020-01-27 NOTE — REHAB-NURSING IDT TEAM NOTE
Nursing   Nursing  Diet/Nutrition:  Dysphagia II, Nectar Thick Liquids and Other:2 Gram Sodium  Medication Administration:  Crush all Medications in Puree  % consumed at meals in last 24 hours:  Consumed <25% of meals per documentation.  Meal/Snack Consumption for the past 24 hrs:   Oral Nutrition   01/26/20 1800 0% Consumed   01/26/20 1200 Lunch;0% Consumed   01/26/20 0800 Breakfast;Less than 25% Consumed     Snack schedule:  None  Supplement:  none  Appetite:  Poor  Fluid Intake/Output in past 24 hours: In: 570 [P.O.:570]  Out: -   Admit Weight:  Weight: 66 kg (145 lb 8.1 oz)  Weight Last 7 Days   [64 kg (141 lb)-66 kg (145 lb 8.1 oz)] 64 kg (141 lb) (01/26 0953)    Pain Issues:  Denies      Bowel:    Bowel Assist Initial Score:  3 - Moderate Assistance  Bowel Assist Current Score:  2 - Max Assistance  Bowl Accidents in last 7 days:     Last bowel movement: 01/26/20  Stool Description: Large, Soft, Incontinence(incontinence)     Usual bowel pattern:  daily  Scheduled bowel medications:  senna-docusate (PERICOLACE or SENOKOT S)   PRN bowel medications used in last 24 hours:  None  Nursing Interventions:  Increased time, Scheduled medication, Supervision, Verbal cueing, Set-up, Positioning on commode/toilet, Brief  Effectiveness of bowel program:   fair=occasional unpredictable, incontinent emptying of bowel (less than 1 time day)  Bladder:    Bladder Assist Initial Score:  3 - Moderate Assistance  Bladder Assist Current Score:  2 - Max Assistance  Bladder Accidents in last 7 days:     PVR range for past 24-48 hours: -- ()  Intermittent Catheterization:  0  Medications affecting bladder:  None    Time void schedule/voiding pattern:  Time void every 3 hours during the day and every 4 hours at night  Interventions:  Increased time, Supervision, Verbal cueing, Pad, Brief, Time void patient initiated  Effectiveness of bladder training:  Poor= > 1 incontinent emptying of bladder      Wound: None        Sleep/wake cycle:    Average hours slept:  Sleeps 4-6 hours without waking  Sleep medication usage:  Melatonin 3mg    Patient/Family Training/Education:  Aspiration Precautions, Bladder Management/Training, Bowel Management/Training, Diet/Nutrition, Fall Prevention, General Self Care, Medication Management, Safe Transfers, Safety and Skin Care  Discharge Supply Recommendations:  Blood Pressure Monitor  Strengths: Willingly participates in therapeutic activities, Able to follow instructions, Supportive family, Pleasant and cooperative, Good carryover of learning, Motivated for self care and independence and Good endurance   Barriers:   Aspiration risk, Bladder incontinence, Bowel incontinence, Fatigue, Generalized weakness and Impulsive       Nursing Problems     Problem: Bowel/Gastric:     Description:     Goal: Normal bowel function is maintained or improved     Description:           Goal: Will not experience complications related to bowel motility     Description:                 Problem: Communication     Description:     Goal: The ability to communicate needs accurately and effectively will improve     Description:                 Problem: Discharge Barriers/Planning     Description:     Goal: Patient's continuum of care needs will be met     Description:                 Problem: Infection     Description:     Goal: Will remain free from infection     Description:                 Problem: Knowledge Deficit     Description:     Goal: Knowledge of disease process/condition, treatment plan, diagnostic tests, and medications will improve     Description:           Goal: Knowledge of the prescribed therapeutic regimen will improve     Description:                 Problem: Pain Management     Description:     Goal: Pain level will decrease to patient's comfort goal     Description:                 Problem: Respiratory:     Description:     Goal: Respiratory status will improve     Description:                 Problem: Safety      Description:     Goal: Will remain free from injury     Description:           Goal: Will remain free from falls     Description:                 Problem: Skin Integrity     Description:     Goal: Risk for impaired skin integrity will decrease     Description:                 Problem: Urinary Elimination:     Description:     Goal: Ability to reestablish a normal urinary elimination pattern will improve     Description:                 Problem: Venous Thromboembolism (VTW)/Deep Vein Thrombosis (DVT) Prevention:     Description:     Goal: Patient will participate in Venous Thrombosis (VTE)/Deep Vein Thrombosis (DVT)Prevention Measures     Description:                        Long Term Goals:   At discharge patient will be able to function safely at home and in the community with support.    Section completed by:  Gwendolyn Alves R.N.

## 2020-01-27 NOTE — THERAPY
Speech Language Pathology  Daily Treatment     Patient Name: Yousif Kimble  Age:  89 y.o., Sex:  male  Medical Record #: [id number]  Today's Date: 1/27/2020     Subjective    Pt cooperative, but lethargic during tx.       Objective       01/27/20 0801   Dysphagia    Positioning / Behavior Modification Modulate Rate or Bite Size;Multiple Swallows   Nutritional Liquid Intake Rating Scale 2   Nutritional Food Intake Rating Scale 5   SLP Total Time Spent   SLP Individual Total Time Spent (Mins) 30   Charge Group   SLP Swallowing Dysfunction Treatment Swallowing Dysfunction Treatment       FIM Eating Score:  5 - Standby Prompting/Supervision or Set-up  Eating Description:  Increased time, Modified diet, Supervision for safety, Verbal cueing      Assessment    Pt assessed with dys2/NTL liquid tray; however, pt only ate dys1 texture options (magic cup).  Pt refused to try dys2 Malay toast or peaches.  Pt presented with intermittent throat clear following NTL by cup.  Pt benefits from mod verbal cues to complete second swallow.      Plan    Assess diet tolerance, use of strategies.

## 2020-01-28 PROBLEM — E87.0 HYPERNATREMIA: Status: ACTIVE | Noted: 2020-01-28

## 2020-01-28 PROBLEM — E87.6 HYPOKALEMIA: Status: ACTIVE | Noted: 2020-01-28

## 2020-01-28 PROCEDURE — 99232 SBSQ HOSP IP/OBS MODERATE 35: CPT | Performed by: HOSPITALIST

## 2020-01-28 PROCEDURE — 97129 THER IVNTJ 1ST 15 MIN: CPT

## 2020-01-28 PROCEDURE — 700102 HCHG RX REV CODE 250 W/ 637 OVERRIDE(OP)

## 2020-01-28 PROCEDURE — 99232 SBSQ HOSP IP/OBS MODERATE 35: CPT | Performed by: PHYSICAL MEDICINE & REHABILITATION

## 2020-01-28 PROCEDURE — 97116 GAIT TRAINING THERAPY: CPT

## 2020-01-28 PROCEDURE — 92508 TX SP LANG VOICE COMM GROUP: CPT

## 2020-01-28 PROCEDURE — 97530 THERAPEUTIC ACTIVITIES: CPT

## 2020-01-28 PROCEDURE — 700105 HCHG RX REV CODE 258: Performed by: HOSPITALIST

## 2020-01-28 PROCEDURE — 770010 HCHG ROOM/CARE - REHAB SEMI PRIVAT*

## 2020-01-28 PROCEDURE — 97112 NEUROMUSCULAR REEDUCATION: CPT

## 2020-01-28 PROCEDURE — 92526 ORAL FUNCTION THERAPY: CPT

## 2020-01-28 PROCEDURE — A9270 NON-COVERED ITEM OR SERVICE: HCPCS

## 2020-01-28 PROCEDURE — 700102 HCHG RX REV CODE 250 W/ 637 OVERRIDE(OP): Performed by: PHYSICAL MEDICINE & REHABILITATION

## 2020-01-28 PROCEDURE — A9270 NON-COVERED ITEM OR SERVICE: HCPCS | Performed by: PHYSICAL MEDICINE & REHABILITATION

## 2020-01-28 RX ORDER — TRAZODONE HYDROCHLORIDE 50 MG/1
25 TABLET ORAL
Status: DISCONTINUED | OUTPATIENT
Start: 2020-01-28 | End: 2020-01-29

## 2020-01-28 RX ORDER — MIRTAZAPINE 15 MG/1
15 TABLET, ORALLY DISINTEGRATING ORAL
Status: DISCONTINUED | OUTPATIENT
Start: 2020-01-28 | End: 2020-02-08 | Stop reason: HOSPADM

## 2020-01-28 RX ORDER — DEXTROSE MONOHYDRATE 50 MG/ML
INJECTION, SOLUTION INTRAVENOUS ONCE
Status: COMPLETED | OUTPATIENT
Start: 2020-01-28 | End: 2020-01-28

## 2020-01-28 RX ORDER — POTASSIUM CHLORIDE 20 MEQ/1
40 TABLET, EXTENDED RELEASE ORAL ONCE
Status: COMPLETED | OUTPATIENT
Start: 2020-01-28 | End: 2020-01-28

## 2020-01-28 RX ORDER — POTASSIUM CHLORIDE 20 MEQ/1
TABLET, EXTENDED RELEASE ORAL
Status: COMPLETED
Start: 2020-01-28 | End: 2020-01-28

## 2020-01-28 RX ADMIN — SENNOSIDES AND DOCUSATE SODIUM 2 TABLET: 8.6; 5 TABLET ORAL at 21:28

## 2020-01-28 RX ADMIN — METOPROLOL TARTRATE 75 MG: 25 TABLET ORAL at 05:28

## 2020-01-28 RX ADMIN — ATORVASTATIN CALCIUM 40 MG: 40 TABLET, FILM COATED ORAL at 21:30

## 2020-01-28 RX ADMIN — HYDROCORTISONE: 1 CREAM TOPICAL at 10:12

## 2020-01-28 RX ADMIN — MODAFINIL 100 MG: 100 TABLET ORAL at 07:46

## 2020-01-28 RX ADMIN — SENNOSIDES AND DOCUSATE SODIUM 2 TABLET: 8.6; 5 TABLET ORAL at 07:46

## 2020-01-28 RX ADMIN — ASPIRIN 81 MG 81 MG: 81 TABLET ORAL at 07:46

## 2020-01-28 RX ADMIN — DIGOXIN 125 MCG: 125 TABLET ORAL at 17:48

## 2020-01-28 RX ADMIN — TRAZODONE HYDROCHLORIDE 25 MG: 50 TABLET ORAL at 21:29

## 2020-01-28 RX ADMIN — APIXABAN 5 MG: 5 TABLET, FILM COATED ORAL at 07:46

## 2020-01-28 RX ADMIN — DEXTROSE MONOHYDRATE: 50 INJECTION, SOLUTION INTRAVENOUS at 02:05

## 2020-01-28 RX ADMIN — MIRTAZAPINE 15 MG: 15 TABLET, ORALLY DISINTEGRATING ORAL at 21:30

## 2020-01-28 RX ADMIN — METOPROLOL TARTRATE 75 MG: 25 TABLET ORAL at 17:48

## 2020-01-28 RX ADMIN — APIXABAN 5 MG: 5 TABLET, FILM COATED ORAL at 21:30

## 2020-01-28 RX ADMIN — LISINOPRIL 5 MG: 5 TABLET ORAL at 05:28

## 2020-01-28 RX ADMIN — POTASSIUM CHLORIDE 40 MEQ: 20 TABLET, EXTENDED RELEASE ORAL at 05:29

## 2020-01-28 RX ADMIN — MELATONIN TAB 3 MG 3 MG: 3 TAB at 21:30

## 2020-01-28 RX ADMIN — POTASSIUM CHLORIDE 40 MEQ: 1500 TABLET, EXTENDED RELEASE ORAL at 05:29

## 2020-01-28 ASSESSMENT — ENCOUNTER SYMPTOMS
NAUSEA: 0
SHORTNESS OF BREATH: 0
VOMITING: 0
CHILLS: 0
FEVER: 0
ABDOMINAL PAIN: 0
NERVOUS/ANXIOUS: 0
DIARRHEA: 0

## 2020-01-28 ASSESSMENT — PATIENT HEALTH QUESTIONNAIRE - PHQ9
SUM OF ALL RESPONSES TO PHQ9 QUESTIONS 1 AND 2: 0
1. LITTLE INTEREST OR PLEASURE IN DOING THINGS: NOT AT ALL
2. FEELING DOWN, DEPRESSED, IRRITABLE, OR HOPELESS: NOT AT ALL

## 2020-01-28 NOTE — THERAPY
01/27/20 1559   Precautions   Precautions Fall Risk;Swallow Precautions ( See Comments);Other (See Comments)   Comments Dysarthria, NTL, puréed, SOB with activity   Pain 0 - 10 Group   Therapist Pain Assessment 0   Cognition    Speech/ Communication Delayed Responses;Hard of Hearing   Level of Consciousness Alert   Ability To Follow Commands 1 Step   Safety Awareness Impaired   New Learning Impaired   Attention Impaired   Sequencing Impaired   Initiation Impaired   Comments Delayed processing, delayed responses, uses gestures   Sitting Lower Body Exercises   Nustep Resistance Level 3  (38 steps per minute, 8 minutes)   Bed Mobility    Supine to Sit Stand by Assist   Sit to Stand Contact Guard Assist   Scooting Stand by Assist   Rolling Supervised   Neuro-Muscular Treatments   Neuro-Muscular Treatments Sequencing;Tactile Cuing;Tapping;Verbal Cuing;Weight Shift Right   Comments Sequencing bed mobility supine to sit and stand pivot transfer CGA, sequencing sit to/from stand wheelchair/fww, sequencing gait with a fww with attention to pathfinding, posture, sequencing stand to sit after walking   PT Total Time Spent   PT Individual Total Time Spent (Mins) 60   PT Charge Group   PT Gait Training 2   PT Therapeutic Exercise 1   PT Therapeutic Activities 1   Physical Therapy   Daily Treatment     Patient Name: Yousif Kimble  Age:  89 y.o., Sex:  male  Medical Record #: [id number]  Today's Date: 1/27/2020     Precautions  Precautions: Fall Risk, Swallow Precautions ( See Comments), Other (See Comments)  Comments: Dysarthria, NTL, puréed, SOB with activity    Subjective    The patient was resting in bed and he agreed to PT.  He indicated he was very tired.     Objective    The patient participated in bed mobility supine to sit and transfer stand pivot bed/wheelchair which required CGA.  He participated in gait training with a fww intermittent CGA and tactile cues for posture.  He tolerated 130 FT x3.  He also utilized the  Rafael with the information indicated above.    FIM Bed/Chair/Wheelchair Transfers Score: 5 - Standby Prompting/Supervision or Set-up  Bed/Chair/Wheelchair Transfers Description:  Increased time, Supervision for safety, Adaptive equipment(Bed rail for support)    FIM Walking Score:  2 - Max Assistance  Walking Description:  Extra time, Verbal cueing, Supervision for safety, Walker, Requires incidental assist(FWW,  FT x3)    FIM Wheelchair Score:  2 - Max Assistance  Wheelchair Description:       FIM Stairs Score:     Stairs Description:         Assessment    The patient is pleasant and cooperative and he tries to do his best during the session.  Primary barrier to progress is fatigue and significantly impaired tolerance for activity.  He is making progress but will need maximal amount of time to recover strength and improve his function.    Plan    Bed mobility and transfer training, safety education, gait training, sequencing activities with a walker and encourage tall posture, therapeutic exercise

## 2020-01-28 NOTE — PROGRESS NOTES
Rehab Fall Note    Date of fall: 1/28/2020     Time of incident/discovery? 1345     Witnessed or Unwitnessed: unwitnessed     Assisted or unassisted?: unassisted     Staff member present? Nurse      Head strike?: no    What is the patient's orientation status? disoriented to time    Location of fall: patient's room     Was this a fall with therapy? No      Patient had a fall from: chair/wheelchair     Injury from fall: none     Persons contacted regarding the fall: family/caregiver, physician and charge nurse     Post fall huddle called: yes     Persons present at post fall huddle: nurse and nursing aide     Interventions to prevent another fall: alarms on, call light within reach, shoes on, room close to nursing station and signs on patient's door     Was the call light used? No     Did the bed or chair alarm sound? Yes     If no alarm sounded, do the alarms work? N/A     If alarm malfunctioned, was a maintenance request submitted? N/A     Was the pt. wearing a seatbelt prior to the fall? Yes     If wearing, did the pt. take off the seatbelt? Yes     What is the patient's transfer status? CGA      Other fall details:   Pt. was found on the floor, patient is unsure if he hit his head during the fall. Alarm was on and RN found patient on the floor. No neuro changes present. VSS.

## 2020-01-28 NOTE — THERAPY
Speech Language Pathology  Daily Treatment     Patient Name: Yousif Kimble  Age:  89 y.o., Sex:  male  Medical Record #: [id number]  Today's Date: 1/28/2020     Subjective    Patient pleasantly oppositional to cues and encouragement for intake.  Low insight into deficits and motivation to make adjustments.      Objective       01/28/20 0801   Cognition   Prospective Memory Moderate (3)   Functional Memory Activities Severe (2)   Insight into Deficits Severe (2)   Dysphagia    Other Treatments therapeutic trial of thin liquids.    Diet / Liquid Recommendation Nectar Thick Liquid;Dysphagia II   Nutritional Liquid Intake Rating Scale 2   Nutritional Food Intake Rating Scale 5   SLP Total Time Spent   SLP Individual Total Time Spent (Mins) 60   Charge Group   SLP Swallowing Dysfunction Treatment Swallowing Dysfunction Treatment   SLP Cognitive Skill Development First 15 Minutes 1       FIM Eating Score:  1 - Total Assistance  Eating Description:  Modified diet, Supervision for safety, Set-up of equipment or meal/tube feeding    FIM Comprehension Score:  4 - Minimal Assistance  Comprehension Description:       FIM Expression Score:  4 - Minimal Assistance  Expression Description:       FIM Social Interaction Score:  5 - Stand-by Prompting/Supervision or Set-up  Social Interaction Description:       FIM Problem Solving Score:  2 - Max Assistance  Problem Solving Description:       FIM Memory Score:  2 - Max Assistance  Memory Description:         Assessment    Patient seen at meal.  He is currently receiving supplemental IV hydration.  He demonstrates poor appetite and willingness to intake both meals and liquids, in spite of alternatives offered.  Participated in trials of with thin liquids via teaspoon and cup sip.  He demonstrated no overt distress.  He displayed wet voice quality post 50% of sips taken.  He required verbal cues to be aware and to clear with forceful cough followed by effortful swallow.   Patient recalled  his swallow strategies with min cues.  He required mod to max cues to implement them consistently.Patient took one bite of eggs, refused any other solid.  He took 1/2 of his supplement shake and required constant verbal cues to take remainder.  Through out session patient was intermittently pleasantly non compliant.  He frequently responded to feed back as a joke, displaying low insight into deficits and judgement for safety and good outcome.       Plan    Target recall of safety sequencing, safe swallow with trials of thin liquids.

## 2020-01-28 NOTE — DISCHARGE PLANNING
Met with patient and provided update from team conference discussion and current d/c target.  I will contact his brother to update also.  I will gather private hired resources for his area.  Will follow.

## 2020-01-28 NOTE — THERAPY
Speech Language Pathology  Daily Treatment     Patient Name: Yousif Kimble  Age:  89 y.o., Sex:  male  Medical Record #: [id number]  Today's Date: 1/28/2020     Subjective    Patient was seen in t-dine. Encouragement needed.      Objective       01/28/20 1132   Dysphagia    Diet / Liquid Recommendation Nectar Thick Liquid;Dysphagia II   Nutritional Liquid Intake Rating Scale 2   Nutritional Food Intake Rating Scale 5   SLP Total Time Spent   SLP Individual Total Time Spent (Mins) 30   Charge Group   SLP Swallowing Dysfunction Treatment Swallowing Dysfunction Treatment         Assessment    Patient refused all solid textures despite max encouragement and education. Only consumed small amount of NTL by cup. No overt s/sx of aspiration were noted.     Plan    Trial thins in isolation as tolerated.

## 2020-01-28 NOTE — THERAPY
Occupational Therapy  Daily Treatment     Patient Name: Yousif Kimble  Age:  89 y.o., Sex:  male  Medical Record #: [id number]  Today's Date: 1/28/2020     Precautions  Precautions: Fall Risk, Swallow Precautions ( See Comments), Other (See Comments)  Comments: Dysarthria, NTL, puréed, SOB with activity    Safety   ADL Safety : Requires Supervision for Safety, Requires Physical Assist for Safety  Bathroom Safety: Requires Supervision for Safety, Requires Physical Assist for Safety  Comments: Requires frequent rest breaks due to SOB, close SBA to min A due to L lean    Subjective    Pt agreeable to OT     Objective       01/28/20 0901   Precautions   Precautions Fall Risk;Swallow Precautions ( See Comments);Other (See Comments)   Comments Dysarthria, NTL, puréed, SOB with activity   Sitting Upper Body Exercises   Sitting Upper Body Exercises Yes   Upper Extremity Bike Level 3 Resistance  (15 minutes for general endurance)   Sitting Lower Body Exercises   Sit to Stand 2 sets of 10  (no UE support, SBA)   OT Total Time Spent   OT Individual Total Time Spent (Mins) 60   OT Charge Group   OT Neuromuscular Re-education / Balance 2   OT Therapy Activity 2     Balance activity:  Seated - functional reach to floor 1x10, to alternating foot 1x10 each side, to alternating outside of foot 1x10 each side  Standing - functional reach to knees 1x10, to hips 1x10, to low back 1x10, to rochelle 1x10 with 5 second holds    Assessment    Pt tolerated session well, demos improved sitting and standing balance this session during functional activity with only slight lateral lean when fatigued. Less SOB with activity this session. Continues with poor attention requiring consistent cues to continue repetitions.     Plan    ADLs/IADLs, incorporate energy conservation strategies into functional tasks, general strengthening/endurance training, balance, functional transfers/functional mobility, functional cognition

## 2020-01-28 NOTE — PROGRESS NOTES
Hospital Medicine Daily Progress Note        Chief Complaint:  Hypertension  Afib  CHF    Interval History:  No significant events or changes since last visit    Review of Systems  Review of Systems   Constitutional: Negative for chills and fever.   Respiratory: Negative for shortness of breath.    Cardiovascular: Negative for chest pain.   Gastrointestinal: Negative for abdominal pain, diarrhea, nausea and vomiting.   Skin: Positive for rash.        Has back rash   Psychiatric/Behavioral: The patient is not nervous/anxious.         Physical Exam  Temp:  [36.6 °C (97.9 °F)-37 °C (98.6 °F)] 37 °C (98.6 °F)  Pulse:  [] 81  Resp:  [18] 18  BP: (113-139)/(76-90) 113/89  SpO2:  [94 %-98 %] 98 %    Physical Exam  Vitals signs and nursing note reviewed.   Constitutional:       Appearance: Normal appearance.   HENT:      Head: Atraumatic.   Eyes:      Conjunctiva/sclera: Conjunctivae normal.      Pupils: Pupils are equal, round, and reactive to light.   Neck:      Musculoskeletal: Normal range of motion and neck supple.   Cardiovascular:      Rate and Rhythm: Normal rate and regular rhythm.      Heart sounds: No murmur.   Pulmonary:      Effort: Pulmonary effort is normal.      Breath sounds: No stridor. No wheezing or rales.   Abdominal:      General: There is no distension.      Palpations: Abdomen is soft.      Tenderness: There is no tenderness.   Musculoskeletal:      Right lower leg: No edema.      Left lower leg: No edema.   Skin:     General: Skin is warm and dry.      Findings: No rash.   Neurological:      Mental Status: He is alert and oriented to person, place, and time.   Psychiatric:         Mood and Affect: Mood normal.         Behavior: Behavior normal.         Fluids    Intake/Output Summary (Last 24 hours) at 1/28/2020 0852  Last data filed at 1/28/2020 0536  Gross per 24 hour   Intake 360 ml   Output --   Net 360 ml       Laboratory  Recent Labs     01/26/20  0529   WBC 9.6   RBC 4.08*   HEMOGLOBIN  13.1*   HEMATOCRIT 40.7*   MCV 99.8*   MCH 32.1   MCHC 32.2*   RDW 49.4   PLATELETCT 282   MPV 10.5     Recent Labs     01/26/20  0529 01/27/20  0621   SODIUM 147* 146*   POTASSIUM 3.7 3.5*   CHLORIDE 112 111   CO2 26 27   GLUCOSE 103* 109*   BUN 21 20   CREATININE 1.18 1.26   CALCIUM 9.2 9.4                   Assessment/Plan  * Cerebrovascular accident (CVA) due to embolism (HCC)- (present on admission)  Assessment & Plan  On Eliquis, ASA, and Lipitor  Also on Provigil    Hypernatremia  Assessment & Plan  Na: 144 (1/23) --> 147 (1/26) --> 146 (1/27)  S/P 1/2 NS x 500 mL  On D5W x 500 mL (1/28)  Will change diet to low Na  Likely 2nd to being on a nectar thick liquid diet  Cont to monitor    Hypokalemia  Assessment & Plan  K+ 3.5 (1/27)  S/P KCl 40 meq x 1  Monitor    Rash of back  Assessment & Plan  Improving -- less itchy  On topical steroid -- d/c'd today (1/28)  Monitor    CAD (coronary artery disease)  Assessment & Plan  On ASA  On Lipitor  On Lisinopril & Metoprolol    Systolic CHF (HCC)- (present on admission)  Assessment & Plan  Echo (from [facility] [place]): EF 25-30%, mod MR, RVSP 40-45  BNP: 15,885 (1/23) --> 10,147 (1/26)  On Lopressor & Lisinopril    Hypertension- (present on admission)  Assessment & Plan  BP ok  On Lisinopril: 5 mg daily  On Metoprolol: 75 mg bid  Monitor    Prostate cancer (HCC)- (present on admission)  Assessment & Plan  Casodex now on hold as unable to be crushed  Outpt Urology F/U    Atrial fibrillation (HCC)- (present on admission)  Assessment & Plan  HR ok  S/P RVR at [facility] [place]  On Digoxin  On Lopressor: 75 mg bid  On Eliquis  Monitor

## 2020-01-28 NOTE — CARE PLAN
Problem: Bowel/Gastric:  Goal: Normal bowel function is maintained or improved  Outcome: PROGRESSING SLOWER THAN EXPECTED  Note:   Pt incontinent of bowel this shift. Refused his scheduled senna tonight. LBM 1/27/20.     Problem: Pain Management  Goal: Pain level will decrease to patient's comfort goal  Outcome: PROGRESSING AS EXPECTED  Note:   Pt denies pain or discomfort tonight. Sleeps good with scheduled melatonin and trazodone. Will continue to monitor.

## 2020-01-28 NOTE — CARE PLAN
Problem: Infection  Goal: Will remain free from infection  Note:   Back rash appears to be improving. Pt. continnues on scheduled Hydrocortisone QID.      Problem: Urinary Elimination:  Goal: Ability to reestablish a normal urinary elimination pattern will improve  Note:   Pt. was incontinent of bladder during this shift. Pt. Was kept clean and dry by staff.

## 2020-01-28 NOTE — PROGRESS NOTES
"Rehab Progress Note     Date of Service: 1/28/2020  Chief Complaint: follow up stroke    Interval Events (Subjective)    Patient seen and examined today in the therapy gym.  He is standing up with occupational therapy.  Patient thinks he slept better last night on the higher dose of trazodone, though records show he was awake at 1 AM.  Patient is agreeable for a trial of Remeron to help both with his sleep as well as his appetite.  He is currently denying any pain.  He has no complaints.    Objective:  VITAL SIGNS: /89   Pulse 81   Temp 37 °C (98.6 °F) (Temporal)   Resp 18   Ht 1.727 m (5' 8\")   Wt 64 kg (141 lb)   SpO2 98%   BMI 21.44 kg/m²   Gen: alert, no apparent distress  CV: tachycardic, irregular rhythm, no murmurs, no peripheral edema  Resp: clear to ascultation bilaterally, normal respiratory effort  GI: soft, non-tender abdomen, bowel sounds present  Neuro: notable for leg responses, dysarthria, left facial droop      Recent Results (from the past 72 hour(s))   CBC WITHOUT DIFFERENTIAL    Collection Time: 01/26/20  5:29 AM   Result Value Ref Range    WBC 9.6 4.8 - 10.8 K/uL    RBC 4.08 (L) 4.70 - 6.10 M/uL    Hemoglobin 13.1 (L) 14.0 - 18.0 g/dL    Hematocrit 40.7 (L) 42.0 - 52.0 %    MCV 99.8 (H) 81.4 - 97.8 fL    MCH 32.1 27.0 - 33.0 pg    MCHC 32.2 (L) 33.7 - 35.3 g/dL    RDW 49.4 35.9 - 50.0 fL    Platelet Count 282 164 - 446 K/uL    MPV 10.5 9.0 - 12.9 fL   Basic Metabolic Panel    Collection Time: 01/26/20  5:29 AM   Result Value Ref Range    Sodium 147 (H) 135 - 145 mmol/L    Potassium 3.7 3.6 - 5.5 mmol/L    Chloride 112 96 - 112 mmol/L    Co2 26 20 - 33 mmol/L    Glucose 103 (H) 65 - 99 mg/dL    Bun 21 8 - 22 mg/dL    Creatinine 1.18 0.50 - 1.40 mg/dL    Calcium 9.2 8.5 - 10.5 mg/dL    Anion Gap 9.0 0.0 - 11.9   proBrain Natriuretic Peptide, NT    Collection Time: 01/26/20  5:29 AM   Result Value Ref Range    NT-proBNP 86237 (H) 0 - 125 pg/mL   ESTIMATED GFR    Collection Time: " 01/26/20  5:29 AM   Result Value Ref Range    GFR If African American >60 >60 mL/min/1.73 m 2    GFR If Non African American 58 (A) >60 mL/min/1.73 m 2   Basic Metabolic Panel    Collection Time: 01/27/20  6:21 AM   Result Value Ref Range    Sodium 146 (H) 135 - 145 mmol/L    Potassium 3.5 (L) 3.6 - 5.5 mmol/L    Chloride 111 96 - 112 mmol/L    Co2 27 20 - 33 mmol/L    Glucose 109 (H) 65 - 99 mg/dL    Bun 20 8 - 22 mg/dL    Creatinine 1.26 0.50 - 1.40 mg/dL    Calcium 9.4 8.5 - 10.5 mg/dL    Anion Gap 8.0 0.0 - 11.9   ESTIMATED GFR    Collection Time: 01/27/20  6:21 AM   Result Value Ref Range    GFR If African American >60 >60 mL/min/1.73 m 2    GFR If Non African American 54 (A) >60 mL/min/1.73 m 2       Current Facility-Administered Medications   Medication Frequency   • mirtazapine (REMERON) orally disintegrating tab 15 mg QHS   • traZODone (DESYREL) tablet 50 mg QHS   • melatonin tablet 3 mg QHS   • modafinil (PROVIGIL) tablet 100 mg QAM   • lisinopril (PRINIVIL) tablet 5 mg Q DAY   • aspirin (ASA) chewable tab 81 mg DAILY   • Respiratory Care per Protocol Continuous RT   • Pharmacy Consult Request ...Pain Management Review 1 Each PHARMACY TO DOSE   • acetaminophen (TYLENOL) tablet 650 mg Q4HRS PRN   • senna-docusate (PERICOLACE or SENOKOT S) 8.6-50 MG per tablet 2 Tab BID    And   • polyethylene glycol/lytes (MIRALAX) PACKET 1 Packet QDAY PRN    And   • magnesium hydroxide (MILK OF MAGNESIA) suspension 30 mL QDAY PRN    And   • bisacodyl (DULCOLAX) suppository 10 mg QDAY PRN   • artificial tears ophthalmic solution 1 Drop PRN   • benzocaine-menthol (CEPACOL) lozenge 1 Lozenge Q2HRS PRN   • mag hydrox-al hydrox-simeth (MAALOX PLUS ES or MYLANTA DS) suspension 20 mL Q2HRS PRN   • ondansetron (ZOFRAN ODT) dispertab 4 mg 4X/DAY PRN    Or   • ondansetron (ZOFRAN) syringe/vial injection 4 mg 4X/DAY PRN   • sodium chloride (OCEAN) 0.65 % nasal spray 2 Spray PRN   • apixaban (ELIQUIS) tablet 5 mg BID   • lactulose 20  GM/30ML solution 30 mL QDAY PRN   • docusate sodium (ENEMEEZ) enema 283 mg QDAY PRN   • atorvastatin (LIPITOR) tablet 40 mg Q EVENING   • digoxin (LANOXIN) tablet 125 mcg DAILY AT 1800   • metoprolol (LOPRESSOR) tablet 75 mg TWICE DAILY   • albuterol inhaler 2 Puff Q4HRS PRN   • hydrocortisone 1 % cream 4X/DAY       Orders Placed This Encounter   Procedures   • Diet Order 2 Gram Sodium (forify all foods )     Standing Status:   Standing     Number of Occurrences:   1     Order Specific Question:   Diet:     Answer:   2 Gram Sodium [7]     Comments:   forify all foods      Order Specific Question:   Texture/Fiber modifications:     Answer:   Dysphagia 2(Pureed/Chopped)specify fluid consistency(question 6) [2]     Order Specific Question:   Consistency/Fluid modifications:     Answer:   [place] Thick [2]       Assessment:  Active Hospital Problems    Diagnosis   • *Cerebrovascular accident (CVA) due to embolism (HCC)   • Atrial fibrillation (HCC)   • Prostate cancer (HCC)   • Hypertension   • Systolic CHF (HCC)   • Dysarthria   • Dysphagia   • CAD (coronary artery disease)   • Rash of back     This patient is a 89 y.o. male admitted for acute inpatient rehabilitation with Cerebrovascular accident (CVA) due to embolism (HCC).    I led and attended the weekly conference, and agree with the IDT conference documentation and plan of care as noted below.    Date of conference: 1/27/2020    Goals and barriers: See IDT note.    Biggest barriers: incontinent of bowel/bladder, back rash, poor appetite, dysphagia, impaired posture with walker, shortness of breath with activity, moderate cognitive deficits, impaired attention    CM/social support: brother can only provide intermittent assistance, needs life alert    Anticipated DC date: 2/8/2020    Home health: PT/OT/SLP/RN    Equip: FWW    Follow up: PCP, rehab clinic    Medical Decision Making and Plan:    Bilateral strokes  Largest R MCA   Cardioembolic  Left pronator  drift  Left facial droop  Left lateral lean  Dysarthria  Dysphagia, improved  Cognitive deficits  Continue full rehab program  PT/OT/SLP, 1 hr each discipline, 5 days per week  Continue Eliquis and statin for secondary stroke prophylaxis  Speech therapy to advance diet  Continue Provigil for neurostimulation    Insomnia  Schedule melatonin and low dose trazodone  Increased trazodone  Start Remeron    Severe protein calorie malnutrition  Dietician consult  Supplements  Start Remeron    Bowel  Continue bowel meds  Last BM 1/28    Bladder  Checked PVRs - 65, 36  Not retaining  ICP for over 400 cc  Scheduled toileting    DVT prophylaxis  Eliquis    Appreciate the assistance of the hospitalist with his medical comorbidities:     Hypertension, metoprolol, lisinopril  Atrial fibrillation, digoxin, metoprolol  Systolic CHF  Elevated Pro-BNP  History of prostate cancer, Casadex on hold as it cannot be crushed  Coronary artery disease, aspirin  Rash on back, hydrocortisone cream    Total time:  26 minutes.  I spent greater than 50% of the time for patient care, counseling, and coordination on this date, including patient face-to face time, unit/floor time with review of records/pertinent lab data and studies, as well as discussing diagnostic evaluation/work up, planned therapeutic interventions, and future disposition of care, as per the interval events/subjective and the assessment and plan as noted above.    I have performed a physical exam, reviewed and updated ROS, as well as the assessment and plan today 1/28/2020. In review of note from 1/27/2020 there are no new changes except as documented above.          Gauri Fortune M.D.   Physical Medicine and Rehabilitation

## 2020-01-28 NOTE — ASSESSMENT & PLAN NOTE
Currently resolved  Na: 145 (1/29) --> 141 (2/1)  S/P 1/2 NS x 500 mL  S/P D5W x 500 mL   On a low salt diet (1/28)  Likely 2nd to being on a nectar thick liquid diet  Cont to monitor

## 2020-01-28 NOTE — PROGRESS NOTES
[facility] Medicine Daily Progress Note        Chief Complaint  AFib, CHF, HTN    Interval Problem Update  Back rash examined w/ RN.  Labs reviewed.    Review of Systems  Review of Systems   Constitutional: Negative for chills and fever.   HENT: Negative.    Eyes: Negative.    Respiratory: Negative for cough and shortness of breath.    Cardiovascular: Negative for chest pain and palpitations.   Gastrointestinal: Negative for abdominal pain, nausea and vomiting.   Skin: Positive for rash.   Endo/Heme/Allergies: Negative for polydipsia. Does not bruise/bleed easily.        Physical Exam  Temp:  [36.2 °C (97.1 °F)-36.7 °C (98.1 °F)] 36.7 °C (98.1 °F)  Pulse:  [] 100  Resp:  [17-18] 18  BP: (125-139)/(80-90) 132/90  SpO2:  [96 %-98 %] 98 %    Physical Exam  Vitals signs reviewed.   Constitutional:       General: He is not in acute distress.     Appearance: Normal appearance. He is not ill-appearing.   HENT:      Head: Normocephalic and atraumatic.      Right Ear: External ear normal.      Left Ear: External ear normal.      Nose: Nose normal.   Eyes:      General:         Right eye: No discharge.         Left eye: No discharge.      Extraocular Movements: Extraocular movements intact.      Conjunctiva/sclera: Conjunctivae normal.   Neck:      Musculoskeletal: Normal range of motion and neck supple.   Cardiovascular:      Rate and Rhythm: Normal rate and regular rhythm.   Pulmonary:      Effort: No respiratory distress.      Breath sounds: No wheezing.      Comments: Decreased BS  Abdominal:      General: Bowel sounds are normal. There is no distension.      Palpations: Abdomen is soft.      Tenderness: There is no tenderness.   Musculoskeletal:      Right lower leg: No edema.      Left lower leg: No edema.   Skin:     General: Skin is warm and dry.      Comments:  Back w/ fading maculopapular rash  Neurological:      Mental Status: He is alert.      Comments: awake         Fluids    Intake/Output Summary (Last 24  hours) at 1/28/2020 0119  Last data filed at 1/27/2020 2106  Gross per 24 hour   Intake 360 ml   Output --   Net 360 ml       Laboratory  Recent Labs     01/26/20  0529   WBC 9.6   RBC 4.08*   HEMOGLOBIN 13.1*   HEMATOCRIT 40.7*   MCV 99.8*   MCH 32.1   MCHC 32.2*   RDW 49.4   PLATELETCT 282   MPV 10.5     Recent Labs     01/26/20  0529 01/27/20  0621   SODIUM 147* 146*   POTASSIUM 3.7 3.5*   CHLORIDE 112 111   CO2 26 27   GLUCOSE 103* 109*   BUN 21 20   CREATININE 1.18 1.26   CALCIUM 9.2 9.4                   Assessment/Plan  * Cerebrovascular accident (CVA) due to embolism (HCC)- (present on admission)  Assessment & Plan  On Eliquis, ASA, and Lipitor  Also on Provigil    Hypokalemia  Assessment & Plan  Replete w/ KCl 40 mEq PO x 1    Azotemia  Assessment & Plan  Also has mild hypernatremia  Not improved w/ 1/2 NS x 500 mL  Will give D5W x 500 mL  Follow renal function    Rash of back  Assessment & Plan  Stopped Claritin for possible adverse effect of somnolence  Improving w/ topical steroid    CAD (coronary artery disease)  Assessment & Plan  On maximal medical management w/ ASA, Lipitor, Lisinopril, and Metoprolol    Systolic CHF (HCC)- (present on admission)  Assessment & Plan  Echo (from [facility]) EF 25-30%, mod MR, and RVSP 40-45 mmHg  BNP improving  Closely monitor volume status    Hypertension- (present on admission)  Assessment & Plan  On Lisinopril and Metoprolol  Observe blood pressure trends    Prostate cancer (HCC)- (present on admission)  Assessment & Plan  Casodex now on hold as unable to be crushed  Outpt Urology F/U    Atrial fibrillation (HCC)- (present on admission)  Assessment & Plan  S/P RVR at [facility]  On Digoxin, drug level non-toxic  Anticoagulated on Eliquis     DNR

## 2020-01-28 NOTE — THERAPY
Speech Language Pathology  Daily Treatment     Patient Name: Yousif Kimble  Age:  89 y.o., Sex:  male  Medical Record #: [id number]  Today's Date: 1/28/2020     Subjective    Pt arrived to ST group with assistance from therapy tech. Pt receiving IV fluids during session.      Objective     01/28/20 1034   Group Therapy    Group Topic Cognitive / Linguistic   Reason for Group Therapy To Increase Active Participation through Peer Pressure;To Validate Increased [place] with Skills;To Increase Patient Interaction during Physical Recovery   Group Treatment Provided Pt participated in group ST targeting attention   Interdisciplinary Plan of Care Collaboration   Patient Position at End of Therapy Seated;Self Releasing Lap Belt Applied;Chair Alarm On  (in therapeutic dining )   SLP Total Time Spent   SLP Group Total Time Spent (Mins) 45   Charge Group   Charges Yes   SLP Treatment - Group Speech Language Treatment - Group         Assessment    Pt participated in group ST targeting attention. Pt requiring tasks to be simplified to adjust for CLOF. Pt able to locate single targets (placing x in squares) with 100% accuracy given larger print and extra white space. When font size decreased to 16 with single letter cancellation pt able to locate 70% of targets on right, 0% on left. Pt with left visual attention deficits noted this date. Max cues did not increase accuracy/attention to left.     Plan    Continue to target simple attention.

## 2020-01-28 NOTE — PROGRESS NOTES
[facility] Medicine Daily Progress Note        Chief Complaint  AFib, CHF, HTN    Interval Problem Update  Back rash examined w/ RN.  Labs reviewed.    Review of Systems  Review of Systems   Constitutional: Negative for chills and fever.   HENT: Negative.    Eyes: Negative.    Respiratory: Negative for cough and shortness of breath.    Cardiovascular: Negative for chest pain and palpitations.   Gastrointestinal: Negative for abdominal pain, nausea and vomiting.   Skin: Positive for rash.   Endo/Heme/Allergies: Negative for polydipsia. Does not bruise/bleed easily.        Physical Exam  Temp:  [36.6 °C (97.9 °F)-37 °C (98.6 °F)] 37 °C (98.6 °F)  Pulse:  [] 81  Resp:  [18] 18  BP: (113-139)/(76-90) 113/89  SpO2:  [94 %-98 %] 98 %    Physical Exam  Vitals signs reviewed.   Constitutional:       General: He is not in acute distress.     Appearance: Normal appearance. He is not ill-appearing.   HENT:      Head: Normocephalic and atraumatic.      Right Ear: External ear normal.      Left Ear: External ear normal.      Nose: Nose normal.   Eyes:      General:         Right eye: No discharge.         Left eye: No discharge.      Extraocular Movements: Extraocular movements intact.      Conjunctiva/sclera: Conjunctivae normal.   Neck:      Musculoskeletal: Normal range of motion and neck supple.   Cardiovascular:      Rate and Rhythm: Normal rate and regular rhythm.   Pulmonary:      Effort: No respiratory distress.      Breath sounds: No wheezing.      Comments: Decreased BS  Abdominal:      General: Bowel sounds are normal. There is no distension.      Palpations: Abdomen is soft.      Tenderness: There is no tenderness.   Musculoskeletal:      Right lower leg: No edema.      Left lower leg: No edema.   Skin:     General: Skin is warm and dry.      Comments:  Back w/ fading maculopapular rash  Neurological:      Mental Status: He is alert.      Comments: awake         Fluids    Intake/Output Summary (Last 24 hours) at  1/28/2020 0850  Last data filed at 1/28/2020 0536  Gross per 24 hour   Intake 360 ml   Output --   Net 360 ml       Laboratory  Recent Labs     01/26/20  0529   WBC 9.6   RBC 4.08*   HEMOGLOBIN 13.1*   HEMATOCRIT 40.7*   MCV 99.8*   MCH 32.1   MCHC 32.2*   RDW 49.4   PLATELETCT 282   MPV 10.5     Recent Labs     01/26/20  0529 01/27/20  0621   SODIUM 147* 146*   POTASSIUM 3.7 3.5*   CHLORIDE 112 111   CO2 26 27   GLUCOSE 103* 109*   BUN 21 20   CREATININE 1.18 1.26   CALCIUM 9.2 9.4                   Assessment/Plan  * Cerebrovascular accident (CVA) due to embolism (HCC)- (present on admission)  Assessment & Plan  On Eliquis, ASA, and Lipitor  Also on Provigil    Hypernatremia  Assessment & Plan  Na: 144 (1/23) --> 147 (1/26) --> 146 (1/27)  S/P1/2 NS x 500 mL  On D5W x 500 mL  Cont to monitor    Hypokalemia  Assessment & Plan  K+ 3.5 (1/27)  S/P KCl 40 meq x 1  Monitor    Rash of back  Assessment & Plan  Stopped Claritin for possible adverse effect of somnolence  Improving  On topical steroid    CAD (coronary artery disease)  Assessment & Plan  On ASA  On Lipitor  On Lisinopril & Metoprolol    Systolic CHF (HCC)- (present on admission)  Assessment & Plan  Echo (from [facility]): EF 25-30%, mod MR, RVSP 40-45  BNP: 15,885 (1/23) --> 10,147 (1/26)  On Lopressor & Lisinopril    Hypertension- (present on admission)  Assessment & Plan  BP ok  On Lisinopril: 5 mg daily  On Metoprolol: 75 mg bid  Monitor    Prostate cancer (HCC)- (present on admission)  Assessment & Plan  Casodex now on hold as unable to be crushed  Outpt Urology F/U    Atrial fibrillation (HCC)- (present on admission)  Assessment & Plan  HR ok  S/P RVR at [facility]  On Digoxin  On Lopressor: 75 mg bid  On Eliquis  Monitor     DNR

## 2020-01-29 LAB
ALBUMIN SERPL BCP-MCNC: 3.4 G/DL (ref 3.2–4.9)
ALBUMIN SERPL BCP-MCNC: 3.7 G/DL (ref 3.2–4.9)
ALBUMIN/GLOB SERPL: 1.1 G/DL
ALBUMIN/GLOB SERPL: 1.1 G/DL
ALP SERPL-CCNC: 56 U/L (ref 30–99)
ALP SERPL-CCNC: 61 U/L (ref 30–99)
ALT SERPL-CCNC: 19 U/L (ref 2–50)
ALT SERPL-CCNC: 21 U/L (ref 2–50)
ANION GAP SERPL CALC-SCNC: 10 MMOL/L (ref 0–11.9)
ANION GAP SERPL CALC-SCNC: 9 MMOL/L (ref 0–11.9)
APPEARANCE UR: ABNORMAL
AST SERPL-CCNC: 23 U/L (ref 12–45)
AST SERPL-CCNC: 27 U/L (ref 12–45)
BACTERIA #/AREA URNS HPF: NEGATIVE /HPF
BASOPHILS # BLD AUTO: 0.3 % (ref 0–1.8)
BASOPHILS # BLD AUTO: 0.9 % (ref 0–1.8)
BASOPHILS # BLD: 0.03 K/UL (ref 0–0.12)
BASOPHILS # BLD: 0.11 K/UL (ref 0–0.12)
BILIRUB SERPL-MCNC: 0.7 MG/DL (ref 0.1–1.5)
BILIRUB SERPL-MCNC: 0.8 MG/DL (ref 0.1–1.5)
BILIRUB UR QL STRIP.AUTO: NEGATIVE
BUN SERPL-MCNC: 19 MG/DL (ref 8–22)
BUN SERPL-MCNC: 19 MG/DL (ref 8–22)
CALCIUM SERPL-MCNC: 10.1 MG/DL (ref 8.5–10.5)
CALCIUM SERPL-MCNC: 9.8 MG/DL (ref 8.5–10.5)
CAOX CRY #/AREA URNS HPF: ABNORMAL /HPF
CHLORIDE SERPL-SCNC: 107 MMOL/L (ref 96–112)
CHLORIDE SERPL-SCNC: 108 MMOL/L (ref 96–112)
CO2 SERPL-SCNC: 27 MMOL/L (ref 20–33)
CO2 SERPL-SCNC: 28 MMOL/L (ref 20–33)
COLOR UR: ABNORMAL
CREAT SERPL-MCNC: 1.18 MG/DL (ref 0.5–1.4)
CREAT SERPL-MCNC: 1.24 MG/DL (ref 0.5–1.4)
EOSINOPHIL # BLD AUTO: 0.26 K/UL (ref 0–0.51)
EOSINOPHIL # BLD AUTO: 0.5 K/UL (ref 0–0.51)
EOSINOPHIL NFR BLD: 2.2 % (ref 0–6.9)
EOSINOPHIL NFR BLD: 4.3 % (ref 0–6.9)
EPI CELLS #/AREA URNS HPF: NEGATIVE /HPF
ERYTHROCYTE [DISTWIDTH] IN BLOOD BY AUTOMATED COUNT: 51.3 FL (ref 35.9–50)
ERYTHROCYTE [DISTWIDTH] IN BLOOD BY AUTOMATED COUNT: 51.6 FL (ref 35.9–50)
GLOBULIN SER CALC-MCNC: 3.2 G/DL (ref 1.9–3.5)
GLOBULIN SER CALC-MCNC: 3.5 G/DL (ref 1.9–3.5)
GLUCOSE SERPL-MCNC: 102 MG/DL (ref 65–99)
GLUCOSE SERPL-MCNC: 106 MG/DL (ref 65–99)
GLUCOSE UR STRIP.AUTO-MCNC: NEGATIVE MG/DL
HCT VFR BLD AUTO: 41 % (ref 42–52)
HCT VFR BLD AUTO: 44.4 % (ref 42–52)
HGB BLD-MCNC: 13.2 G/DL (ref 14–18)
HGB BLD-MCNC: 14.6 G/DL (ref 14–18)
HYALINE CASTS #/AREA URNS LPF: ABNORMAL /LPF
IMM GRANULOCYTES # BLD AUTO: 0.04 K/UL (ref 0–0.11)
IMM GRANULOCYTES NFR BLD AUTO: 0.3 % (ref 0–0.9)
KETONES UR STRIP.AUTO-MCNC: ABNORMAL MG/DL
LEUKOCYTE ESTERASE UR QL STRIP.AUTO: ABNORMAL
LYMPHOCYTES # BLD AUTO: 4.97 K/UL (ref 1–4.8)
LYMPHOCYTES # BLD AUTO: 4.98 K/UL (ref 1–4.8)
LYMPHOCYTES NFR BLD: 42.6 % (ref 22–41)
LYMPHOCYTES NFR BLD: 42.9 % (ref 22–41)
MANUAL DIFF BLD: NORMAL
MCH RBC QN AUTO: 32.4 PG (ref 27–33)
MCH RBC QN AUTO: 33 PG (ref 27–33)
MCHC RBC AUTO-ENTMCNC: 32.2 G/DL (ref 33.7–35.3)
MCHC RBC AUTO-ENTMCNC: 32.9 G/DL (ref 33.7–35.3)
MCV RBC AUTO: 100.5 FL (ref 81.4–97.8)
MCV RBC AUTO: 100.7 FL (ref 81.4–97.8)
MICRO URNS: ABNORMAL
MONOCYTES # BLD AUTO: 0.3 K/UL (ref 0–0.85)
MONOCYTES # BLD AUTO: 0.78 K/UL (ref 0–0.85)
MONOCYTES NFR BLD AUTO: 2.6 % (ref 0–13.4)
MONOCYTES NFR BLD AUTO: 6.7 % (ref 0–13.4)
MORPHOLOGY BLD-IMP: NORMAL
NEUTROPHILS # BLD AUTO: 5.51 K/UL (ref 1.82–7.42)
NEUTROPHILS # BLD AUTO: 5.8 K/UL (ref 1.82–7.42)
NEUTROPHILS NFR BLD: 47.6 % (ref 44–72)
NEUTROPHILS NFR BLD: 49.6 % (ref 44–72)
NITRITE UR QL STRIP.AUTO: NEGATIVE
NRBC # BLD AUTO: 0 K/UL
NRBC # BLD AUTO: 0 K/UL
NRBC BLD-RTO: 0 /100 WBC
NRBC BLD-RTO: 0 /100 WBC
NT-PROBNP SERPL IA-MCNC: 8992 PG/ML (ref 0–125)
NT-PROBNP SERPL IA-MCNC: 9053 PG/ML (ref 0–125)
PH UR STRIP.AUTO: 5 [PH] (ref 5–8)
PHOSPHATE SERPL-MCNC: 2.8 MG/DL (ref 2.5–4.5)
PHOSPHATE SERPL-MCNC: 2.9 MG/DL (ref 2.5–4.5)
PLATELET # BLD AUTO: 230 K/UL (ref 164–446)
PLATELET # BLD AUTO: 249 K/UL (ref 164–446)
PLATELET BLD QL SMEAR: NORMAL
PMV BLD AUTO: 11.2 FL (ref 9–12.9)
PMV BLD AUTO: 11.4 FL (ref 9–12.9)
POTASSIUM SERPL-SCNC: 3.8 MMOL/L (ref 3.6–5.5)
POTASSIUM SERPL-SCNC: 4.7 MMOL/L (ref 3.6–5.5)
PROT SERPL-MCNC: 6.6 G/DL (ref 6–8.2)
PROT SERPL-MCNC: 7.2 G/DL (ref 6–8.2)
PROT UR QL STRIP: 100 MG/DL
RBC # BLD AUTO: 4.07 M/UL (ref 4.7–6.1)
RBC # BLD AUTO: 4.42 M/UL (ref 4.7–6.1)
RBC # URNS HPF: >150 /HPF
RBC BLD AUTO: PRESENT
RBC UR QL AUTO: ABNORMAL
SODIUM SERPL-SCNC: 144 MMOL/L (ref 135–145)
SODIUM SERPL-SCNC: 145 MMOL/L (ref 135–145)
SP GR UR STRIP.AUTO: 1.02
UROBILINOGEN UR STRIP.AUTO-MCNC: 0.2 MG/DL
VARIANT LYMPHS BLD QL SMEAR: NORMAL
WBC # BLD AUTO: 11.6 K/UL (ref 4.8–10.8)
WBC # BLD AUTO: 11.7 K/UL (ref 4.8–10.8)
WBC #/AREA URNS HPF: ABNORMAL /HPF

## 2020-01-29 PROCEDURE — 97116 GAIT TRAINING THERAPY: CPT

## 2020-01-29 PROCEDURE — 85027 COMPLETE CBC AUTOMATED: CPT

## 2020-01-29 PROCEDURE — 770010 HCHG ROOM/CARE - REHAB SEMI PRIVAT*

## 2020-01-29 PROCEDURE — 99232 SBSQ HOSP IP/OBS MODERATE 35: CPT | Performed by: HOSPITALIST

## 2020-01-29 PROCEDURE — 84100 ASSAY OF PHOSPHORUS: CPT | Mod: 91

## 2020-01-29 PROCEDURE — 700102 HCHG RX REV CODE 250 W/ 637 OVERRIDE(OP): Performed by: PHYSICAL MEDICINE & REHABILITATION

## 2020-01-29 PROCEDURE — 92526 ORAL FUNCTION THERAPY: CPT

## 2020-01-29 PROCEDURE — 99233 SBSQ HOSP IP/OBS HIGH 50: CPT | Performed by: PHYSICAL MEDICINE & REHABILITATION

## 2020-01-29 PROCEDURE — 80053 COMPREHEN METABOLIC PANEL: CPT | Mod: 91

## 2020-01-29 PROCEDURE — 83880 ASSAY OF NATRIURETIC PEPTIDE: CPT | Mod: 91

## 2020-01-29 PROCEDURE — 87086 URINE CULTURE/COLONY COUNT: CPT

## 2020-01-29 PROCEDURE — 97535 SELF CARE MNGMENT TRAINING: CPT

## 2020-01-29 PROCEDURE — 97112 NEUROMUSCULAR REEDUCATION: CPT

## 2020-01-29 PROCEDURE — A9270 NON-COVERED ITEM OR SERVICE: HCPCS | Performed by: PHYSICAL MEDICINE & REHABILITATION

## 2020-01-29 PROCEDURE — 85007 BL SMEAR W/DIFF WBC COUNT: CPT

## 2020-01-29 PROCEDURE — 97110 THERAPEUTIC EXERCISES: CPT

## 2020-01-29 PROCEDURE — 97530 THERAPEUTIC ACTIVITIES: CPT

## 2020-01-29 PROCEDURE — 85025 COMPLETE CBC W/AUTO DIFF WBC: CPT

## 2020-01-29 PROCEDURE — 97130 THER IVNTJ EA ADDL 15 MIN: CPT

## 2020-01-29 PROCEDURE — 81001 URINALYSIS AUTO W/SCOPE: CPT

## 2020-01-29 PROCEDURE — 97129 THER IVNTJ 1ST 15 MIN: CPT

## 2020-01-29 RX ORDER — FLUTICASONE PROPIONATE 50 MCG
2 SPRAY, SUSPENSION (ML) NASAL DAILY
Status: DISCONTINUED | OUTPATIENT
Start: 2020-01-29 | End: 2020-02-08 | Stop reason: HOSPADM

## 2020-01-29 RX ADMIN — MODAFINIL 100 MG: 100 TABLET ORAL at 08:36

## 2020-01-29 RX ADMIN — METOPROLOL TARTRATE 75 MG: 25 TABLET ORAL at 05:43

## 2020-01-29 RX ADMIN — MIRTAZAPINE 15 MG: 15 TABLET, ORALLY DISINTEGRATING ORAL at 20:18

## 2020-01-29 RX ADMIN — ASPIRIN 81 MG 81 MG: 81 TABLET ORAL at 08:36

## 2020-01-29 RX ADMIN — APIXABAN 5 MG: 5 TABLET, FILM COATED ORAL at 08:36

## 2020-01-29 RX ADMIN — DIGOXIN 125 MCG: 125 TABLET ORAL at 18:06

## 2020-01-29 RX ADMIN — ATORVASTATIN CALCIUM 40 MG: 40 TABLET, FILM COATED ORAL at 20:17

## 2020-01-29 RX ADMIN — SENNOSIDES AND DOCUSATE SODIUM 2 TABLET: 8.6; 5 TABLET ORAL at 08:36

## 2020-01-29 RX ADMIN — SENNOSIDES AND DOCUSATE SODIUM 2 TABLET: 8.6; 5 TABLET ORAL at 20:17

## 2020-01-29 RX ADMIN — LISINOPRIL 5 MG: 5 TABLET ORAL at 05:43

## 2020-01-29 RX ADMIN — METOPROLOL TARTRATE 75 MG: 25 TABLET ORAL at 18:06

## 2020-01-29 RX ADMIN — FLUTICASONE PROPIONATE 100 MCG: 50 SPRAY, METERED NASAL at 13:29

## 2020-01-29 RX ADMIN — APIXABAN 5 MG: 5 TABLET, FILM COATED ORAL at 20:17

## 2020-01-29 RX ADMIN — MELATONIN TAB 3 MG 3 MG: 3 TAB at 20:17

## 2020-01-29 ASSESSMENT — ENCOUNTER SYMPTOMS
NAUSEA: 0
BLURRED VISION: 0
VOMITING: 0
HALLUCINATIONS: 0
HEADACHES: 0
SHORTNESS OF BREATH: 0
PALPITATIONS: 0
FEVER: 0
DIZZINESS: 0

## 2020-01-29 NOTE — CARE PLAN
Problem: Bowel/Gastric:  Goal: Normal bowel function is maintained or improved  Note:   Pt. is incontinent of bowels. LBM 1/28/20. Bowel sounds present in all four quadrants.      Problem: Discharge Barriers/Planning  Goal: Patient's continuum of care needs will be met  Note:   Pt. Has very poor appetite. He had Boost supplement for breakfast and lunch. Encouraged patient to drink fluids, but patient just agreed to drink few sips. Will continue to monitor.

## 2020-01-29 NOTE — CARE PLAN
Problem: Communication  Goal: The ability to communicate needs accurately and effectively will improve  Outcome: PROGRESSING AS EXPECTED     Problem: Safety  Goal: Will remain free from injury  Outcome: PROGRESSING AS EXPECTED  Goal: Will remain free from falls  Outcome: PROGRESSING AS EXPECTED     Problem: Infection  Goal: Will remain free from infection  Outcome: PROGRESSING AS EXPECTED     Problem: Venous Thromboembolism (VTW)/Deep Vein Thrombosis (DVT) Prevention:  Goal: Patient will participate in Venous Thrombosis (VTE)/Deep Vein Thrombosis (DVT)Prevention Measures  Outcome: PROGRESSING AS EXPECTED     Problem: Bowel/Gastric:  Goal: Normal bowel function is maintained or improved  Outcome: PROGRESSING AS EXPECTED  Goal: Will not experience complications related to bowel motility  Outcome: PROGRESSING AS EXPECTED     Problem: Knowledge Deficit  Goal: Knowledge of disease process/condition, treatment plan, diagnostic tests, and medications will improve  Outcome: PROGRESSING AS EXPECTED  Goal: Knowledge of the prescribed therapeutic regimen will improve  Outcome: PROGRESSING AS EXPECTED     Problem: Discharge Barriers/Planning  Goal: Patient's continuum of care needs will be met  Outcome: PROGRESSING AS EXPECTED     Problem: Skin Integrity  Goal: Risk for impaired skin integrity will decrease  Outcome: PROGRESSING AS EXPECTED     Problem: Urinary Elimination:  Goal: Ability to reestablish a normal urinary elimination pattern will improve  Outcome: PROGRESSING AS EXPECTED     Problem: Respiratory:  Goal: Respiratory status will improve  Outcome: PROGRESSING AS EXPECTED     Problem: Pain Management  Goal: Pain level will decrease to patient's comfort goal  Outcome: PROGRESSING AS EXPECTED

## 2020-01-29 NOTE — THERAPY
Occupational Therapy  Daily Treatment     Patient Name: Yousif Kimble  Age:  89 y.o., Sex:  male  Medical Record #: [id number]  Today's Date: 1/29/2020     Precautions  Precautions: (P) Fall Risk, Swallow Precautions ( See Comments)  Comments: (P) Dysarthria, NTL, SOB with activity    Safety   ADL Safety : Requires Supervision for Safety, Requires Physical Assist for Safety  Bathroom Safety: Requires Supervision for Safety, Requires Physical Assist for Safety  Comments: Requires frequent rest breaks due to SOB, close SBA to min A due to L lean    Subjective    Pt received asleep in bed, required increased time to wake-up and initiate mobilty     Objective       01/29/20 0931   Precautions   Precautions Fall Risk;Swallow Precautions ( See Comments)   Comments Dysarthria, NTL, SOB with activity   IADL Treatments   Home Management Min verbal cues and SBA for safety to load laundry into washing machine   Sitting Lower Body Exercises   Sit to Stand 2 sets of 10  (no UE support, SBA)   OT Total Time Spent   OT Individual Total Time Spent (Mins) 60   OT Charge Group   OT Self Care / ADL 2   OT Neuromuscular Re-education / Balance 2     Pt doffed socks and donned clean socks and shoes seated in w/c, he was able to complete without physical assist but required consistent verbal cues for initiation and problem solving, required manual assist to bring LLE into xleg though he is physically able to do this action and did so automatically with the RLE. He required 20 minutes for completion of task with max verbal cues.     Shower transfer completed in walk-in shower over threshold, pt required SBA to step in/out with grab bars, min A using wall for support. Discussed rec of grab bar placement, possible need for shower chair. Pt reports he has a good  who can assist. Will follow up with brother.    Assessment    Pt initially lethargic and requiring increased assist for mobility but improved as session progressed. He has a  tendency to walk with small steps and a flexed posture both with and without FWW, can stand up straight with cues but does not sustain. Primary limiters continue to be decreased balance, impaired initiation, attention, problem solving.     Plan    ADLs/IADLs, incorporate energy conservation strategies into functional tasks, general strengthening/endurance training, balance, functional transfers/functional mobility, functional cognition

## 2020-01-29 NOTE — PROGRESS NOTES
"Rehab Progress Note     Date of Service: 1/29/2020  Chief Complaint: follow up stroke    Interval Events (Subjective)    Patient seen and evaluated today during his speech therapy session.  Therapist reports he is more confused and having more difficulties with swallowing today.  He did have a fall yesterday without any injuries in his room.  Patient is denying any pain.  He was oversedated yesterday possibly secondary to sleeping medications.  Remeron was also started last night.  Patient has no new complaints.  He is aware he is at the rehab hospital and that he is here because he has had a stroke.      Objective:  VITAL SIGNS: /85   Pulse 89   Temp 36.4 °C (97.6 °F) (Temporal)   Resp 18   Ht 1.727 m (5' 8\")   Wt 64 kg (141 lb)   SpO2 91%   BMI 21.44 kg/m²   Gen: alert, no apparent distress  HEENT: nasal drip  CV: regular rate and rhythm, no murmurs, no peripheral edema  Resp: clear to ascultation bilaterally, normal respiratory effort  GI: soft, non-tender abdomen, bowel sounds present  Neuro: notable for dysarthria, left facial droop      Recent Results (from the past 72 hour(s))   Basic Metabolic Panel    Collection Time: 01/27/20  6:21 AM   Result Value Ref Range    Sodium 146 (H) 135 - 145 mmol/L    Potassium 3.5 (L) 3.6 - 5.5 mmol/L    Chloride 111 96 - 112 mmol/L    Co2 27 20 - 33 mmol/L    Glucose 109 (H) 65 - 99 mg/dL    Bun 20 8 - 22 mg/dL    Creatinine 1.26 0.50 - 1.40 mg/dL    Calcium 9.4 8.5 - 10.5 mg/dL    Anion Gap 8.0 0.0 - 11.9   ESTIMATED GFR    Collection Time: 01/27/20  6:21 AM   Result Value Ref Range    GFR If African American >60 >60 mL/min/1.73 m 2    GFR If Non African American 54 (A) >60 mL/min/1.73 m 2       Current Facility-Administered Medications   Medication Frequency   • fluticasone (FLONASE) nasal spray 100 mcg DAILY   • mirtazapine (REMERON) orally disintegrating tab 15 mg QHS   • melatonin tablet 3 mg QHS   • modafinil (PROVIGIL) tablet 100 mg QAM   • lisinopril " (PRINIVIL) tablet 5 mg Q DAY   • aspirin (ASA) chewable tab 81 mg DAILY   • Respiratory Care per Protocol Continuous RT   • Pharmacy Consult Request ...Pain Management Review 1 Each PHARMACY TO DOSE   • acetaminophen (TYLENOL) tablet 650 mg Q4HRS PRN   • senna-docusate (PERICOLACE or SENOKOT S) 8.6-50 MG per tablet 2 Tab BID    And   • polyethylene glycol/lytes (MIRALAX) PACKET 1 Packet QDAY PRN    And   • magnesium hydroxide (MILK OF MAGNESIA) suspension 30 mL QDAY PRN    And   • bisacodyl (DULCOLAX) suppository 10 mg QDAY PRN   • artificial tears ophthalmic solution 1 Drop PRN   • benzocaine-menthol (CEPACOL) lozenge 1 Lozenge Q2HRS PRN   • mag hydrox-al hydrox-simeth (MAALOX PLUS ES or MYLANTA DS) suspension 20 mL Q2HRS PRN   • ondansetron (ZOFRAN ODT) dispertab 4 mg 4X/DAY PRN    Or   • ondansetron (ZOFRAN) syringe/vial injection 4 mg 4X/DAY PRN   • sodium chloride (OCEAN) 0.65 % nasal spray 2 Spray PRN   • apixaban (ELIQUIS) tablet 5 mg BID   • lactulose 20 GM/30ML solution 30 mL QDAY PRN   • docusate sodium (ENEMEEZ) enema 283 mg QDAY PRN   • atorvastatin (LIPITOR) tablet 40 mg Q EVENING   • digoxin (LANOXIN) tablet 125 mcg DAILY AT 1800   • metoprolol (LOPRESSOR) tablet 75 mg TWICE DAILY   • albuterol inhaler 2 Puff Q4HRS PRN       Orders Placed This Encounter   Procedures   • Diet Order 2 Gram Sodium (forify all foods )     Standing Status:   Standing     Number of Occurrences:   1     Order Specific Question:   Diet:     Answer:   2 Gram Sodium [7]     Comments:   forify all foods      Order Specific Question:   Texture/Fiber modifications:     Answer:   Dysphagia 2(Pureed/Chopped)specify fluid consistency(question 6) [2]     Order Specific Question:   Consistency/Fluid modifications:     Answer:   Sutton Thick [2]       Assessment:  Active Hospital Problems    Diagnosis   • *Cerebrovascular accident (CVA) due to embolism (HCC)   • Atrial fibrillation (HCC)   • Prostate cancer (HCC)   • Hypertension   •  Systolic CHF (HCC)   • Dysarthria   • Dysphagia   • CAD (coronary artery disease)   • Rash of back     This patient is a 89 y.o. male admitted for acute inpatient rehabilitation with Cerebrovascular accident (CVA) due to embolism (HCC).    I led and attended the weekly conference, and agree with the IDT conference documentation and plan of care as noted below.    Date of conference: 1/27/2020    Goals and barriers: See IDT note.    Biggest barriers: incontinent of bowel/bladder, back rash, poor appetite, dysphagia, impaired posture with walker, shortness of breath with activity, moderate cognitive deficits, impaired attention    CM/social support: brother can only provide intermittent assistance, needs life alert    Anticipated DC date: 2/8/2020    Home health: PT/OT/SLP/RN    Equip: FWW    Follow up: PCP, rehab clinic    Medical Decision Making and Plan:    Bilateral strokes  Largest R MCA   Cardioembolic  Left pronator drift  Left facial droop  Left lateral lean  Dysarthria  Dysphagia, improved  Cognitive deficits  Continue full rehab program  PT/OT/SLP, 1 hr each discipline, 5 days per week  Continue Eliquis and statin for secondary stroke prophylaxis  Speech therapy to advance diet  Continue Provigil for neurostimulation    No new focal deficits, today, but is worse functionally. Check labs STAT. If any new neurological changes will get Head CT as patient is on Eliquis and had unwitnessed fall yesterday. Discussed with Dr. Cody.     Insomnia  Schedule melatonin and low dose trazodone  Increased trazodone, too sedated, discontinue  Started Remeron 1/28    Severe protein calorie malnutrition  Dietician consult  Supplements  Started Remeron 1/28    Bowel  Continue bowel meds  Last BM 1/28    Bladder  Checked PVRs - 65, 36  Not retaining  ICP for over 400 cc  Scheduled toileting    DVT prophylaxis  Eliquis    Appreciate the assistance of the hospitalist with his medical comorbidities:     Hypertension, metoprolol,  lisinopril  Atrial fibrillation, digoxin, metoprolol  Systolic CHF, EF 25%  Elevated Pro-BNP, 42021 --> 14317  History of prostate cancer, Casadex on hold as it cannot be crushed  Coronary artery disease, aspirin  Rash on back, improved, s/p hydrocortisone cream    Total time:  37 minutes.  I spent greater than 50% of the time for patient care, counseling, and coordination on this date, including patient face-to face time, unit/floor time with review of records/pertinent lab data and studies, as well as discussing diagnostic evaluation/work up, planned therapeutic interventions, and future disposition of care, as per the interval events/subjective and the assessment and plan as noted above.      I have performed a physical exam, reviewed and updated ROS, as well as the assessment and plan today 1/29/2020. In review of note from 1/28/2020 there are no new changes except as documented above.    Gauri Fortune M.D.   Physical Medicine and Rehabilitation

## 2020-01-29 NOTE — THERAPY
01/29/20 1329   Precautions   Precautions Fall Risk;Swallow Precautions ( See Comments);Other (See Comments)   Comments Dysarthria,  NTL, SOB with activity   Pain 0 - 10 Group   Therapist Pain Assessment 0   Cognition    Speech/ Communication Delayed Responses   Level of Consciousness Alert   Ability To Follow Commands 1 Step   Safety Awareness Impaired;Impulsive   New Learning Impaired   Attention Impaired   Sequencing Impaired   Initiation Impaired   Sitting Lower Body Exercises   Nustep Resistance Level 4  (34 steps per minute)   Bed Mobility    Supine to Sit Stand by Assist   Sit to Supine Stand by Assist   Sit to Stand Stand by Assist   Scooting Stand by Assist   Neuro-Muscular Treatments   Neuro-Muscular Treatments Sequencing;Tactile Cuing;Tapping;Verbal Cuing   Comments Sequencing sit to/from stand, sequencing gait with a fww for posture, pathfinding and attention   PT Total Time Spent   PT Individual Total Time Spent (Mins) 60   PT Charge Group   PT Gait Training 2   PT Therapeutic Exercise 1   PT Therapeutic Activities 1   Physical Therapy   Daily Treatment     Patient Name: Yousif Kimble  Age:  89 y.o., Sex:  male  Medical Record #: [id number]  Today's Date: 1/29/2020     Precautions  Precautions: Fall Risk, Swallow Precautions ( See Comments), Other (See Comments)  Comments: Dysarthria,  NTL, SOB with activity    Subjective    The patient agreed to PT.  He said he felt okay without complaints of lightheadedness.     Objective    The patient participated in practicing sequencing sit to/from stand and sequencing gait with a fww.  He tolerated 120 FT x3 CGA with verbal cues for pathfinding.  He also utilized the NuStep for 16 minutes.  Bed/chair transfers are improving.    FIM Bed/Chair/Wheelchair Transfers Score: 5 - Standby Prompting/Supervision or Set-up  Bed/Chair/Wheelchair Transfers Description:  Increased time, Supervision for safety, Verbal cueing, Set-up of equipment(CGA, stand-pivot)    FIM Walking  Score:  2 - Max Assistance  Walking Description:  Extra time, Supervision for safety, Verbal cueing, Walker, Requires incidental assist(FWW,  FT x3)    FIM Wheelchair Score:  2 - Max Assistance  Wheelchair Description:       FIM Stairs Score:     Stairs Description:         Assessment    The patient's tolerance for activity and shortness of breath are improving slowly.  Forward posture when walking is a longstanding problem which he reports being related to spine surgery.    Plan    Bed mobility and transfer training, safety education, be sure bed and chair alarms are in place, therapeutic exercise, gait training

## 2020-01-29 NOTE — THERAPY
01/28/20 1459   Precautions   Precautions Fall Risk;Swallow Precautions ( See Comments)   Comments Dysarthria, NTL, puréed, SOB with activity   Pain 0 - 10 Group   Therapist Pain Assessment 0   Cognition    Level of Consciousness Alert   Ability To Follow Commands 1 Step   Safety Awareness Impaired;Impulsive   Attention Impaired   Sequencing Impaired   Initiation Impaired   Bed Mobility    Supine to Sit Stand by Assist   Sit to Supine Stand by Assist   Sit to Stand Contact Guard Assist   Scooting Stand by Assist   Neuro-Muscular Treatments   Neuro-Muscular Treatments Sequencing;Tactile Cuing;Tapping;Verbal Cuing   Comments Sequencing bed mobility supine to/from sit and sit to stand and transfer to the wheelchair, sequencing gait with a fww with attention to posture, pace and stability   PT Total Time Spent   PT Individual Total Time Spent (Mins) 60   PT Charge Group   PT Gait Training 2   PT Therapeutic Activities 2   Physical Therapy   Daily Treatment     Patient Name: Yousif Kimble  Age:  89 y.o., Sex:  male  Medical Record #: [id number]  Today's Date: 1/28/2020     Precautions  Precautions: Fall Risk, Swallow Precautions ( See Comments)  Comments: Dysarthria, NTL, puréed, SOB with activity    Subjective    The patient was sleeping in bed and he agreed to PT.     Objective    The patient participated in bed mobility and transfer to the wheelchair, sequencing sit to/from stand wheelchair/fww, sequencing gait with a fww with emphasis on posture and safety.  He tolerated 125 FT x4.  In preparation for gait training the patient also put on his shoes and socks with minimal assistance.  He required assistance to tie the shoes    FIM Bed/Chair/Wheelchair Transfers Score: 5 - Standby Prompting/Supervision or Set-up  Bed/Chair/Wheelchair Transfers Description:  Increased time, Supervision for safety, Verbal cueing, Set-up of equipment(SBA supine to/from sit, squat pivot bed/wheelchair)    FIM Walking Score:  2 - Max  Assistance  Walking Description:  Extra time, Safety concerns, Verbal cueing, Supervision for safety, Walker, Requires incidental assist(FWW, intermittent CGA, 125 FT x4)    FIM Wheelchair Score:  2 - Max Assistance  Wheelchair Description:       FIM Stairs Score:     Stairs Description:         Assessment    The patient is pleasant and cooperative and participates in PT.  He is limited by fatigue and tolerance for activity.  Emphasis is required for safety and using the call light when he is in his room.  A short time before this afternoon PT session the patient fell to the floor when he attempted to get back to bed to rest.    Plan    Safety education, bed mobility and transfer training, gait training, therapeutic exercise

## 2020-01-30 PROBLEM — D72.829 LEUKOCYTOSIS: Status: ACTIVE | Noted: 2020-01-30

## 2020-01-30 PROCEDURE — 99232 SBSQ HOSP IP/OBS MODERATE 35: CPT | Performed by: HOSPITALIST

## 2020-01-30 PROCEDURE — A9270 NON-COVERED ITEM OR SERVICE: HCPCS | Performed by: PHYSICAL MEDICINE & REHABILITATION

## 2020-01-30 PROCEDURE — 97112 NEUROMUSCULAR REEDUCATION: CPT

## 2020-01-30 PROCEDURE — 700102 HCHG RX REV CODE 250 W/ 637 OVERRIDE(OP): Performed by: PHYSICAL MEDICINE & REHABILITATION

## 2020-01-30 PROCEDURE — 97116 GAIT TRAINING THERAPY: CPT

## 2020-01-30 PROCEDURE — 770010 HCHG ROOM/CARE - REHAB SEMI PRIVAT*

## 2020-01-30 PROCEDURE — 92526 ORAL FUNCTION THERAPY: CPT

## 2020-01-30 PROCEDURE — 97535 SELF CARE MNGMENT TRAINING: CPT

## 2020-01-30 PROCEDURE — 97129 THER IVNTJ 1ST 15 MIN: CPT

## 2020-01-30 PROCEDURE — 99232 SBSQ HOSP IP/OBS MODERATE 35: CPT | Performed by: PHYSICAL MEDICINE & REHABILITATION

## 2020-01-30 PROCEDURE — 97130 THER IVNTJ EA ADDL 15 MIN: CPT

## 2020-01-30 RX ADMIN — LISINOPRIL 5 MG: 5 TABLET ORAL at 05:42

## 2020-01-30 RX ADMIN — FLUTICASONE PROPIONATE 100 MCG: 50 SPRAY, METERED NASAL at 09:23

## 2020-01-30 RX ADMIN — ASPIRIN 81 MG 81 MG: 81 TABLET ORAL at 09:19

## 2020-01-30 RX ADMIN — MIRTAZAPINE 15 MG: 15 TABLET, ORALLY DISINTEGRATING ORAL at 20:30

## 2020-01-30 RX ADMIN — METOPROLOL TARTRATE 75 MG: 25 TABLET ORAL at 05:42

## 2020-01-30 RX ADMIN — DIGOXIN 125 MCG: 125 TABLET ORAL at 16:59

## 2020-01-30 RX ADMIN — APIXABAN 5 MG: 5 TABLET, FILM COATED ORAL at 09:19

## 2020-01-30 RX ADMIN — MODAFINIL 100 MG: 100 TABLET ORAL at 09:19

## 2020-01-30 RX ADMIN — APIXABAN 5 MG: 5 TABLET, FILM COATED ORAL at 20:30

## 2020-01-30 RX ADMIN — MELATONIN TAB 3 MG 3 MG: 3 TAB at 20:30

## 2020-01-30 RX ADMIN — METOPROLOL TARTRATE 75 MG: 25 TABLET ORAL at 16:59

## 2020-01-30 RX ADMIN — SENNOSIDES AND DOCUSATE SODIUM 2 TABLET: 8.6; 5 TABLET ORAL at 09:19

## 2020-01-30 RX ADMIN — SENNOSIDES AND DOCUSATE SODIUM 2 TABLET: 8.6; 5 TABLET ORAL at 20:30

## 2020-01-30 RX ADMIN — ATORVASTATIN CALCIUM 40 MG: 40 TABLET, FILM COATED ORAL at 20:30

## 2020-01-30 ASSESSMENT — ENCOUNTER SYMPTOMS
DIZZINESS: 0
NERVOUS/ANXIOUS: 0
DIARRHEA: 0
COUGH: 0
FEVER: 0
BLURRED VISION: 0

## 2020-01-30 NOTE — DISCHARGE PLANNING
Met with patient's brother Ward.  He discussed patient's nutrition and how long it has been with patient not eating normally.  He described to me the routine at home with meals.  Even though patient was not a big eater, his brother was able to get him to eat and prepared meals for them both.  I have relayed his concerns to physician and she plans to meet with him.

## 2020-01-30 NOTE — ASSESSMENT & PLAN NOTE
Currently resolved  WBC's: 11.7 (1/29) --> 10.4 (2/1)  Has been afebrile  U/A (-)  No diarrhea  No cough  Monitor for any aspiration (has some dysphagia)

## 2020-01-30 NOTE — PROGRESS NOTES
Received shift report and assumed care of patient.  Patient awake, calm and stable, currently in shower, staff in room call light within reach.  Denies pain or discomfort at this time.  Will continue to monitor.

## 2020-01-30 NOTE — PROGRESS NOTES
Hospital Medicine Daily Progress Note        Chief Complaint:  Hypertension  Afib  CHF    Interval History:  No significant events or changes since last visit    Review of Systems  Review of Systems   Constitutional: Negative for fever.   Eyes: Negative for blurred vision.   Respiratory: Negative for cough.    Cardiovascular: Negative for chest pain.   Gastrointestinal: Negative for diarrhea.   Musculoskeletal: Negative for joint pain.   Neurological: Negative for dizziness.   Psychiatric/Behavioral: The patient is not nervous/anxious.         Physical Exam  Temp:  [36.2 °C (97.1 °F)-36.8 °C (98.2 °F)] 36.4 °C (97.6 °F)  Pulse:  [67-86] 86  Resp:  [17-18] 17  BP: (111-147)/(77-94) 133/84  SpO2:  [93 %] 93 %    Physical Exam  Vitals signs and nursing note reviewed.   Constitutional:       Appearance: He is not diaphoretic.   HENT:      Mouth/Throat:      Pharynx: No oropharyngeal exudate or posterior oropharyngeal erythema.   Eyes:      Extraocular Movements: Extraocular movements intact.   Neck:      Vascular: No carotid bruit.   Cardiovascular:      Rate and Rhythm: Normal rate and regular rhythm.      Heart sounds: No murmur.   Pulmonary:      Effort: Pulmonary effort is normal.      Breath sounds: Normal breath sounds. No stridor.   Abdominal:      General: Bowel sounds are normal. There is no distension.      Palpations: Abdomen is soft.   Musculoskeletal:      Right lower leg: No edema.      Left lower leg: No edema.   Skin:     General: Skin is warm and dry.      Findings: No rash.   Neurological:      Mental Status: He is alert and oriented to person, place, and time.   Psychiatric:         Mood and Affect: Mood normal.         Behavior: Behavior normal.         Fluids    Intake/Output Summary (Last 24 hours) at 1/30/2020 0819  Last data filed at 1/30/2020 0600  Gross per 24 hour   Intake 710 ml   Output 350 ml   Net 360 ml       Laboratory  Recent Labs     01/29/20  1240 01/29/20  1700   WBC 11.6* 11.7*    RBC 4.42* 4.07*   HEMOGLOBIN 14.6 13.2*   HEMATOCRIT 44.4 41.0*   .5* 100.7*   MCH 33.0 32.4   MCHC 32.9* 32.2*   RDW 51.6* 51.3*   PLATELETCT 249 230   MPV 11.4 11.2     Recent Labs     01/29/20  1240 01/29/20  1700   SODIUM 144 145   POTASSIUM 4.7 3.8   CHLORIDE 107 108   CO2 28 27   GLUCOSE 106* 102*   BUN 19 19   CREATININE 1.24 1.18   CALCIUM 10.1 9.8                   Assessment/Plan  * Cerebrovascular accident (CVA) due to embolism (HCC)- (present on admission)  Assessment & Plan  On Eliquis, ASA, and Lipitor  Also on Provigil    Leukocytosis  Assessment & Plan  WBC's: 11.7 (1/29)  Has been afebrile  U/A (-)  No diarrhea  No cough  Monitor for any aspiration (has some dysphagia)    Hypernatremia  Assessment & Plan  Currently resolved (but still high normal)  Na: 145 (1/29)  S/P 1/2 NS x 500 mL  S/P D5W x 500 mL   On a low salt diet (1/28)  Likely 2nd to being on a nectar thick liquid diet  Cont to monitor    Rash of back  Assessment & Plan  Resolved  S/P topical steroid (1/28)  Monitor    CAD (coronary artery disease)  Assessment & Plan  On ASA  On Lipitor  On Lisinopril & Metoprolol    Systolic CHF (HCC)- (present on admission)  Assessment & Plan  Echo (from [facility]): EF 25-30%, mod MR, RVSP 40-45  BNP: 15,885 (1/23) --> 10,147 (1/26) --> 8892 (1/29)  On Lopressor & Lisinopril    Hypertension- (present on admission)  Assessment & Plan  BP ok  On Lisinopril: 5 mg daily  On Metoprolol: 75 mg bid  Monitor    Prostate cancer (HCC)- (present on admission)  Assessment & Plan  Casodex now on hold as unable to be crushed  Outpt Urology F/U    Atrial fibrillation (HCC)- (present on admission)  Assessment & Plan  HR ok  S/P RVR at [facility]  On Digoxin  On Lopressor: 75 mg bid  On Eliquis  Monitor

## 2020-01-30 NOTE — THERAPY
Speech Language Pathology  Daily Treatment     Patient Name: Yousif Kimble  Age:  89 y.o., Sex:  male  Medical Record #: [id number]  Today's Date: 1/30/2020     Subjective    Patient's responses are not as delayed as yesterday, and has returned to his typical performance.  He was agreeable to therapy.     Objective       01/30/20 1001   Cognition   Simple Attention Minimal (4)   Orientation  Moderate (3)   Visual Scanning / Cancellation Skills Moderate (3)   Prospective Memory Severe (2)   Functional Memory Activities Severe (2)   Insight into Deficits Severe (2)   Planning / Decision Making Severe (2)   Dysphagia    Other Treatments Therapeutic trial of thin liquids via teaspoon 5 ml.   Diet / Liquid Recommendation Nectar Thick Liquid;Dysphagia II   Nutritional Liquid Intake Rating Scale 2   Nutritional Food Intake Rating Scale 5   SLP Total Time Spent   SLP Individual Total Time Spent (Mins) 60   Charge Group   SLP Swallowing Dysfunction Treatment Swallowing Dysfunction Treatment   SLP Cognitive Skill Development First 15 Minutes 1   SLP Cognitive Skill Development Additional 15 Minutes 1           Assessment    Patient was seen for thin liquid trials by teaspoon, in isolation, after oral care completed.  Patient achieved 45 ( swallows of water and saliva swallows combined)with 2 coughs post swallow.  Patient max cues for bolus control and to initiate double swallows.  He achieved double swallows 70% of opportunity  With verbal cues allone.  Needed models for effort full cough to facilitate second swallows for remainder.    Patient has recent elevated WBC. He received trials of thin liquid with meal 2 days ago with poor ablitily to self monitor to carryout compensatory swallow strategies..  Patient is at risk for silent aspiration.  Recommend trials of water in isolation only.  Mod cues needed to scan left in large print, single line, single letter scan task.    Plan    Target safe swallow with 5 ml amounts of thin  water in isolation, recall of safety sequencing, visual scanning.

## 2020-01-30 NOTE — THERAPY
"Physical Therapy   Daily Treatment     Patient Name: Yousif Kimble  Age:  89 y.o., Sex:  male  Medical Record #: [id number]  Today's Date: 1/30/2020     Precautions  Precautions: Fall Risk, Swallow Precautions ( See Comments)  Comments: Dysarthria, NTL SOB w/ activity    Subjective    Pt in bed, with encouragement, pt agreeble to therapy.     Objective       01/30/20 1301   Precautions   Precautions Fall Risk;Swallow Precautions ( See Comments)   Comments Dysarthria, NTL SOB w/ activity   Cognition    Ability To Follow Commands 1 Step   Safety Awareness Impaired;Impulsive   Attention Impaired   Sequencing Impaired   Initiation Impaired   Comments Requires 1 step commands for follow through with correct  sequencing.  Pt very disengaged and poor insight into deficits.  REviewed with pt during session his goals, pt stating \"I could go home right now and take care of myself.\"  When asked what he'd do if he fell, \"I'd get up.\"   Neuro-Muscular Treatments   Neuro-Muscular Treatments Sequencing;Verbal Cuing;Weight Shift Right;Weight Shift Left   Comments Toilet transfer performed with CGA, consistent cues for all sequencing.  Toileting required maxA for pants up/down and hygiene.  Pt able to assist partially with pants up/down.  Pt practiced reaching down to ground for pants x2 reps with Haleigh for balance.  Oral hygiene, pt rinsed mouth, required SPV for safety to only use thickened water.  Pt has residue in mouth that he spit out with rinsing.  Using washcloth, required max cues for L side attention to mouth and mustache.     Interdisciplinary Plan of Care Collaboration   IDT Collaboration with  Nursing   Collaboration Comments re: discussed removal of chemo precuation sign outside of room d/t d/c home chemo med at this time.   PT Total Time Spent   PT Individual Total Time Spent (Mins) 60   PT Charge Group   PT Gait Training 2   PT Neuromuscular Re-Education / Balance 2       FIM Bed/Chair/Wheelchair Transfers Score: 4 - " Minimal Assistance  Bed/Chair/Wheelchair Transfers Description:  (Sup>sit EOM Haleigh for scooting L hip to EOB and finding midline posture.  Pt transferred bed>w/c SPT CGA, cues for sequence and safety.)    FIM Walking Score:  2 - Max Assistance  Walking Description:  (Pt amb over level tile x130' CGA with FWW max cues for upright posture, picking up feet and safety awareness with walker management.  Pt requires max cues for each STS sequence.)    Assessment    Time spent doing functional mobility tasks with pt, required consistent cues for safety with all tasks.  Pt has very limited insight into deficits and limited engagement with therapy.    Plan    Transfer training/sequencing, gait with FWW, strengthening, endurance training

## 2020-01-30 NOTE — REHAB-DIETARY IDT TEAM NOTE
Dietary   Nutrition  Dietary Problems     Problem: Inadequate nutrient intake     Description:     Goal: Patient to consume greater than or equal to 50% of meals     Description:                   Nutrition Services: Update   Day 8 of admit.  Yousif Kimble is a 89 y.o. 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 Average PO intake since 1/27/20 of his meals has been poor at 18%. Pt is also receiving a high protein milkshake made with 2 ice creams and 1 Boost VHC which is providing 760 kcals and 27 gm protein. This provides 2280 kcals and 81 gm protein per day. Kcal and protein needs are most likely met with intake of his milkshakes.  Overall intake of milkshakes has been good with 100% recorded since 1/27/20. Yesterday, however, intake of milkshake was only 15% at breakfast. MD reported that pt was more sleepy, confused and had more difficulty swallowing on 1/29/20. This may have caused diminished intake of his milkshake. Pt was eating breakfast during RD visit today. Very little was consumed off of his meal tray. He was drinking his milkshake, though, and CNA later told this RD that he drank ~70% of it. CNA and dietary staff said that pt does not like to eat anything. When dietary visits him, he will say yes to everything offered. CNA has not noticed any specific food items that he will consistently eat besides his milkshake.     Wt : 1/26/20 kg via 63.96 scale - standing. Current weight is down by 2 kg (3%) x 4 days. Weights taken on different scales which can impact accuracy of weight trend.    Labs: 1/29/20: WBC=11.7 (H) per hospitalist, pt is afebrile, U/A (-), no diarrhea or cough    Meds: Remeron (may improve appetite but MD is associating this with his sleepiness yesterday)    Malnutrition Risk: (from RD note dated 1/27/20) patient likely meets criteria for chronic/severe malnutrition with intake <75% of nutrient  needs >1 month, patient's cachectic/ frail appearance with bony prominences visible, absence of subcutaneous body fat to upper and lower extremities; unable to quantify weight loss      Recommendations/Plan:  1. Continue with diet and milkshakes as ordered.    2. Encourage intake of meals  3. Document intake of all meals as % taken in ADL's to provide interdisciplinary communication across all shifts.   4. Monitor weight.  5. Nutrition rep will continue to see patient for ongoing meal and snack preferences.    RD following      Section completed by:  Bernadette Joy R.D.

## 2020-01-30 NOTE — CARE PLAN
Problem: Infection  Goal: Will remain free from infection  Note:   UA was collected as ordered. Urine was dark and concentrated. Physician notified.      Problem: Pain Management  Goal: Pain level will decrease to patient's comfort goal  Note:   Patient denies pain. No sign of discomfort noted. Will monitor throughout the shift.

## 2020-01-30 NOTE — THERAPY
Occupational Therapy  Daily Treatment     Patient Name: Yousif Kimble  Age:  89 y.o., Sex:  male  Medical Record #: [id number]  Today's Date: 1/30/2020     Precautions  Precautions: Fall Risk, Swallow Precautions ( See Comments)  Comments: Dysarthria,  NTL, SOB with activity    Safety   ADL Safety : Requires Physical Assist for Safety  Bathroom Safety: Requires Supervision for Safety  Comments: Requires frequent rest breaks due to SOB, close SBA to min A due to L lean    Subjective    Patient asleep in bed, but arousable upon arrival; agreeable to participate in OT.      Objective     01/30/20 0701   Precautions   Precautions Fall Risk;Swallow Precautions ( See Comments)   Comments Dysarthria,  NTL, SOB with activity   Safety    ADL Safety  Requires Physical Assist for Safety   Bathroom Safety Requires Supervision for Safety   Cognition    Orientation Level Not Oriented to Month;Not Oriented to Day   Level of Consciousness Alert   New Learning Impaired   Attention Impaired   Initiation Impaired   Interdisciplinary Plan of Care Collaboration   Patient Position at End of Therapy Seated;Self Releasing Lap Belt Applied  (in T-Dine for breakfast)   OT Total Time Spent   OT Individual Total Time Spent (Mins) 60   OT Charge Group   OT Self Care / ADL 4     FIM Grooming Score:  5 - Standby Prompting/Supervision or Set-up  Grooming Description:  Set-up of equipment, Increased time, Verbal cueing(Patient brushed teeth with set-up and VCs to turn off faucet while seated in w/c.)    FIM Bathing Score:  5 - Standby Prompting/Supervision or Set-up  Bathing Description:  Grab bar, Tub bench, Hand rails, Hand held shower, Increased time, Supervision for safety, Verbal cueing, Set-up of equipment(Patient completed shower with SBA while incorporating sitting and standing (with use of grab bar for standing balance/safety). Patient needed VCs for how to turn warm water on and to get hair wet prior to applying shampoo.)    FIM Upper Body  Dressin - Standby Prompting/Supervision or Set-up  Upper Body Dressing Description:  Increased time, Supervision for safety, Verbal cueing, Set-up of equipment(Patient [facility] [place] gown with set-up and donned t-shirt/long-sleeve shirt with set-up and mod VCs for clothing orientation (due to patient attempting to don LUE through neck hole of t-shirt and long-sleeve shirt))    FIM Lower Body Dressing Score:  4 - Minimal Assistance  Lower Body Dressing Description:  Increased time, Supervision for safety, Verbal cueing, Set-up of equipment(Patient donned underwear, elastic waist pants, socks and lace shoes with min assist and VCs (for balance/safety, clothing orientation, and AE to facilitate independence with donning lace shoes))    FIM Tub/Shower Transfers Score:  4 - Minimal Assistance  Tub/Shower Transfers Description:  Grab bar, Increased time, Supervision for safety, Verbal cueing, Set-up of equipment(CGA to perform wc<>fold-down shower bench txfr with use of grab bar (for safety and balance))    Assessment    Patient tolerated OT session well with focus on progression well ADL independence. Patient requires increased time to initiate and complete ADL tasks and VCs for functional problem solving. Additionally, patient requires verbal and tactile cues due to flexed posture in standing.     Plan    ADLs/IADLs, incorporate energy conservation strategies into functional tasks, general strengthening/endurance training, balance, functional transfers/functional mobility, functional cognition

## 2020-01-30 NOTE — CARE PLAN
Problem: Safety  Goal: Will remain free from injury  Outcome: PROGRESSING AS EXPECTED  Goal: Will remain free from falls  Outcome: PROGRESSING AS EXPECTED     Problem: Infection  Goal: Will remain free from infection  Outcome: PROGRESSING AS EXPECTED     Problem: Venous Thromboembolism (VTW)/Deep Vein Thrombosis (DVT) Prevention:  Goal: Patient will participate in Venous Thrombosis (VTE)/Deep Vein Thrombosis (DVT)Prevention Measures  Outcome: PROGRESSING AS EXPECTED     Problem: Bowel/Gastric:  Goal: Normal bowel function is maintained or improved  Outcome: PROGRESSING AS EXPECTED  Goal: Will not experience complications related to bowel motility  Outcome: PROGRESSING AS EXPECTED     Problem: Discharge Barriers/Planning  Goal: Patient's continuum of care needs will be met  Outcome: PROGRESSING AS EXPECTED     Problem: Skin Integrity  Goal: Risk for impaired skin integrity will decrease  Outcome: PROGRESSING AS EXPECTED     Problem: Urinary Elimination:  Goal: Ability to reestablish a normal urinary elimination pattern will improve  Outcome: PROGRESSING AS EXPECTED     Problem: Respiratory:  Goal: Respiratory status will improve  Outcome: PROGRESSING AS EXPECTED     Problem: Pain Management  Goal: Pain level will decrease to patient's comfort goal  Outcome: PROGRESSING AS EXPECTED

## 2020-01-30 NOTE — THERAPY
Speech Language Pathology  Daily Treatment     Patient Name: Yousif Kimble  Age:  89 y.o., Sex:  male  Medical Record #: [id number]  Today's Date: 1/29/2020     Subjective    Patient had unwitnessed fall over night. Patient slower to respond, showing more difficulty and less responsiveness bringing gaze to the left.       Objective   01/29/20 1031   Cognition   Simple Attention Moderate (3)   Orientation  Moderate (3)   Visual Scanning / Cancellation Skills Severe (2)   Prospective Memory Severe (2)   Functional Memory Activities Severe (2)   Insight into Deficits Severe (2)   Planning / Decision Making Severe (2)   Dysphagia    Other Treatments therapeutic trials of thin liquids by spoon x3 and by cup sip x2.   Diet / Liquid Recommendation Nectar Thick Liquid;Dysphagia II   Nutritional Liquid Intake Rating Scale 2   Nutritional Food Intake Rating Scale 5   SLP Total Time Spent   SLP Individual Total Time Spent (Mins) 60   Charge Group   SLP Swallowing Dysfunction Treatment Swallowing Dysfunction Treatment   SLP Cognitive Skill Development First 15 Minutes 1   SLP Cognitive Skill Development Additional 15 Minutes 1     Assessment    Showing decline in performance today.  With increased delay of response, with extra prompts needed, gazing to right with increased cuing needed to look to left.  Patient assessed with therapeutic trials of thin liquids by spoon x3 and by cup sip x2.  He displayed increased discoordination with oral transport and sequencing of swallow, and delayed coughing, voice change.  Discontinued thin liquid trials for this session.  Patient worked on large print single letter visual scanning with 50% acc and mod to max A to locate additional targets.  Patient showed impaired organization and right side compression of numerals on clock drawing.  He was not able to draw in the hands. He had previously achieved a [facility].    Plan    Target therapeutic trials of thin  liquids in isolation.  ATtention and recall.

## 2020-01-30 NOTE — CARE PLAN
Problem: Inadequate nutrient intake  Goal: Patient to consume greater than or equal to 50% of meals  Outcome: PROGRESSING SLOWER THAN EXPECTED

## 2020-01-30 NOTE — PROGRESS NOTES
"Rehab Progress Note     Date of Service: 1/30/2020  Chief Complaint: follow up stroke    Interval Events (Subjective)    Patient seen and examined today in his room.  He is falling asleep in his wheelchair.  He denies any pain or headache.  He denies any new focal weakness.  He thinks he slept well last night.  He denies any dysuria or cough.  He has no complaints.    I spoke to the patient's brother today, Ward, and advised him we are aware of the patient's poor appetite and low oral intake.  He is being seen by the dietitian.  Has been started on Remeron to help his appetite, and is having milkshakes with all of his meals.  Brother was appreciative and had no other concerns.    Objective:  VITAL SIGNS: /84   Pulse 86   Temp 36.4 °C (97.6 °F) (Temporal)   Resp 17   Ht 1.727 m (5' 8\")   Wt 64 kg (141 lb)   SpO2 93%   BMI 21.44 kg/m²   Gen: alert, no apparent distress  CV: regular rate and rhythm, no murmurs, no peripheral edema  Resp: clear to ascultation bilaterally, normal respiratory effort  GI: soft, non-tender abdomen, bowel sounds present  Neuro: notable for dysarthria, left-sided facial droop    Recent Results (from the past 72 hour(s))   Comp Metabolic Panel    Collection Time: 01/29/20 12:40 PM   Result Value Ref Range    Sodium 144 135 - 145 mmol/L    Potassium 4.7 3.6 - 5.5 mmol/L    Chloride 107 96 - 112 mmol/L    Co2 28 20 - 33 mmol/L    Anion Gap 9.0 0.0 - 11.9    Glucose 106 (H) 65 - 99 mg/dL    Bun 19 8 - 22 mg/dL    Creatinine 1.24 0.50 - 1.40 mg/dL    Calcium 10.1 8.5 - 10.5 mg/dL    AST(SGOT) 27 12 - 45 U/L    ALT(SGPT) 21 2 - 50 U/L    Alkaline Phosphatase 61 30 - 99 U/L    Total Bilirubin 0.8 0.1 - 1.5 mg/dL    Albumin 3.7 3.2 - 4.9 g/dL    Total Protein 7.2 6.0 - 8.2 g/dL    Globulin 3.5 1.9 - 3.5 g/dL    A-G Ratio 1.1 g/dL   CBC WITH DIFFERENTIAL    Collection Time: 01/29/20 12:40 PM   Result Value Ref Range    WBC 11.6 (H) 4.8 - 10.8 K/uL    RBC 4.42 (L) 4.70 - 6.10 M/uL    " Hemoglobin 14.6 14.0 - 18.0 g/dL    Hematocrit 44.4 42.0 - 52.0 %    .5 (H) 81.4 - 97.8 fL    MCH 33.0 27.0 - 33.0 pg    MCHC 32.9 (L) 33.7 - 35.3 g/dL    RDW 51.6 (H) 35.9 - 50.0 fL    Platelet Count 249 164 - 446 K/uL    MPV 11.4 9.0 - 12.9 fL    Neutrophils-Polys 47.60 44.00 - 72.00 %    Lymphocytes 42.90 (H) 22.00 - 41.00 %    Monocytes 6.70 0.00 - 13.40 %    Eosinophils 2.20 0.00 - 6.90 %    Basophils 0.30 0.00 - 1.80 %    Immature Granulocytes 0.30 0.00 - 0.90 %    Nucleated RBC 0.00 /100 WBC    Neutrophils (Absolute) 5.51 1.82 - 7.42 K/uL    Lymphs (Absolute) 4.97 (H) 1.00 - 4.80 K/uL    Monos (Absolute) 0.78 0.00 - 0.85 K/uL    Eos (Absolute) 0.26 0.00 - 0.51 K/uL    Baso (Absolute) 0.03 0.00 - 0.12 K/uL    Immature Granulocytes (abs) 0.04 0.00 - 0.11 K/uL    NRBC (Absolute) 0.00 K/uL   PHOSPHORUS    Collection Time: 01/29/20 12:40 PM   Result Value Ref Range    Phosphorus 2.9 2.5 - 4.5 mg/dL   proBrain Natriuretic Peptide, NT    Collection Time: 01/29/20 12:40 PM   Result Value Ref Range    NT-proBNP 9053 (H) 0 - 125 pg/mL   ESTIMATED GFR    Collection Time: 01/29/20 12:40 PM   Result Value Ref Range    GFR If African American >60 >60 mL/min/1.73 m 2    GFR If Non African American 55 (A) >60 mL/min/1.73 m 2   URINALYSIS    Collection Time: 01/29/20  3:47 PM   Result Value Ref Range    Color DK Yellow     Character Turbid (A)     Specific Gravity 1.022 <1.035    Ph 5.0 5.0 - 8.0    Glucose Negative Negative mg/dL    Ketones Trace (A) Negative mg/dL    Protein 100 (A) Negative mg/dL    Bilirubin Negative Negative    Urobilinogen, Urine 0.2 Negative    Nitrite Negative Negative    Leukocyte Esterase Small (A) Negative    Occult Blood Large (A) Negative    Micro Urine Req Microscopic    URINE MICROSCOPIC (W/UA)    Collection Time: 01/29/20  3:47 PM   Result Value Ref Range    WBC 2-5 (A) /hpf    RBC >150 (A) /hpf    Bacteria Negative None /hpf    Epithelial Cells Negative /hpf    Ca Oxalate Crystal  Moderate /hpf    Hyaline Cast 0-2 /lpf   CBC WITH DIFFERENTIAL    Collection Time: 01/29/20  5:00 PM   Result Value Ref Range    WBC 11.7 (H) 4.8 - 10.8 K/uL    RBC 4.07 (L) 4.70 - 6.10 M/uL    Hemoglobin 13.2 (L) 14.0 - 18.0 g/dL    Hematocrit 41.0 (L) 42.0 - 52.0 %    .7 (H) 81.4 - 97.8 fL    MCH 32.4 27.0 - 33.0 pg    MCHC 32.2 (L) 33.7 - 35.3 g/dL    RDW 51.3 (H) 35.9 - 50.0 fL    Platelet Count 230 164 - 446 K/uL    MPV 11.2 9.0 - 12.9 fL    Neutrophils-Polys 49.60 44.00 - 72.00 %    Lymphocytes 42.60 (H) 22.00 - 41.00 %    Monocytes 2.60 0.00 - 13.40 %    Eosinophils 4.30 0.00 - 6.90 %    Basophils 0.90 0.00 - 1.80 %    Nucleated RBC 0.00 /100 WBC    Neutrophils (Absolute) 5.80 1.82 - 7.42 K/uL    Lymphs (Absolute) 4.98 (H) 1.00 - 4.80 K/uL    Monos (Absolute) 0.30 0.00 - 0.85 K/uL    Eos (Absolute) 0.50 0.00 - 0.51 K/uL    Baso (Absolute) 0.11 0.00 - 0.12 K/uL    NRBC (Absolute) 0.00 K/uL   Comp Metabolic Panel    Collection Time: 01/29/20  5:00 PM   Result Value Ref Range    Sodium 145 135 - 145 mmol/L    Potassium 3.8 3.6 - 5.5 mmol/L    Chloride 108 96 - 112 mmol/L    Co2 27 20 - 33 mmol/L    Anion Gap 10.0 0.0 - 11.9    Glucose 102 (H) 65 - 99 mg/dL    Bun 19 8 - 22 mg/dL    Creatinine 1.18 0.50 - 1.40 mg/dL    Calcium 9.8 8.5 - 10.5 mg/dL    AST(SGOT) 23 12 - 45 U/L    ALT(SGPT) 19 2 - 50 U/L    Alkaline Phosphatase 56 30 - 99 U/L    Total Bilirubin 0.7 0.1 - 1.5 mg/dL    Albumin 3.4 3.2 - 4.9 g/dL    Total Protein 6.6 6.0 - 8.2 g/dL    Globulin 3.2 1.9 - 3.5 g/dL    A-G Ratio 1.1 g/dL   PHOSPHORUS    Collection Time: 01/29/20  5:00 PM   Result Value Ref Range    Phosphorus 2.8 2.5 - 4.5 mg/dL   proBrain Natriuretic Peptide, NT    Collection Time: 01/29/20  5:00 PM   Result Value Ref Range    NT-proBNP 8992 (H) 0 - 125 pg/mL   ESTIMATED GFR    Collection Time: 01/29/20  5:00 PM   Result Value Ref Range    GFR If African American >60 >60 mL/min/1.73 m 2    GFR If Non  58 (A) >60  mL/min/1.73 m 2   PLATELET ESTIMATE    Collection Time: 01/29/20  5:00 PM   Result Value Ref Range    Plt Estimation Normal    MORPHOLOGY    Collection Time: 01/29/20  5:00 PM   Result Value Ref Range    RBC Morphology Present     Reactive Lymphocytes Few    PERIPHERAL SMEAR REVIEW    Collection Time: 01/29/20  5:00 PM   Result Value Ref Range    Peripheral Smear Review see below    DIFFERENTIAL MANUAL    Collection Time: 01/29/20  5:00 PM   Result Value Ref Range    Manual Diff Status PERFORMED        Current Facility-Administered Medications   Medication Frequency   • fluticasone (FLONASE) nasal spray 100 mcg DAILY   • mirtazapine (REMERON) orally disintegrating tab 15 mg QHS   • melatonin tablet 3 mg QHS   • modafinil (PROVIGIL) tablet 100 mg QAM   • lisinopril (PRINIVIL) tablet 5 mg Q DAY   • aspirin (ASA) chewable tab 81 mg DAILY   • Respiratory Care per Protocol Continuous RT   • Pharmacy Consult Request ...Pain Management Review 1 Each PHARMACY TO DOSE   • acetaminophen (TYLENOL) tablet 650 mg Q4HRS PRN   • senna-docusate (PERICOLACE or SENOKOT S) 8.6-50 MG per tablet 2 Tab BID    And   • polyethylene glycol/lytes (MIRALAX) PACKET 1 Packet QDAY PRN    And   • magnesium hydroxide (MILK OF MAGNESIA) suspension 30 mL QDAY PRN    And   • bisacodyl (DULCOLAX) suppository 10 mg QDAY PRN   • artificial tears ophthalmic solution 1 Drop PRN   • benzocaine-menthol (CEPACOL) lozenge 1 Lozenge Q2HRS PRN   • mag hydrox-al hydrox-simeth (MAALOX PLUS ES or MYLANTA DS) suspension 20 mL Q2HRS PRN   • ondansetron (ZOFRAN ODT) dispertab 4 mg 4X/DAY PRN    Or   • ondansetron (ZOFRAN) syringe/vial injection 4 mg 4X/DAY PRN   • sodium chloride (OCEAN) 0.65 % nasal spray 2 Spray PRN   • apixaban (ELIQUIS) tablet 5 mg BID   • lactulose 20 GM/30ML solution 30 mL QDAY PRN   • docusate sodium (ENEMEEZ) enema 283 mg QDAY PRN   • atorvastatin (LIPITOR) tablet 40 mg Q EVENING   • digoxin (LANOXIN) tablet 125 mcg DAILY AT 1800   •  metoprolol (LOPRESSOR) tablet 75 mg TWICE DAILY   • albuterol inhaler 2 Puff Q4HRS PRN       Orders Placed This Encounter   Procedures   • Diet Order 2 Gram Sodium (forify all foods )     Standing Status:   Standing     Number of Occurrences:   1     Order Specific Question:   Diet:     Answer:   2 Gram Sodium [7]     Comments:   forify all foods      Order Specific Question:   Texture/Fiber modifications:     Answer:   Dysphagia 2(Pureed/Chopped)specify fluid consistency(question 6) [2]     Order Specific Question:   Consistency/Fluid modifications:     Answer:   Maxwell Thick [2]       Assessment:  Active Hospital Problems    Diagnosis   • *Cerebrovascular accident (CVA) due to embolism (HCC)   • Atrial fibrillation (HCC)   • Prostate cancer (HCC)   • Hypertension   • Systolic CHF (HCC)   • Dysarthria   • Dysphagia   • CAD (coronary artery disease)   • Rash of back     This patient is a 89 y.o. male admitted for acute inpatient rehabilitation with Cerebrovascular accident (CVA) due to embolism (HCC).    I led and attended the weekly conference, and agree with the IDT conference documentation and plan of care as noted below.    Date of conference: 1/27/2020    Goals and barriers: See IDT note.    Biggest barriers: incontinent of bowel/bladder, back rash, poor appetite, dysphagia, impaired posture with walker, shortness of breath with activity, moderate cognitive deficits, impaired attention    CM/social support: brother can only provide intermittent assistance, needs life alert    Anticipated DC date: 2/8/2020    Home health: PT/OT/SLP/RN    Equip: FWW    Follow up: PCP, rehab clinic    Medical Decision Making and Plan:    Bilateral strokes  Largest R MCA   Cardioembolic  Left pronator drift  Left facial droop  Left lateral lean  Dysarthria  Dysphagia, improved  Cognitive deficits  Continue full rehab program  PT/OT/SLP, 1 hr each discipline, 5 days per week  Continue Eliquis and statin for secondary stroke  prophylaxis  Speech therapy to advance diet  Continue Provigil for neurostimulation  Increased sedation 1/29 - thought secondary to sleeping meds, no new focal deficits    Insomnia  Schedule melatonin and low dose trazodone  Increased trazodone, too sedated, discontinued  Started Remeron 1/28  Continue melatonin    Severe protein calorie malnutrition  Dietician consult  Supplements, milkshakes  Started Remeron 1/28  Discussed treatment plan with his brother Ward today    Bowel  Continue bowel meds  Last BM 1/30    Bladder  Checked PVRs - 65, 36  Not retaining  ICP for over 400 cc  Scheduled toileting    DVT prophylaxis  Eliquis    Appreciate the assistance of the hospitalist with his medical comorbidities:     Hypertension, metoprolol, lisinopril  Atrial fibrillation, digoxin, metoprolol  Systolic CHF, EF 25%  Elevated Pro-BNP, 71445 --> 96510  History of prostate cancer, Casadex on hold as it cannot be crushed  Coronary artery disease, aspirin  Rash on back, improved, s/p hydrocortisone cream  Leukocytosis, urine culture pending    Total time:  28 minutes.  I spent greater than 50% of the time for patient care, counseling, and coordination on this date, including patient face-to face time, unit/floor time with review of records/pertinent lab data and studies, as well as discussing diagnostic evaluation/work up, planned therapeutic interventions, and future disposition of care, as per the interval events/subjective and the assessment and plan as noted above.    I have performed a physical exam, reviewed and updated ROS, as well as the assessment and plan today 1/30/2020. In review of note from 1/29/2020 there are no new changes except as documented above.    Gauri Fortune M.D.   Physical Medicine and Rehabilitation

## 2020-01-31 PROCEDURE — 97112 NEUROMUSCULAR REEDUCATION: CPT

## 2020-01-31 PROCEDURE — 700102 HCHG RX REV CODE 250 W/ 637 OVERRIDE(OP): Performed by: PHYSICAL MEDICINE & REHABILITATION

## 2020-01-31 PROCEDURE — 97530 THERAPEUTIC ACTIVITIES: CPT

## 2020-01-31 PROCEDURE — 99232 SBSQ HOSP IP/OBS MODERATE 35: CPT | Performed by: HOSPITALIST

## 2020-01-31 PROCEDURE — 99231 SBSQ HOSP IP/OBS SF/LOW 25: CPT | Performed by: PHYSICAL MEDICINE & REHABILITATION

## 2020-01-31 PROCEDURE — 770010 HCHG ROOM/CARE - REHAB SEMI PRIVAT*

## 2020-01-31 PROCEDURE — A9270 NON-COVERED ITEM OR SERVICE: HCPCS | Performed by: PHYSICAL MEDICINE & REHABILITATION

## 2020-01-31 PROCEDURE — 97535 SELF CARE MNGMENT TRAINING: CPT

## 2020-01-31 PROCEDURE — 92526 ORAL FUNCTION THERAPY: CPT

## 2020-01-31 PROCEDURE — 97130 THER IVNTJ EA ADDL 15 MIN: CPT

## 2020-01-31 PROCEDURE — 97116 GAIT TRAINING THERAPY: CPT

## 2020-01-31 PROCEDURE — 97129 THER IVNTJ 1ST 15 MIN: CPT

## 2020-01-31 RX ADMIN — ASPIRIN 81 MG 81 MG: 81 TABLET ORAL at 08:38

## 2020-01-31 RX ADMIN — LISINOPRIL 5 MG: 5 TABLET ORAL at 06:08

## 2020-01-31 RX ADMIN — FLUTICASONE PROPIONATE 100 MCG: 50 SPRAY, METERED NASAL at 08:44

## 2020-01-31 RX ADMIN — SENNOSIDES AND DOCUSATE SODIUM 2 TABLET: 8.6; 5 TABLET ORAL at 20:55

## 2020-01-31 RX ADMIN — ATORVASTATIN CALCIUM 40 MG: 40 TABLET, FILM COATED ORAL at 20:56

## 2020-01-31 RX ADMIN — METOPROLOL TARTRATE 75 MG: 25 TABLET ORAL at 06:08

## 2020-01-31 RX ADMIN — MODAFINIL 100 MG: 100 TABLET ORAL at 08:38

## 2020-01-31 RX ADMIN — APIXABAN 5 MG: 5 TABLET, FILM COATED ORAL at 08:39

## 2020-01-31 RX ADMIN — MIRTAZAPINE 15 MG: 15 TABLET, ORALLY DISINTEGRATING ORAL at 20:56

## 2020-01-31 RX ADMIN — SENNOSIDES AND DOCUSATE SODIUM 2 TABLET: 8.6; 5 TABLET ORAL at 08:38

## 2020-01-31 RX ADMIN — MELATONIN TAB 3 MG 3 MG: 3 TAB at 20:56

## 2020-01-31 RX ADMIN — METOPROLOL TARTRATE 75 MG: 25 TABLET ORAL at 17:37

## 2020-01-31 RX ADMIN — DIGOXIN 125 MCG: 125 TABLET ORAL at 17:40

## 2020-01-31 RX ADMIN — APIXABAN 5 MG: 5 TABLET, FILM COATED ORAL at 20:56

## 2020-01-31 ASSESSMENT — ENCOUNTER SYMPTOMS
ABDOMINAL PAIN: 0
NAUSEA: 0
NERVOUS/ANXIOUS: 0
FEVER: 0
VOMITING: 0
CHILLS: 0
DIARRHEA: 0
SHORTNESS OF BREATH: 0

## 2020-01-31 NOTE — THERAPY
Occupational Therapy  Daily Treatment     Patient Name: Yousif Kimble  Age:  89 y.o., Sex:  male  Medical Record #: [id number]  Today's Date: 2020     Precautions  Precautions: (P) Fall Risk, Swallow Precautions ( See Comments)  Comments: (P) Dysarthria, NTL SOB w/ activity    Safety   ADL Safety : Requires Physical Assist for Safety  Bathroom Safety: Requires Supervision for Safety  Comments: Requires frequent rest breaks due to SOB, close SBA to min A due to L lean    Subjective    Pt agreeable to OT     Objective       20 0831   Precautions   Precautions Fall Risk;Swallow Precautions ( See Comments)   Comments Dysarthria, NTL SOB w/ activity   Sitting Lower Body Exercises   Sit to Stand 1 set of 10  (no UE support, SBA)   Nustep Resistance Level 4  (10 min)   Balance   Comments sit to stand from mat with functional reach to rochelle, functional reach down to floor and back up x10 repetitions, rest between, cues to return to upright posture   OT Total Time Spent   OT Individual Total Time Spent (Mins) 60   OT Charge Group   OT Self Care / ADL 2   OT Therapy Activity 2       FIM Upper Body Dressin - Standby Prompting/Supervision or Set-up  Upper Body Dressing Description:  Verbal cueing, Set-up of equipment    LB Dressing: Pt setup to doff socks and don socks/shoes, requires increased time and consistent verbal cues for problem solving and attention. Tendency to continue attempting to don with same strategy and does not attempt to problem solve when unsuccessful.     Assessment    Pt tolerated session well, lethargic but participatory, continues to be impaired by impaired initiation, attention, and problem solving during routine tasks, impaired balance, impaired endurance. He demos poor carryover of education from session to session.     Plan    ADLs/IADLs, incorporate energy conservation strategies into functional tasks, general strengthening/endurance training, balance, functional transfers/functional  mobility, functional cognition

## 2020-01-31 NOTE — REHAB-CM IDT TEAM NOTE
Case Management    DC Planning    DC destination/dispostion:  Home with brother in a two-story home with 2 steps into the home.      Referrals: Will likely need DME and Home Health. I will continue to follow for needs.      DC Needs: DME and Home Health.     Barriers to discharge:  Functional deficits following stroke.    Strengths: Patient has a supportive brother to help him when he gets home.      Section completed by:  Annette Benitez

## 2020-01-31 NOTE — THERAPY
01/31/20 1129   Precautions   Precautions Fall Risk;Swallow Precautions ( See Comments)   Comments Dysarthria, NTL, SOB with activity   Pain 0 - 10 Group   Location Neck   Location Orientation Right;Posterior   Therapist Pain Assessment 3;Prior to Activity;During Activity   Cognition    Speech/ Communication Delayed Responses;Dysarthric   Level of Consciousness Alert   Ability To Follow Commands 1 Step   Safety Awareness Impaired   New Learning Impaired   Attention Impaired   Sequencing Impaired   Initiation Impaired   Neuro-Muscular Treatments   Neuro-Muscular Treatments Sequencing;Tactile Cuing;Verbal Cuing;Tapping;Weight Shift Left;Weight Shift Right;Other (See Comments)   Comments Standing on foam pad without upper extremity support, parallel bars gait stepping over bolsters and on/off foam pad, forward and backward gait in the bars   PT Total Time Spent   PT Individual Total Time Spent (Mins) 60   PT Charge Group   PT Gait Training 1   PT Neuromuscular Re-Education / Balance 3   Physical Therapy   Daily Treatment     Patient Name: Yousif Kimble  Age:  89 y.o., Sex:  male  Medical Record #: [id number]  Today's Date: 1/31/2020     Precautions  Precautions: Fall Risk, Swallow Precautions ( See Comments)  Comments: Dysarthria, NTL, SOB with activity    Subjective    The patient was up in his chair and he agreed to PT.     Objective    He participated in activities in the parallel bars to improve gait.  Activities included walking forward and backward, gait while stepping over bolsters and on/off foam pad as training to improve step length and not shuffling when walking, static standing on the foam pad without upper extremity support.  With a front wheel walker the patient tolerated gait 130 FT x2.    FIM Bed/Chair/Wheelchair Transfers Score: 5 - Standby Prompting/Supervision or Set-up  Bed/Chair/Wheelchair Transfers Description:       FIM Walking Score:  2 - Max Assistance  Walking Description:  Extra time, Verbal  cueing, Supervision for safety, Walker(FWW, CGA/gait belt verbal cues for posture and picking up his feet 130 FT x2, verbal and tactile cues to sit down in wc)    FIM Wheelchair Score:  2 - Max Assistance  Wheelchair Description:       FIM Stairs Score:     Stairs Description:         Assessment    Currently the patient tends to lean forward when walking and has a shuffle gait pattern.  His tolerance for gait and other standing activities is improving but he is limited by his ejection fraction, pain in the low back resulting in flexed posture.    Plan    Safety education, therapeutic exercise, bed mobility and transfer training, gait training with a FWW, neuromuscular reeducation to improve gait quality.

## 2020-01-31 NOTE — PROGRESS NOTES
Hospital Medicine Daily Progress Note        Chief Complaint:  Hypertension  Afib  CHF    Interval History:  No significant events or changes since last visit    Review of Systems  Review of Systems   Constitutional: Negative for chills and fever.   Respiratory: Negative for shortness of breath.    Cardiovascular: Negative for chest pain.   Gastrointestinal: Negative for abdominal pain, diarrhea, nausea and vomiting.   Psychiatric/Behavioral: The patient is not nervous/anxious.         Physical Exam  Temp:  [36.5 °C (97.7 °F)-36.8 °C (98.3 °F)] 36.8 °C (98.3 °F)  Pulse:  [80-89] 83  Resp:  [17-18] 17  BP: (106-140)/(60-92) 122/81  SpO2:  [93 %-94 %] 93 %    Physical Exam  Vitals signs and nursing note reviewed.   Constitutional:       Appearance: Normal appearance.   HENT:      Head: Atraumatic.   Eyes:      Conjunctiva/sclera: Conjunctivae normal.      Pupils: Pupils are equal, round, and reactive to light.   Neck:      Musculoskeletal: Normal range of motion and neck supple.   Cardiovascular:      Rate and Rhythm: Normal rate and regular rhythm.   Pulmonary:      Effort: Pulmonary effort is normal.      Breath sounds: Normal breath sounds.   Abdominal:      General: Bowel sounds are normal.      Palpations: Abdomen is soft.   Musculoskeletal:      Right lower leg: No edema.      Left lower leg: No edema.   Skin:     General: Skin is warm and dry.   Neurological:      Mental Status: He is alert and oriented to person, place, and time.   Psychiatric:         Mood and Affect: Mood normal.         Behavior: Behavior normal.         Fluids    Intake/Output Summary (Last 24 hours) at 1/31/2020 0813  Last data filed at 1/30/2020 1800  Gross per 24 hour   Intake 320 ml   Output --   Net 320 ml       Laboratory  Recent Labs     01/29/20  1240 01/29/20  1700   WBC 11.6* 11.7*   RBC 4.42* 4.07*   HEMOGLOBIN 14.6 13.2*   HEMATOCRIT 44.4 41.0*   .5* 100.7*   MCH 33.0 32.4   MCHC 32.9* 32.2*   RDW 51.6* 51.3*    PLATELETCT 249 230   MPV 11.4 11.2     Recent Labs     01/29/20  1240 01/29/20  1700   SODIUM 144 145   POTASSIUM 4.7 3.8   CHLORIDE 107 108   CO2 28 27   GLUCOSE 106* 102*   BUN 19 19   CREATININE 1.24 1.18   CALCIUM 10.1 9.8                   Assessment/Plan  * Cerebrovascular accident (CVA) due to embolism (HCC)- (present on admission)  Assessment & Plan  On Eliquis, ASA, and Lipitor  Also on Provigil    Leukocytosis  Assessment & Plan  WBC's: 11.7 (1/29)  Has been afebrile  U/A (-)  No diarrhea  No cough  Monitor for any aspiration (has some dysphagia)    Hypernatremia  Assessment & Plan  Currently resolved (but still high normal)  Na: 145 (1/29)  S/P 1/2 NS x 500 mL  S/P D5W x 500 mL   On a low salt diet (1/28)  Likely 2nd to being on a nectar thick liquid diet  Cont to monitor    Rash of back  Assessment & Plan  Resolved  S/P topical steroid (1/28)  Monitor    CAD (coronary artery disease)  Assessment & Plan  On ASA  On Lipitor  On Lisinopril & Metoprolol    Systolic CHF (HCC)- (present on admission)  Assessment & Plan  Echo (from [facility]): EF 25-30%, mod MR, RVSP 40-45  BNP: 15,885 (1/23) --> 10,147 (1/26) --> 8892 (1/29)  On Lopressor & Lisinopril    Hypertension- (present on admission)  Assessment & Plan  BP ok  On Lisinopril: 5 mg daily  On Metoprolol: 75 mg bid  Monitor    Prostate cancer (HCC)- (present on admission)  Assessment & Plan  Casodex now on hold as unable to be crushed  Outpt Urology F/U    Atrial fibrillation (HCC)- (present on admission)  Assessment & Plan  HR ok  S/P RVR at [facility]  On Digoxin  On Lopressor: 75 mg bid  On Eliquis  Monitor

## 2020-01-31 NOTE — PROGRESS NOTES
"Rehab Progress Note     Date of Service: 1/31/2020  Chief Complaint: follow up stroke    Interval Events (Subjective)    Patient seen and examined today in the therapy gym.  He is working in the parallel bars with increasing his step length.  He reports he slept well last night.  He is complaining of pain in his right neck which is new today.  He denies a history of neck surgery.  He does have very poor posture.  He does not want a Lidoderm patch.  Therapist reports he also complains of chronic low back pain.  He is moving his bowels.  He has no other new complaints today.  Therapy grieves he is much improved with his mobility and alertness.    Objective:  VITAL SIGNS: /81   Pulse 83   Temp 36.8 °C (98.3 °F) (Oral)   Resp 17   Ht 1.727 m (5' 8\")   Wt 64 kg (141 lb)   SpO2 93%   BMI 21.44 kg/m²   Gen: alert, no apparent distress  CV: regular rate, irregular rhythm, no murmurs, no peripheral edema  Resp: clear to ascultation bilaterally, normal respiratory effort  GI: soft, non-tender abdomen, bowel sounds present  Neuro: notable for dysarthria, left-sided facial droop, delayed responses  Msk; poor posture, scoliotic thoracic area with more prominent ribs on the right, tight cord on right side of neck    Recent Results (from the past 72 hour(s))   Comp Metabolic Panel    Collection Time: 01/29/20 12:40 PM   Result Value Ref Range    Sodium 144 135 - 145 mmol/L    Potassium 4.7 3.6 - 5.5 mmol/L    Chloride 107 96 - 112 mmol/L    Co2 28 20 - 33 mmol/L    Anion Gap 9.0 0.0 - 11.9    Glucose 106 (H) 65 - 99 mg/dL    Bun 19 8 - 22 mg/dL    Creatinine 1.24 0.50 - 1.40 mg/dL    Calcium 10.1 8.5 - 10.5 mg/dL    AST(SGOT) 27 12 - 45 U/L    ALT(SGPT) 21 2 - 50 U/L    Alkaline Phosphatase 61 30 - 99 U/L    Total Bilirubin 0.8 0.1 - 1.5 mg/dL    Albumin 3.7 3.2 - 4.9 g/dL    Total Protein 7.2 6.0 - 8.2 g/dL    Globulin 3.5 1.9 - 3.5 g/dL    A-G Ratio 1.1 g/dL   CBC WITH DIFFERENTIAL    Collection Time: 01/29/20 12:40 " PM   Result Value Ref Range    WBC 11.6 (H) 4.8 - 10.8 K/uL    RBC 4.42 (L) 4.70 - 6.10 M/uL    Hemoglobin 14.6 14.0 - 18.0 g/dL    Hematocrit 44.4 42.0 - 52.0 %    .5 (H) 81.4 - 97.8 fL    MCH 33.0 27.0 - 33.0 pg    MCHC 32.9 (L) 33.7 - 35.3 g/dL    RDW 51.6 (H) 35.9 - 50.0 fL    Platelet Count 249 164 - 446 K/uL    MPV 11.4 9.0 - 12.9 fL    Neutrophils-Polys 47.60 44.00 - 72.00 %    Lymphocytes 42.90 (H) 22.00 - 41.00 %    Monocytes 6.70 0.00 - 13.40 %    Eosinophils 2.20 0.00 - 6.90 %    Basophils 0.30 0.00 - 1.80 %    Immature Granulocytes 0.30 0.00 - 0.90 %    Nucleated RBC 0.00 /100 WBC    Neutrophils (Absolute) 5.51 1.82 - 7.42 K/uL    Lymphs (Absolute) 4.97 (H) 1.00 - 4.80 K/uL    Monos (Absolute) 0.78 0.00 - 0.85 K/uL    Eos (Absolute) 0.26 0.00 - 0.51 K/uL    Baso (Absolute) 0.03 0.00 - 0.12 K/uL    Immature Granulocytes (abs) 0.04 0.00 - 0.11 K/uL    NRBC (Absolute) 0.00 K/uL   PHOSPHORUS    Collection Time: 01/29/20 12:40 PM   Result Value Ref Range    Phosphorus 2.9 2.5 - 4.5 mg/dL   proBrain Natriuretic Peptide, NT    Collection Time: 01/29/20 12:40 PM   Result Value Ref Range    NT-proBNP 9053 (H) 0 - 125 pg/mL   ESTIMATED GFR    Collection Time: 01/29/20 12:40 PM   Result Value Ref Range    GFR If African American >60 >60 mL/min/1.73 m 2    GFR If Non African American 55 (A) >60 mL/min/1.73 m 2   URINALYSIS    Collection Time: 01/29/20  3:47 PM   Result Value Ref Range    Color DK Yellow     Character Turbid (A)     Specific Gravity 1.022 <1.035    Ph 5.0 5.0 - 8.0    Glucose Negative Negative mg/dL    Ketones Trace (A) Negative mg/dL    Protein 100 (A) Negative mg/dL    Bilirubin Negative Negative    Urobilinogen, Urine 0.2 Negative    Nitrite Negative Negative    Leukocyte Esterase Small (A) Negative    Occult Blood Large (A) Negative    Micro Urine Req Microscopic    URINE MICROSCOPIC (W/UA)    Collection Time: 01/29/20  3:47 PM   Result Value Ref Range    WBC 2-5 (A) /hpf    RBC >150 (A)  /hpf    Bacteria Negative None /hpf    Epithelial Cells Negative /hpf    Ca Oxalate Crystal Moderate /hpf    Hyaline Cast 0-2 /lpf   CBC WITH DIFFERENTIAL    Collection Time: 01/29/20  5:00 PM   Result Value Ref Range    WBC 11.7 (H) 4.8 - 10.8 K/uL    RBC 4.07 (L) 4.70 - 6.10 M/uL    Hemoglobin 13.2 (L) 14.0 - 18.0 g/dL    Hematocrit 41.0 (L) 42.0 - 52.0 %    .7 (H) 81.4 - 97.8 fL    MCH 32.4 27.0 - 33.0 pg    MCHC 32.2 (L) 33.7 - 35.3 g/dL    RDW 51.3 (H) 35.9 - 50.0 fL    Platelet Count 230 164 - 446 K/uL    MPV 11.2 9.0 - 12.9 fL    Neutrophils-Polys 49.60 44.00 - 72.00 %    Lymphocytes 42.60 (H) 22.00 - 41.00 %    Monocytes 2.60 0.00 - 13.40 %    Eosinophils 4.30 0.00 - 6.90 %    Basophils 0.90 0.00 - 1.80 %    Nucleated RBC 0.00 /100 WBC    Neutrophils (Absolute) 5.80 1.82 - 7.42 K/uL    Lymphs (Absolute) 4.98 (H) 1.00 - 4.80 K/uL    Monos (Absolute) 0.30 0.00 - 0.85 K/uL    Eos (Absolute) 0.50 0.00 - 0.51 K/uL    Baso (Absolute) 0.11 0.00 - 0.12 K/uL    NRBC (Absolute) 0.00 K/uL   Comp Metabolic Panel    Collection Time: 01/29/20  5:00 PM   Result Value Ref Range    Sodium 145 135 - 145 mmol/L    Potassium 3.8 3.6 - 5.5 mmol/L    Chloride 108 96 - 112 mmol/L    Co2 27 20 - 33 mmol/L    Anion Gap 10.0 0.0 - 11.9    Glucose 102 (H) 65 - 99 mg/dL    Bun 19 8 - 22 mg/dL    Creatinine 1.18 0.50 - 1.40 mg/dL    Calcium 9.8 8.5 - 10.5 mg/dL    AST(SGOT) 23 12 - 45 U/L    ALT(SGPT) 19 2 - 50 U/L    Alkaline Phosphatase 56 30 - 99 U/L    Total Bilirubin 0.7 0.1 - 1.5 mg/dL    Albumin 3.4 3.2 - 4.9 g/dL    Total Protein 6.6 6.0 - 8.2 g/dL    Globulin 3.2 1.9 - 3.5 g/dL    A-G Ratio 1.1 g/dL   PHOSPHORUS    Collection Time: 01/29/20  5:00 PM   Result Value Ref Range    Phosphorus 2.8 2.5 - 4.5 mg/dL   proBrain Natriuretic Peptide, NT    Collection Time: 01/29/20  5:00 PM   Result Value Ref Range    NT-proBNP 8992 (H) 0 - 125 pg/mL   ESTIMATED GFR    Collection Time: 01/29/20  5:00 PM   Result Value Ref Range     GFR If African American >60 >60 mL/min/1.73 m 2    GFR If Non African American 58 (A) >60 mL/min/1.73 m 2   PLATELET ESTIMATE    Collection Time: 01/29/20  5:00 PM   Result Value Ref Range    Plt Estimation Normal    MORPHOLOGY    Collection Time: 01/29/20  5:00 PM   Result Value Ref Range    RBC Morphology Present     Reactive Lymphocytes Few    PERIPHERAL SMEAR REVIEW    Collection Time: 01/29/20  5:00 PM   Result Value Ref Range    Peripheral Smear Review see below    DIFFERENTIAL MANUAL    Collection Time: 01/29/20  5:00 PM   Result Value Ref Range    Manual Diff Status PERFORMED        Current Facility-Administered Medications   Medication Frequency   • fluticasone (FLONASE) nasal spray 100 mcg DAILY   • mirtazapine (REMERON) orally disintegrating tab 15 mg QHS   • melatonin tablet 3 mg QHS   • modafinil (PROVIGIL) tablet 100 mg QAM   • lisinopril (PRINIVIL) tablet 5 mg Q DAY   • aspirin (ASA) chewable tab 81 mg DAILY   • Respiratory Care per Protocol Continuous RT   • Pharmacy Consult Request ...Pain Management Review 1 Each PHARMACY TO DOSE   • acetaminophen (TYLENOL) tablet 650 mg Q4HRS PRN   • senna-docusate (PERICOLACE or SENOKOT S) 8.6-50 MG per tablet 2 Tab BID    And   • polyethylene glycol/lytes (MIRALAX) PACKET 1 Packet QDAY PRN    And   • magnesium hydroxide (MILK OF MAGNESIA) suspension 30 mL QDAY PRN    And   • bisacodyl (DULCOLAX) suppository 10 mg QDAY PRN   • artificial tears ophthalmic solution 1 Drop PRN   • benzocaine-menthol (CEPACOL) lozenge 1 Lozenge Q2HRS PRN   • mag hydrox-al hydrox-simeth (MAALOX PLUS ES or MYLANTA DS) suspension 20 mL Q2HRS PRN   • ondansetron (ZOFRAN ODT) dispertab 4 mg 4X/DAY PRN    Or   • ondansetron (ZOFRAN) syringe/vial injection 4 mg 4X/DAY PRN   • sodium chloride (OCEAN) 0.65 % nasal spray 2 Spray PRN   • apixaban (ELIQUIS) tablet 5 mg BID   • lactulose 20 GM/30ML solution 30 mL QDAY PRN   • docusate sodium (ENEMEEZ) enema 283 mg QDAY PRN   • atorvastatin  (LIPITOR) tablet 40 mg Q EVENING   • digoxin (LANOXIN) tablet 125 mcg DAILY AT 1800   • metoprolol (LOPRESSOR) tablet 75 mg TWICE DAILY   • albuterol inhaler 2 Puff Q4HRS PRN       Orders Placed This Encounter   Procedures   • Diet Order 2 Gram Sodium (forify all foods )     Standing Status:   Standing     Number of Occurrences:   1     Order Specific Question:   Diet:     Answer:   2 Gram Sodium [7]     Comments:   forify all foods      Order Specific Question:   Texture/Fiber modifications:     Answer:   Dysphagia 2(Pureed/Chopped)specify fluid consistency(question 6) [2]     Order Specific Question:   Consistency/Fluid modifications:     Answer:   [place] Thick [2]       Assessment:  Active Hospital Problems    Diagnosis   • *Cerebrovascular accident (CVA) due to embolism (HCC)   • Atrial fibrillation (HCC)   • Prostate cancer (HCC)   • Hypertension   • Systolic CHF (HCC)   • Dysarthria   • Dysphagia   • CAD (coronary artery disease)   • Rash of back     This patient is a 89 y.o. male admitted for acute inpatient rehabilitation with Cerebrovascular accident (CVA) due to embolism (HCC).    I led and attended the weekly conference, and agree with the IDT conference documentation and plan of care as noted below.    Date of conference: 1/27/2020    Goals and barriers: See IDT note.    Biggest barriers: incontinent of bowel/bladder, back rash, poor appetite, dysphagia, impaired posture with walker, shortness of breath with activity, moderate cognitive deficits, impaired attention    CM/social support: brother can only provide intermittent assistance, needs life alert    Anticipated DC date: 2/8/2020    Home health: PT/OT/SLP/RN    Equip: FWW    Follow up: PCP, rehab clinic    Medical Decision Making and Plan:    Bilateral strokes  Largest R MCA  Cardioembolic  Left pronator drift  Left facial droop  Left lateral lean  Dysarthria  Dysphagia, improved  Cognitive deficits  Continue full rehab program  PT/OT/SLP, 1 hr each  discipline, 5 days per week  Continue Eliquis and statin for secondary stroke prophylaxis  Speech therapy to advance diet  Continue Provigil for neurostimulation    Insomnia  Continue melatonin  Increased trazodone, too sedated, discontinued  Started Remeron 1/28    Severe protein calorie malnutrition  Dietician consult  Supplements, milkshakes  Started Remeron 1/28  Discussed treatment plan with his brother Ward    Chronic low back pain  Neck pain  Poor posture, scoliosis  PRN tylenol  Patient doesn't want Lidoderm patch    Bowel  Continue bowel meds  Last BM 1/30    Bladder  Checked PVRs - 65, 36  Not retaining  ICP for over 400 cc  Scheduled toileting    DVT prophylaxis  Eliquis    Appreciate the assistance of the hospitalist with his medical comorbidities:     Hypertension, metoprolol, lisinopril  Atrial fibrillation, digoxin, metoprolol  Systolic CHF, EF 25%  Elevated Pro-BNP, 06544 --> 13299  History of prostate cancer, Casadex on hold as it cannot be crushed  Coronary artery disease, aspirin  Rash on back, improved, s/p hydrocortisone cream  Leukocytosis, urine culture pending    Total time:  16 minutes.  I spent greater than 50% of the time for patient care, counseling, and coordination on this date, including patient face-to face time, unit/floor time with review of records/pertinent lab data and studies, as well as discussing diagnostic evaluation/work up, planned therapeutic interventions, and future disposition of care, as per the interval events/subjective and the assessment and plan as noted above.    I have performed a physical exam, reviewed and updated ROS, as well as the assessment and plan today 1/31/2020. In review of note from 1/30/2020 there are no new changes except as documented above.      Gauri Fortune M.D.   Physical Medicine and Rehabilitation

## 2020-01-31 NOTE — CARE PLAN
Problem: Communication  Goal: The ability to communicate needs accurately and effectively will improve  Outcome: PROGRESSING AS EXPECTED  Note:   Patient demonstrates good safety technique this shift.  Pt needs extra prompting to ask for assistance when needed and not attempt self transfer.  Able to verbalize needs.       Problem: Venous Thromboembolism (VTW)/Deep Vein Thrombosis (DVT) Prevention:  Goal: Patient will participate in Venous Thrombosis (VTE)/Deep Vein Thrombosis (DVT)Prevention Measures  Outcome: PROGRESSING AS EXPECTED  Flowsheets (Taken 1/30/2020 1619)  Pharmacologic Prophylaxis Used: Apixaban  Note:   Pt is up and walking, participates in therapies, and up to dinning room for all meals.

## 2020-02-01 LAB
ANION GAP SERPL CALC-SCNC: 6 MMOL/L (ref 0–11.9)
BACTERIA UR CULT: NORMAL
BASOPHILS # BLD AUTO: 0.3 % (ref 0–1.8)
BASOPHILS # BLD: 0.03 K/UL (ref 0–0.12)
BUN SERPL-MCNC: 19 MG/DL (ref 8–22)
CALCIUM SERPL-MCNC: 9.5 MG/DL (ref 8.5–10.5)
CHLORIDE SERPL-SCNC: 107 MMOL/L (ref 96–112)
CO2 SERPL-SCNC: 28 MMOL/L (ref 20–33)
CREAT SERPL-MCNC: 1.14 MG/DL (ref 0.5–1.4)
EOSINOPHIL # BLD AUTO: 0.41 K/UL (ref 0–0.51)
EOSINOPHIL NFR BLD: 3.9 % (ref 0–6.9)
ERYTHROCYTE [DISTWIDTH] IN BLOOD BY AUTOMATED COUNT: 48.9 FL (ref 35.9–50)
GLUCOSE SERPL-MCNC: 93 MG/DL (ref 65–99)
HCT VFR BLD AUTO: 39.7 % (ref 42–52)
HGB BLD-MCNC: 13.2 G/DL (ref 14–18)
IMM GRANULOCYTES # BLD AUTO: 0.04 K/UL (ref 0–0.11)
IMM GRANULOCYTES NFR BLD AUTO: 0.4 % (ref 0–0.9)
LYMPHOCYTES # BLD AUTO: 4.86 K/UL (ref 1–4.8)
LYMPHOCYTES NFR BLD: 46.7 % (ref 22–41)
MAGNESIUM SERPL-MCNC: 1.7 MG/DL (ref 1.5–2.5)
MCH RBC QN AUTO: 32.7 PG (ref 27–33)
MCHC RBC AUTO-ENTMCNC: 33.2 G/DL (ref 33.7–35.3)
MCV RBC AUTO: 98.3 FL (ref 81.4–97.8)
MONOCYTES # BLD AUTO: 0.82 K/UL (ref 0–0.85)
MONOCYTES NFR BLD AUTO: 7.9 % (ref 0–13.4)
NEUTROPHILS # BLD AUTO: 4.24 K/UL (ref 1.82–7.42)
NEUTROPHILS NFR BLD: 40.8 % (ref 44–72)
NRBC # BLD AUTO: 0 K/UL
NRBC BLD-RTO: 0 /100 WBC
NT-PROBNP SERPL IA-MCNC: 7960 PG/ML (ref 0–125)
PHOSPHATE SERPL-MCNC: 3 MG/DL (ref 2.5–4.5)
PLATELET # BLD AUTO: 213 K/UL (ref 164–446)
PMV BLD AUTO: 10.8 FL (ref 9–12.9)
POTASSIUM SERPL-SCNC: 3.8 MMOL/L (ref 3.6–5.5)
RBC # BLD AUTO: 4.04 M/UL (ref 4.7–6.1)
SIGNIFICANT IND 70042: NORMAL
SITE SITE: NORMAL
SODIUM SERPL-SCNC: 141 MMOL/L (ref 135–145)
SOURCE SOURCE: NORMAL
WBC # BLD AUTO: 10.4 K/UL (ref 4.8–10.8)

## 2020-02-01 PROCEDURE — 84100 ASSAY OF PHOSPHORUS: CPT

## 2020-02-01 PROCEDURE — 770010 HCHG ROOM/CARE - REHAB SEMI PRIVAT*

## 2020-02-01 PROCEDURE — A9270 NON-COVERED ITEM OR SERVICE: HCPCS | Performed by: PHYSICAL MEDICINE & REHABILITATION

## 2020-02-01 PROCEDURE — 99232 SBSQ HOSP IP/OBS MODERATE 35: CPT | Performed by: HOSPITALIST

## 2020-02-01 PROCEDURE — 92507 TX SP LANG VOICE COMM INDIV: CPT

## 2020-02-01 PROCEDURE — 80048 BASIC METABOLIC PNL TOTAL CA: CPT

## 2020-02-01 PROCEDURE — 97129 THER IVNTJ 1ST 15 MIN: CPT

## 2020-02-01 PROCEDURE — 85025 COMPLETE CBC W/AUTO DIFF WBC: CPT

## 2020-02-01 PROCEDURE — 83735 ASSAY OF MAGNESIUM: CPT

## 2020-02-01 PROCEDURE — 97130 THER IVNTJ EA ADDL 15 MIN: CPT

## 2020-02-01 PROCEDURE — 700102 HCHG RX REV CODE 250 W/ 637 OVERRIDE(OP): Performed by: PHYSICAL MEDICINE & REHABILITATION

## 2020-02-01 PROCEDURE — 36415 COLL VENOUS BLD VENIPUNCTURE: CPT

## 2020-02-01 PROCEDURE — 83880 ASSAY OF NATRIURETIC PEPTIDE: CPT

## 2020-02-01 RX ADMIN — MODAFINIL 100 MG: 100 TABLET ORAL at 08:52

## 2020-02-01 RX ADMIN — METOPROLOL TARTRATE 75 MG: 25 TABLET ORAL at 05:38

## 2020-02-01 RX ADMIN — SENNOSIDES AND DOCUSATE SODIUM 2 TABLET: 8.6; 5 TABLET ORAL at 20:38

## 2020-02-01 RX ADMIN — ASPIRIN 81 MG 81 MG: 81 TABLET ORAL at 08:52

## 2020-02-01 RX ADMIN — FLUTICASONE PROPIONATE 100 MCG: 50 SPRAY, METERED NASAL at 09:00

## 2020-02-01 RX ADMIN — METOPROLOL TARTRATE 75 MG: 25 TABLET ORAL at 18:10

## 2020-02-01 RX ADMIN — DIGOXIN 125 MCG: 125 TABLET ORAL at 18:10

## 2020-02-01 RX ADMIN — APIXABAN 5 MG: 5 TABLET, FILM COATED ORAL at 20:38

## 2020-02-01 RX ADMIN — MELATONIN TAB 3 MG 3 MG: 3 TAB at 20:38

## 2020-02-01 RX ADMIN — ATORVASTATIN CALCIUM 40 MG: 40 TABLET, FILM COATED ORAL at 20:38

## 2020-02-01 RX ADMIN — SENNOSIDES AND DOCUSATE SODIUM 2 TABLET: 8.6; 5 TABLET ORAL at 08:52

## 2020-02-01 RX ADMIN — APIXABAN 5 MG: 5 TABLET, FILM COATED ORAL at 08:52

## 2020-02-01 RX ADMIN — LISINOPRIL 5 MG: 5 TABLET ORAL at 05:38

## 2020-02-01 RX ADMIN — MIRTAZAPINE 15 MG: 15 TABLET, ORALLY DISINTEGRATING ORAL at 20:38

## 2020-02-01 ASSESSMENT — ENCOUNTER SYMPTOMS
FEVER: 0
BLURRED VISION: 0
VOMITING: 0
HALLUCINATIONS: 0
SHORTNESS OF BREATH: 0
HEADACHES: 0
DIZZINESS: 0
PALPITATIONS: 0
NAUSEA: 0

## 2020-02-01 NOTE — PROGRESS NOTES
Hospital Medicine Daily Progress Note        Chief Complaint:  Hypertension  Afib  CHF    Interval History:  No significant events or changes since last visit    Review of Systems  Review of Systems   Constitutional: Negative for fever.   Eyes: Negative for blurred vision.   Respiratory: Negative for shortness of breath.    Cardiovascular: Negative for palpitations.   Gastrointestinal: Negative for nausea and vomiting.   Neurological: Negative for dizziness and headaches.   Psychiatric/Behavioral: Negative for hallucinations.        Physical Exam  Temp:  [36.6 °C (97.8 °F)-36.7 °C (98 °F)] 36.6 °C (97.8 °F)  Pulse:  [] 92  Resp:  [17-18] 18  BP: (126-138)/(74-87) 130/82  SpO2:  [93 %-95 %] 94 %    Physical Exam  Vitals signs and nursing note reviewed.   Constitutional:       General: He is not in acute distress.  HENT:      Mouth/Throat:      Mouth: Mucous membranes are moist.      Pharynx: Oropharynx is clear.   Eyes:      General: No scleral icterus.  Neck:      Musculoskeletal: Normal range of motion and neck supple.   Cardiovascular:      Rate and Rhythm: Normal rate and regular rhythm.   Pulmonary:      Effort: Pulmonary effort is normal.      Breath sounds: No wheezing or rales.   Abdominal:      General: Bowel sounds are normal.      Palpations: Abdomen is soft.   Musculoskeletal:      Right lower leg: No edema.      Left lower leg: No edema.   Skin:     General: Skin is warm and dry.   Neurological:      Mental Status: He is alert and oriented to person, place, and time.   Psychiatric:         Mood and Affect: Mood normal.         Behavior: Behavior normal.         Fluids    Intake/Output Summary (Last 24 hours) at 2/1/2020 0810  Last data filed at 1/31/2020 2056  Gross per 24 hour   Intake 600 ml   Output --   Net 600 ml       Laboratory  Recent Labs     01/29/20  1240 01/29/20  1700 02/01/20  0524   WBC 11.6* 11.7* 10.4   RBC 4.42* 4.07* 4.04*   HEMOGLOBIN 14.6 13.2* 13.2*   HEMATOCRIT 44.4 41.0*  39.7*   .5* 100.7* 98.3*   MCH 33.0 32.4 32.7   MCHC 32.9* 32.2* 33.2*   RDW 51.6* 51.3* 48.9   PLATELETCT 249 230 213   MPV 11.4 11.2 10.8     Recent Labs     01/29/20  1240 01/29/20  1700 02/01/20  0524   SODIUM 144 145 141   POTASSIUM 4.7 3.8 3.8   CHLORIDE 107 108 107   CO2 28 27 28   GLUCOSE 106* 102* 93   BUN 19 19 19   CREATININE 1.24 1.18 1.14   CALCIUM 10.1 9.8 9.5                   Assessment/Plan  * Cerebrovascular accident (CVA) due to embolism (HCC)- (present on admission)  Assessment & Plan  On Eliquis, ASA, and Lipitor  Also on Provigil    Leukocytosis  Assessment & Plan  Currently resolved  WBC's: 11.7 (1/29) --> 10.4 (2/1)  Has been afebrile  U/A (-)  No diarrhea  No cough  Monitor for any aspiration (has some dysphagia)    Hypernatremia  Assessment & Plan  Currently resolved  Na: 145 (1/29) --> 141 (2/1)  S/P 1/2 NS x 500 mL  S/P D5W x 500 mL   On a low salt diet (1/28)  Likely 2nd to being on a nectar thick liquid diet  Cont to monitor    Rash of back  Assessment & Plan  Resolved  S/P topical steroid (1/28)  Monitor    CAD (coronary artery disease)  Assessment & Plan  On ASA  On Lipitor  On Lisinopril & Metoprolol    Systolic CHF (HCC)- (present on admission)  Assessment & Plan  Echo (from [facility]): EF 25-30%, mod MR, RVSP 40-45  BNP: 15,885 (1/23) --> 10,147 (1/26) --> 8892 (1/29) --> 7960 (2/1)  On Lopressor & Lisinopril    Hypertension- (present on admission)  Assessment & Plan  BP ok  On Lisinopril: 5 mg daily  On Metoprolol: 75 mg bid  Monitor    Prostate cancer (HCC)- (present on admission)  Assessment & Plan  Casodex now on hold as unable to be crushed  Outpt Urology F/U    Atrial fibrillation (HCC)- (present on admission)  Assessment & Plan  HR ok  S/P RVR at [facility]  On Digoxin  On Lopressor: 75 mg bid  On Eliquis  Monitor

## 2020-02-01 NOTE — CARE PLAN
Problem: Skin Integrity  Goal: Risk for impaired skin integrity will decrease  Outcome: PROGRESSING AS EXPECTED  Note:   Patient's skin remains intact and free from new or accidental injury this shift.

## 2020-02-01 NOTE — CARE PLAN
Problem: Safety  Goal: Will remain free from injury  Outcome: PROGRESSING AS EXPECTED  Note:   Call light with in reach. Redirection to use call light for assistance. Non skid socks. Upper siderails up x2 while in bed. Bed alarm for safety awareness. No complains of pain. No respiratory distress. Kept clean and dry. Will continue to monitor.

## 2020-02-01 NOTE — THERAPY
"Speech Language Pathology  Daily Treatment     Patient Name: Yousif Kimble  Age:  89 y.o., Sex:  male  Medical Record #: [id number]  Today's Date: 1/31/2020     Subjective    Pt pleasant and cooperative during tx.      Objective       01/31/20 1401   Cognition   Visual Scanning / Cancellation Skills Moderate (3)   Safety Awareness Moderate (3)   Insight into Deficits Severe (2)   Dysphagia    Other Treatments trials of thin liquid by spoon.    SLP Total Time Spent   SLP Individual Total Time Spent (Mins) 60   Charge Group   SLP Swallowing Dysfunction Treatment Swallowing Dysfunction Treatment   SLP Cognitive Skill Development First 15 Minutes 1   SLP Cognitive Skill Development Additional 15 Minutes 1       FIM Comprehension Score:  4 - Minimal Assistance  Comprehension Description:  Verbal cues    FIM Expression Score:  4 - Minimal Assistance  Expression Description:  Verbal cueing    FIM Social Interaction Score:  5 - Stand-by Prompting/Supervision or Set-up  Social Interaction Description:  Increased time    FIM Problem Solving Score:  2 - Max Assistance  Problem Solving Description:  Verbal cueing, Therapy schedule, Bed/chair alarm, Increased time, Seat belt    FIM Memory Score:  2 - Max Assistance  Memory Description:  Verbal cueing, Therapy schedule, Seat belt, Bed/chair alarm      Assessment    Visual scanning (1 target): 70% independent; 100% mod-max A (targets missed on left hand side).  Line guide utilized with minimal increase in accuracy (1 extra target).  Pt presented with consistent \"wet\" vocal quality following thin liquids by spoon.  Pt also presented with L anterior spillage due to leaning postures, and decreased labial seal.      Plan    Target thin liquid trials in isolation.  Target visual scanning and problem solving.    "

## 2020-02-01 NOTE — THERAPY
Speech Language Pathology  Daily Treatment     Patient Name: Yousif Kimble  Age:  89 y.o., Sex:  male  Medical Record #: [id number]  Today's Date: 2/1/2020     Subjective    Pt pleasant and cooperative during ST session.      Objective       02/01/20 1031   Cognition   Simple Attention Moderate (3)   Visual Scanning / Cancellation Skills Moderate (3)   Speech / Dysarthria   Speech / Dysarthria X   Articulation Training Minimal (4)   Interdisciplinary Plan of Care Collaboration   IDT Collaboration with  Therapy Tech   Patient Position at End of Therapy Seated;Bed Alarm On   Collaboration Comments In t-dine for lunch meal   SLP Total Time Spent   SLP Individual Total Time Spent (Mins) 60   Charge Group   Charges Yes   SLP Treatment - Individual Speech Language Treatment - Individual   SLP Cognitive Skill Development First 15 Minutes 1   SLP Cognitive Skill Development Additional 15 Minutes 2           Assessment    Pt complete 3 trials of thin liquids w/ immediate cough following each swallow, chin tuck and cues for effortful swallow did not improve. Visual scanning- MOD cues w/ visual supports- left side of page and bottom of targets highlight to direct scanning to bottom left quadrant- ~ 77% acc. Pt implemented speech intelligibility strategies at sentence level with 90% acc and MIN cues.     Plan    Continue to address dysphagia, visual scanning.

## 2020-02-01 NOTE — REHAB-PHARMACY IDT TEAM NOTE
Pharmacy   Pharmacy  Antibiotics/Antifungals/Antivirals:  Medication:      Active Orders (From admission, onward)    None        Route:        NA  Stop Date:  NA  Reason:      NA  Antihypertensives/Cardiac:  Medication:    Orders (72h ago, onward)     Start     Ordered    01/24/20 0600  lisinopril (PRINIVIL) tablet 5 mg  Q DAY      01/23/20 1439    01/23/20 1800  digoxin (LANOXIN) tablet 125 mcg  DAILY      01/22/20 1327    01/22/20 2100  atorvastatin (LIPITOR) tablet 40 mg  EVERY EVENING      01/22/20 1327    01/22/20 1800  metoprolol (LOPRESSOR) tablet 75 mg  TWICE DAILY      01/22/20 1327              Patient Vitals for the past 24 hrs:   BP Pulse   01/31/20 1352 129/87 61   01/31/20 0620 122/81 83   01/31/20 0608 115/72 --   01/30/20 1900 126/82 82     Anticoagulation:  Medication: Aspirin, Eliquis,    Other key medications: A review of the medication list reveals no issues at this time. Patient is currently on antihypertensive(s). Recommend home blood pressure monitoring/recording if antihypertensive(s) regimen(s) continue.    Section completed by: Lauro Seay [facility]

## 2020-02-01 NOTE — CARE PLAN
Problem: Inadequate nutrient intake  Goal: Patient to consume greater than or equal to 50% of meals  Outcome: PROGRESSING SLOWER THAN EXPECTED  Note:   Pt has eaten < 25% of breakfast and 0% of lunch, other choices offered and declined. Pt has been also encouraged to drink more fluids, says he doesn't like the NTL.

## 2020-02-02 PROCEDURE — 700102 HCHG RX REV CODE 250 W/ 637 OVERRIDE(OP): Performed by: PHYSICAL MEDICINE & REHABILITATION

## 2020-02-02 PROCEDURE — 99232 SBSQ HOSP IP/OBS MODERATE 35: CPT | Performed by: HOSPITALIST

## 2020-02-02 PROCEDURE — 770010 HCHG ROOM/CARE - REHAB SEMI PRIVAT*

## 2020-02-02 PROCEDURE — A9270 NON-COVERED ITEM OR SERVICE: HCPCS | Performed by: PHYSICAL MEDICINE & REHABILITATION

## 2020-02-02 RX ADMIN — ATORVASTATIN CALCIUM 40 MG: 40 TABLET, FILM COATED ORAL at 20:34

## 2020-02-02 RX ADMIN — METOPROLOL TARTRATE 75 MG: 25 TABLET ORAL at 17:37

## 2020-02-02 RX ADMIN — MELATONIN TAB 3 MG 3 MG: 3 TAB at 20:34

## 2020-02-02 RX ADMIN — DIGOXIN 125 MCG: 125 TABLET ORAL at 17:37

## 2020-02-02 RX ADMIN — APIXABAN 5 MG: 5 TABLET, FILM COATED ORAL at 08:21

## 2020-02-02 RX ADMIN — METOPROLOL TARTRATE 75 MG: 25 TABLET ORAL at 05:40

## 2020-02-02 RX ADMIN — SENNOSIDES AND DOCUSATE SODIUM 2 TABLET: 8.6; 5 TABLET ORAL at 08:21

## 2020-02-02 RX ADMIN — ASPIRIN 81 MG 81 MG: 81 TABLET ORAL at 08:21

## 2020-02-02 RX ADMIN — APIXABAN 5 MG: 5 TABLET, FILM COATED ORAL at 20:33

## 2020-02-02 RX ADMIN — FLUTICASONE PROPIONATE 100 MCG: 50 SPRAY, METERED NASAL at 08:26

## 2020-02-02 RX ADMIN — MIRTAZAPINE 15 MG: 15 TABLET, ORALLY DISINTEGRATING ORAL at 20:34

## 2020-02-02 RX ADMIN — LISINOPRIL 5 MG: 5 TABLET ORAL at 05:40

## 2020-02-02 RX ADMIN — MODAFINIL 100 MG: 100 TABLET ORAL at 08:21

## 2020-02-02 RX ADMIN — SENNOSIDES AND DOCUSATE SODIUM 2 TABLET: 8.6; 5 TABLET ORAL at 20:33

## 2020-02-02 ASSESSMENT — ENCOUNTER SYMPTOMS
NERVOUS/ANXIOUS: 0
DIZZINESS: 0
DIARRHEA: 0
FEVER: 0
BLURRED VISION: 0
COUGH: 0

## 2020-02-02 ASSESSMENT — PATIENT HEALTH QUESTIONNAIRE - PHQ9
2. FEELING DOWN, DEPRESSED, IRRITABLE, OR HOPELESS: NOT AT ALL
SUM OF ALL RESPONSES TO PHQ9 QUESTIONS 1 AND 2: 0
1. LITTLE INTEREST OR PLEASURE IN DOING THINGS: NOT AT ALL

## 2020-02-02 NOTE — CARE PLAN
Problem: Safety  Goal: Will remain free from injury  Outcome: PROGRESSING AS EXPECTED  Note:   Call light with in reach. Redirection to use call light for assistance. Non skid socks. Upper siderails up x2 while in bed. No complains of pain. No respiratory distress. Encouraged increase fluids intake. Will continue to monitor.

## 2020-02-02 NOTE — PROGRESS NOTES
DATE OF SERVICE:  01/31/2020    The patient said he is doing well.  He continues to deny any mood disturbance.    He said he is meeting all of his goals.  The patient's affect remains very   constricted, almost blunted.  He did not seem any more engaging than he did   the last time he was seen.  Patient said he anticipates that when he goes   home, he will do well.  He was spoken to about some of the things he believes   he needs to focus on after he returns home.       ____________________________________     BAYRON MORGAN, PHD    JEISON / STEFANO    DD:  02/01/2020 13:51:38  DT:  02/01/2020 20:10:58    D#:  [id number]  Job#:  [id number]

## 2020-02-02 NOTE — CARE PLAN
Problem: Safety  Goal: Will remain free from injury  Outcome: PROGRESSING AS EXPECTED  Note:   Pt caught getting up out of bed without using call light, alarms in place and pt given reminders to use call light when needing to get up.      Problem: Skin Integrity  Goal: Risk for impaired skin integrity will decrease  Outcome: PROGRESSING AS EXPECTED  Note:   Rash on back is resolving, with mild splotchy pink spots, but still some raised bumps.

## 2020-02-02 NOTE — PROGRESS NOTES
Hospital Medicine Daily Progress Note        Chief Complaint:  Hypertension  Afib  CHF    Interval History:  No significant events or changes since last visit    Review of Systems  Review of Systems   Constitutional: Negative for fever.   Eyes: Negative for blurred vision.   Respiratory: Negative for cough.    Cardiovascular: Negative for chest pain.   Gastrointestinal: Negative for diarrhea.   Musculoskeletal: Negative for joint pain.   Neurological: Negative for dizziness.   Psychiatric/Behavioral: The patient is not nervous/anxious.         Physical Exam  Temp:  [36.4 °C (97.6 °F)-37.1 °C (98.8 °F)] 37.1 °C (98.8 °F)  Pulse:  [] 81  Resp:  [17-18] 17  BP: (110-134)/(74-86) 130/86  SpO2:  [93 %-97 %] 93 %    Physical Exam  Vitals signs and nursing note reviewed.   Constitutional:       Appearance: He is not diaphoretic.   HENT:      Mouth/Throat:      Pharynx: No oropharyngeal exudate or posterior oropharyngeal erythema.   Eyes:      Extraocular Movements: Extraocular movements intact.   Neck:      Vascular: No carotid bruit.   Cardiovascular:      Rate and Rhythm: Normal rate and regular rhythm.   Pulmonary:      Effort: Pulmonary effort is normal.      Breath sounds: No wheezing or rales.   Abdominal:      General: There is no distension.      Palpations: Abdomen is soft.      Tenderness: There is no tenderness.   Musculoskeletal:      Right lower leg: No edema.      Left lower leg: No edema.   Skin:     General: Skin is warm and dry.   Neurological:      Mental Status: He is alert and oriented to person, place, and time.   Psychiatric:         Mood and Affect: Mood normal.         Behavior: Behavior normal.         Fluids    Intake/Output Summary (Last 24 hours) at 2/2/2020 0751  Last data filed at 2/1/2020 2038  Gross per 24 hour   Intake 420 ml   Output --   Net 420 ml       Laboratory  Recent Labs     02/01/20  0524   WBC 10.4   RBC 4.04*   HEMOGLOBIN 13.2*   HEMATOCRIT 39.7*   MCV 98.3*   MCH 32.7    MCHC 33.2*   RDW 48.9   PLATELETCT 213   MPV 10.8     Recent Labs     02/01/20  0524   SODIUM 141   POTASSIUM 3.8   CHLORIDE 107   CO2 28   GLUCOSE 93   BUN 19   CREATININE 1.14   CALCIUM 9.5                   Assessment/Plan  * Cerebrovascular accident (CVA) due to embolism (HCC)- (present on admission)  Assessment & Plan  On Eliquis, ASA, and Lipitor  Also on Provigil    Leukocytosis  Assessment & Plan  Currently resolved  WBC's: 11.7 (1/29) --> 10.4 (2/1)  Has been afebrile  U/A (-)  No diarrhea  No cough  Monitor for any aspiration (has some dysphagia)    Hypernatremia  Assessment & Plan  Currently resolved  Na: 145 (1/29) --> 141 (2/1)  S/P 1/2 NS x 500 mL  S/P D5W x 500 mL   On a low salt diet (1/28)  Likely 2nd to being on a nectar thick liquid diet  Cont to monitor    Rash of back  Assessment & Plan  Resolved  S/P topical steroid (1/28)  Monitor    CAD (coronary artery disease)  Assessment & Plan  On ASA  On Lipitor  On Lisinopril & Metoprolol    Systolic CHF (HCC)- (present on admission)  Assessment & Plan  Echo (from [facility]): EF 25-30%, mod MR, RVSP 40-45  BNP: 15,885 (1/23) --> 10,147 (1/26) --> 8892 (1/29) --> 7960 (2/1)  On Lopressor & Lisinopril    Hypertension- (present on admission)  Assessment & Plan  BP ok  On Lisinopril: 5 mg daily  On Metoprolol: 75 mg bid  Monitor    Prostate cancer (HCC)- (present on admission)  Assessment & Plan  Casodex now on hold as unable to be crushed  Outpt Urology F/U    Atrial fibrillation (HCC)- (present on admission)  Assessment & Plan  HR ok  S/P RVR at [facility]  On Digoxin  On Lopressor: 75 mg bid  On Eliquis  Monitor

## 2020-02-03 PROCEDURE — 92526 ORAL FUNCTION THERAPY: CPT

## 2020-02-03 PROCEDURE — 770010 HCHG ROOM/CARE - REHAB SEMI PRIVAT*

## 2020-02-03 PROCEDURE — 700102 HCHG RX REV CODE 250 W/ 637 OVERRIDE(OP): Performed by: PHYSICAL MEDICINE & REHABILITATION

## 2020-02-03 PROCEDURE — A9270 NON-COVERED ITEM OR SERVICE: HCPCS | Performed by: PHYSICAL MEDICINE & REHABILITATION

## 2020-02-03 PROCEDURE — 97530 THERAPEUTIC ACTIVITIES: CPT

## 2020-02-03 PROCEDURE — 99232 SBSQ HOSP IP/OBS MODERATE 35: CPT | Performed by: HOSPITALIST

## 2020-02-03 PROCEDURE — 99233 SBSQ HOSP IP/OBS HIGH 50: CPT | Performed by: PHYSICAL MEDICINE & REHABILITATION

## 2020-02-03 PROCEDURE — 97535 SELF CARE MNGMENT TRAINING: CPT

## 2020-02-03 PROCEDURE — 97110 THERAPEUTIC EXERCISES: CPT

## 2020-02-03 PROCEDURE — 97116 GAIT TRAINING THERAPY: CPT

## 2020-02-03 PROCEDURE — 97129 THER IVNTJ 1ST 15 MIN: CPT

## 2020-02-03 RX ADMIN — APIXABAN 5 MG: 5 TABLET, FILM COATED ORAL at 08:08

## 2020-02-03 RX ADMIN — LISINOPRIL 5 MG: 5 TABLET ORAL at 05:12

## 2020-02-03 RX ADMIN — MIRTAZAPINE 15 MG: 15 TABLET, ORALLY DISINTEGRATING ORAL at 20:24

## 2020-02-03 RX ADMIN — ASPIRIN 81 MG 81 MG: 81 TABLET ORAL at 08:08

## 2020-02-03 RX ADMIN — MELATONIN TAB 3 MG 3 MG: 3 TAB at 20:24

## 2020-02-03 RX ADMIN — MODAFINIL 100 MG: 100 TABLET ORAL at 08:08

## 2020-02-03 RX ADMIN — ATORVASTATIN CALCIUM 40 MG: 40 TABLET, FILM COATED ORAL at 20:24

## 2020-02-03 RX ADMIN — SENNOSIDES AND DOCUSATE SODIUM 2 TABLET: 8.6; 5 TABLET ORAL at 20:25

## 2020-02-03 RX ADMIN — FLUTICASONE PROPIONATE 100 MCG: 50 SPRAY, METERED NASAL at 08:13

## 2020-02-03 RX ADMIN — APIXABAN 5 MG: 5 TABLET, FILM COATED ORAL at 20:24

## 2020-02-03 RX ADMIN — DIGOXIN 125 MCG: 125 TABLET ORAL at 17:40

## 2020-02-03 RX ADMIN — SENNOSIDES AND DOCUSATE SODIUM 2 TABLET: 8.6; 5 TABLET ORAL at 08:08

## 2020-02-03 RX ADMIN — METOPROLOL TARTRATE 75 MG: 25 TABLET ORAL at 05:12

## 2020-02-03 RX ADMIN — METOPROLOL TARTRATE 75 MG: 25 TABLET ORAL at 17:40

## 2020-02-03 ASSESSMENT — ENCOUNTER SYMPTOMS
DIARRHEA: 0
CHILLS: 0
VOMITING: 0
ABDOMINAL PAIN: 0
NERVOUS/ANXIOUS: 0
SHORTNESS OF BREATH: 0
FEVER: 0
NAUSEA: 0

## 2020-02-03 ASSESSMENT — PATIENT HEALTH QUESTIONNAIRE - PHQ9
SUM OF ALL RESPONSES TO PHQ9 QUESTIONS 1 AND 2: 0
SUM OF ALL RESPONSES TO PHQ9 QUESTIONS 1 AND 2: 0
2. FEELING DOWN, DEPRESSED, IRRITABLE, OR HOPELESS: NOT AT ALL
1. LITTLE INTEREST OR PLEASURE IN DOING THINGS: NOT AT ALL
1. LITTLE INTEREST OR PLEASURE IN DOING THINGS: NOT AT ALL
2. FEELING DOWN, DEPRESSED, IRRITABLE, OR HOPELESS: NOT AT ALL

## 2020-02-03 NOTE — PROGRESS NOTES
"Rehab Progress Note     Date of Service: 2/3/2020  Chief Complaint: follow up stroke    Interval Events (Subjective)    Patient seen and examined in his room today. He is resting in his bed. He denies any pain. No issues with his bowel or bladder. No new complaints. Per staff, he had packed up his suitcase yesterday as he thought his brother was taking him home.     Objective:  VITAL SIGNS: /83   Pulse 88   Temp 37.2 °C (98.9 °F) (Temporal)   Resp 18   Ht 1.727 m (5' 8\")   Wt 61.7 kg (136 lb)   SpO2 93%   BMI 20.68 kg/m²   Gen: alert, no apparent distress  CV: regular rate, irregular rhythm, no murmurs, no peripheral edema  Resp: clear to ascultation bilaterally, normal respiratory effort  GI: soft, non-tender abdomen, bowel sounds present  Neuro: notable for left facial droop, mild dysarthria      Recent Results (from the past 72 hour(s))   Basic Metabolic Panel    Collection Time: 02/01/20  5:24 AM   Result Value Ref Range    Sodium 141 135 - 145 mmol/L    Potassium 3.8 3.6 - 5.5 mmol/L    Chloride 107 96 - 112 mmol/L    Co2 28 20 - 33 mmol/L    Glucose 93 65 - 99 mg/dL    Bun 19 8 - 22 mg/dL    Creatinine 1.14 0.50 - 1.40 mg/dL    Calcium 9.5 8.5 - 10.5 mg/dL    Anion Gap 6.0 0.0 - 11.9   MAGNESIUM    Collection Time: 02/01/20  5:24 AM   Result Value Ref Range    Magnesium 1.7 1.5 - 2.5 mg/dL   PHOSPHORUS    Collection Time: 02/01/20  5:24 AM   Result Value Ref Range    Phosphorus 3.0 2.5 - 4.5 mg/dL   CBC WITH DIFFERENTIAL    Collection Time: 02/01/20  5:24 AM   Result Value Ref Range    WBC 10.4 4.8 - 10.8 K/uL    RBC 4.04 (L) 4.70 - 6.10 M/uL    Hemoglobin 13.2 (L) 14.0 - 18.0 g/dL    Hematocrit 39.7 (L) 42.0 - 52.0 %    MCV 98.3 (H) 81.4 - 97.8 fL    MCH 32.7 27.0 - 33.0 pg    MCHC 33.2 (L) 33.7 - 35.3 g/dL    RDW 48.9 35.9 - 50.0 fL    Platelet Count 213 164 - 446 K/uL    MPV 10.8 9.0 - 12.9 fL    Neutrophils-Polys 40.80 (L) 44.00 - 72.00 %    Lymphocytes 46.70 (H) 22.00 - 41.00 %    Monocytes " 7.90 0.00 - 13.40 %    Eosinophils 3.90 0.00 - 6.90 %    Basophils 0.30 0.00 - 1.80 %    Immature Granulocytes 0.40 0.00 - 0.90 %    Nucleated RBC 0.00 /100 WBC    Neutrophils (Absolute) 4.24 1.82 - 7.42 K/uL    Lymphs (Absolute) 4.86 (H) 1.00 - 4.80 K/uL    Monos (Absolute) 0.82 0.00 - 0.85 K/uL    Eos (Absolute) 0.41 0.00 - 0.51 K/uL    Baso (Absolute) 0.03 0.00 - 0.12 K/uL    Immature Granulocytes (abs) 0.04 0.00 - 0.11 K/uL    NRBC (Absolute) 0.00 K/uL   proBrain Natriuretic Peptide, NT    Collection Time: 02/01/20  5:24 AM   Result Value Ref Range    NT-proBNP 7960 (H) 0 - 125 pg/mL   ESTIMATED GFR    Collection Time: 02/01/20  5:24 AM   Result Value Ref Range    GFR If African American >60 >60 mL/min/1.73 m 2    GFR If Non African American >60 >60 mL/min/1.73 m 2       Current Facility-Administered Medications   Medication Frequency   • fluticasone (FLONASE) nasal spray 100 mcg DAILY   • mirtazapine (REMERON) orally disintegrating tab 15 mg QHS   • melatonin tablet 3 mg QHS   • modafinil (PROVIGIL) tablet 100 mg QAM   • lisinopril (PRINIVIL) tablet 5 mg Q DAY   • aspirin (ASA) chewable tab 81 mg DAILY   • Respiratory Care per Protocol Continuous RT   • Pharmacy Consult Request ...Pain Management Review 1 Each PHARMACY TO DOSE   • acetaminophen (TYLENOL) tablet 650 mg Q4HRS PRN   • senna-docusate (PERICOLACE or SENOKOT S) 8.6-50 MG per tablet 2 Tab BID    And   • polyethylene glycol/lytes (MIRALAX) PACKET 1 Packet QDAY PRN    And   • magnesium hydroxide (MILK OF MAGNESIA) suspension 30 mL QDAY PRN    And   • bisacodyl (DULCOLAX) suppository 10 mg QDAY PRN   • artificial tears ophthalmic solution 1 Drop PRN   • benzocaine-menthol (CEPACOL) lozenge 1 Lozenge Q2HRS PRN   • mag hydrox-al hydrox-simeth (MAALOX PLUS ES or MYLANTA DS) suspension 20 mL Q2HRS PRN   • ondansetron (ZOFRAN ODT) dispertab 4 mg 4X/DAY PRN    Or   • ondansetron (ZOFRAN) syringe/vial injection 4 mg 4X/DAY PRN   • sodium chloride (OCEAN) 0.65 %  nasal spray 2 Spray PRN   • apixaban (ELIQUIS) tablet 5 mg BID   • lactulose 20 GM/30ML solution 30 mL QDAY PRN   • docusate sodium (ENEMEEZ) enema 283 mg QDAY PRN   • atorvastatin (LIPITOR) tablet 40 mg Q EVENING   • digoxin (LANOXIN) tablet 125 mcg DAILY AT 1800   • metoprolol (LOPRESSOR) tablet 75 mg TWICE DAILY   • albuterol inhaler 2 Puff Q4HRS PRN       Orders Placed This Encounter   Procedures   • Diet Order 2 Gram Sodium (forify all foods )     Standing Status:   Standing     Number of Occurrences:   1     Order Specific Question:   Diet:     Answer:   2 Gram Sodium [7]     Comments:   forify all foods      Order Specific Question:   Texture/Fiber modifications:     Answer:   Dysphagia 2(Pureed/Chopped)specify fluid consistency(question 6) [2]     Order Specific Question:   Consistency/Fluid modifications:     Answer:   [place] Thick [2]       Assessment:  Active Hospital Problems    Diagnosis   • *Cerebrovascular accident (CVA) due to embolism (HCC)   • Atrial fibrillation (HCC)   • Prostate cancer (HCC)   • Hypertension   • Systolic CHF (HCC)   • Dysarthria   • Dysphagia   • CAD (coronary artery disease)   • Rash of back     This patient is a 89 y.o. male admitted for acute inpatient rehabilitation with Cerebrovascular accident (CVA) due to embolism (HCC).    I led and attended the weekly conference, and agree with the IDT conference documentation and plan of care as noted below.    Date of conference: 2/3/2020    Goals and barriers: See IDT note.    Biggest barriers: impulsive, incontinent of bowel/bladder - due to impaired initiation, needs verbal cues, poor carryover, dysphagia    Therapy update 2/3/2020  Supervision eating  Supervision grooming  Supervision bathing  Supervision upper body dressing  Supervision lower body dressing  Min assist toileting  Mod assistbladder  Max assist bowel  Supervision bed/chair transfer  Min assist toilet transfer  Min assist tub/shower transfer  Max assist  ambulation  Max assist wheelchair propulsion  Max assist stairs  Min assist comprehension  Min assist expression  Supervision social interaction  Max assist problem solving  Max assist memory    CM/social support: brother can only provide intermittent assistance, needs life alert and/or 24/7 supervision    Anticipated DC date: 2/8/2020    Home health: PT/OT/SLP/RN    Equip: FWW    Follow up: PCP, rehab clinic    Medical Decision Making and Plan:    Bilateral strokes  Largest R MCA/frontal lobe  Cardioembolic  Left pronator drift  Left facial droop  Left lateral lean  Dysarthria, continues  Dysphagia, improved  Cognitive deficits, continues  Poor initiation, continues  Poor attention, continues  Continue full rehab program  PT/OT/SLP, 1 hr each discipline, 5 days per week  Continue Eliquis and statin for secondary stroke prophylaxis  Speech therapy to advance diet  Continue Provigil for neurostimulation    Insomnia  Continue melatonin  Increased trazodone, too sedated, discontinued  Continue Remeron, started 1/28    Severe protein calorie malnutrition  Dietician consult  Supplements, milkshakes  Continue Remeron, started 1/28  Discussed treatment plan with his brother Ward    Chronic low back pain  Neck pain  Poor posture, scoliosis  PRN tylenol  Patient doesn't want Lidoderm patch    Bowel  Continue bowel meds  Last BM 2/3    Bladder  Checked PVRs - 65, 36  Not retaining  ICP for over 400 cc  Scheduled toileting    DVT prophylaxis  Eliquis    Appreciate the assistance of the hospitalist with his medical comorbidities:     Hypertension, metoprolol, lisinopril  Atrial fibrillation, digoxin, metoprolol  Systolic CHF, EF 25%  Elevated Pro-BNP, 31372 --> 02723  History of prostate cancer, Casadex on hold as it cannot be crushed  Coronary artery disease, aspirin  Rash on back, improved, s/p hydrocortisone cream  Leukocytosis, urine culture negative    Total time:  40 minutes.  I spent greater than 50% of the time for  patient care, counseling, and coordination on this date, including patient face-to face time, unit/floor time with review of records/pertinent lab data and studies, as well as discussing diagnostic evaluation/work up, planned therapeutic interventions, and future disposition of care, as per the interval events/subjective and the assessment and plan as noted above.        Gauri Fortune M.D.   Physical Medicine and Rehabilitation

## 2020-02-03 NOTE — PROGRESS NOTES
Hospital Medicine Daily Progress Note        Chief Complaint:  Hypertension  Afib  CHF    Interval History:  No significant events or changes since last visit    Review of Systems  Review of Systems   Constitutional: Negative for chills and fever.   Respiratory: Negative for shortness of breath.    Cardiovascular: Negative for chest pain.   Gastrointestinal: Negative for abdominal pain, diarrhea, nausea and vomiting.   Psychiatric/Behavioral: The patient is not nervous/anxious.         Physical Exam  Temp:  [36.6 °C (97.9 °F)-37.2 °C (98.9 °F)] 37.2 °C (98.9 °F)  Pulse:  [] 88  Resp:  [18] 18  BP: (122-131)/(67-84) 124/83  SpO2:  [93 %-96 %] 93 %    Physical Exam  Vitals signs and nursing note reviewed.   Constitutional:       Appearance: Normal appearance.   HENT:      Head: Atraumatic.   Eyes:      Conjunctiva/sclera: Conjunctivae normal.      Pupils: Pupils are equal, round, and reactive to light.   Neck:      Musculoskeletal: Normal range of motion and neck supple.   Cardiovascular:      Rate and Rhythm: Normal rate and regular rhythm.      Heart sounds: No murmur.   Pulmonary:      Effort: Pulmonary effort is normal.      Breath sounds: No stridor. No wheezing or rales.   Abdominal:      General: There is no distension.      Palpations: Abdomen is soft.      Tenderness: There is no tenderness.   Musculoskeletal:      Right lower leg: No edema.      Left lower leg: No edema.   Skin:     General: Skin is warm and dry.      Findings: No rash.   Neurological:      Mental Status: He is alert and oriented to person, place, and time.   Psychiatric:         Mood and Affect: Mood normal.         Behavior: Behavior normal.         Fluids    Intake/Output Summary (Last 24 hours) at 2/3/2020 0822  Last data filed at 2/2/2020 2033  Gross per 24 hour   Intake 295 ml   Output --   Net 295 ml       Laboratory  Recent Labs     02/01/20  0524   WBC 10.4   RBC 4.04*   HEMOGLOBIN 13.2*   HEMATOCRIT 39.7*   MCV 98.3*   MCH  32.7   MCHC 33.2*   RDW 48.9   PLATELETCT 213   MPV 10.8     Recent Labs     02/01/20  0524   SODIUM 141   POTASSIUM 3.8   CHLORIDE 107   CO2 28   GLUCOSE 93   BUN 19   CREATININE 1.14   CALCIUM 9.5                   Assessment/Plan  * Cerebrovascular accident (CVA) due to embolism (HCC)- (present on admission)  Assessment & Plan  On Eliquis, ASA, and Lipitor  Also on Provigil    Leukocytosis  Assessment & Plan  Currently resolved  WBC's: 11.7 (1/29) --> 10.4 (2/1)  Has been afebrile  U/A (-)  No diarrhea  No cough  Monitor for any aspiration (has some dysphagia)    Hypernatremia  Assessment & Plan  Currently resolved  Na: 145 (1/29) --> 141 (2/1)  S/P 1/2 NS x 500 mL  S/P D5W x 500 mL   On a low salt diet (1/28)  Likely 2nd to being on a nectar thick liquid diet  Cont to monitor    Rash of back  Assessment & Plan  Resolved  S/P topical steroid (1/28)  Monitor    CAD (coronary artery disease)  Assessment & Plan  On ASA  On Lipitor  On Lisinopril & Metoprolol    Systolic CHF (HCC)- (present on admission)  Assessment & Plan  Echo (from [facility]): EF 25-30%, mod MR, RVSP 40-45  BNP: 15,885 (1/23) --> 10,147 (1/26) --> 8892 (1/29) --> 7960 (2/1)  On Lopressor & Lisinopril    Hypertension- (present on admission)  Assessment & Plan  BP ok  On Lisinopril: 5 mg daily  On Metoprolol: 75 mg bid  Monitor    Prostate cancer (HCC)- (present on admission)  Assessment & Plan  Casodex now on hold as unable to be crushed  Outpt Urology F/U    Atrial fibrillation (HCC)- (present on admission)  Assessment & Plan  HR ok  S/P RVR at [facility]  On Digoxin  On Lopressor: 75 mg bid  On Eliquis  Monitor

## 2020-02-03 NOTE — REHAB-OT IDT TEAM NOTE
Occupational Therapy   Activities of Daily Living  Eating Initial:  5 - Standby Prompting/Supervision or Set-up  Eating Current:  1 - Total Assistance   Eating Description:  Modified diet, Supervision for safety, Set-up of equipment or meal/tube feeding  Grooming Initial:  5 - Standby Prompting/Supervision or Set-up  Grooming Current:  5 - Standby Prompting/Supervision or Set-up   Grooming Description:  Set-up of equipment, Increased time, Verbal cueing(Patient brushed teeth with set-up and VCs to turn off faucet while seated in w/c.)  Bathing Initial:  4 - Minimal Assistance  Bathing Current:  5 - Standby Prompting/Supervision or Set-up   Bathing Description:  Grab bar, Supervision for safety, Verbal cueing(verbal cues for initiation and thoroughness, supervision for safety)  Upper Body Dressing Initial:  4 - Minimal Assistance  Upper Body Dressing Current:  5 - Standby Prompting/Supervision or Set-up   Upper Body Dressing Description:  Set-up of equipment  Lower Body Dressing Initial:  4 - Minimal Assistance  Lower Body Dressing Current:  5 - Standby Prompting/Supervsion or Set-up   Lower Body Dressing Description:  5 - Standby Prompting/Supervsion or Set-up  Toileting Initial:  2 - Max Assistance  Toileting Current:  4 - Minimal Assistance   Toileting Description:  Grab bar, Increased time, Supervision for safety  Toilet Transfer Initial:  3 - Moderate Assistance  Toilet Transfer Current:  4 - Minimal Assistance   Toilet Transfer Description:  4 - Minimal Assistance  Tub / Shower Transfer Initial:  4 - Minimal Assistance  Tub / Shower Transfer Current:  5 - Standby Prompting/Supervision or Set-up   Tub / Shower Transfer Description:  Supervision for safety, Grab bar(stand pivot w/c <> shower chair via grab bar)  IADL:  Not yet addressed   Family Training/Education:  Not yet addressed, will benefit from FT this week prior to d/c   DME/DC Recommendations:  Grab bar installation in shower. Pt has built in shower  bench that will be adequate     Strengths:  Independent PLOF, Pleasant and cooperative and Willingly participates in therapeutic activities  Barriers:  Aspiration risk, Decreased endurance, Generalized weakness, Poor balance, Poor carryover of learning and Other: impaired attention, impaired problem solving, impaired initiation, left lateral lean     # of short term goals set= 3    # of short term goals met= 3     Occupational Therapy Goals     Problem: OT Long Term Goals     Dates: Start: 01/23/20       Description:     Goal: LTG-By discharge, patient will complete basic self care tasks     Dates: Start: 01/23/20       Description: 1) Individualized Goal: Supervision    2) Interventions: OT Group Therapy, OT Self Care/ADL, OT Cognitive Skill Dev, OT Community Reintegration, OT Manual Ther Technique, OT Neuro Re-Ed/Balance, OT Sensory Int Techniques, OT Therapeutic Activity, OT Evaluation and OT Therapeutic Exercise             Goal: LTG-By discharge, patient will perform bathroom transfers     Dates: Start: 01/23/20       Description: 1) Individualized Goal: Supervision    2) Interventions: OT Group Therapy, OT Self Care/ADL, OT Cognitive Skill Dev, OT Community Reintegration, OT Manual Ther Technique, OT Neuro Re-Ed/Balance, OT Sensory Int Techniques, OT Therapeutic Activity, OT Evaluation and OT Therapeutic Exercise                         Section completed by:  Anya Trejo MS,OTR/L

## 2020-02-03 NOTE — CARE PLAN
Problem: Safety  Goal: Will remain free from injury  Outcome: PROGRESSING AS EXPECTED  Note:   Call light with in reach. Redirection to use call light for assistance. Non skid socks. Upper siderails up x2 while in bed. Bed alarm for safety awareness. No complains of pain. No respiratory distress. Encouraged increase fluids intake. Will continue to monitor.

## 2020-02-03 NOTE — REHAB-NURSING IDT TEAM NOTE
Nursing   Nursing  Diet/Nutrition:  Dysphagia II and Other:2 gm Na, NTL  Medication Administration:  Crush all Medications in Puree  % consumed at meals in last 24 hours:  Consumed 25-50% of meals per documentation.  Meal/Snack Consumption for the past 24 hrs:   Oral Nutrition Oral Nutrition Supplement    02/02/20 1800 Dinner;Less than 25% Consumed --   02/02/20 1200 Lunch;Less than 25% Consumed --   02/02/20 0800 Breakfast;Between 25-50% Consumed Boost     Snack schedule:  None  Supplement:  Other: per RD  Appetite:  Poor  Fluid Intake/Output in past 24 hours: In: 315 [P.O.:315]  Out: -   Admit Weight:  Weight: 66 kg (145 lb 8.1 oz)  Weight Last 7 Days   [61.7 kg (136 lb)] 61.7 kg (136 lb) (02/02 0947)    Pain Issues:    Location:  --  --         Severity:  Denies   Scheduled pain medications:  None     PRN pain medications used in last 24 hours:  None   Non Pharmacologic Interventions:  repositioned and rest  Effectiveness of pain management plan:  good=patient states acceptable comfort after interventions    Bowel:    Bowel Assist Initial Score:  3 - Moderate Assistance  Bowel Assist Current Score:  2 - Max Assistance  Bowl Accidents in last 7 days:     Last bowel movement: 02/02/20  Stool Description: Large, Soft, Brown     Usual bowel pattern:  daily  Scheduled bowel medications:  senna-docusate (PERICOLACE or SENOKOT S)   PRN bowel medications used in last 24 hours:  None  Nursing Interventions:  Increased time, Scheduled medication, Supervision, Verbal cueing, Positioning on commode/toilet, Brief  Effectiveness of bowel program:   good=regular, predictable, controlled emptying of bowel  Bladder:    Bladder Assist Initial Score:  3 - Moderate Assistance  Bladder Assist Current Score:  2 - Max Assistance  Bladder Accidents in last 7 days:     PVR range for past 24-48 hours: -- ()  Intermittent Catheterization:  n/a  Medications affecting bladder:  None    Time void schedule/voiding pattern:  Voiding every 2-4  hours  Interventions:  Increased time, Supervision, Verbal cueing, Brief  Effectiveness of bladder training:  Good=regular, predictable, emptying of bladder, patient initiates time voiding    Arre[place]:    Type:     Patient Lines/Drains/Airways Status    Active Catheter     None              Date placed:          Justification:    Diabetes:  Blood Sugar Frequency:  None    Range of BS for last 48 hours:       Scheduled diabetic medications:  None  Sliding scale usage in past 24 hours:  No  Total Short acting insulin in the past 24 hours:  None  Total Long acting insulin in the past 24 hours:  None    Wound:         Patient Lines/Drains/Airways Status    Active Current Wounds     None                   Interventions:  n/a    Wound Vac Location:  Not applicable  Dressing last changed:  Not applicable  Pump Mode Pressure Setting       Description of drainage:  Not applicable    Sleep/wake cycle:   Average hours slept:  Sleeps 4-6 hours without waking  Sleep medication usage:  Other Melatonin 3 mg    Patient/Family Training/Education:  Aspiration Precautions, Diet/Nutrition, Fall Prevention, General Self Care, Safe Transfers and Safety  Discharge Supply Recommendations:  Blood Pressure Monitor  Strengths: Manages pain appropriately   Barriers:   Aspiration risk, Confused, Generalized weakness, Hypertension and Impulsive       Nursing Problems     Problem: Bowel/Gastric:     Description:     Goal: Normal bowel function is maintained or improved     Description:           Goal: Will not experience complications related to bowel motility     Description:                 Problem: Communication     Description:     Goal: The ability to communicate needs accurately and effectively will improve     Description:                 Problem: Discharge Barriers/Planning     Description:     Goal: Patient's continuum of care needs will be met     Description:                 Problem: Infection     Description:     Goal: Will remain free  from infection     Description:                 Problem: Knowledge Deficit     Description:     Goal: Knowledge of disease process/condition, treatment plan, diagnostic tests, and medications will improve     Description:           Goal: Knowledge of the prescribed therapeutic regimen will improve     Description:                 Problem: Pain Management     Description:     Goal: Pain level will decrease to patient's comfort goal     Description:                 Problem: Respiratory:     Description:     Goal: Respiratory status will improve     Description:                 Problem: Safety     Description:     Goal: Will remain free from injury     Description:           Goal: Will remain free from falls     Description:                 Problem: Skin Integrity     Description:     Goal: Risk for impaired skin integrity will decrease     Description:                 Problem: Urinary Elimination:     Description:     Goal: Ability to reestablish a normal urinary elimination pattern will improve     Description:                 Problem: Venous Thromboembolism (VTW)/Deep Vein Thrombosis (DVT) Prevention:     Description:     Goal: Patient will participate in Venous Thrombosis (VTE)/Deep Vein Thrombosis (DVT)Prevention Measures     Description:     Flowsheet:     Taken at 01/30/20 9035    Pharmacologic Prophylaxis Used Apixaban by  Samia Berry R.N.                             Long Term Goals:   At discharge patient will be able to function safely at home and in the community with support.    Section completed by:  Racheal Herbert R.N.

## 2020-02-03 NOTE — REHAB-SLP IDT TEAM NOTE
Speech Therapy   Cognitive Linquistic Functions  Comprehension Initial:  4 - Minimal Assistance  Comprehension Current:  4 - Minimal Assistance   Comprehension Description:  Verbal cues  Expression Initial:  4 - Minimal Assistance  Expression Current:  4 - Minimal Assistance   Expression Description:  Verbal cueing  Social Interaction Initial:  5 - Stand-by Prompting/Supervision or Set-up  Social Interaction Current:  5 - Stand-by Prompting/Supervision or Set-up   Social Interaction Description:  Increased time  Problem Solving Initial:  2 - Max Assistance  Problem Solving Current:  2 - Max Assistance   Problem Solving Description:  Verbal cueing, Therapy schedule, Bed/chair alarm, Increased time, Seat belt  Memory Initial:  2 - Max Assistance  Memory Current:  2 - Max Assistance   Memory Description:  Verbal cueing, Therapy schedule, Seat belt, Bed/chair alarm  Executive Functioning / Organization Initial:     Executive Functioning / Organization Current:  2 - Max Assistance     Executive Functioning Desciption:  Verbal cues, extra time.  Swallowing  Swallowing Status: Patient has been tolerating dysphagia 2 and nectar thick liquids.  He has participated in trials of thin liquids but demonstrates pattern of coughing post swallow.  Orders Placed This Encounter   Procedures   • Diet Order 2 Gram Sodium (forify all foods )     Standing Status:   Standing     Number of Occurrences:   1     Order Specific Question:   Diet:     Answer:   2 Gram Sodium [7]     Comments:   forify all foods      Order Specific Question:   Texture/Fiber modifications:     Answer:   Dysphagia 2(Pureed/Chopped)specify fluid consistency(question 6) [2]     Order Specific Question:   Consistency/Fluid modifications:     Answer:   Nectar Thick [2]     Behavior Modification Plan  Keep instructions simple/concrete, Give clear feedback, Set clear goals, Redirect to task/topic and Analyze tasks (break down into smaller steps)  Assistive  Technology  Low tech: Calendar, planner, schedule, alarms/timers, pill organizer, post-it notes, lists  Family Training/Education:  To be sheduled.  DC Recommendations:   Patient will benefit from additional ST services upon discharge from this facility.  Strengths:  Pleasant and cooperative and Willingly participates in therapeutic activities  Barriers:  Hemiplegia, Poor appetite/intake, Poor carryover of learning, Poor insight/denial of deficits, Lack of motivation and Other: limited family support, impaired left visual attention, slow to respond, initiate and process.  # of short term goals set=  3  # of short term goals met= 1  Speech Therapy Problems     Problem: Expression STGs     Dates: Start: 01/23/20       Description:     Goal: STG-Within one week, patient will express     Dates: Start: 01/23/20       Description: 1) Individualized goal:  In 4-6 word sentences using compensatory speaking strategies ( open mouth, effortful articulation,  Increased breath support and volume, pause between words) with 75% acc with mod cues to improve.  2) Interventions:  SLP Speech Language Treatment and SLP Group Treatment       Note:     Goal Note filed on 02/03/20 0814 by Laruen Wright MS,CCC-SLP    Mod to max cues needed in simple responses.                        Problem: Problem Solving STGs     Dates: Start: 02/03/20       Description:     Goal: STG-Within one week, patient will     Dates: Start: 02/03/20       Description: 1) Individualized goal:  Perform selective attention tasks with 90% acc with min cues.  2) Interventions:  SLP Self Care / ADL Training , SLP Cognitive Skill Development and SLP Group Treatment                   Problem: Speech/Swallowing LTGs     Dates: Start: 01/23/20       Description:     Goal: LTG-By discharge, patient will safely swallow     Dates: Start: 01/23/20       Description: 1) Individualized goal:  Regular diet textures and thin liquids for 2/2 sessions, with no s/sx of  aspiration.  2) Interventions:  SLP Swallowing Dysfunction Treatment, SLP Video Swallow Evaluation, SLP Sensory Integration Techniques  and SLP Group Treatment             Goal: LTG-By discharge, patient will solve basic problems     Dates: Start: 01/23/20       Description: 1) Individualized goal:  For transfers, and safety problem solving for home environment with 80% acc with spv.  2) Interventions:  SLP Self Care / ADL Training , SLP Cognitive Skill Development and SLP Group Treatment             Goal: LTG-By discharge, patient will express     Dates: Start: 01/23/20       Description: 1) Individualized goal:  Verbally in structured conversation with 100% intelligibility with use of compensatory speaking strategies with min cues to spv.  2) Interventions:  SLP Speech Language Treatment and SLP Group Treatment                   Problem: Swallowing STGs     Dates: Start: 01/23/20       Description:     Goal: STG-Within one week, patient will safely swallow     Dates: Start: 01/23/20       Description: 1) Individualized goal:  Therapeutic trials of dysphagia 2 and thin liquids in isolation as appropriate, with no s/sx of aspiration for 2/2 sessions.  2) Interventions:  SLP Swallowing Dysfunction Treatment, SLP Video Swallow Evaluation and SLP Group Treatment       Note:     Goal Note filed on 02/03/20 0814 by Lauren Wright MS,CCC-SLP    Partly met.  Patient is safely tolerating dysphagia 2 textures and therapeutic trials of dysphagia 3 have been initiated.  However, patient continues to demonstrate coughing post swallow of thin liquid trials.                               Section completed by:  Lauren Wright MS,CCC-SLP

## 2020-02-03 NOTE — REHAB-DIETARY IDT TEAM NOTE
Dietary   Nutrition  Dietary Problems     Problem: Inadequate nutrient intake     Description:     Goal: Patient to consume greater than or equal to 50% of meals     Description:                     Patient continues to report disinterest in food.  Nutrition reps are visiting daily and he does not exactly report any preferences or complaints.  Staff is offering patient different items when he consumes little to nothing on his trays, and patient often time refuses.  He reports to me that he is not hungry.  He does like his milkshake and is drinking them, although intake is variable of this as well.  Patient reports disliking the NTL.    Patient continues to work with SLP.  Attempting diet upgrades as tolerated.    Patient denies GI/ issues.  He reports he can self feed.    Continues to endorse frequent fatigue.  Sleeping better.  Wants to go home.    Diet: 2gm Na+, Dypshagia 2/ NTL - fortifying foods; boost [facility] milkshake TID with all meals   % Intake x 72 hours: ~23% of meals/ variable intake of supplements    Pertinent Labs: Hgb 13.2/Hct 39.7, BTNP 7960  Pertinent Medications: Eliquis, Aspirin, Lipitor, Digoxin, Flonase, Lisinopril, Melatonin, Metoprolol, Mirtazapine, Provigil, Senna  PRN medications noted.  Off IVF.     Weight: 61.7kg- taken via bed scale; prior weight taken via stand up scale; overall weight is trending down but cannot be certain due to difference in scales  Skin: CDI    Vitals: WNL  GI: BM today  : WNL  I/Os: +375mL x 24 hours - no output recorded    Malnutrition: meets criteria for presumed chronic/ severe malnutrition.  See prior notes.     Plan: May consider adding Megace to regimen to see if this helps with appetite stimulation.  Continue Mirtazapine.  Continue NR seeing daily and supplements.  Recommend liberalized diet.  Continue fortifying foods to increase kcals without expanding volume of intake. May need to have RD evaluate patient in outpatient setting if he's open to it for  ongoing follow up.                Section completed by:  Marcela Lane R.D.

## 2020-02-03 NOTE — REHAB-COLLABORATIVE ONGOING IDT TEAM NOTE
Weekly Interdisciplinary Team Conference Note    Weekly Interdisciplinary Team Conference # 2  Date:  2/3/2020    Clinicians present and reporting at team conference include the following:   MD: Gauri Fortune MD   RN:  Delfina Agudelo RN    PT:   Kei Price PT, MPT, MEd  OT:  Anya Trejo MS, OTR/L    ST:  Lauren Wright MS, CCC-SLP, CBIS  CM:  Adele Sherwood RN [facility]  REC:  None  RT:  None  RPh:  Carito Pruitt formerly [facility]  Other:   None  All reporting clinicians have a working knowledge of this patient's plan of care.    Targeted DC Date:  2/8/2020     Medical    Patient Active Problem List    Diagnosis Date Noted   • Leukocytosis 01/30/2020   • Hypokalemia 01/28/2020   • Hypernatremia 01/28/2020   • Azotemia 01/26/2020   • Cerebrovascular accident (CVA) due to embolism (HCC) 01/22/2020   • Atrial fibrillation (HCC) 01/22/2020   • Prostate cancer (HCC) 01/22/2020   • Hypertension 01/22/2020   • Systolic CHF (HCC) 01/22/2020   • Dysarthria 01/22/2020   • Dysphagia 01/22/2020   • CAD (coronary artery disease) 01/22/2020   • Rash of back 01/22/2020     Results     ** No results found for the last 24 hours. **        Nursing  Diet/Nutrition:  Dysphagia II and Other:2 gm Na, NTL  Medication Administration:  Crush all Medications in Puree  % consumed at meals in last 24 hours:  Consumed 25-50% of meals per documentation.  Meal/Snack Consumption for the past 24 hrs:   Oral Nutrition Oral Nutrition Supplement    02/02/20 1800 Dinner;Less than 25% Consumed --   02/02/20 1200 Lunch;Less than 25% Consumed --   02/02/20 0800 Breakfast;Between 25-50% Consumed Boost     Snack schedule:  None  Supplement:  Other: per RD  Appetite:  Poor  Fluid Intake/Output in past 24 hours: In: 315 [P.O.:315]  Out: -   Admit Weight:  Weight: 66 kg (145 lb 8.1 oz)  Weight Last 7 Days   [61.7 kg (136 lb)] 61.7 kg (136 lb) (02/02 0947)    Pain Issues:    Location:  --  --         Severity:  Denies   Scheduled pain medications:  None     PRN pain  medications used in last 24 hours:  None   Non Pharmacologic Interventions:  repositioned and rest  Effectiveness of pain management plan:  good=patient states acceptable comfort after interventions    Bowel:    Bowel Assist Initial Score:  3 - Moderate Assistance  Bowel Assist Current Score:  2 - Max Assistance  Bowl Accidents in last 7 days:     Last bowel movement: 02/02/20  Stool Description: Large, Soft, Brown     Usual bowel pattern:  daily  Scheduled bowel medications:  senna-docusate (PERICOLACE or SENOKOT S)   PRN bowel medications used in last 24 hours:  None  Nursing Interventions:  Increased time, Scheduled medication, Supervision, Verbal cueing, Positioning on commode/toilet, Brief  Effectiveness of bowel program:   good=regular, predictable, controlled emptying of bowel  Bladder:    Bladder Assist Initial Score:  3 - Moderate Assistance  Bladder Assist Current Score:  2 - Max Assistance  Bladder Accidents in last 7 days:     PVR range for past 24-48 hours: -- ()  Intermittent Catheterization:  n/a  Medications affecting bladder:  None    Time void schedule/voiding pattern:  Voiding every 2-4 hours  Interventions:  Increased time, Supervision, Verbal cueing, Brief  Effectiveness of bladder training:  Good=regular, predictable, emptying of bladder, patient initiates time voiding    Arreaga:    Type:     Patient Lines/Drains/Airways Status    Active Catheter     None              Date placed:          Justification:    Diabetes:  Blood Sugar Frequency:  None    Range of BS for last 48 hours:       Scheduled diabetic medications:  None  Sliding scale usage in past 24 hours:  No  Total Short acting insulin in the past 24 hours:  None  Total Long acting insulin in the past 24 hours:  None    Wound:         Patient Lines/Drains/Airways Status    Active Current Wounds     None                   Interventions:  n/a    Wound Vac Location:  Not applicable  Dressing last changed:  Not applicable  Pump Mode Pressure  Setting       Description of drainage:  Not applicable    Sleep/wake cycle:   Average hours slept:  Sleeps 4-6 hours without waking  Sleep medication usage:  Other Melatonin 3 mg    Patient/Family Training/Education:  Aspiration Precautions, Diet/Nutrition, Fall Prevention, General Self Care, Safe Transfers and Safety  Discharge Supply Recommendations:  Blood Pressure Monitor  Strengths: Manages pain appropriately   Barriers:   Aspiration risk, Confused, Generalized weakness, Hypertension and Impulsive       Nursing Problems     Problem: Bowel/Gastric:     Description:     Goal: Normal bowel function is maintained or improved     Description:           Goal: Will not experience complications related to bowel motility     Description:                 Problem: Communication     Description:     Goal: The ability to communicate needs accurately and effectively will improve     Description:                 Problem: Discharge Barriers/Planning     Description:     Goal: Patient's continuum of care needs will be met     Description:                 Problem: Infection     Description:     Goal: Will remain free from infection     Description:                 Problem: Knowledge Deficit     Description:     Goal: Knowledge of disease process/condition, treatment plan, diagnostic tests, and medications will improve     Description:           Goal: Knowledge of the prescribed therapeutic regimen will improve     Description:                 Problem: Pain Management     Description:     Goal: Pain level will decrease to patient's comfort goal     Description:                 Problem: Respiratory:     Description:     Goal: Respiratory status will improve     Description:                 Problem: Safety     Description:     Goal: Will remain free from injury     Description:           Goal: Will remain free from falls     Description:                 Problem: Skin Integrity     Description:     Goal: Risk for impaired skin  integrity will decrease     Description:                 Problem: Urinary Elimination:     Description:     Goal: Ability to reestablish a normal urinary elimination pattern will improve     Description:                 Problem: Venous Thromboembolism (VTW)/Deep Vein Thrombosis (DVT) Prevention:     Description:     Goal: Patient will participate in Venous Thrombosis (VTE)/Deep Vein Thrombosis (DVT)Prevention Measures     Description:     Flowsheet:     Taken at 01/30/20 8400    Pharmacologic Prophylaxis Used Apixaban by  Samia Berry RVelasquezNVelasquez                             Long Term Goals:   At discharge patient will be able to function safely at home and in the community with support.    Section completed by:  Racheal Herbert RCHI           Mobility  Bed mobility:   SBA supine to/from sit  Bed /Chair/Wheelchair Transfer Initial:  3 - Moderate Assistance  Bed /Chair/Wheelchair Transfer Current:  5 - Standby Prompting/Supervision  FWW CGA   Bed/Chair/Wheelchair Transfer Description:  Increased time, Supervision for safety, Verbal cueing(Supine to/from sit SBA, CGA stand/pivot)  Walk Initial:  1 - Total Assistance  Walk Current:  2 - Max Assistance   FWW intermittent CGA, 135 FT   Walk Description:  Extra time, Verbal cueing, Supervision for safety, Walker, Requires incidental assist(FWW, incidental  FT x4)  Wheelchair Initial:  2 - Max Assistance  Wheelchair Current:  2 - Max Assistance   Wheelchair Description:  Extra time, Requires incidental assist, Verbal cueing  Stairs Initial:  2 - Max Assistance  Stairs Current: 2 - Max Assistance   Stairs Description: Hand rails, Extra time, Safety concerns, Verbal cueing, Supervision for safety, Ascends/descends 4 to 11 steps  Patient/Family Training/Education: In progress  DME/DC Recommendations:  FWW  Strengths:  Independent PLOF, Making steady progress towards goals, Motivated for self care and independence, Pleasant and cooperative and Willingly participates in  therapeutic activities  Barriers:   Other: Impaired tolerance for activity, minimal support at home, impaired bed mobility, transfers, gait, fall risk  # of short term goals set= 1  # of short term goals met=1  Physical Therapy Problems     Problem: Mobility     Dates: Start: 02/03/20       Description:     Goal: STG-Within one week, patient will     Dates: Start: 02/03/20       Description: 1) Individualized goal: Gait fww,  FT 1 week  2) Interventions:  PT Gait Training, PT Therapeutic Exercises, PT Neuro Re-Ed/Balance and PT Therapeutic Activity                   Problem: Mobility Transfers     Dates: Start: 02/03/20       Description:     Goal: STG-Within one week, patient will transfer bed to chair     Dates: Start: 02/03/20       Description: 1) Individualized goal: Transfer bed to/from chair fww supervision 1 week  2) Interventions:  PT Gait Training, PT Therapeutic Exercises, PT Neuro Re-Ed/Balance and PT Therapeutic Activity                   Problem: PT-Long Term Goals     Dates: Start: 01/23/20       Description:     Goal: LTG-By discharge, patient will ambulate     Dates: Start: 01/23/20       Description: 1) Individualized goal: Gait fww/ FT, modified independent at discharge  2) Interventions:  PT Gait Training, PT Therapeutic Exercises, PT Neuro Re-Ed/Balance and PT Therapeutic Activity             Goal: LTG-By discharge, patient will transfer one surface to another     Dates: Start: 01/23/20       Description: Transfer1) Individualized goal: Transfer one surface to another FWW/SPC modified independent at discharge  2) Interventions:  PT Gait Training, PT Therapeutic Exercises, PT Neuro Re-Ed/Balance and PT Therapeutic Activity             Goal: LTG-By discharge, patient will ambulate up/down 4-6 stairs     Dates: Start: 01/23/20       Description:   1) Individualized goal: Up/down 4-6 stairs, handrails, supervision at discharge  2) Interventions:  PT Gait Training, PT Therapeutic  Exercises, PT Neuro Re-Ed/Balance and PT Therapeutic Activity             Goal: LTG-By discharge, patient will transfer in/out of a car     Dates: Start: 01/23/20       Description: 1) Individualized goal: Car transfer fww/spc supervision at discharge  2) Interventions:  PT Gait Training, PT Therapeutic Exercises, PT Neuro Re-Ed/Balance and PT Therapeutic Activity                           Section completed by:  CATRINA Thurman    Activities of Daily Living  Eating Initial:  5 - Standby Prompting/Supervision or Set-up  Eating Current:  1 - Total Assistance   Eating Description:  Modified diet, Supervision for safety, Set-up of equipment or meal/tube feeding  Grooming Initial:  5 - Standby Prompting/Supervision or Set-up  Grooming Current:  5 - Standby Prompting/Supervision or Set-up   Grooming Description:  Set-up of equipment, Increased time, Verbal cueing(Patient brushed teeth with set-up and VCs to turn off faucet while seated in w/c.)  Bathing Initial:  4 - Minimal Assistance  Bathing Current:  5 - Standby Prompting/Supervision or Set-up   Bathing Description:  Grab bar, Supervision for safety, Verbal cueing(verbal cues for initiation and thoroughness, supervision for safety)  Upper Body Dressing Initial:  4 - Minimal Assistance  Upper Body Dressing Current:  5 - Standby Prompting/Supervision or Set-up   Upper Body Dressing Description:  Set-up of equipment  Lower Body Dressing Initial:  4 - Minimal Assistance  Lower Body Dressing Current:  5 - Standby Prompting/Supervsion or Set-up   Lower Body Dressing Description:  5 - Standby Prompting/Supervsion or Set-up  Toileting Initial:  2 - Max Assistance  Toileting Current:  4 - Minimal Assistance   Toileting Description:  Grab bar, Increased time, Supervision for safety  Toilet Transfer Initial:  3 - Moderate Assistance  Toilet Transfer Current:  4 - Minimal Assistance   Toilet Transfer Description:  4 - Minimal Assistance  Tub / Shower Transfer Initial:  4 -  Minimal Assistance  Tub / Shower Transfer Current:  5 - Standby Prompting/Supervision or Set-up   Tub / Shower Transfer Description:  Supervision for safety, Grab bar(stand pivot w/c <> shower chair via grab bar)  IADL:  Not yet addressed   Family Training/Education:  Not yet addressed, will benefit from FT this week prior to d/c   DME/DC Recommendations:  Grab bar installation in shower. Pt has built in shower bench that will be adequate     Strengths:  Independent PLOF, Pleasant and cooperative and Willingly participates in therapeutic activities  Barriers:  Aspiration risk, Decreased endurance, Generalized weakness, Poor balance, Poor carryover of learning and Other: impaired attention, impaired problem solving, impaired initiation, left lateral lean     # of short term goals set= 3    # of short term goals met= 3     Occupational Therapy Goals     Problem: OT Long Term Goals     Dates: Start: 01/23/20       Description:     Goal: LTG-By discharge, patient will complete basic self care tasks     Dates: Start: 01/23/20       Description: 1) Individualized Goal: Supervision    2) Interventions: OT Group Therapy, OT Self Care/ADL, OT Cognitive Skill Dev, OT Community Reintegration, OT Manual Ther Technique, OT Neuro Re-Ed/Balance, OT Sensory Int Techniques, OT Therapeutic Activity, OT Evaluation and OT Therapeutic Exercise             Goal: LTG-By discharge, patient will perform bathroom transfers     Dates: Start: 01/23/20       Description: 1) Individualized Goal: Supervision    2) Interventions: OT Group Therapy, OT Self Care/ADL, OT Cognitive Skill Dev, OT Community Reintegration, OT Manual Ther Technique, OT Neuro Re-Ed/Balance, OT Sensory Int Techniques, OT Therapeutic Activity, OT Evaluation and OT Therapeutic Exercise                         Section completed by:  Anya Trejo MS,OTR/L    Cognitive Linquistic Functions  Comprehension Initial:  4 - Minimal Assistance  Comprehension Current:  4 - Minimal  Assistance   Comprehension Description:  Verbal cues  Expression Initial:  4 - Minimal Assistance  Expression Current:  4 - Minimal Assistance   Expression Description:  Verbal cueing  Social Interaction Initial:  5 - Stand-by Prompting/Supervision or Set-up  Social Interaction Current:  5 - Stand-by Prompting/Supervision or Set-up   Social Interaction Description:  Increased time  Problem Solving Initial:  2 - Max Assistance  Problem Solving Current:  2 - Max Assistance   Problem Solving Description:  Verbal cueing, Therapy schedule, Bed/chair alarm, Increased time, Seat belt  Memory Initial:  2 - Max Assistance  Memory Current:  2 - Max Assistance   Memory Description:  Verbal cueing, Therapy schedule, Seat belt, Bed/chair alarm  Executive Functioning / Organization Initial:     Executive Functioning / Organization Current:  2 - Max Assistance     Executive Functioning Desciption:  Verbal cues, extra time.  Swallowing  Swallowing Status: Patient has been tolerating dysphagia 2 and nectar thick liquids.  He has participated in trials of thin liquids but demonstrates pattern of coughing post swallow.  Orders Placed This Encounter   Procedures   • Diet Order 2 Gram Sodium (forify all foods )     Standing Status:   Standing     Number of Occurrences:   1     Order Specific Question:   Diet:     Answer:   2 Gram Sodium [7]     Comments:   forify all foods      Order Specific Question:   Texture/Fiber modifications:     Answer:   Dysphagia 2(Pureed/Chopped)specify fluid consistency(question 6) [2]     Order Specific Question:   Consistency/Fluid modifications:     Answer:   Nectar Thick [2]     Behavior Modification Plan  Keep instructions simple/concrete, Give clear feedback, Set clear goals, Redirect to task/topic and Analyze tasks (break down into smaller steps)  Assistive Technology  Low tech: Calendar, planner, schedule, alarms/timers, pill organizer, post-it notes, lists  Family Training/Education:  To be  sheduled.  DC Recommendations:   Patient will benefit from additional ST services upon discharge from this facility.  Strengths:  Pleasant and cooperative and Willingly participates in therapeutic activities  Barriers:  Hemiplegia, Poor appetite/intake, Poor carryover of learning, Poor insight/denial of deficits, Lack of motivation and Other: limited family support, impaired left visual attention, slow to respond, initiate and process.  # of short term goals set=  3  # of short term goals met= 1  Speech Therapy Problems     Problem: Expression STGs     Dates: Start: 01/23/20       Description:     Goal: STG-Within one week, patient will express     Dates: Start: 01/23/20       Description: 1) Individualized goal:  In 4-6 word sentences using compensatory speaking strategies ( open mouth, effortful articulation,  Increased breath support and volume, pause between words) with 75% acc with mod cues to improve.  2) Interventions:  SLP Speech Language Treatment and SLP Group Treatment       Note:     Goal Note filed on 02/03/20 0814 by Lauren Wright MS,CCC-SLP    Mod to max cues needed in simple responses.                        Problem: Problem Solving STGs     Dates: Start: 02/03/20       Description:     Goal: STG-Within one week, patient will     Dates: Start: 02/03/20       Description: 1) Individualized goal:  Perform selective attention tasks with 90% acc with min cues.  2) Interventions:  SLP Self Care / ADL Training , SLP Cognitive Skill Development and SLP Group Treatment                   Problem: Speech/Swallowing LTGs     Dates: Start: 01/23/20       Description:     Goal: LTG-By discharge, patient will safely swallow     Dates: Start: 01/23/20       Description: 1) Individualized goal:  Regular diet textures and thin liquids for 2/2 sessions, with no s/sx of aspiration.  2) Interventions:  SLP Swallowing Dysfunction Treatment, SLP Video Swallow Evaluation, SLP Sensory Integration Techniques  and SLP Group  Treatment             Goal: LTG-By discharge, patient will solve basic problems     Dates: Start: 01/23/20       Description: 1) Individualized goal:  For transfers, and safety problem solving for home environment with 80% acc with spv.  2) Interventions:  SLP Self Care / ADL Training , SLP Cognitive Skill Development and SLP Group Treatment             Goal: LTG-By discharge, patient will express     Dates: Start: 01/23/20       Description: 1) Individualized goal:  Verbally in structured conversation with 100% intelligibility with use of compensatory speaking strategies with min cues to spv.  2) Interventions:  SLP Speech Language Treatment and SLP Group Treatment                   Problem: Swallowing STGs     Dates: Start: 01/23/20       Description:     Goal: STG-Within one week, patient will safely swallow     Dates: Start: 01/23/20       Description: 1) Individualized goal:  Therapeutic trials of dysphagia 2 and thin liquids in isolation as appropriate, with no s/sx of aspiration for 2/2 sessions.  2) Interventions:  SLP Swallowing Dysfunction Treatment, SLP Video Swallow Evaluation and SLP Group Treatment       Note:     Goal Note filed on 02/03/20 0814 by Lauren Wright MS,[facility]-SLP    Partly met.  Patient is safely tolerating dysphagia 2 textures and therapeutic trials of dysphagia 3 have been initiated.  However, patient continues to demonstrate coughing post swallow of thin liquid trials.                               Section completed by:  Lauren Wright MS,[facility]-SLP       Nutrition  Dietary Problems     Problem: Inadequate nutrient intake     Description:     Goal: Patient to consume greater than or equal to 50% of meals     Description:                     Patient continues to report disinterest in food.  Nutrition reps are visiting daily and he does not exactly report any preferences or complaints.  Staff is offering patient different items when he consumes little to nothing on his trays, and patient  often time refuses.  He reports to me that he is not hungry.  He does like his milkshake and is drinking them, although intake is variable of this as well.  Patient reports disliking the NTL.    Patient continues to work with SLP.  Attempting diet upgrades as tolerated.    Patient denies GI/ issues.  He reports he can self feed.    Continues to endorse frequent fatigue.  Sleeping better.  Wants to go home.    Diet: 2gm Na+, Dypshagia 2/ NTL - fortifying foods; boost [facility] milkshake TID with all meals   % Intake x 72 hours: ~23% of meals/ variable intake of supplements    Pertinent Labs: Hgb 13.2/Hct 39.7, BTNP 7960  Pertinent Medications: Eliquis, Aspirin, Lipitor, Digoxin, Flonase, Lisinopril, Melatonin, Metoprolol, Mirtazapine, Provigil, Senna  PRN medications noted.  Off IVF.     Weight: 61.7kg- taken via bed scale; prior weight taken via stand up scale; overall weight is trending down but cannot be certain due to difference in scales  Skin: CDI    Vitals: WNL  GI: BM today  : WNL  I/Os: +375mL x 24 hours - no output recorded    Malnutrition: meets criteria for presumed chronic/ severe malnutrition.  See prior notes.     Plan: May consider adding Megace to regimen to see if this helps with appetite stimulation.  Continue Mirtazapine.  Continue NR seeing daily and supplements.  Recommend liberalized diet.  Continue fortifying foods to increase kcals without expanding volume of intake. May need to have RD evaluate patient in outpatient setting if he's open to it for ongoing follow up.                Section completed by:  Marcela Lane R.D.    REHAB-Pharmacy IDT Team Note by Lauro Seay RPH at 1/31/2020  5:22 PM  Version 1 of 1    Author:  Lauro Seay RPH Service:  -- Author Type:  Pharmacist    Filed:  1/31/2020  5:23 PM Date of Service:  1/31/2020  5:22 PM Status:  Signed    :  Lauro Seay RPH (Pharmacist)         Pharmacy   Pharmacy  Antibiotics/Antifungals/Antivirals:  Medication:       Active Orders (From admission, onward)    None        Route:        NA  Stop Date:  NA  Reason:      NA  Antihypertensives/Cardiac:  Medication:    Orders (72h ago, onward)     Start     Ordered    01/24/20 0600  lisinopril (PRINIVIL) tablet 5 mg  Q DAY      01/23/20 1439    01/23/20 1800  digoxin (LANOXIN) tablet 125 mcg  DAILY      01/22/20 1327    01/22/20 2100  atorvastatin (LIPITOR) tablet 40 mg  EVERY EVENING      01/22/20 1327    01/22/20 1800  metoprolol (LOPRESSOR) tablet 75 mg  TWICE DAILY      01/22/20 1327              Patient Vitals for the past 24 hrs:   BP Pulse   01/31/20 1352 129/87 61   01/31/20 0620 122/81 83   01/31/20 0608 115/72 --   01/30/20 1900 126/82 82     Anticoagulation:  Medication: Aspirin, Eliquis,    Other key medications: A review of the medication list reveals no issues at this time. Patient is currently on antihypertensive(s). Recommend home blood pressure monitoring/recording if antihypertensive(s) regimen(s) continue.    Section completed by: Lauro Seay [facility][[email or web address]]     Attribution Key     AW.1 - Lauro Seay [facility] on 1/31/2020  5:22 PM                  DC Planning    DC destination/dispostion:  Home with brother in a two-story home with 2 steps into the home.      Referrals: Will likely need DME and Home Health. I will continue to follow for needs.      DC Needs: DME and Home Health.     Barriers to discharge:  Functional deficits following stroke.    Strengths: Patient has a supportive brother to help him when he gets home.      Section completed by:  Annette Benitez      Physician Summary  Gauri Fortune MD participated and led team conference discussion.

## 2020-02-03 NOTE — CARE PLAN
Problem: Mobility Transfers  Goal: STG-Within one week, patient will transfer bed to chair  Description  1) Individualized goal: Transfer bed to/from chair fww SBA 1 week  2) Interventions:  PT Gait Training, PT Therapeutic Exercises, PT Neuro Re-Ed/Balance and PT Therapeutic Activity     Outcome: MET     Problem: Bathing  Goal: STG-Within one week, patient will bathe  Description  1) Individualized Goal:  With supervision  2) Interventions:  OT Group Therapy, OT Self Care/ADL, OT Cognitive Skill Dev, OT Community Reintegration, OT Manual Ther Technique, OT Neuro Re-Ed/Balance, OT Sensory Int Techniques, OT Therapeutic Activity, OT Evaluation and OT Therapeutic Exercise      2/3/2020 0810 by Anya Trejo MS,OTR/L  Outcome: MET  2/3/2020 0810 by Anya Trejo MS,OTR/L  Reactivated     Problem: Dressing  Goal: STG-Within one week, patient will dress UB  Description  1) Individualized Goal: with supervision  2) Interventions: OT Group Therapy, OT Self Care/ADL, OT Cognitive Skill Dev, OT Community Reintegration, OT Manual Ther Technique, OT Neuro Re-Ed/Balance, OT Sensory Int Techniques, OT Therapeutic Activity, OT Evaluation and OT Therapeutic Exercise      2/3/2020 0810 by Anya Trejo MS,OTR/L  Outcome: MET  2/3/2020 0810 by Anya Trejo MS,OTR/L  Reactivated  Goal: STG-Within one week, patient will dress LB  Description  1) Individualized Goal: CGA  2) Interventions: OT Group Therapy, OT Self Care/ADL, OT Cognitive Skill Dev, OT Community Reintegration, OT Manual Ther Technique, OT Neuro Re-Ed/Balance, OT Sensory Int Techniques, OT Therapeutic Activity, OT Evaluation and OT Therapeutic Exercise     Outcome: MET

## 2020-02-03 NOTE — REHAB-PT IDT TEAM NOTE
Physical Therapy   Mobility  Bed mobility:   SBA supine to/from sit  Bed /Chair/Wheelchair Transfer Initial:  3 - Moderate Assistance  Bed /Chair/Wheelchair Transfer Current:  5 - Standby Prompting/Supervision  FWW CGA   Bed/Chair/Wheelchair Transfer Description:  Increased time, Supervision for safety, Verbal cueing(Supine to/from sit SBA, CGA stand/pivot)  Walk Initial:  1 - Total Assistance  Walk Current:  2 - Max Assistance   FWW intermittent CGA, 135 FT   Walk Description:  Extra time, Verbal cueing, Supervision for safety, Walker, Requires incidental assist(FWW, incidental  FT x4)  Wheelchair Initial:  2 - Max Assistance  Wheelchair Current:  2 - Max Assistance   Wheelchair Description:  Extra time, Requires incidental assist, Verbal cueing  Stairs Initial:  2 - Max Assistance  Stairs Current: 2 - Max Assistance   Stairs Description: Hand rails, Extra time, Safety concerns, Verbal cueing, Supervision for safety, Ascends/descends 4 to 11 steps  Patient/Family Training/Education: In progress  DME/DC Recommendations:  FWW  Strengths:  Independent PLOF, Making steady progress towards goals, Motivated for self care and independence, Pleasant and cooperative and Willingly participates in therapeutic activities  Barriers:   Other: Impaired tolerance for activity, minimal support at home, impaired bed mobility, transfers, gait, fall risk  # of short term goals set= 1  # of short term goals met=1  Physical Therapy Problems     Problem: Mobility     Dates: Start: 02/03/20       Description:     Goal: STG-Within one week, patient will     Dates: Start: 02/03/20       Description: 1) Individualized goal: Gait fww,  FT 1 week  2) Interventions:  PT Gait Training, PT Therapeutic Exercises, PT Neuro Re-Ed/Balance and PT Therapeutic Activity                   Problem: Mobility Transfers     Dates: Start: 02/03/20       Description:     Goal: STG-Within one week, patient will transfer bed to chair     Dates:  Start: 02/03/20       Description: 1) Individualized goal: Transfer bed to/from chair fww supervision 1 week  2) Interventions:  PT Gait Training, PT Therapeutic Exercises, PT Neuro Re-Ed/Balance and PT Therapeutic Activity                   Problem: PT-Long Term Goals     Dates: Start: 01/23/20       Description:     Goal: LTG-By discharge, patient will ambulate     Dates: Start: 01/23/20       Description: 1) Individualized goal: Gait fww/ FT, modified independent at discharge  2) Interventions:  PT Gait Training, PT Therapeutic Exercises, PT Neuro Re-Ed/Balance and PT Therapeutic Activity             Goal: LTG-By discharge, patient will transfer one surface to another     Dates: Start: 01/23/20       Description: Transfer1) Individualized goal: Transfer one surface to another FWW/SPC modified independent at discharge  2) Interventions:  PT Gait Training, PT Therapeutic Exercises, PT Neuro Re-Ed/Balance and PT Therapeutic Activity             Goal: LTG-By discharge, patient will ambulate up/down 4-6 stairs     Dates: Start: 01/23/20       Description:   1) Individualized goal: Up/down 4-6 stairs, handrails, supervision at discharge  2) Interventions:  PT Gait Training, PT Therapeutic Exercises, PT Neuro Re-Ed/Balance and PT Therapeutic Activity             Goal: LTG-By discharge, patient will transfer in/out of a car     Dates: Start: 01/23/20       Description: 1) Individualized goal: Car transfer fww/spc supervision at discharge  2) Interventions:  PT Gait Training, PT Therapeutic Exercises, PT Neuro Re-Ed/Balance and PT Therapeutic Activity                           Section completed by:  CATRINA Thurman

## 2020-02-03 NOTE — CARE PLAN
Problem: Swallowing STGs  Goal: STG-Within one week, patient will safely swallow  Description  1) Individualized goal:  Therapeutic trials of dysphagia 2 and thin liquids in isolation as appropriate, with no s/sx of aspiration for 2/2 sessions.  2) Interventions:  SLP Swallowing Dysfunction Treatment, SLP Video Swallow Evaluation and SLP Group Treatment     Outcome: NOT MET  Note:   Partly met.  Patient is safely tolerating dysphagia 2 textures and therapeutic trials of dysphagia 3 have been initiated.  However, patient continues to demonstrate coughing post swallow of thin liquid trials.     Problem: Expression STGs  Goal: STG-Within one week, patient will express  Description  1) Individualized goal:  In 4-6 word sentences using compensatory speaking strategies ( open mouth, effortful articulation,  Increased breath support and volume, pause between words) with 75% acc with mod cues to improve.  2) Interventions:  SLP Speech Language Treatment and SLP Group Treatment     Outcome: NOT MET  Note:   Mod to max cues needed in simple responses.     Problem: Problem Solving STGs  Goal: STG-Within one week, patient will  Description  1) Individualized goal:  Selective attention tasks with 75% acc with min A to improve to 100%  2) Interventions:  SLP Speech Language Treatment, SLP Self Care / ADL Training , SLP Cognitive Skill Development and SLP Group Treatment     Outcome: MET  Note:   77% with mod cues needed to improve to %

## 2020-02-03 NOTE — THERAPY
"Occupational Therapy  Daily Treatment     Patient Name: Yousif Kimble  Age:  89 y.o., Sex:  male  Medical Record #: [id number]  Today's Date: 2/3/2020     Precautions  Precautions: (P) Fall Risk, Swallow Precautions ( See Comments)  Comments: (P) Dysarthria, NTL, SOB with activity    Safety   ADL Safety : Requires Physical Assist for Safety  Bathroom Safety: Requires Supervision for Safety  Comments: Requires frequent rest breaks due to SOB, close SBA to min A due to L lean    Subjective    \"I think I am ready to go home\"     Objective       20 0701   Precautions   Precautions Fall Risk;Swallow Precautions ( See Comments)   Comments Dysarthria, NTL, SOB with activity   IADL Treatments   Home Management SBA to load laundry into washing machine in standing, min verbal cues to problem solve novel machine dials   OT Total Time Spent   OT Individual Total Time Spent (Mins) 60   OT Charge Group   OT Self Care / ADL 3   OT Therapy Activity 1       FIM Bathing Score:  5 - Standby Prompting/Supervision or Set-up  Bathing Description:       FIM Upper Body Dressin - Standby Prompting/Supervision or Set-up  Upper Body Dressing Description:  Set-up of equipment    FIM Lower Body Dressing Score:  5 - Standby Prompting/Supervsion or Set-up  Lower Body Dressing Description:  Verbal cueing, Supervision for safety, Increased time(SBA for safety in standing, increased time and consistent verbal cues for progression and problem solving)    FIM Tub/Shower Transfers Score:  5 - Standby Prompting/Supervision or Set-up  Tub/Shower Transfers Description:  Supervision for safety, Grab bar(stand pivot w/c <> shower chair via grab bar) *Pt also completed dry run transfer stepping over threshold and on/off shower chair with simulation of home environment, requires grab bars and SBA for safety      Assessment    Pt tolerated session well, improved alertness this session and improve ability to progress tasks without assist, continues to " require cues for initiation and intermittent cues during ADLs for progression and to problem solve more difficulty components as well as cues to take rest breaks for breath recovery. Limiters continue to be impaired attention, impaired initiation, impaired problem solving, decreased endurance, global weakness, and impaired balance with left lean in sitting and standing as well as flexed posture in standing.     Plan    ADLs/IADLs, incorporate energy conservation strategies into functional tasks, general strengthening/endurance training, balance, functional transfers/functional mobility, functional cognition

## 2020-02-03 NOTE — THERAPY
Speech Language Pathology  Daily Treatment     Patient Name: Yousif Kimble  Age:  89 y.o., Sex:  male  Medical Record #: [id number]  Today's Date: 2/3/2020     Subjective    Patient more alert, and showing decreased delays in responsive ness.  Attention and gaze tend towards right but more responsive to cuing to attend left in conversation.     Objective     02/03/20 1401   Cognition   Functional Memory Activities Moderate (3)   Functional Problem Solving Moderate (3)   Safety Awareness Moderate (3)   Insight into Deficits Moderate (3)   Planning / Decision Making Severe (2)   Initiation Severe (2)   Dysphagia    Positioning / Behavior Modification Breath Hold;Cough / Clear after Swallow;Effortful Swallow;Modulate Rate or Bite Size;Multiple Swallows   Diet / Liquid Recommendation Nectar Thick Liquid;Dysphagia I   Nutritional Liquid Intake Rating Scale 2   Nutritional Food Intake Rating Scale 5   SLP Total Time Spent   SLP Individual Total Time Spent (Mins) 60   Charge Group   SLP Swallowing Dysfunction Treatment Swallowing Dysfunction Treatment   SLP Cognitive Skill Development First 15 Minutes 1       FIM Eating Score:  5 - Standby Prompting/Supervision or Set-up  Eating Description:  Increased time, Modified diet, Supervision for safety    FIM Comprehension Score:  5 - Stand-by Prompting/Supervision or Set-up  Comprehension Description:  Verbal cues    FIM Expression Score:  5 - Stand-by Prompting/Supervision or Set-up  Expression Description:  Verbal cueing    FIM Social Interaction Score:  6 - Modified Independent  Social Interaction Description:  Increased time, Verbal cues    FIM Problem Solving Score:  3 - Moderate Assistance  Problem Solving Description:  Bed/chair alarm, Therapy schedule, Increased time, Verbal cueing    FIM Memory Score:  3 - Moderate Assistance  Memory Description:  Medication, Therapy schedule, Bed/chair alarm, Seat belt      Assessment    Patient worked on swallows of thin liquid in  isolation after oral care.  Patient took 10 trials of 5 ml water with no overt s/sx of aspiration.  Patient took 10 ml with coughing post swallow on 2/10.   Returned to 5 ml trials with cough on 1/10 swallows.    Trialed 10 ml with cough on 3/5.  Trialed 10 ml in conjunction with a breath hold technique.   Patient was able to return demonstration and  Tolerated thin water 10ml  with cough on 1/4.    Continued patient education on his dysphagia symptoms and stroke and dysphagia.   Patient participated in functional recall and problem solving related to bladder or bowel incontinence.   Patient reports that he is aware that he has been incontinent but did not express why.    Mod cues needed for verbal planning and problem solving related to this problem.  Functional implementation not tested.  Patient verbalized awareness of impaired balance with walking, but showing impaired insight as to whether it was safe to do so alone.  Mod to max cues needed for insight.    Plan    Target trials of thin liquid in isolation after oral care, safety insight, planning and problem solving, recall of safety training.

## 2020-02-03 NOTE — THERAPY
02/03/20 1029   Precautions   Precautions Fall Risk;Swallow Precautions ( See Comments)   Comments Dysarthria, NTL, SOB with activities   Pain 0 - 10 Group   Therapist Pain Assessment 0   Cognition    Speech/ Communication Delayed Responses;Dysarthric   Level of Consciousness Alert   Ability To Follow Commands 1 Step   Safety Awareness Impaired   New Learning Impaired   Attention Impaired   Sequencing Impaired   Initiation Impaired   Sitting Lower Body Exercises   Hip Flexion 3 sets of 15;1 set of 15;Bilateral   Hip Abduction 2 sets of 15;Bilateral   Hip Adduction 2 sets of 15;Bilateral   Long Arc Quad 3 sets of 15;1 set of 15   Hamstring Curl 3 sets of 15;1 set of 15;Bilateral   Bed Mobility    Supine to Sit Stand by Assist   Sit to Supine Stand by Assist   Sit to Stand Stand by Assist   Scooting Stand by Assist   Neuro-Muscular Treatments   Neuro-Muscular Treatments Sequencing;Tactile Cuing;Tapping;Verbal Cuing   Comments Sequencing sit to/from stand, sequencing gait with a fww   PT Total Time Spent   PT Individual Total Time Spent (Mins) 60   PT Charge Group   PT Gait Training 2   PT Therapeutic Exercise 2   Physical Therapy   Daily Treatment     Patient Name: Yousif Kimble  Age:  89 y.o., Sex:  male  Medical Record #: [id number]  Today's Date: 2/3/2020     Precautions  Precautions: Fall Risk, Swallow Precautions ( See Comments)  Comments: Dysarthria, NTL, SOB with activities    Subjective    The patient agreed to PT.  Initially he did not want to participate.     Objective    The patient participated in sequencing sit to/from stand wheelchair/fww, sequencing gait with a fww and seated bilateral lower extremity therapeutic exercise indicated above.  In gait he tolerated 135 FT x4 with incidental CGA.  The seated exercises are indicated above.    FIM Bed/Chair/Wheelchair Transfers Score: 5 - Standby Prompting/Supervision or Set-up  Bed/Chair/Wheelchair Transfers Description:  Increased time, Supervision for safety,  Verbal cueing(Supine to/from sit SBA, CGA stand/pivot)    FIM Walking Score:  2 - Max Assistance  Walking Description:  Extra time, Verbal cueing, Supervision for safety, Walker, Requires incidental assist(FWW, incidental  FT x4)    FIM Wheelchair Score:  2 - Max Assistance  Wheelchair Description:       FIM Stairs Score:  2 - Max Assistance  Stairs Description:  Hand rails, Extra time, Safety concerns, Verbal cueing, Supervision for safety, Ascends/descends 4 to 11 steps      Assessment    Is challenged by increasing his walking distance.  However the quality of his walking has improved and he has fewer episodes of shortness of breath.    Plan    Safety education, bed mobility and transfer training, gait training fww as tolerated, therapeutic exercise

## 2020-02-04 PROCEDURE — 97116 GAIT TRAINING THERAPY: CPT

## 2020-02-04 PROCEDURE — 97530 THERAPEUTIC ACTIVITIES: CPT

## 2020-02-04 PROCEDURE — 97535 SELF CARE MNGMENT TRAINING: CPT

## 2020-02-04 PROCEDURE — 97110 THERAPEUTIC EXERCISES: CPT

## 2020-02-04 PROCEDURE — 92526 ORAL FUNCTION THERAPY: CPT

## 2020-02-04 PROCEDURE — A9270 NON-COVERED ITEM OR SERVICE: HCPCS | Performed by: PHYSICAL MEDICINE & REHABILITATION

## 2020-02-04 PROCEDURE — 700102 HCHG RX REV CODE 250 W/ 637 OVERRIDE(OP): Performed by: PHYSICAL MEDICINE & REHABILITATION

## 2020-02-04 PROCEDURE — 99232 SBSQ HOSP IP/OBS MODERATE 35: CPT | Performed by: HOSPITALIST

## 2020-02-04 PROCEDURE — 770010 HCHG ROOM/CARE - REHAB SEMI PRIVAT*

## 2020-02-04 PROCEDURE — 99231 SBSQ HOSP IP/OBS SF/LOW 25: CPT | Performed by: PHYSICAL MEDICINE & REHABILITATION

## 2020-02-04 PROCEDURE — 97112 NEUROMUSCULAR REEDUCATION: CPT

## 2020-02-04 RX ADMIN — FLUTICASONE PROPIONATE 100 MCG: 50 SPRAY, METERED NASAL at 09:00

## 2020-02-04 RX ADMIN — METOPROLOL TARTRATE 75 MG: 25 TABLET ORAL at 17:35

## 2020-02-04 RX ADMIN — SENNOSIDES AND DOCUSATE SODIUM 2 TABLET: 8.6; 5 TABLET ORAL at 20:39

## 2020-02-04 RX ADMIN — DIGOXIN 125 MCG: 125 TABLET ORAL at 17:35

## 2020-02-04 RX ADMIN — LISINOPRIL 5 MG: 5 TABLET ORAL at 05:10

## 2020-02-04 RX ADMIN — APIXABAN 5 MG: 5 TABLET, FILM COATED ORAL at 20:40

## 2020-02-04 RX ADMIN — ATORVASTATIN CALCIUM 40 MG: 40 TABLET, FILM COATED ORAL at 20:40

## 2020-02-04 RX ADMIN — ASPIRIN 81 MG 81 MG: 81 TABLET ORAL at 08:23

## 2020-02-04 RX ADMIN — APIXABAN 5 MG: 5 TABLET, FILM COATED ORAL at 08:23

## 2020-02-04 RX ADMIN — MELATONIN TAB 3 MG 3 MG: 3 TAB at 20:40

## 2020-02-04 RX ADMIN — METOPROLOL TARTRATE 75 MG: 25 TABLET ORAL at 05:10

## 2020-02-04 RX ADMIN — SENNOSIDES AND DOCUSATE SODIUM 2 TABLET: 8.6; 5 TABLET ORAL at 08:23

## 2020-02-04 RX ADMIN — MODAFINIL 100 MG: 100 TABLET ORAL at 08:23

## 2020-02-04 RX ADMIN — MIRTAZAPINE 15 MG: 15 TABLET, ORALLY DISINTEGRATING ORAL at 20:40

## 2020-02-04 ASSESSMENT — PATIENT HEALTH QUESTIONNAIRE - PHQ9
2. FEELING DOWN, DEPRESSED, IRRITABLE, OR HOPELESS: NOT AT ALL
1. LITTLE INTEREST OR PLEASURE IN DOING THINGS: NOT AT ALL
SUM OF ALL RESPONSES TO PHQ9 QUESTIONS 1 AND 2: 0
2. FEELING DOWN, DEPRESSED, IRRITABLE, OR HOPELESS: NOT AT ALL
1. LITTLE INTEREST OR PLEASURE IN DOING THINGS: NOT AT ALL
SUM OF ALL RESPONSES TO PHQ9 QUESTIONS 1 AND 2: 0

## 2020-02-04 NOTE — THERAPY
Occupational Therapy  Daily Treatment     Patient Name: Yousif Kimble  Age:  89 y.o., Sex:  male  Medical Record #: [id number]  Today's Date: 2/4/2020     Precautions  Precautions: (P) Fall Risk, Swallow Precautions ( See Comments)  Comments: (P) Dysarthria, NTL, SOB with activities    Safety   ADL Safety : Requires Physical Assist for Safety  Bathroom Safety: Requires Supervision for Safety  Comments: Requires frequent rest breaks due to SOB, close SBA to min A due to L lean    Subjective    Pt agreeable to OT     Objective       02/04/20 0931   Precautions   Precautions Fall Risk;Swallow Precautions ( See Comments)   Comments Dysarthria, NTL, SOB with activities   Sitting Lower Body Exercises   Nustep Resistance Level 4  (15 min, 30 steps per minute)   Balance   Comments standing in // bars; balloon volleyball for 3 minute intervals x5 repetitions w/ intermittent UE support. LOB x6 with pt able to self-recover using UE support. Requires consistent cues to maintain upright posture and for LLE engagement   OT Total Time Spent   OT Individual Total Time Spent (Mins) 60   OT Charge Group   OT Self Care / ADL 1   OT Neuromuscular Re-education / Balance 2   OT Therapy Activity 1     Pt donned shoes/socks with setup, increased time, mod verbal cues for progression and problem solving.    Assessment    Pt tolerated session well, is making slow but steady progress in endurance, balance, and attention during ADLs. Limiters continue to be impaired attention, impaired initiation, impaired problem solving, decreased endurance, global weakness, and impaired balance with left lean in sitting and standing as well as flexed posture in standing.     Plan    ADLs/IADLs, incorporate energy conservation strategies into functional tasks, general strengthening/endurance training, balance, functional transfers/functional mobility, functional cognition

## 2020-02-04 NOTE — CARE PLAN
Problem: Safety  Goal: Will remain free from falls  Intervention: Implement fall precautions  Note:   Use of call light reinforced to make needs known.Pt is impulsive and transfers self without staff assistance.Fall precautions and frequent rounding in place for safety.Call light within reach.Will continue to monitor and assess needs and safety.     Problem: Pain Management  Goal: Pain level will decrease to patient's comfort goal  Note:   Pt denies any pain or discomfort.Repositioned with pillows for comfort.Will continue to monitor and assess pain level and medicate as needed.

## 2020-02-04 NOTE — THERAPY
Speech Language Pathology  Daily Treatment     Patient Name: Yousif Kimble  Age:  89 y.o., Sex:  male  Medical Record #: [id number]  Today's Date: 2/4/2020     Subjective    Patient was in bed at time of ST. Initially refused, but willing to participate after encouragement.      Objective       02/04/20 1402   Dysphagia    Positioning / Behavior Modification Cough / Clear after Swallow;Self Monitoring;Modulate Rate or Bite Size;Multiple Swallows;Effortful Swallow   Other Treatments trials of thin liq by tsp   SLP Total Time Spent   SLP Individual Total Time Spent (Mins) 30   Charge Group   SLP Swallowing Dysfunction Treatment Swallowing Dysfunction Treatment           Assessment    Trials of thin liquid via tsp after oral care. Trials of 5 and 10 ml water with no overt s/sx of aspiration.  Oral holding with mod cues needed for quick swallows. Patient not following verbal directives to swallow in 50% of occasions, but would swallow prior to answering questions.  Patient was not oriented to time of day or location. Frequent redirections and encouragement needed.   Plan    Continue to reinforce swallow strategies. Trials of thin liquids as tolerated.

## 2020-02-04 NOTE — PROGRESS NOTES
"Rehab Progress Note     Date of Service: 2/4/2020  Chief Complaint: follow up stroke    Interval Events (Subjective)    Patient seen and examined today during therapeutic dining.  When asked how he is doing he gives me a thumbs up sign.  He denies any pain.  He does report very poor appetite though he does like his milkshakes.  No new complaints.  Therapy notes were reviewed.    Objective:  VITAL SIGNS: /80   Pulse 80   Temp 36.3 °C (97.3 °F) (Temporal)   Resp 18   Ht 1.727 m (5' 8\")   Wt 61.7 kg (136 lb)   SpO2 96%   BMI 20.68 kg/m²   Gen: alert, no apparent distress  CV: regular rate, irregular rhythm, no murmurs, no peripheral edema  Resp: clear to ascultation bilaterally, normal respiratory effort  GI: soft, non-tender abdomen, bowel sounds present  Neuro: notable for dysarthria, left facial droop    No results found for this or any previous visit (from the past 72 hour(s)).    Current Facility-Administered Medications   Medication Frequency   • fluticasone (FLONASE) nasal spray 100 mcg DAILY   • mirtazapine (REMERON) orally disintegrating tab 15 mg QHS   • melatonin tablet 3 mg QHS   • modafinil (PROVIGIL) tablet 100 mg QAM   • lisinopril (PRINIVIL) tablet 5 mg Q DAY   • aspirin (ASA) chewable tab 81 mg DAILY   • Respiratory Care per Protocol Continuous RT   • Pharmacy Consult Request ...Pain Management Review 1 Each PHARMACY TO DOSE   • acetaminophen (TYLENOL) tablet 650 mg Q4HRS PRN   • senna-docusate (PERICOLACE or SENOKOT S) 8.6-50 MG per tablet 2 Tab BID    And   • polyethylene glycol/lytes (MIRALAX) PACKET 1 Packet QDAY PRN    And   • magnesium hydroxide (MILK OF MAGNESIA) suspension 30 mL QDAY PRN    And   • bisacodyl (DULCOLAX) suppository 10 mg QDAY PRN   • artificial tears ophthalmic solution 1 Drop PRN   • benzocaine-menthol (CEPACOL) lozenge 1 Lozenge Q2HRS PRN   • mag hydrox-al hydrox-simeth (MAALOX PLUS ES or MYLANTA DS) suspension 20 mL Q2HRS PRN   • ondansetron (ZOFRAN ODT) " dispertab 4 mg 4X/DAY PRN    Or   • ondansetron (ZOFRAN) syringe/vial injection 4 mg 4X/DAY PRN   • sodium chloride (OCEAN) 0.65 % nasal spray 2 Spray PRN   • apixaban (ELIQUIS) tablet 5 mg BID   • lactulose 20 GM/30ML solution 30 mL QDAY PRN   • docusate sodium (ENEMEEZ) enema 283 mg QDAY PRN   • atorvastatin (LIPITOR) tablet 40 mg Q EVENING   • digoxin (LANOXIN) tablet 125 mcg DAILY AT 1800   • metoprolol (LOPRESSOR) tablet 75 mg TWICE DAILY   • albuterol inhaler 2 Puff Q4HRS PRN       Orders Placed This Encounter   Procedures   • Diet Order 2 Gram Sodium (forify all foods )     Standing Status:   Standing     Number of Occurrences:   1     Order Specific Question:   Diet:     Answer:   2 Gram Sodium [7]     Comments:   forify all foods      Order Specific Question:   Texture/Fiber modifications:     Answer:   Dysphagia 2(Pureed/Chopped)specify fluid consistency(question 6) [2]     Order Specific Question:   Consistency/Fluid modifications:     Answer:   [place] Thick [2]       Assessment:  Active Hospital Problems    Diagnosis   • *Cerebrovascular accident (CVA) due to embolism (HCC)   • Atrial fibrillation (HCC)   • Prostate cancer (HCC)   • Hypertension   • Systolic CHF (HCC)   • Dysarthria   • Dysphagia   • CAD (coronary artery disease)   • Rash of back     This patient is a 89 y.o. male admitted for acute inpatient rehabilitation with Cerebrovascular accident (CVA) due to embolism (HCC).    I led and attended the weekly conference, and agree with the IDT conference documentation and plan of care as noted below.    Date of conference: 2/3/2020    Goals and barriers: See IDT note.    Biggest barriers: impulsive, incontinent of bowel/bladder - due to impaired initiation, needs verbal cues, poor carryover, dysphagia    CM/social support: brother can only provide intermittent assistance, needs life alert and/or 24/7 supervision    Anticipated DC date: 2/8/2020    Home health: PT/OT/SLP/RN    Equip: FWW    Follow  up: PCP, rehab clinic    Medical Decision Making and Plan:    Bilateral strokes  Largest R MCA/frontal lobe  Cardioembolic  Left pronator drift  Left facial droop  Left lateral lean  Dysarthria, continues  Dysphagia, improved  Cognitive deficits, continues  Poor initiation, continues  Poor attention, continues  Continue full rehab program  PT/OT/SLP, 1 hr each discipline, 5 days per week  Continue Eliquis and statin for secondary stroke prophylaxis  Speech therapy to advance diet  Continue Provigil for neurostimulation    Insomnia  Continue melatonin  Increased trazodone, too sedated, discontinued  Continue Remeron, started 1/28    Severe protein calorie malnutrition  Dietician consult  Supplements, milkshakes  Continue Remeron, started 1/28  Discussed treatment plan with his brother Ward    Chronic low back pain  Neck pain  Poor posture, scoliosis  PRN tylenol  Patient doesn't want Lidoderm patch    Bowel  Continue bowel meds  Last BM 2/3    Bladder  Checked PVRs - 65, 36  Not retaining  ICP for over 400 cc  Scheduled toileting    DVT prophylaxis  Eliquis    Appreciate the assistance of the hospitalist with his medical comorbidities:     Hypertension, metoprolol, lisinopril  Atrial fibrillation, digoxin, metoprolol  Systolic CHF, EF 25%  Elevated Pro-BNP, 74154 --> 88119  History of prostate cancer, Casadex on hold as it cannot be crushed  Coronary artery disease, aspirin  Rash on back, improved, s/p hydrocortisone cream  Leukocytosis, urine culture negative    Total time:  16 minutes.  I spent greater than 50% of the time for patient care, counseling, and coordination on this date, including patient face-to face time, unit/floor time with review of records/pertinent lab data and studies, as well as discussing diagnostic evaluation/work up, planned therapeutic interventions, and future disposition of care, as per the interval events/subjective and the assessment and plan as noted above.    I have performed a  physical exam, reviewed and updated ROS, as well as the assessment and plan today 2/4/2020. In review of note from 2/3/2020 there are no new changes except as documented above.    Gauri Fortune M.D.   Physical Medicine and Rehabilitation

## 2020-02-04 NOTE — THERAPY
Speech Language Pathology  Daily Treatment     Patient Name: Yousif Kimble  Age:  89 y.o., Sex:  male  Medical Record #: [id number]  Today's Date: 2/4/2020     Subjective    Pt was pleasant and cooperative during this ST session      Objective       02/04/20 1431   SLP Total Time Spent   SLP Individual Total Time Spent (Mins) 30   Charge Group   SLP Swallowing Dysfunction Treatment Swallowing Dysfunction Treatment     Assessment    Pt tolerated 5/5 5 mL spoonfuls of thin liquids, 5/5 10 mL spoonfuls of thin liquids and 4/5 cup sips of thin liquids without any overt s/sx of aspiration/penetration.  Pt demonstrated coughing x1 cup sip trial and required cues to continue coughing.      Plan    Recommend continuing trials of thin liquids during meals.

## 2020-02-05 ENCOUNTER — HOME HEALTH ADMISSION (OUTPATIENT)
Dept: HOME HEALTH SERVICES | Facility: HOME HEALTHCARE | Age: 85
End: 2020-02-05
Payer: MEDICARE

## 2020-02-05 PROCEDURE — A9270 NON-COVERED ITEM OR SERVICE: HCPCS | Performed by: PHYSICAL MEDICINE & REHABILITATION

## 2020-02-05 PROCEDURE — 99232 SBSQ HOSP IP/OBS MODERATE 35: CPT | Performed by: HOSPITALIST

## 2020-02-05 PROCEDURE — 97116 GAIT TRAINING THERAPY: CPT

## 2020-02-05 PROCEDURE — 97112 NEUROMUSCULAR REEDUCATION: CPT

## 2020-02-05 PROCEDURE — 97530 THERAPEUTIC ACTIVITIES: CPT

## 2020-02-05 PROCEDURE — 99231 SBSQ HOSP IP/OBS SF/LOW 25: CPT | Performed by: PHYSICAL MEDICINE & REHABILITATION

## 2020-02-05 PROCEDURE — 770010 HCHG ROOM/CARE - REHAB SEMI PRIVAT*

## 2020-02-05 PROCEDURE — 92507 TX SP LANG VOICE COMM INDIV: CPT

## 2020-02-05 PROCEDURE — 700102 HCHG RX REV CODE 250 W/ 637 OVERRIDE(OP): Performed by: PHYSICAL MEDICINE & REHABILITATION

## 2020-02-05 PROCEDURE — 92526 ORAL FUNCTION THERAPY: CPT

## 2020-02-05 RX ADMIN — SENNOSIDES AND DOCUSATE SODIUM 2 TABLET: 8.6; 5 TABLET ORAL at 08:14

## 2020-02-05 RX ADMIN — SENNOSIDES AND DOCUSATE SODIUM 2 TABLET: 8.6; 5 TABLET ORAL at 20:26

## 2020-02-05 RX ADMIN — APIXABAN 5 MG: 5 TABLET, FILM COATED ORAL at 20:26

## 2020-02-05 RX ADMIN — ASPIRIN 81 MG 81 MG: 81 TABLET ORAL at 08:14

## 2020-02-05 RX ADMIN — ATORVASTATIN CALCIUM 40 MG: 40 TABLET, FILM COATED ORAL at 20:26

## 2020-02-05 RX ADMIN — METOPROLOL TARTRATE 75 MG: 25 TABLET ORAL at 05:16

## 2020-02-05 RX ADMIN — MELATONIN TAB 3 MG 3 MG: 3 TAB at 20:26

## 2020-02-05 RX ADMIN — LISINOPRIL 5 MG: 5 TABLET ORAL at 05:16

## 2020-02-05 RX ADMIN — METOPROLOL TARTRATE 75 MG: 25 TABLET ORAL at 17:05

## 2020-02-05 RX ADMIN — FLUTICASONE PROPIONATE 100 MCG: 50 SPRAY, METERED NASAL at 08:41

## 2020-02-05 RX ADMIN — DIGOXIN 125 MCG: 125 TABLET ORAL at 17:05

## 2020-02-05 RX ADMIN — MIRTAZAPINE 15 MG: 15 TABLET, ORALLY DISINTEGRATING ORAL at 20:26

## 2020-02-05 RX ADMIN — APIXABAN 5 MG: 5 TABLET, FILM COATED ORAL at 08:14

## 2020-02-05 RX ADMIN — MODAFINIL 100 MG: 100 TABLET ORAL at 08:14

## 2020-02-05 ASSESSMENT — ENCOUNTER SYMPTOMS
PALPITATIONS: 0
POLYDIPSIA: 0
ABDOMINAL PAIN: 0
FEVER: 0
NAUSEA: 0
CHILLS: 0
BRUISES/BLEEDS EASILY: 0
COUGH: 0
SHORTNESS OF BREATH: 0
EYES NEGATIVE: 1
VOMITING: 0

## 2020-02-05 ASSESSMENT — PATIENT HEALTH QUESTIONNAIRE - PHQ9
1. LITTLE INTEREST OR PLEASURE IN DOING THINGS: NOT AT ALL
2. FEELING DOWN, DEPRESSED, IRRITABLE, OR HOPELESS: NOT AT ALL
SUM OF ALL RESPONSES TO PHQ9 QUESTIONS 1 AND 2: 0

## 2020-02-05 NOTE — THERAPY
"Occupational Therapy  Daily Treatment     Patient Name: Yousif Kimble  Age:  89 y.o., Sex:  male  Medical Record #: [id number]  Today's Date: 2/5/2020     Precautions  Precautions: (P) Fall Risk, Swallow Precautions ( See Comments)  Comments: (P) Dysarthria, NTL, SOB with activities    Safety   ADL Safety : Requires Physical Assist for Safety  Bathroom Safety: Requires Supervision for Safety  Comments: Requires frequent rest breaks due to SOB, close SBA to min A due to L lean    Subjective    \"Will you hear my plea? I would like to leave early\"     Objective       02/05/20 1231   Precautions   Precautions Fall Risk;Swallow Precautions ( See Comments)   Comments Dysarthria, NTL, SOB with activities   Sitting Lower Body Exercises   Sit to Stand 1 set of 15  (from low mat, no UE support)   Nustep Resistance Level 5  (for general endurance training)   Balance   Comments from mat completed 1x10 reps: sit to stand, reach to rochelle, touch ground, stand up straight   OT Total Time Spent   OT Individual Total Time Spent (Mins) 60   OT Charge Group   OT Neuromuscular Re-education / Balance 2   OT Therapy Activity 2     Pt completed functional mobility in gym with FWW 2x1 lap, 1x2 laps with SBA    Assessment    Pt tolerated session well, demonstrating increased alertness and asking more questions, however appeared confused stating that his brother was in the building. His balance and endurance continue to improve, posture improved during mobility this session as well. Limiters continue to be impaired attention, impaired initiation, impaired problem solving, decreased endurance, global weakness, and impaired balance,     Plan    ADLs/IADLs, incorporate energy conservation strategies into functional tasks, general strengthening/endurance training, balance, functional transfers/functional mobility, functional cognition     "

## 2020-02-05 NOTE — THERAPY
"Physical Therapy   Daily Treatment     Patient Name: Yousif Kimble  Age:  89 y.o., Sex:  male  Medical Record #: [id number]  Today's Date: 2/5/2020     Precautions  Precautions: Fall Risk, Swallow Precautions ( See Comments)  Comments: Dysarthria, NTL, SOB with activities    Subjective    Pt in bed sleeping, agreeable to PT upon waking.      Objective       02/05/20 0931   Precautions   Precautions Fall Risk;Swallow Precautions ( See Comments)   Comments Dysarthria, NTL, SOB with activities   Neuro-Muscular Treatments   Neuro-Muscular Treatments Weight Shift Right;Weight Shift Left;Verbal Cuing;Postural Changes   Comments Pt completed the following activities in the // bars: ball toss with medium \"beach ball\" while standing on solid ground and on air-ex ~10 min standing without seated rest break, SBA- CGA and intermittent BUE support for balance recovery. Step-ups onto 5.5 inch step, 1x15 leading with R 2x15 leading with L, verbal cuing for placing entire foot on step. Verbal cuing throughout all balance activities for decreased UE support and upright posture    Interdisciplinary Plan of Care Collaboration   IDT Collaboration with  Speech Therapist   Patient Position at End of Therapy Seated  (in SLP office)   Collaboration Comments pt care passed to SLP   PT Total Time Spent   PT Individual Total Time Spent (Mins) 30   PT Charge Group   PT Neuromuscular Re-Education / Balance 1   PT Therapeutic Activities 1       FIM Bed/Chair/Wheelchair Transfers Score: 5 - Standby Prompting/Supervision or Set-up  Bed/Chair/Wheelchair Transfers Description:  Increased time, Verbal cueing, Set-up of equipment(bed > WC, SBA without AD, extra time and verbal encouragement )      Assessment    Pt motivated and demonstrated good standing tolerance this session. Required frequent verbal cuing for upright posture throughout session. Able to recover from LOBs with CGA and UE support on // bars.     Plan    Safety education, bed mobility and " transfers, gait training, tolerance for activity, sit up/down stairs as tolerated for exercise

## 2020-02-05 NOTE — PROGRESS NOTES
[facility] Medicine Daily Progress Note        Chief Complaint:  AFib, CHF, HTN    Interval History:  No chest pain, shortness of breath, or palpitations.    Review of Systems  Review of Systems   Constitutional: Negative for chills and fever.   HENT: Negative.    Eyes: Negative.    Respiratory: Negative for cough and shortness of breath.    Cardiovascular: Negative for chest pain and palpitations.   Gastrointestinal: Negative for abdominal pain, nausea and vomiting.   Skin: Negative for itching and rash.   Endo/Heme/Allergies: Negative for polydipsia. Does not bruise/bleed easily.        Physical Exam  Temp:  [36.3 °C (97.3 °F)-36.8 °C (98.3 °F)] 36.7 °C (98 °F)  Pulse:  [74-97] 97  Resp:  [18] 18  BP: (122-130)/(75-92) 122/79  SpO2:  [92 %-96 %] 92 %    Physical Exam  Vitals signs reviewed.   Constitutional:       General: He is not in acute distress.     Appearance: Normal appearance. He is not ill-appearing.   HENT:      Head: Normocephalic and atraumatic.      Right Ear: External ear normal.      Left Ear: External ear normal.      Nose: Nose normal.   Eyes:      General:         Right eye: No discharge.         Left eye: No discharge.      Extraocular Movements: Extraocular movements intact.      Conjunctiva/sclera: Conjunctivae normal.   Neck:      Musculoskeletal: Normal range of motion and neck supple.   Cardiovascular:      Rate and Rhythm: Normal rate and regular rhythm.   Pulmonary:      Effort: No respiratory distress.      Breath sounds: No wheezing.      Comments: Decreased BS  Abdominal:      General: Bowel sounds are normal. There is no distension.      Palpations: Abdomen is soft.      Tenderness: There is no tenderness.   Musculoskeletal:      Right lower leg: No edema.      Left lower leg: No edema.   Skin:     General: Skin is warm and dry.   Neurological:      Mental Status: He is alert.      Comments: awake         Fluids    Intake/Output Summary (Last 24 hours) at 2/5/2020 1601  Last data filed  at 2/5/2020 1300  Gross per 24 hour   Intake 310 ml   Output --   Net 310 ml       Laboratory                        Assessment/Plan  * Cerebrovascular accident (CVA) due to embolism (HCC)- (present on admission)  Assessment & Plan  On Eliquis, ASA, and Lipitor  Also on Provigil and Remeron    Rash of back  Assessment & Plan  Resolved w/ topical steroid    CAD (coronary artery disease)  Assessment & Plan  On maximal medical management w/ ASA, Lipitor, Lisinopril, and Metoprolol    Systolic CHF (HCC)- (present on admission)  Assessment & Plan  Echo (from [facility]) EF 25-30%, mod MR, and RVSP 40-45 mmHg  Closely monitor volume status  Follow BNP  Check F/U labs in am    Hypertension- (present on admission)  Assessment & Plan  On Lisinopril and Metoprolol  Observe blood pressure trends    Prostate cancer (HCC)- (present on admission)  Assessment & Plan  Pt now taking PO  But Casodex remains on hold as pt still requiring meds to be crushed    Atrial fibrillation (HCC)- (present on admission)  Assessment & Plan  S/P RVR at [facility]  On Digoxin w/ non-toxic drug level  Anticoagulated on Eliquis    DNR

## 2020-02-05 NOTE — DISCHARGE PLANNING
DME referral sent to [facility].  HH referral sent to [facility] per choice from.  Awaiting responses.

## 2020-02-05 NOTE — CARE PLAN
Problem: Safety  Goal: Will remain free from injury  Outcome: PROGRESSING AS EXPECTED   Pt does not use call light for assistance, Impulsive and unsteady from transfers.  Patient encouraged to call for assistance and not attempt self transfer . Able to verbalize needs.   Problem: Infection  Goal: Will remain free from infection  Outcome: PROGRESSING AS EXPECTED   Patient remains free of infection as evidenced by normal vital signs and breath sounds. Will continue monitoring.   Problem: Skin Integrity  Goal: Risk for impaired skin integrity will decrease  Outcome: PROGRESSING AS EXPECTED   Patient's skin remains intact and free from new or accidental injury this shift.  Will continue to monitor.   Problem: Urinary Elimination:  Goal: Ability to reestablish a normal urinary elimination pattern will improve  Outcome: PROGRESSING AS EXPECTED   Patient voiding adequate amounts of clear yellow urine.  Denies flank pain or dysuria; afebrile.  Will continue to monitor.

## 2020-02-05 NOTE — THERAPY
Speech Language Pathology  Daily Treatment     Patient Name: Yousif Kimble  Age:  89 y.o., Sex:  male  Medical Record #: [id number]  Today's Date: 2/5/2020     Subjective    Patient more engaged, reduced delays in response noted, patient initiated telling a joke.     Objective       02/05/20 1001   Speech / Dysarthria   Articulation Training Minimal (4)   Dysphagia    Positioning / Behavior Modification Breath Hold;Cough / Clear after Swallow;Self Monitoring;Modulate Rate or Bite Size;Multiple Swallows   Other Treatments Therapeutic trials of thin liquids via 5/10 ml's and cup sips.   Diet / Liquid Recommendation Nectar Thick Liquid;Dysphagia II   Nutritional Liquid Intake Rating Scale 2   Nutritional Food Intake Rating Scale 5   SLP Total Time Spent   SLP Individual Total Time Spent (Mins) 60   Charge Group   SLP Treatment - Individual Speech Language Treatment - Individual   SLP Swallowing Dysfunction Treatment Swallowing Dysfunction Treatment       FIM Eating Score:  5 - Standby Prompting/Supervision or Set-up  Eating Description:  Modified diet, Increased time, Supervision for safety    FIM Comprehension Score:  5 - Stand-by Prompting/Supervision or Set-up  Comprehension Description:  Verbal cues    FIM Expression Score:  5 - Stand-by Prompting/Supervision or Set-up  Expression Description:  Verbal cueing    FIM Social Interaction Score:  6 - Modified Independent  Social Interaction Description:  Increased time, Verbal cues    FIM Problem Solving Score:  3 - Moderate Assistance  Problem Solving Description:  Verbal cueing, Therapy schedule, Bed/chair alarm, Increased time, Seat belt    FIM Memory Score:  3 - Moderate Assistance  Memory Description:  Seat belt, Therapy schedule, Bed/chair alarm, Verbal cueing    Assessment    Patient worked on thin liquid trials via 5 and 10 ml teaspoon amounts, as well as cup sips.   Tolerated 10/10 5 ml trials with no cough or throat clear.  Tolerated 8/10, then 10/10 trials of 10  ml amounts with no cough or throat clear.  Tolerated 8/10 small cup sips with no cough or throat clear.    Patient needed min cues for use of slow rate and effortful speech strategies in sentences .    Plan    Therapeutic trials of thin liquids with meals with controled cup sips.  Target speech intelligiblity, recall of safety training.

## 2020-02-05 NOTE — CARE PLAN
Problem: Safety  Goal: Will remain free from falls  Intervention: Implement fall precautions  Note:   Pt is confused to time and impulsive at times.Use of call light reinforced to make needs known.Fall precautions and frequent rounding in place for safety.Call light within reach.Will continue to monitor and assess needs and safety.     Problem: Urinary Elimination:  Goal: Ability to reestablish a normal urinary elimination pattern will improve  Intervention: Encourage scheduled voiding  Note:   Pt is continent of bladder with time void.Denies any discomfort or dysuria.Will continue to monitor.

## 2020-02-05 NOTE — PROGRESS NOTES
Hospital Medicine Daily Progress Note        Chief Complaint  AFib, CHF, HTN    Interval Problem Update  No overnight problems.    Review of Systems  Review of Systems   Constitutional: Negative for chills and fever.   HENT: Negative.    Eyes: Negative.    Respiratory: Negative for cough and shortness of breath.    Cardiovascular: Negative for chest pain and palpitations.   Gastrointestinal: Negative for abdominal pain, nausea and vomiting.   Skin: Negative.  Endo/Heme/Allergies: Negative for polydipsia. Does not bruise/bleed easily.        Physical Exam  Temp:  [36.3 °C (97.3 °F)-36.8 °C (98.3 °F)] 36.7 °C (98 °F)  Pulse:  [74-97] 97  Resp:  [18] 18  BP: (122-130)/(75-92) 122/79  SpO2:  [92 %-96 %] 92 %    Physical Exam  Vitals signs reviewed.   Constitutional:       General: He is not in acute distress.     Appearance: Normal appearance. He is not ill-appearing.   HENT:      Head: Normocephalic and atraumatic.      Right Ear: External ear normal.      Left Ear: External ear normal.      Nose: Nose normal.   Eyes:      General:         Right eye: No discharge.         Left eye: No discharge.      Extraocular Movements: Extraocular movements intact.      Conjunctiva/sclera: Conjunctivae normal.   Neck:      Musculoskeletal: Normal range of motion and neck supple.   Cardiovascular:      Rate and Rhythm: Normal rate and regular rhythm.   Pulmonary:      Effort: No respiratory distress.      Breath sounds: No wheezing.      Comments: Decreased BS  Abdominal:      General: Bowel sounds are normal. There is no distension.      Palpations: Abdomen is soft.      Tenderness: There is no tenderness.   Musculoskeletal:      Right lower leg: No edema.      Left lower leg: No edema.   Skin:     General: Skin is warm and dry.  Neurological:      Mental Status: He is alert.      Comments: awake         Fluids    Intake/Output Summary (Last 24 hours) at 2/5/2020 1555  Last data filed at 2/5/2020 1300  Gross per 24 hour   Intake  310 ml   Output --   Net 310 ml       Laboratory                        Assessment/Plan  * Cerebrovascular accident (CVA) due to embolism (HCC)- (present on admission)  Assessment & Plan  On Eliquis, ASA, and Lipitor  Also on Provigil and Remeron    Rash of back  Assessment & Plan  Resolved w/ topical steroid    CAD (coronary artery disease)  Assessment & Plan  On maximal medical management w/ ASA, Lipitor, Lisinopril, and Metoprolol    Systolic CHF (HCC)- (present on admission)  Assessment & Plan  Echo (from [facility]) EF 25-30%, mod MR, and RVSP 40-45 mmHg  BNP improving  Closely monitor volume status    Hypertension- (present on admission)  Assessment & Plan  On Lisinopril and Metoprolol  Observe blood pressure trends    Prostate cancer (HCC)- (present on admission)  Assessment & Plan  Pt now taking PO  But Casodex remains on hold as pt still requiring meds to be crushed    Atrial fibrillation (HCC)- (present on admission)  Assessment & Plan  S/P RVR at [facility]  On Digoxin w/ non-toxic drug level  Anticoagulated on Eliquis     DNR

## 2020-02-05 NOTE — DISCHARGE PLANNING
Per Nina with PCP office patient has not been seen since 2/2019 and will need new appt to reestablish. Adeline with [facility] will get appt set up and notify us of date and time. Once we have this information we can continue to process referral.    Thank you

## 2020-02-05 NOTE — THERAPY
02/04/20 1559   Precautions   Precautions Fall Risk;Swallow Precautions ( See Comments)   Comments Dysarthria, NTL, S OB with activities   Pain 0 - 10 Group   Therapist Pain Assessment 0   Cognition    Level of Consciousness Alert   Safety Awareness Impaired   New Learning Impaired   Attention Impaired   Sequencing Impaired   Initiation Impaired   Sitting Lower Body Exercises   Nustep Resistance Level 4  (34 steps per minute, 12 minutes)   Neuro-Muscular Treatments   Neuro-Muscular Treatments Sequencing;Tactile Cuing;Verbal Cuing   Comments Sequencing sit to/from stand, sequencing gait fww, sequencing gait in the parallel bars stepping over bolsters and on/off a foam pad to facilitate higher stepping during gait, balance, attention and problem solving   PT Total Time Spent   PT Individual Total Time Spent (Mins) 60   PT Charge Group   PT Gait Training 2   PT Therapeutic Exercise 1   PT Neuromuscular Re-Education / Balance 1   Physical Therapy   Daily Treatment     Patient Name: Yousif Kimble  Age:  89 y.o., Sex:  male  Medical Record #: [id number]  Today's Date: 2/4/2020     Precautions  Precautions: Fall Risk, Swallow Precautions ( See Comments)  Comments: Dysarthria, NTL, S OB with activities    Subjective   The patient agreed to PT.  He asked if his brother was in the building.  He was expecting a visit.  Objective    The patient participated in practicing sequencing sit to/from stand, gait with a walker, gait in the parallel bars while stepping over bolsters and on/off a foam pad to facilitate hip flexion during gait and for attention and problem solving while walking.  Gait with a front wheel walker was tolerated at 135 FT x3.  He also utilized the NuStep with the information indicated above.        Assessment    The patient required verbal cues and explanation of stepping over the bolsters and onto and off the foam pad so he did not kick them or catch his heel on the obstacles.  After accomplishing the task  correctly he was able to continue appropriate strategies during this phase of the session.  He had done this same task yesterday but had no carryover of problem solving during today's session.    Plan    Safety education, bed mobility and transfers, gait training, tolerance for activity, sit up/down stairs as tolerated for exercise

## 2020-02-06 LAB
ANION GAP SERPL CALC-SCNC: 8 MMOL/L (ref 0–11.9)
BUN SERPL-MCNC: 15 MG/DL (ref 8–22)
CALCIUM SERPL-MCNC: 8.8 MG/DL (ref 8.5–10.5)
CHLORIDE SERPL-SCNC: 107 MMOL/L (ref 96–112)
CO2 SERPL-SCNC: 28 MMOL/L (ref 20–33)
CREAT SERPL-MCNC: 1.04 MG/DL (ref 0.5–1.4)
ERYTHROCYTE [DISTWIDTH] IN BLOOD BY AUTOMATED COUNT: 47.4 FL (ref 35.9–50)
GLUCOSE SERPL-MCNC: 92 MG/DL (ref 65–99)
HCT VFR BLD AUTO: 39.3 % (ref 42–52)
HGB BLD-MCNC: 12.7 G/DL (ref 14–18)
MCH RBC QN AUTO: 31.7 PG (ref 27–33)
MCHC RBC AUTO-ENTMCNC: 32.3 G/DL (ref 33.7–35.3)
MCV RBC AUTO: 98 FL (ref 81.4–97.8)
NT-PROBNP SERPL IA-MCNC: 6862 PG/ML (ref 0–125)
PLATELET # BLD AUTO: 251 K/UL (ref 164–446)
PMV BLD AUTO: 10.2 FL (ref 9–12.9)
POTASSIUM SERPL-SCNC: 3.3 MMOL/L (ref 3.6–5.5)
RBC # BLD AUTO: 4.01 M/UL (ref 4.7–6.1)
SODIUM SERPL-SCNC: 143 MMOL/L (ref 135–145)
WBC # BLD AUTO: 8.1 K/UL (ref 4.8–10.8)

## 2020-02-06 PROCEDURE — 97530 THERAPEUTIC ACTIVITIES: CPT

## 2020-02-06 PROCEDURE — 92526 ORAL FUNCTION THERAPY: CPT

## 2020-02-06 PROCEDURE — 97535 SELF CARE MNGMENT TRAINING: CPT

## 2020-02-06 PROCEDURE — A9270 NON-COVERED ITEM OR SERVICE: HCPCS | Performed by: HOSPITALIST

## 2020-02-06 PROCEDURE — 770010 HCHG ROOM/CARE - REHAB SEMI PRIVAT*

## 2020-02-06 PROCEDURE — 80048 BASIC METABOLIC PNL TOTAL CA: CPT

## 2020-02-06 PROCEDURE — 99231 SBSQ HOSP IP/OBS SF/LOW 25: CPT | Performed by: PHYSICAL MEDICINE & REHABILITATION

## 2020-02-06 PROCEDURE — 83880 ASSAY OF NATRIURETIC PEPTIDE: CPT

## 2020-02-06 PROCEDURE — 99232 SBSQ HOSP IP/OBS MODERATE 35: CPT | Performed by: HOSPITALIST

## 2020-02-06 PROCEDURE — 85027 COMPLETE CBC AUTOMATED: CPT

## 2020-02-06 PROCEDURE — 97110 THERAPEUTIC EXERCISES: CPT

## 2020-02-06 PROCEDURE — 700102 HCHG RX REV CODE 250 W/ 637 OVERRIDE(OP): Performed by: HOSPITALIST

## 2020-02-06 PROCEDURE — 92507 TX SP LANG VOICE COMM INDIV: CPT

## 2020-02-06 PROCEDURE — 97116 GAIT TRAINING THERAPY: CPT

## 2020-02-06 PROCEDURE — A9270 NON-COVERED ITEM OR SERVICE: HCPCS | Performed by: PHYSICAL MEDICINE & REHABILITATION

## 2020-02-06 PROCEDURE — 700102 HCHG RX REV CODE 250 W/ 637 OVERRIDE(OP): Performed by: PHYSICAL MEDICINE & REHABILITATION

## 2020-02-06 PROCEDURE — 36415 COLL VENOUS BLD VENIPUNCTURE: CPT

## 2020-02-06 RX ORDER — POTASSIUM CHLORIDE 20 MEQ/1
40 TABLET, EXTENDED RELEASE ORAL ONCE
Status: COMPLETED | OUTPATIENT
Start: 2020-02-06 | End: 2020-02-06

## 2020-02-06 RX ADMIN — MIRTAZAPINE 15 MG: 15 TABLET, ORALLY DISINTEGRATING ORAL at 20:56

## 2020-02-06 RX ADMIN — MELATONIN TAB 3 MG 3 MG: 3 TAB at 20:56

## 2020-02-06 RX ADMIN — APIXABAN 5 MG: 5 TABLET, FILM COATED ORAL at 09:03

## 2020-02-06 RX ADMIN — ASPIRIN 81 MG 81 MG: 81 TABLET ORAL at 09:03

## 2020-02-06 RX ADMIN — POTASSIUM CHLORIDE 40 MEQ: 1500 TABLET, EXTENDED RELEASE ORAL at 12:57

## 2020-02-06 RX ADMIN — LISINOPRIL 5 MG: 5 TABLET ORAL at 05:14

## 2020-02-06 RX ADMIN — FLUTICASONE PROPIONATE 100 MCG: 50 SPRAY, METERED NASAL at 09:11

## 2020-02-06 RX ADMIN — SENNOSIDES AND DOCUSATE SODIUM 2 TABLET: 8.6; 5 TABLET ORAL at 20:56

## 2020-02-06 RX ADMIN — DIGOXIN 125 MCG: 125 TABLET ORAL at 17:46

## 2020-02-06 RX ADMIN — ATORVASTATIN CALCIUM 40 MG: 40 TABLET, FILM COATED ORAL at 20:56

## 2020-02-06 RX ADMIN — MODAFINIL 100 MG: 100 TABLET ORAL at 09:03

## 2020-02-06 RX ADMIN — SENNOSIDES AND DOCUSATE SODIUM 2 TABLET: 8.6; 5 TABLET ORAL at 09:03

## 2020-02-06 RX ADMIN — APIXABAN 5 MG: 5 TABLET, FILM COATED ORAL at 20:56

## 2020-02-06 RX ADMIN — METOPROLOL TARTRATE 75 MG: 25 TABLET ORAL at 17:46

## 2020-02-06 RX ADMIN — METOPROLOL TARTRATE 75 MG: 25 TABLET ORAL at 05:14

## 2020-02-06 ASSESSMENT — ENCOUNTER SYMPTOMS
ABDOMINAL PAIN: 0
EYES NEGATIVE: 1
VOMITING: 0
POLYDIPSIA: 0
COUGH: 0
BRUISES/BLEEDS EASILY: 0
NAUSEA: 0
FEVER: 0
CHILLS: 0
SHORTNESS OF BREATH: 0
PALPITATIONS: 0

## 2020-02-06 NOTE — THERAPY
Speech Language Pathology  Daily Treatment     Patient Name: Yousif Kimble  Age:  89 y.o., Sex:  male  Medical Record #: [id number]  Today's Date: 2/6/2020     Subjective    Patient pleasant and cooperative.   Patient accompanied by his brother for family training.  Patient's brother reported patient to be largely independent with ADL's and IADL's prior to his stroke.  He did report that the patient had poor hearing prior, and this had been a barrier to communication even with face to face speaking.   He reported that patient sometimes just didn't respond.     Objective       02/06/20 1301   Dysphagia    Positioning / Behavior Modification Breath Hold;Cough / Clear after Swallow;Self Monitoring;Modulate Rate or Bite Size;Multiple Swallows   Other Treatments Therapeutic trials of thin liquids via cup sips.  Patient displayed coughing post swallow on 4/6 cup sips, no distress. Reflexive cough weak. Patient did require verbal cues and models for powerful cough followed by saliva swallow.    Diet / Liquid Recommendation Nectar Thick Liquid   Nutritional Liquid Intake Rating Scale 2   Nutritional Food Intake Rating Scale 5   Interdisciplinary Plan of Care Collaboration   IDT Collaboration with  Family / Caregiver   Patient Position at End of Therapy Seated;Chair Alarm On;Family / Friend in Room   Collaboration Comments Patient and his brother participated in family training and education.  Disucssed patient's CLOF including diet recommendations and nectar thick liquids.  Discussed POC including dysphagia 2 and NTL, therapeutic trials of thin liquids, and patient's current aspiration risks.    SLP Total Time Spent   SLP Individual Total Time Spent (Mins) 30   Charge Group   SLP Treatment - Individual Speech Language Treatment - Individual   SLP Swallowing Dysfunction Treatment Swallowing Dysfunction Treatment           Assessment    Therapeutic trials of thin liquids via cup sips.  Patient displayed coughing post swallow on  4/6 cup sips, no distress. Reflexive cough weak. Patient did require verbal cues and models for powerful cough followed by saliva swallow. Patient and his brother participated in family training and education. Family training completed with patient and his brother. Disucssed patient's CLOF including diet recommendations and nectar thick liquids.  Discussed POC including dysphagia 2 and NTL, therapeutic trials of thin liquids, and patient's current aspiration risks.     Plan    Target safe swallow with 10 ml, and 5 ml amounts of thin liquids, intelligible speech, recall of safety training.

## 2020-02-06 NOTE — THERAPY
Occupational Therapy  Daily Treatment     Patient Name: Yousif Kimble  Age:  89 y.o., Sex:  male  Medical Record #: [id number]  Today's Date: 2020     Precautions  Precautions: (P) Fall Risk, Swallow Precautions ( See Comments)  Comments: (P) Dysarthria, NTL, SOB with activities    Safety   ADL Safety : (P) Requires Supervision for Safety, Requires Cueing for Safety  Bathroom Safety: (P) Requires Supervision for Safety, Requires Cuing for Safety  Comments: (P) SBA for standing components for safety, requires cues for initiation and sequencing    Subjective    Pt received awake in bed, agreeable to ADL routine     Objective       20 0701   Precautions   Precautions Fall Risk;Swallow Precautions ( See Comments)   Comments Dysarthria, NTL, SOB with activities   Safety    ADL Safety  Requires Supervision for Safety;Requires Cueing for Safety   Bathroom Safety Requires Supervision for Safety;Requires Cuing for Safety   Comments SBA for standing components for safety, requires cues for initiation and sequencing   OT Total Time Spent   OT Individual Total Time Spent (Mins) 60   OT Charge Group   OT Self Care / ADL 4       FIM Grooming Score:  5 - Standby Prompting/Supervision or Set-up  Grooming Description:  (SBA in standing to comb hair)    FIM Bathing Score:  5 - Standby Prompting/Supervision or Set-up  Bathing Description:       FIM Upper Body Dressin - Standby Prompting/Supervision or Set-up  Upper Body Dressing Description:  Verbal cueing(verbal cue to sit for safety)    FIM Lower Body Dressing Score:  5 - Standby Prompting/Supervsion or Set-up  Lower Body Dressing Description:  Supervision for safety(supervision for safety, no verbal cues for sequencing)    FIM Toilet Transfer Score:  5 - Standby Prompting/Supervision or Set-up  Toilet Transfer Description:  Supervision for safety(FWW from shower to toilet to dress, grab bar for transition)    FIM Tub/Shower Transfers Score:  5 - Standby  Prompting/Supervision or Set-up  Tub/Shower Transfers Description:  (FWW to/from bathroom, grab bar for transition to shower)      Assessment    Pt tolerated session well, he demos slow but steady improvements in ADL independence. He progressed to completion of ADL routine at FWW level this session with supervision for safety, demos improvement in dressing routine - did not require cues for sequencing, only one cue for initiation. He did however require consistent cues during showering for sequencing and thoroughness. Discussed bowel/bladder incontinence, pt reports that he does have urinary incontinence and occasional bowel incontinence, states that he is aware when it happens and that he would clean himself up after if needed. Discussed OT recommendation of assist/supervision for showers at d/c from brother or caregiver, need for initial 24/7 supervision at home due to fall risk. Pt agreeable to supervision when showering, does not believe he will require 24/7 assist.     Plan    FT with brother this afternoon, prep for d/c home with 24/7 support vs SNF pending brother's ability to provide support

## 2020-02-06 NOTE — THERAPY
02/05/20 1459   Precautions   Precautions Fall Risk;Swallow Precautions ( See Comments)   Comments Dysarthria, NTL, SOB with activities   Cognition    Level of Consciousness Alert   New Learning Impaired   Attention Impaired   Sequencing Impaired   Bed Mobility    Supine to Sit Stand by Assist   Sit to Supine Stand by Assist   Sit to Stand Stand by Assist   Scooting Stand by Assist   Rolling Supervised   Neuro-Muscular Treatments   Neuro-Muscular Treatments Sequencing;Verbal Cuing   PT Total Time Spent   PT Individual Total Time Spent (Mins) 30   PT Charge Group   PT Gait Training 1   PT Therapeutic Activities 1   Physical Therapy   Daily Treatment     Patient Name: Yousif Kimble  Age:  89 y.o., Sex:  male  Medical Record #: [id number]  Today's Date: 2/5/2020     Precautions  Precautions: Fall Risk, Swallow Precautions ( See Comments)  Comments: Dysarthria, NTL, SOB with activities    Subjective    The patient was sitting up in his chair watching a golf tournament.  He agreed to PT     Objective    The patient participated in gait training using a fww with SBA and verbal cues.  He tolerated 260 FT x2.  He also practiced transferring to/from a normal flat bed making his approach to the bed with a walker.  He required no physical assistance but only a few verbal cues for positioning.  The patient went up/down 4 stairs with bilateral handrails SBA.        Assessment    The patient continues to improve and alertness and orientation, mobility, gait, tolerance for activity and safety awareness.    Plan    Safety education, sequencing, gait training, endurance and strength training, balance, coordination

## 2020-02-06 NOTE — THERAPY
Occupational Therapy  Daily Treatment     Patient Name: Yousif Kimble  Age:  89 y.o., Sex:  male  Medical Record #: [id number]  Today's Date: 2/6/2020     Precautions  Precautions: (P) Fall Risk, Swallow Precautions ( See Comments)  Comments: (P) Dysarthria, NTL, SOB with activities    Safety   ADL Safety : Requires Supervision for Safety, Requires Cueing for Safety  Bathroom Safety: Requires Supervision for Safety, Requires Cuing for Safety  Comments: SBA for standing components for safety, requires cues for initiation and sequencing    Subjective    Pt and brother agreeable to family training/education     Objective       02/06/20 1331   Precautions   Precautions Fall Risk;Swallow Precautions ( See Comments)   Comments Dysarthria, NTL, SOB with activities   Interdisciplinary Plan of Care Collaboration   IDT Collaboration with  Family / Caregiver   Patient Position at End of Therapy Seated;Family / Friend in Room  (hand-off to PT for continued FT)   Collaboration Comments See assessment for FT details   OT Total Time Spent   OT Individual Total Time Spent (Mins) 30   OT Charge Group   OT Therapy Activity 2     Assessment    Pt's brother present for family training, discussed bathroom setup with brother reporting grab bars to be installed tomorrow at entry of shower and to right of toilet. Brother educated on patient's current level of function including need for 24/7 supervision for safety, need for verbal cues during showering due to impaired initiation/sequencing, need for setup for dressing, and need for verbal cues to manage pt's incontinence. Pt's brother feels that he will find a way to get patient the support he needs.     Plan    Prep for d/c Saturday

## 2020-02-06 NOTE — PROGRESS NOTES
"Rehab Progress Note     Date of Service: 2/6/2020  Chief Complaint: follow up stroke    Interval Events (Subjective)    Patient seen and examined in the gym today.  His brother Ward is here for family training.  We discussed plan for discharge home on Saturday morning.  Patient has no new complaints.    Objective:  VITAL SIGNS: /77   Pulse 62   Temp 36.5 °C (97.7 °F) (Temporal)   Resp 16   Ht 1.727 m (5' 8\")   Wt 61.7 kg (136 lb)   SpO2 94%   BMI 20.68 kg/m²   Gen: alert, no apparent distress  CV: regular rate, irregular rhythm, no murmurs, no peripheral edema  Resp: clear to ascultation bilaterally, normal respiratory effort  GI: soft, non-tender abdomen, bowel sounds present  Neuro: notable for mild dysarthria and left facial droop    Recent Results (from the past 72 hour(s))   CBC WITHOUT DIFFERENTIAL    Collection Time: 02/06/20  5:40 AM   Result Value Ref Range    WBC 8.1 4.8 - 10.8 K/uL    RBC 4.01 (L) 4.70 - 6.10 M/uL    Hemoglobin 12.7 (L) 14.0 - 18.0 g/dL    Hematocrit 39.3 (L) 42.0 - 52.0 %    MCV 98.0 (H) 81.4 - 97.8 fL    MCH 31.7 27.0 - 33.0 pg    MCHC 32.3 (L) 33.7 - 35.3 g/dL    RDW 47.4 35.9 - 50.0 fL    Platelet Count 251 164 - 446 K/uL    MPV 10.2 9.0 - 12.9 fL   Basic Metabolic Panel    Collection Time: 02/06/20  5:40 AM   Result Value Ref Range    Sodium 143 135 - 145 mmol/L    Potassium 3.3 (L) 3.6 - 5.5 mmol/L    Chloride 107 96 - 112 mmol/L    Co2 28 20 - 33 mmol/L    Glucose 92 65 - 99 mg/dL    Bun 15 8 - 22 mg/dL    Creatinine 1.04 0.50 - 1.40 mg/dL    Calcium 8.8 8.5 - 10.5 mg/dL    Anion Gap 8.0 0.0 - 11.9   proBrain Natriuretic Peptide, NT    Collection Time: 02/06/20  5:40 AM   Result Value Ref Range    NT-proBNP 6862 (H) 0 - 125 pg/mL   ESTIMATED GFR    Collection Time: 02/06/20  5:40 AM   Result Value Ref Range    GFR If African American >60 >60 mL/min/1.73 m 2    GFR If Non African American >60 >60 mL/min/1.73 m 2       Current Facility-Administered Medications "   Medication Frequency   • fluticasone (FLONASE) nasal spray 100 mcg DAILY   • mirtazapine (REMERON) orally disintegrating tab 15 mg QHS   • melatonin tablet 3 mg QHS   • modafinil (PROVIGIL) tablet 100 mg QAM   • lisinopril (PRINIVIL) tablet 5 mg Q DAY   • aspirin (ASA) chewable tab 81 mg DAILY   • Respiratory Care per Protocol Continuous RT   • Pharmacy Consult Request ...Pain Management Review 1 Each PHARMACY TO DOSE   • acetaminophen (TYLENOL) tablet 650 mg Q4HRS PRN   • senna-docusate (PERICOLACE or SENOKOT S) 8.6-50 MG per tablet 2 Tab BID    And   • polyethylene glycol/lytes (MIRALAX) PACKET 1 Packet QDAY PRN    And   • magnesium hydroxide (MILK OF MAGNESIA) suspension 30 mL QDAY PRN    And   • bisacodyl (DULCOLAX) suppository 10 mg QDAY PRN   • artificial tears ophthalmic solution 1 Drop PRN   • benzocaine-menthol (CEPACOL) lozenge 1 Lozenge Q2HRS PRN   • mag hydrox-al hydrox-simeth (MAALOX PLUS ES or MYLANTA DS) suspension 20 mL Q2HRS PRN   • ondansetron (ZOFRAN ODT) dispertab 4 mg 4X/DAY PRN    Or   • ondansetron (ZOFRAN) syringe/vial injection 4 mg 4X/DAY PRN   • sodium chloride (OCEAN) 0.65 % nasal spray 2 Spray PRN   • apixaban (ELIQUIS) tablet 5 mg BID   • lactulose 20 GM/30ML solution 30 mL QDAY PRN   • docusate sodium (ENEMEEZ) enema 283 mg QDAY PRN   • atorvastatin (LIPITOR) tablet 40 mg Q EVENING   • digoxin (LANOXIN) tablet 125 mcg DAILY AT 1800   • metoprolol (LOPRESSOR) tablet 75 mg TWICE DAILY   • albuterol inhaler 2 Puff Q4HRS PRN       Orders Placed This Encounter   Procedures   • Diet Order 2 Gram Sodium (forify all foods )     Standing Status:   Standing     Number of Occurrences:   1     Order Specific Question:   Diet:     Answer:   2 Gram Sodium [7]     Comments:   forify all foods      Order Specific Question:   Texture/Fiber modifications:     Answer:   Dysphagia 2(Pureed/Chopped)specify fluid consistency(question 6) [2]     Order Specific Question:   Consistency/Fluid modifications:      Answer:   Faith Serra [2]       Assessment:  Active Hospital Problems    Diagnosis   • *Cerebrovascular accident (CVA) due to embolism (HCC)   • Atrial fibrillation (HCC)   • Prostate cancer (HCC)   • Hypertension   • Systolic CHF (HCC)   • Dysarthria   • Dysphagia   • CAD (coronary artery disease)   • Rash of back     This patient is a 89 y.o. male admitted for acute inpatient rehabilitation with Cerebrovascular accident (CVA) due to embolism (HCC).    I led and attended the weekly conference, and agree with the IDT conference documentation and plan of care as noted below.    Date of conference: 2/3/2020    Goals and barriers: See IDT note.    Biggest barriers: impulsive, incontinent of bowel/bladder - due to impaired initiation, needs verbal cues, poor carryover, dysphagia    CM/social support: brother can only provide intermittent assistance, needs life alert and/or 24/7 supervision    Anticipated DC date: 2/8/2020    Home health: PT/OT/SLP/RN    Equip: FWW    Follow up: PCP, rehab clinic    Medical Decision Making and Plan:    Bilateral strokes  Largest R MCA/frontal lobe  Cardioembolic  Left pronator drift   Left facial droop, improved  Left lateral lean, improved  Dysarthria, improved  Dysphagia, improved  Cognitive deficits, continues  Poor initiation, improved  Poor attention, improved  Continue full rehab program  PT/OT/SLP, 1 hr each discipline, 5 days per week  Continue Eliquis and statin for secondary stroke prophylaxis  Speech therapy to advance diet  Discontinue Provigil    Insomnia  Continue melatonin  Increased trazodone, too sedated, discontinued  Continue Remeron, started 1/28    Severe protein calorie malnutrition  Dietician consult  Supplements, milkshakes  Continue Remeron, started 1/28  Discussed treatment plan with his brother Ward    Chronic low back pain  Neck pain  Poor posture, scoliosis  PRN tylenol  Patient doesn't want Lidoderm patch    Bowel  Continue bowel meds  Last BM  2/5    Bladder  Checked PVRs - 65, 36  Not retaining  ICP for over 400 cc  Scheduled toileting    DVT prophylaxis  Eliquis    Appreciate the assistance of the hospitalist with his medical comorbidities:     Hypertension, metoprolol, lisinopril  Atrial fibrillation, digoxin, metoprolol  Systolic CHF, EF 25%  Elevated Pro-BNP, 52820 --> 72718  History of prostate cancer, Casadex on hold as it cannot be crushed  Coronary artery disease, aspirin  Rash on back, improved, s/p hydrocortisone cream  Leukocytosis, urine culture negative    Total time:  15 minutes.  I spent greater than 50% of the time for patient care, counseling, and coordination on this date, including patient face-to face time, unit/floor time with review of records/pertinent lab data and studies, as well as discussing diagnostic evaluation/work up, planned therapeutic interventions, and future disposition of care, as per the interval events/subjective and the assessment and plan as noted above.    I have performed a physical exam, reviewed and updated ROS, as well as the assessment and plan today 2/6/2020. In review of note from 2/5/2020 there are no new changes except as documented above.              Gauri Fortune M.D.   Physical Medicine and Rehabilitation

## 2020-02-06 NOTE — CARE PLAN
Problem: Bowel/Gastric:  Goal: Normal bowel function is maintained or improved  Outcome: PROGRESSING AS EXPECTED     Problem: Skin Integrity  Goal: Risk for impaired skin integrity will decrease  Outcome: PROGRESSING AS EXPECTED     Problem: Safety  Goal: Will remain free from injury  Outcome: PROGRESSING SLOWER THAN EXPECTED     Problem: Urinary Elimination:  Goal: Ability to reestablish a normal urinary elimination pattern will improve  Outcome: PROGRESSING SLOWER THAN EXPECTED

## 2020-02-06 NOTE — PROGRESS NOTES
"Rehab Progress Note     Date of Service: 2/5/2020  Chief Complaint: follow up stroke    Interval Events (Subjective)    Patient seen and examined today in the therapy gym.  He is working with occupational therapy on a balance drill.  He continues to make excellent improvement per all therapy staff.  He actually told a joke today but cannot remember it to tell me.  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  He denies any current pain or headache.  He was hoping he would be discharged a little bit earlier.  Advised him we will discuss at conference.  He has no new complaints.    Objective:  VITAL SIGNS: /95   Pulse 86   Temp 36.7 °C (98 °F) (Temporal)   Resp 18   Ht 1.727 m (5' 8\")   Wt 61.7 kg (136 lb)   SpO2 92%   BMI 20.68 kg/m²   Gen: alert, no apparent distress  CV: regular rate, irregular rhythm, no murmurs, no peripheral edema  Resp: clear to ascultation bilaterally, normal respiratory effort  GI: soft, non-tender abdomen, bowel sounds present  Neuro: notable for left facial droop, mild dysarthria    No results found for this or any previous visit (from the past 72 hour(s)).    Current Facility-Administered Medications   Medication Frequency   • fluticasone (FLONASE) nasal spray 100 mcg DAILY   • mirtazapine (REMERON) orally disintegrating tab 15 mg QHS   • melatonin tablet 3 mg QHS   • modafinil (PROVIGIL) tablet 100 mg QAM   • lisinopril (PRINIVIL) tablet 5 mg Q DAY   • aspirin (ASA) chewable tab 81 mg DAILY   • Respiratory Care per Protocol Continuous RT   • Pharmacy Consult Request ...Pain Management Review 1 Each PHARMACY TO DOSE   • acetaminophen (TYLENOL) tablet 650 mg Q4HRS PRN   • senna-docusate (PERICOLACE or SENOKOT S) 8.6-50 MG per tablet 2 Tab BID    And   • polyethylene glycol/lytes (MIRALAX) PACKET 1 Packet QDAY PRN    And   • magnesium hydroxide (MILK OF MAGNESIA) suspension 30 mL QDAY PRN    And   • bisacodyl (DULCOLAX) suppository 10 mg QDAY PRN   • " artificial tears ophthalmic solution 1 Drop PRN   • benzocaine-menthol (CEPACOL) lozenge 1 Lozenge Q2HRS PRN   • mag hydrox-al hydrox-simeth (MAALOX PLUS ES or MYLANTA DS) suspension 20 mL Q2HRS PRN   • ondansetron (ZOFRAN ODT) dispertab 4 mg 4X/DAY PRN    Or   • ondansetron (ZOFRAN) syringe/vial injection 4 mg 4X/DAY PRN   • sodium chloride (OCEAN) 0.65 % nasal spray 2 Spray PRN   • apixaban (ELIQUIS) tablet 5 mg BID   • lactulose 20 GM/30ML solution 30 mL QDAY PRN   • docusate sodium (ENEMEEZ) enema 283 mg QDAY PRN   • atorvastatin (LIPITOR) tablet 40 mg Q EVENING   • digoxin (LANOXIN) tablet 125 mcg DAILY AT 1800   • metoprolol (LOPRESSOR) tablet 75 mg TWICE DAILY   • albuterol inhaler 2 Puff Q4HRS PRN       Orders Placed This Encounter   Procedures   • Diet Order 2 Gram Sodium (forify all foods )     Standing Status:   Standing     Number of Occurrences:   1     Order Specific Question:   Diet:     Answer:   2 Gram Sodium [7]     Comments:   forify all foods      Order Specific Question:   Texture/Fiber modifications:     Answer:   Dysphagia 2(Pureed/Chopped)specify fluid consistency(question 6) [2]     Order Specific Question:   Consistency/Fluid modifications:     Answer:   [place] Thick [2]       Assessment:  Active Hospital Problems    Diagnosis   • *Cerebrovascular accident (CVA) due to embolism (HCC)   • Atrial fibrillation (HCC)   • Prostate cancer (HCC)   • Hypertension   • Systolic CHF (HCC)   • Dysarthria   • Dysphagia   • CAD (coronary artery disease)   • Rash of back     This patient is a 89 y.o. male admitted for acute inpatient rehabilitation with Cerebrovascular accident (CVA) due to embolism (HCC).    I led and attended the weekly conference, and agree with the IDT conference documentation and plan of care as noted below.    Date of conference: 2/3/2020    Goals and barriers: See IDT note.    Biggest barriers: impulsive, incontinent of bowel/bladder - due to impaired initiation, needs verbal cues,  poor carryover, dysphagia    CM/social support: brother can only provide intermittent assistance, needs life alert and/or 24/7 supervision    Anticipated DC date: 2/8/2020, ? Move up to 2/7 - brother in for family training    Home health: PT/OT/SLP/RN    Equip: FWW    Follow up: PCP, rehab clinic    Medical Decision Making and Plan:    Bilateral strokes  Largest R MCA/frontal lobe  Cardioembolic  Left pronator drift   Left facial droop, improved  Left lateral lean, improved  Dysarthria, improved  Dysphagia, improved  Cognitive deficits, continues  Poor initiation, improved  Poor attention, improved  Continue full rehab program  PT/OT/SLP, 1 hr each discipline, 5 days per week  Continue Eliquis and statin for secondary stroke prophylaxis  Speech therapy to advance diet  Continue Provigil for neurostimulation, will not continue at discharge as won't be covered by insurance    Insomnia  Continue melatonin  Increased trazodone, too sedated, discontinued  Continue Remeron, started 1/28    Severe protein calorie malnutrition  Dietician consult  Supplements, milkshakes  Continue Remeron, started 1/28  Discussed treatment plan with his brother Ward    Chronic low back pain  Neck pain  Poor posture, scoliosis  PRN tylenol  Patient doesn't want Lidoderm patch    Bowel  Continue bowel meds  Last BM 2/5    Bladder  Checked PVRs - 65, 36  Not retaining  ICP for over 400 cc  Scheduled toileting    DVT prophylaxis  Eliquis    Appreciate the assistance of the hospitalist with his medical comorbidities:     Hypertension, metoprolol, lisinopril  Atrial fibrillation, digoxin, metoprolol  Systolic CHF, EF 25%  Elevated Pro-BNP, 39017 --> 46834  History of prostate cancer, Casadex on hold as it cannot be crushed  Coronary artery disease, aspirin  Rash on back, improved, s/p hydrocortisone cream  Leukocytosis, urine culture negative    Total time:  17 minutes.  I spent greater than 50% of the time for patient care, counseling, and  coordination on this date, including patient face-to face time, unit/floor time with review of records/pertinent lab data and studies, as well as discussing diagnostic evaluation/work up, planned therapeutic interventions, and future disposition of care, as per the interval events/subjective and the assessment and plan as noted above.    I have performed a physical exam, reviewed and updated ROS, as well as the assessment and plan today 2/5/2020. In review of note from 2/4/2020 there are no new changes except as documented above.        Gauri Fortune M.D.   Physical Medicine and Rehabilitation

## 2020-02-06 NOTE — THERAPY
Speech Language Pathology  Daily Treatment     Patient Name: Yousif Kimble  Age:  89 y.o., Sex:  male  Medical Record #: [id number]  Today's Date: 2/6/2020     Subjective    Patient more talkative with reducing delays in responses.     Objective       02/05/20 1001   Speech / Dysarthria   Articulation Training Minimal (4)   Dysphagia    Positioning / Behavior Modification Breath Hold;Cough / Clear after Swallow;Self Monitoring;Modulate Rate or Bite Size;Multiple Swallows   Other Treatments Therapeutic trials of thin liquids via 5/10 ml's and cup sips.   Diet / Liquid Recommendation Nectar Thick Liquid;Dysphagia II   Nutritional Liquid Intake Rating Scale 2   Nutritional Food Intake Rating Scale 5   SLP Total Time Spent   SLP Individual Total Time Spent (Mins) 60   Charge Group   SLP Treatment - Individual Speech Language Treatment - Individual   SLP Swallowing Dysfunction Treatment Swallowing Dysfunction Treatment     FIM Eating Score:  5 - Standby Prompting/Supervision or Set-up  Eating Description:  Modified diet, Increased time    FIM Comprehension Score:  5 - Stand-by Prompting/Supervision or Set-up  Comprehension Description:  Verbal cues(extra time.)    FIM Expression Score:  5 - Stand-by Prompting/Supervision or Set-up  Expression Description:  Verbal cueing    FIM Social Interaction Score:  6 - Modified Independent  Social Interaction Description:  Increased time    FIM Problem Solving Score:  3 - Moderate Assistance  Problem Solving Description:  Verbal cueing, Therapy schedule, Bed/chair alarm, Increased time, Seat belt    FIM Memory Score:  3 - Moderate Assistance  Memory Description:  Verbal cueing, Therapy schedule, Seat belt      Assessment    As patient is becoming more verbose, he is displaying speech that shows increased compromise from breath support with decreased intelligibility, at end of extended sentences related to increased rate of speech complicated by reduced voice volume and minimized  articulation.  Patient educated on compensatory speaking strategies and was able to use in structured speech and conversation with min verbal cueing.  Patient also benefitted from improved sitting posture achieved with use of blanket roll positioned vertically behind spine.    Plan    Target safe swallow with thin liquids with 10 ml and cup sip.  Patient also benefits from breath hold,( has a natural breath hold but does not consistently use) and double swallows.  Target speech intelligibility, recall and problem solving.

## 2020-02-06 NOTE — DISCHARGE PLANNING
ATTN: Case Management  RE: Referral for Home Health    As of 02/06/2020, we have accepted the Home Health referral for the patient listed above.    A [facility] clinician will be out to see the patient within 48 hours. If you have any questions or concerns regarding the patient's transition to Home Health, please do not hesitate to contact us at [phone].      We look forward to collaborating with you,  [facility] Team

## 2020-02-06 NOTE — PROGRESS NOTES
[facility] Medicine Daily Progress Note        Chief Complaint:  AFib, CHF, HTN    Interval History:  No 24 hour clinical changes.  Labs reviewed.    Review of Systems  Review of Systems   Constitutional: Negative for chills and fever.   HENT: Negative.    Eyes: Negative.    Respiratory: Negative for cough and shortness of breath.    Cardiovascular: Negative for chest pain and palpitations.   Gastrointestinal: Negative for abdominal pain, nausea and vomiting.   Skin: Negative for itching and rash.   Endo/Heme/Allergies: Negative for polydipsia. Does not bruise/bleed easily.        Physical Exam  Temp:  [36.5 °C (97.7 °F)-36.7 °C (98 °F)] 36.5 °C (97.7 °F)  Pulse:  [62-97] 62  Resp:  [16-18] 16  BP: (101-135)/(62-95) 116/77  SpO2:  [92 %-94 %] 94 %    Physical Exam  Vitals signs reviewed.   Constitutional:       General: He is not in acute distress.     Appearance: Normal appearance. He is not ill-appearing.   HENT:      Head: Normocephalic and atraumatic.      Right Ear: External ear normal.      Left Ear: External ear normal.      Nose: Nose normal.   Eyes:      General:         Right eye: No discharge.         Left eye: No discharge.      Extraocular Movements: Extraocular movements intact.      Conjunctiva/sclera: Conjunctivae normal.   Neck:      Musculoskeletal: Normal range of motion and neck supple.   Cardiovascular:      Rate and Rhythm: Normal rate and regular rhythm.   Pulmonary:      Effort: No respiratory distress.      Breath sounds: No wheezing.      Comments: Decreased BS  Abdominal:      General: Bowel sounds are normal. There is no distension.      Palpations: Abdomen is soft.      Tenderness: There is no tenderness.   Musculoskeletal:      Right lower leg: No edema.      Left lower leg: No edema.   Skin:     General: Skin is warm and dry.   Neurological:      Mental Status: He is alert.      Comments: awake         Fluids    Intake/Output Summary (Last 24 hours) at 2/6/2020 1221  Last data filed at  2/6/2020 0800  Gross per 24 hour   Intake 380 ml   Output --   Net 380 ml       Laboratory  Recent Labs     02/06/20  0540   WBC 8.1   RBC 4.01*   HEMOGLOBIN 12.7*   HEMATOCRIT 39.3*   MCV 98.0*   MCH 31.7   MCHC 32.3*   RDW 47.4   PLATELETCT 251   MPV 10.2     Recent Labs     02/06/20  0540   SODIUM 143   POTASSIUM 3.3*   CHLORIDE 107   CO2 28   GLUCOSE 92   BUN 15   CREATININE 1.04   CALCIUM 8.8                   Assessment/Plan  * Cerebrovascular accident (CVA) due to embolism (HCC)- (present on admission)  Assessment & Plan  On Eliquis, ASA, and Lipitor  Also on Provigil and Remeron    Hypokalemia  Assessment & Plan  Replete w/ KCl 40 mEq PO x 1 today    Rash of back  Assessment & Plan  Resolved w/ topical steroid    CAD (coronary artery disease)  Assessment & Plan  On maximal medical management w/ ASA, Lipitor, Lisinopril, and Metoprolol    Systolic CHF (HCC)- (present on admission)  Assessment & Plan  Echo (from [facility]) EF 25-30%, mod MR, and RVSP 40-45 mmHg  Closely monitor volume status  BNP improving    Hypertension- (present on admission)  Assessment & Plan  On Lisinopril and Metoprolol  Observe blood pressure trends    Prostate cancer (HCC)- (present on admission)  Assessment & Plan  Pt now taking PO  But Casodex remains on hold as pt still requiring meds to be crushed    Atrial fibrillation (HCC)- (present on admission)  Assessment & Plan  S/P RVR at [facility]  On Digoxin w/ non-toxic drug level  Anticoagulated on Eliquis    DNR

## 2020-02-06 NOTE — CARE PLAN
Problem: Bowel/Gastric:  Goal: Normal bowel function is maintained or improved  Note:   LBM 2/5/20. Patient incontinent of bowels. Bowel sounds active in all four quadrants.     Problem: Skin Integrity  Goal: Risk for impaired skin integrity will decrease  Note:   Right forearm IV removed as ordered.

## 2020-02-07 PROBLEM — R21 RASH OF BACK: Status: RESOLVED | Noted: 2020-01-22 | Resolved: 2020-02-07

## 2020-02-07 PROBLEM — D72.829 LEUKOCYTOSIS: Status: RESOLVED | Noted: 2020-01-30 | Resolved: 2020-02-07

## 2020-02-07 PROBLEM — E87.0 HYPERNATREMIA: Status: RESOLVED | Noted: 2020-01-28 | Resolved: 2020-02-07

## 2020-02-07 PROBLEM — R79.89 AZOTEMIA: Status: RESOLVED | Noted: 2020-01-26 | Resolved: 2020-02-07

## 2020-02-07 PROCEDURE — 97530 THERAPEUTIC ACTIVITIES: CPT

## 2020-02-07 PROCEDURE — 700102 HCHG RX REV CODE 250 W/ 637 OVERRIDE(OP): Performed by: PHYSICAL MEDICINE & REHABILITATION

## 2020-02-07 PROCEDURE — 97110 THERAPEUTIC EXERCISES: CPT

## 2020-02-07 PROCEDURE — 97116 GAIT TRAINING THERAPY: CPT

## 2020-02-07 PROCEDURE — 770010 HCHG ROOM/CARE - REHAB SEMI PRIVAT*

## 2020-02-07 PROCEDURE — 97535 SELF CARE MNGMENT TRAINING: CPT

## 2020-02-07 PROCEDURE — A9270 NON-COVERED ITEM OR SERVICE: HCPCS | Performed by: PHYSICAL MEDICINE & REHABILITATION

## 2020-02-07 PROCEDURE — 92526 ORAL FUNCTION THERAPY: CPT

## 2020-02-07 PROCEDURE — 99231 SBSQ HOSP IP/OBS SF/LOW 25: CPT | Performed by: PHYSICAL MEDICINE & REHABILITATION

## 2020-02-07 PROCEDURE — 99232 SBSQ HOSP IP/OBS MODERATE 35: CPT | Performed by: HOSPITALIST

## 2020-02-07 RX ORDER — LISINOPRIL 5 MG/1
5 TABLET ORAL DAILY
Qty: 30 TAB | Refills: 0 | Status: SHIPPED | OUTPATIENT
Start: 2020-02-08

## 2020-02-07 RX ORDER — METOPROLOL TARTRATE 75 MG/1
75 TABLET, FILM COATED ORAL 2 TIMES DAILY
Qty: 60 TAB | Refills: 0 | Status: SHIPPED | OUTPATIENT
Start: 2020-02-07

## 2020-02-07 RX ORDER — MIRTAZAPINE 15 MG/1
15 TABLET, ORALLY DISINTEGRATING ORAL
Qty: 30 TAB | Refills: 0 | Status: SHIPPED
Start: 2020-02-07 | End: 2020-04-02

## 2020-02-07 RX ORDER — LANOLIN ALCOHOL/MO/W.PET/CERES
3 CREAM (GRAM) TOPICAL
Qty: 30 TAB | COMMUNITY
Start: 2020-02-07 | End: 2020-03-12

## 2020-02-07 RX ORDER — ASPIRIN 81 MG/1
81 TABLET, CHEWABLE ORAL DAILY
Qty: 100 TAB | COMMUNITY
Start: 2020-02-08 | End: 2020-03-12

## 2020-02-07 RX ORDER — DIGOXIN 125 MCG
125 TABLET ORAL DAILY
Qty: 30 TAB | Refills: 0 | Status: SHIPPED | OUTPATIENT
Start: 2020-02-07

## 2020-02-07 RX ORDER — ATORVASTATIN CALCIUM 40 MG/1
40 TABLET, FILM COATED ORAL EVERY EVENING
Qty: 30 TAB | Refills: 0 | Status: SHIPPED | OUTPATIENT
Start: 2020-02-07

## 2020-02-07 RX ORDER — AMOXICILLIN 250 MG
2 CAPSULE ORAL 2 TIMES DAILY
Qty: 30 TAB | Refills: 0 | COMMUNITY
Start: 2020-02-07 | End: 2020-03-12

## 2020-02-07 RX ADMIN — METOPROLOL TARTRATE 75 MG: 25 TABLET ORAL at 17:57

## 2020-02-07 RX ADMIN — ATORVASTATIN CALCIUM 40 MG: 40 TABLET, FILM COATED ORAL at 20:50

## 2020-02-07 RX ADMIN — SENNOSIDES AND DOCUSATE SODIUM 2 TABLET: 8.6; 5 TABLET ORAL at 09:35

## 2020-02-07 RX ADMIN — MELATONIN TAB 3 MG 3 MG: 3 TAB at 20:50

## 2020-02-07 RX ADMIN — FLUTICASONE PROPIONATE 100 MCG: 50 SPRAY, METERED NASAL at 10:46

## 2020-02-07 RX ADMIN — LISINOPRIL 5 MG: 5 TABLET ORAL at 05:30

## 2020-02-07 RX ADMIN — APIXABAN 5 MG: 5 TABLET, FILM COATED ORAL at 09:35

## 2020-02-07 RX ADMIN — APIXABAN 5 MG: 5 TABLET, FILM COATED ORAL at 20:50

## 2020-02-07 RX ADMIN — SENNOSIDES AND DOCUSATE SODIUM 2 TABLET: 8.6; 5 TABLET ORAL at 20:50

## 2020-02-07 RX ADMIN — METOPROLOL TARTRATE 75 MG: 25 TABLET ORAL at 05:29

## 2020-02-07 RX ADMIN — MIRTAZAPINE 15 MG: 15 TABLET, ORALLY DISINTEGRATING ORAL at 20:50

## 2020-02-07 RX ADMIN — DIGOXIN 125 MCG: 125 TABLET ORAL at 17:57

## 2020-02-07 RX ADMIN — ASPIRIN 81 MG 81 MG: 81 TABLET ORAL at 09:35

## 2020-02-07 ASSESSMENT — ENCOUNTER SYMPTOMS
FEVER: 0
VOMITING: 0
CHILLS: 0
COUGH: 0
SHORTNESS OF BREATH: 0
POLYDIPSIA: 0
BRUISES/BLEEDS EASILY: 0
NAUSEA: 0
ABDOMINAL PAIN: 0
EYES NEGATIVE: 1
PALPITATIONS: 0

## 2020-02-07 ASSESSMENT — ACTIVITIES OF DAILY LIVING (ADL)
SHOWER_TRANSFER_LEVEL_OF_ASSIST: REQUIRES SUPERVISION WITH SHOWER TRANSFER
TOILET_TRANSFER_LEVEL_OF_ASSIST: REQUIRES SUPERVISION WITH TOILET TRANSFER
TOILETING_LEVEL_OF_ASSIST: REQUIRES SUPERVISION WITH TOILETING

## 2020-02-07 NOTE — THERAPY
"Speech Language Pathology  Daily Treatment     Patient Name: Yousif Kimble  Age:  89 y.o., Sex:  male  Medical Record #: [id number]  Today's Date: 2/7/2020     Subjective    Pt agreeable to dysphagia intervention. \"I just get so full\" referring to PO trials by tsp.      Objective     02/07/20 1104   Dysphagia    Positioning / Behavior Modification Multiple Swallows;Effortful Swallow   Other Treatments trials of thin liquids vis 5mL bolus   Diet / Liquid Recommendation Nectar Thick Liquid;Dysphagia II   Nutritional Liquid Intake Rating Scale 2   Nutritional Food Intake Rating Scale 5   Interdisciplinary Plan of Care Collaboration   Patient Position at End of Therapy Seated  (in therapeutic dining)   SLP Total Time Spent   SLP Individual Total Time Spent (Mins) 30   Charge Group   SLP Swallowing Dysfunction Treatment Swallowing Dysfunction Treatment         Assessment    Pt completed 50 effortful swallows in 30 minutes with 5ml bolus of thin liquids. Emphasis on effortful swallows with cues to \"swallow hard and fast\" - each bolus followed by saliva swallow. Pt able to initiate 2nd swallow within 5-15 seconds. Frequent breaks needed during trials. No coughing, however, pt did exhibit throat clearing in 10% of trials.     Plan    Anticipated d/c scheduled for 2/8/20    "

## 2020-02-07 NOTE — THERAPY
02/06/20 1429   Precautions   Precautions Fall Risk;Swallow Precautions ( See Comments);Other (See Comments)   Comments Dysarthria, NTL, S OB with activities   Pain 0 - 10 Group   Therapist Pain Assessment 0   Cognition    Speech/ Communication Delayed Responses   Level of Consciousness Alert   Safety Awareness Impaired   New Learning Impaired   Attention Impaired   Sequencing Impaired   Initiation Impaired   Sitting Lower Body Exercises   Nustep Resistance Level 4  (36 steps per minute, 10 minutes)   Bed Mobility    Supine to Sit Supervised   Sit to Supine Supervised   Sit to Stand Stand by Assist   Scooting Stand by Assist   Rolling Supervised   Neuro-Muscular Treatments   Neuro-Muscular Treatments Sequencing;Tactile Cuing;Verbal Cuing   Comments Sequencing sit to/from stand, sequencing gait with a tall posture fww   PT Total Time Spent   PT Individual Total Time Spent (Mins) 60   PT Charge Group   PT Gait Training 1   PT Therapeutic Exercise 1   PT Therapeutic Activities 2   Physical Therapy   Daily Treatment     Patient Name: Yousif Kimble  Age:  89 y.o., Sex:  male  Medical Record #: [id number]  Today's Date: 2/6/2020     Precautions  Precautions: Fall Risk, Swallow Precautions ( See Comments), Other (See Comments)  Comments: Dysarthria, NTL, S OB with activities    Subjective    The patient agreed to PT. his brother/caregiver was present to observe and to participate in family training.     Objective    Patient demonstrated his ability to walk with a fww, transfer to/from a normal flat bed, go up/down a curb/platform, up/down 8 stairs.  The patient requires SBA for gait and CGA with gait belt on the stairs and on the curb.    FIM Bed/Chair/Wheelchair Transfers Score: 5 - Standby Prompting/Supervision or Set-up  Bed/Chair/Wheelchair Transfers Description:  Increased time, Supervision for safety, Verbal cueing, Adaptive equipment(FWW)    FIM Walking Score:  5 - Standby Prompting/Supervision or Set-up  Walking  Description:  Extra time, Verbal cueing, Supervision for safety, Walker(FWW,  FT x2)    FIM Wheelchair Score:  2 - Max Assistance  Wheelchair Description:       FIM Stairs Score:  2 - Max Assistance  Stairs Description:  Extra time, Verbal cueing, Supervision for safety, Hand rails, Assist device/equipment, Ascends/descends 4 to 11 steps(CGA/gait belt, bilateral handrails, 8 stairs)      Assessment  The patient's brother is an appropriate caregiver and understands the responsibility that the patient requires supervision when he transfers and is walking with a walker.  All of the activities went well including gait, transfer onto a normal flat bed, up/down 8 stairs, up/down a curb/platform and a car transfer.  Plan    Continue bed mobility and transfer training, gait training, therapeutic exercise, safety education

## 2020-02-07 NOTE — THERAPY
Speech Language Pathology  Daily Treatment     Patient Name: Yousif Kimble  Age:  89 y.o., Sex:  male  Medical Record #: [id number]  Today's Date: 2/7/2020     Subjective    Pt pleasant and cooperative, expressed he is ready to go home tomorrow.      Objective       02/07/20 0804   Dysphagia    Positioning / Behavior Modification Breath Hold;Self Monitoring;Multiple Swallows   Diet / Liquid Recommendation Dysphagia II;[place] Thick Liquid   Nutritional Liquid Intake Rating Scale 2   Nutritional Food Intake Rating Scale 5   Interdisciplinary Plan of Care Collaboration   IDT Collaboration with  Therapy Tech   Patient Position at End of Therapy Seated   Collaboration Comments transfer of care to therapy tech   SLP Total Time Spent   SLP Individual Total Time Spent (Mins) 30   Charge Group   SLP Swallowing Dysfunction Treatment Swallowing Dysfunction Treatment       FIM Eating Score:  5 - Standby Prompting/Supervision or Set-up  Eating Description:  Modified diet, Increased time, Verbal cueing      Assessment    Pt tolerating Dys 2/NTL with no overt s/sx of pen/asp. Pt continues to require cues to complete serial, effortful swallows during meals.     Plan    Trial thin liquids 5-10mL in isolation in subsequent therapy session

## 2020-02-07 NOTE — PROGRESS NOTES
Hospital Medicine Daily Progress Note        Chief Complaint:  AFib, CHF, HTN    Interval History:  Pt seen and examined in [facility], has fair appetite.    Review of Systems  Review of Systems   Constitutional: Negative for chills and fever.   HENT: Negative.    Eyes: Negative.    Respiratory: Negative for cough and shortness of breath.    Cardiovascular: Negative for chest pain and palpitations.   Gastrointestinal: Negative for abdominal pain, nausea and vomiting.   Skin: Negative for itching and rash.   Endo/Heme/Allergies: Negative for polydipsia. Does not bruise/bleed easily.        Physical Exam  Temp:  [36.2 °C (97.1 °F)-37 °C (98.6 °F)] 36.2 °C (97.1 °F)  Pulse:  [] 82  Resp:  [16-18] 16  BP: (115-127)/(66-83) 115/78  SpO2:  [97 %] 97 %    Physical Exam  Vitals signs reviewed.   Constitutional:       General: He is not in acute distress.     Appearance: Normal appearance. He is not ill-appearing.   HENT:      Head: Normocephalic and atraumatic.      Right Ear: External ear normal.      Left Ear: External ear normal.      Nose: Nose normal.   Eyes:      General:         Right eye: No discharge.         Left eye: No discharge.      Extraocular Movements: Extraocular movements intact.      Conjunctiva/sclera: Conjunctivae normal.   Neck:      Musculoskeletal: Normal range of motion and neck supple.   Cardiovascular:      Rate and Rhythm: Normal rate and regular rhythm.   Pulmonary:      Effort: No respiratory distress.      Breath sounds: No wheezing.      Comments: Decreased BS  Abdominal:      General: Bowel sounds are normal. There is no distension.      Palpations: Abdomen is soft.      Tenderness: There is no tenderness.   Musculoskeletal:      Right lower leg: No edema.      Left lower leg: No edema.   Skin:     General: Skin is warm and dry.   Neurological:      Mental Status: He is alert.      Comments: awake         Fluids    Intake/Output Summary (Last 24 hours) at 2/7/2020 1105  Last data filed at  2/7/2020 0800  Gross per 24 hour   Intake 170 ml   Output --   Net 170 ml       Laboratory  Recent Labs     02/06/20  0540   WBC 8.1   RBC 4.01*   HEMOGLOBIN 12.7*   HEMATOCRIT 39.3*   MCV 98.0*   MCH 31.7   MCHC 32.3*   RDW 47.4   PLATELETCT 251   MPV 10.2     Recent Labs     02/06/20  0540   SODIUM 143   POTASSIUM 3.3*   CHLORIDE 107   CO2 28   GLUCOSE 92   BUN 15   CREATININE 1.04   CALCIUM 8.8                   Assessment/Plan  * Cerebrovascular accident (CVA) due to embolism (HCC)- (present on admission)  Assessment & Plan  On Eliquis, ASA, and Lipitor  Now off Provigil, remains on Remeron    Hypokalemia  Assessment & Plan  Repleted w/ KCl 40 mEq PO x 1 2/6/20  Check F/U labs in am    Rash of back  Assessment & Plan  Resolved w/ topical steroid    CAD (coronary artery disease)  Assessment & Plan  On maximal medical management w/ ASA, Lipitor, Lisinopril, and Metoprolol    Systolic CHF (HCC)- (present on admission)  Assessment & Plan  Echo (from [facility]) EF 25-30%, mod MR, and RVSP 40-45 mmHg  Closely monitor volume status  BNP improving    Hypertension- (present on admission)  Assessment & Plan  Blood pressure controlled on Lisinopril and Metoprolol    Prostate cancer (HCC)- (present on admission)  Assessment & Plan  Pt now taking PO  But Casodex remains on hold as pt still requiring meds to be crushed    Atrial fibrillation (HCC)- (present on admission)  Assessment & Plan  S/P RVR at [facility]  On Digoxin w/ non-toxic drug level  Anticoagulated on Eliquis    DNR

## 2020-02-07 NOTE — CARE PLAN
Problem: Safety  Goal: Will remain free from injury  Outcome: PROGRESSING AS EXPECTED  No injury continue safety measures check on pt hourly  Goal: Will remain free from falls  Outcome: PROGRESSING AS EXPECTED  No falls continue fall precautions and check on pt hourly.      Problem: Pain Management  Goal: Pain level will decrease to patient's comfort goal  Outcome: PROGRESSING AS EXPECTED  Denies pain when asked, no pain meds have been given. Continue to monitor for pain and if needed effectiveness of intervention.

## 2020-02-07 NOTE — CARE PLAN
Problem: Inadequate nutrient intake  Goal: Patient to consume greater than or equal to 50% of meals  Outcome: DISCHARGED-GOAL NOT MET

## 2020-02-07 NOTE — THERAPY
02/07/20 1029   Precautions   Precautions Fall Risk;Swallow Precautions ( See Comments);Other (See Comments)   Comments Dysarthria, NTL, SOB with activity   Pain 0 - 10 Group   Therapist Pain Assessment 0   Cognition    Speech/ Communication Delayed Responses   Level of Consciousness Alert   Safety Awareness Impaired   New Learning Impaired   Attention Impaired   Sequencing Impaired   Initiation Other (See Comments)   Sitting Lower Body Exercises   Ankle Pumps 3 sets of 15;Bilateral   Hip Flexion 3 sets of 15;Bilateral   Hip Abduction 3 sets of 15;Bilateral   Hip Adduction 3 sets of 15;Bilateral   Long Arc Quad 3 sets of 15;Bilateral   Hamstring Curl 3 sets of 15;Bilateral   Bed Mobility    Supine to Sit Supervised   Sit to Supine Supervised   Sit to Stand Stand by Assist   Scooting Stand by Assist   Rolling Supervised   Neuro-Muscular Treatments   Neuro-Muscular Treatments Sequencing;Tactile Cuing;Verbal Cuing   Comments Sequencing supine to sit, sequencing sit to/from stand wheelchair/fww, sequencing gait with a fww   PT Total Time Spent   PT Individual Total Time Spent (Mins) 60   PT Charge Group   PT Gait Training 1   PT Therapeutic Exercise 2   PT Therapeutic Activities 1   Physical Therapy   Daily Treatment     Patient Name: Yousif Kimble  Age:  89 y.o., Sex:  male  Medical Record #: [id number]  Today's Date: 2/7/2020     Precautions  Precautions: Fall Risk, Swallow Precautions ( See Comments), Other (See Comments)  Comments: Dysarthria, NTL, SOB with activity    Subjective    The patient was resting in bed and he agreed to PT.     Objective    He participated in safety education for bed mobility and transfers, during gait training and going up and down 16 stairs.  Gait required SBA and verbal cues for posture using a fww.  He tolerated 290 FT x2.  He also participated in going up and down 16 stairs bilateral handrails with supervision and verbal cues.  He participated in seated bilateral lower extremity  therapeutic exercise indicated above.    FIM Bed/Chair/Wheelchair Transfers Score: 5 - Standby Prompting/Supervision or Set-up  Bed/Chair/Wheelchair Transfers Description:  Increased time, Supervision for safety, Verbal cueing, Adaptive equipment    FIM Walking Score:  5 - Standby Prompting/Supervision or Set-up  Walking Description:  Extra time, Verbal cueing, Supervision for safety, Walker(FWW, SBA, 290 FT x2)    FIM Wheelchair Score:  2 - Max Assistance  Wheelchair Description:       FIM Stairs Score:  5 - Standby Prompting/Supervision or Set-up  Stairs Description:  Safety concerns, Verbal cueing, Supervision for safety, Hand rails, Ascends/descends 12 to 14 steps(Up/down 16 stairs, supervision/verbal cues, bilateral handrails)      Assessment    The patient has demonstrated significant improvement in tolerance for activity.  Initially he could walk about 30 to 40 feet and had to sit down due to fatigue and shortness of breath.  He is now able to walk 250 to 300 feet with a walker and supervision.    Plan    Safety education, therapeutic exercise, gait training, transfer training, discharge to home with supervision from his younger brother.

## 2020-02-07 NOTE — DISCHARGE PLANNING
Met with patient and his brother.  Family training completed today.  Patient's brother confirms that he will be home with patient to provide 24 hr supervision.  I confirmed that we have requested home health for PT/OT/ST/nursing and nurses aide with [facility].  He will evaluate for hiring of [facility] caregivers after they get home.  Walker has been delivered.  I will review case management d/c instructions tomorrow per our discussion.

## 2020-02-07 NOTE — THERAPY
Occupational Therapy  Daily Treatment     Patient Name: Yousif Kimble  Age:  89 y.o., Sex:  male  Medical Record #: [id number]  Today's Date: 2/7/2020     Precautions  Precautions: (P) Fall Risk, Swallow Precautions ( See Comments), Other (See Comments)  Comments: (P) Dysarthria, NTL, SOB with activity    Safety   ADL Safety : (P) Requires Supervision for Safety, Requires Cueing for Safety  Bathroom Safety: (P) Requires Supervision for Safety, Requires Cuing for Safety  Comments: (P) SBA for standing components for safety, requires cues for initiation and sequencing    Subjective    Pt agreeable to OT     Objective       02/07/20 1301   Precautions   Precautions Fall Risk;Swallow Precautions ( See Comments);Other (See Comments)   Comments Dysarthria, NTL, SOB with activity   Safety    ADL Safety  Requires Supervision for Safety;Requires Cueing for Safety   Bathroom Safety Requires Supervision for Safety;Requires Cuing for Safety   Comments SBA for standing components for safety, requires cues for initiation and sequencing   Sitting Lower Body Exercises   Sit to Stand 1 set of 15  (from low mat, no UE support)   Nustep Resistance Level 4  (12 minutes)   Balance   Comments from mat completed 1x10 reps: sit to stand, reach to rochelle, touch ground, stand up straight   OT Total Time Spent   OT Individual Total Time Spent (Mins) 60   OT Charge Group   OT Self Care / ADL 2   OT Therapy Activity 2       FIM Grooming Score:  5 - Standby Prompting/Supervision or Set-up  Grooming Description:  (SBA in standing at sink, verbal prompting for thoroughness to clean beard)    Pt donned shoes seated edge of bed, required increased time, mod verbal cues for progression and problem solving.     Assessment    Pt tolerated session fair, required increased encouragement for participation this session. His balance and endurance continue to improve, however his independence continues to be impacted by decreased attention, decreased initiation, and  impaired problem solving.     Plan    D/C home tomorrow with 24/7 support from brother, recommend HHOT follow up to continue to progress toward independence

## 2020-02-07 NOTE — PROGRESS NOTES
"Rehab Progress Note     Date of Service: 2/7/2020  Chief Complaint: follow up stroke    Interval Events (Subjective)    Patient seen and examined today in his room.  He is working with occupational therapy.  He feels ready for discharge home tomorrow.  Discussed with Dr. Fontana we will follow-up with potassium level in the morning.    Objective:  VITAL SIGNS: /62   Pulse 92   Temp 36.7 °C (98.1 °F) (Temporal)   Resp 18   Ht 1.727 m (5' 8\")   Wt 61.7 kg (136 lb)   SpO2 97%   BMI 20.68 kg/m²   Gen: alert, no apparent distress  CV: regular rate, irregular rhythm, no murmurs, no peripheral edema  Resp: clear to ascultation bilaterally, normal respiratory effort  GI: soft, non-tender abdomen, bowel sounds present  Neuro: notable for improved dysarthria, improved left-sided facial droop      Recent Results (from the past 72 hour(s))   CBC WITHOUT DIFFERENTIAL    Collection Time: 02/06/20  5:40 AM   Result Value Ref Range    WBC 8.1 4.8 - 10.8 K/uL    RBC 4.01 (L) 4.70 - 6.10 M/uL    Hemoglobin 12.7 (L) 14.0 - 18.0 g/dL    Hematocrit 39.3 (L) 42.0 - 52.0 %    MCV 98.0 (H) 81.4 - 97.8 fL    MCH 31.7 27.0 - 33.0 pg    MCHC 32.3 (L) 33.7 - 35.3 g/dL    RDW 47.4 35.9 - 50.0 fL    Platelet Count 251 164 - 446 K/uL    MPV 10.2 9.0 - 12.9 fL   Basic Metabolic Panel    Collection Time: 02/06/20  5:40 AM   Result Value Ref Range    Sodium 143 135 - 145 mmol/L    Potassium 3.3 (L) 3.6 - 5.5 mmol/L    Chloride 107 96 - 112 mmol/L    Co2 28 20 - 33 mmol/L    Glucose 92 65 - 99 mg/dL    Bun 15 8 - 22 mg/dL    Creatinine 1.04 0.50 - 1.40 mg/dL    Calcium 8.8 8.5 - 10.5 mg/dL    Anion Gap 8.0 0.0 - 11.9   proBrain Natriuretic Peptide, NT    Collection Time: 02/06/20  5:40 AM   Result Value Ref Range    NT-proBNP 6862 (H) 0 - 125 pg/mL   ESTIMATED GFR    Collection Time: 02/06/20  5:40 AM   Result Value Ref Range    GFR If African American >60 >60 mL/min/1.73 m 2    GFR If Non African American >60 >60 mL/min/1.73 m 2 "       Current Facility-Administered Medications   Medication Frequency   • fluticasone (FLONASE) nasal spray 100 mcg DAILY   • mirtazapine (REMERON) orally disintegrating tab 15 mg QHS   • melatonin tablet 3 mg QHS   • lisinopril (PRINIVIL) tablet 5 mg Q DAY   • aspirin (ASA) chewable tab 81 mg DAILY   • Respiratory Care per Protocol Continuous RT   • Pharmacy Consult Request ...Pain Management Review 1 Each PHARMACY TO DOSE   • acetaminophen (TYLENOL) tablet 650 mg Q4HRS PRN   • senna-docusate (PERICOLACE or SENOKOT S) 8.6-50 MG per tablet 2 Tab BID    And   • polyethylene glycol/lytes (MIRALAX) PACKET 1 Packet QDAY PRN    And   • magnesium hydroxide (MILK OF MAGNESIA) suspension 30 mL QDAY PRN    And   • bisacodyl (DULCOLAX) suppository 10 mg QDAY PRN   • artificial tears ophthalmic solution 1 Drop PRN   • benzocaine-menthol (CEPACOL) lozenge 1 Lozenge Q2HRS PRN   • mag hydrox-al hydrox-simeth (MAALOX PLUS ES or MYLANTA DS) suspension 20 mL Q2HRS PRN   • ondansetron (ZOFRAN ODT) dispertab 4 mg 4X/DAY PRN    Or   • ondansetron (ZOFRAN) syringe/vial injection 4 mg 4X/DAY PRN   • sodium chloride (OCEAN) 0.65 % nasal spray 2 Spray PRN   • apixaban (ELIQUIS) tablet 5 mg BID   • lactulose 20 GM/30ML solution 30 mL QDAY PRN   • docusate sodium (ENEMEEZ) enema 283 mg QDAY PRN   • atorvastatin (LIPITOR) tablet 40 mg Q EVENING   • digoxin (LANOXIN) tablet 125 mcg DAILY AT 1800   • metoprolol (LOPRESSOR) tablet 75 mg TWICE DAILY   • albuterol inhaler 2 Puff Q4HRS PRN       Orders Placed This Encounter   Procedures   • Diet Order 2 Gram Sodium (forify all foods )     Standing Status:   Standing     Number of Occurrences:   1     Order Specific Question:   Diet:     Answer:   2 Gram Sodium [7]     Comments:   forify all foods      Order Specific Question:   Texture/Fiber modifications:     Answer:   Dysphagia 2(Pureed/Chopped)specify fluid consistency(question 6) [2]     Order Specific Question:   Consistency/Fluid  modifications:     Answer:   Faith Serra [2]       Assessment:  Active Hospital Problems    Diagnosis   • *Cerebrovascular accident (CVA) due to embolism (HCC)   • Atrial fibrillation (HCC)   • Prostate cancer (HCC)   • Hypertension   • Systolic CHF (HCC)   • Dysarthria   • Dysphagia   • CAD (coronary artery disease)   • Rash of back     This patient is a 89 y.o. male admitted for acute inpatient rehabilitation with Cerebrovascular accident (CVA) due to embolism (HCC).    I led and attended the weekly conference, and agree with the IDT conference documentation and plan of care as noted below.    Date of conference: 2/3/2020    Goals and barriers: See IDT note.    Biggest barriers: impulsive, incontinent of bowel/bladder - due to impaired initiation, needs verbal cues, poor carryover, dysphagia    CM/social support: brother can only provide intermittent assistance, needs life alert and/or 24/7 supervision    Anticipated DC date: 2/8/2020    Home health: PT/OT/SLP/RN    Equip: FWW    Follow up: PCP, rehab clinic    Medical Decision Making and Plan:    Bilateral strokes  Largest R MCA/frontal lobe  Cardioembolic  Left pronator drift   Left facial droop, improved  Left lateral lean, improved  Dysarthria, improved  Dysphagia, improved  Cognitive deficits, continues  Poor initiation, improved  Poor attention, improved  Continue full rehab program  PT/OT/SLP, 1 hr each discipline, 5 days per week  Continue Eliquis and statin for secondary stroke prophylaxis  Speech therapy to advance diet  Discontinue Provigil    Insomnia  Continue melatonin  Increased trazodone, too sedated, discontinued  Continue Remeron, started 1/28    Severe protein calorie malnutrition  Dietician consult  Supplements, milkshakes  Continue Remeron, started 1/28  Discussed treatment plan with his brother Ward    Chronic low back pain  Neck pain  Poor posture, scoliosis  PRN tylenol  Patient doesn't want Lidoderm patch    Bowel  Continue bowel  meds  Last BM 2/7    Bladder  Checked PVRs - 65, 36  Not retaining  ICP for over 400 cc  Scheduled toileting    DVT prophylaxis  Eliquis    Appreciate the assistance of the hospitalist with his medical comorbidities:     Hypertension, metoprolol, lisinopril  Atrial fibrillation, digoxin, metoprolol  Systolic CHF, EF 25%  Elevated Pro-BNP, 60280 --> 84179  History of prostate cancer, Casadex on hold as it cannot be crushed  Coronary artery disease, aspirin  Rash on back, improved, s/p hydrocortisone cream  Leukocytosis, urine culture negative  Hypokalemia, s/p supplementation    Total time:  16 minutes.  I spent greater than 50% of the time for patient care, counseling, and coordination on this date, including patient face-to face time, unit/floor time with review of records/pertinent lab data and studies, as well as discussing diagnostic evaluation/work up, planned therapeutic interventions, and future disposition of care, as per the interval events/subjective and the assessment and plan as noted above.    I have performed a physical exam, reviewed and updated ROS, as well as the assessment and plan today 2/7/2020. In review of note from 2/6/2020 there are no new changes except as documented above.      Gauri Fortune M.D.   Physical Medicine and Rehabilitation

## 2020-02-08 VITALS
BODY MASS INDEX: 20.61 KG/M2 | SYSTOLIC BLOOD PRESSURE: 119 MMHG | RESPIRATION RATE: 20 BRPM | OXYGEN SATURATION: 96 % | WEIGHT: 136 LBS | HEIGHT: 68 IN | TEMPERATURE: 98.9 F | HEART RATE: 72 BPM | DIASTOLIC BLOOD PRESSURE: 85 MMHG

## 2020-02-08 LAB — POTASSIUM SERPL-SCNC: 3.5 MMOL/L (ref 3.6–5.5)

## 2020-02-08 PROCEDURE — A9270 NON-COVERED ITEM OR SERVICE: HCPCS | Performed by: PHYSICAL MEDICINE & REHABILITATION

## 2020-02-08 PROCEDURE — 700102 HCHG RX REV CODE 250 W/ 637 OVERRIDE(OP): Performed by: PHYSICAL MEDICINE & REHABILITATION

## 2020-02-08 PROCEDURE — 84132 ASSAY OF SERUM POTASSIUM: CPT

## 2020-02-08 PROCEDURE — 99231 SBSQ HOSP IP/OBS SF/LOW 25: CPT | Performed by: HOSPITALIST

## 2020-02-08 PROCEDURE — 36415 COLL VENOUS BLD VENIPUNCTURE: CPT

## 2020-02-08 RX ORDER — POTASSIUM CHLORIDE 20 MEQ/1
40 TABLET, EXTENDED RELEASE ORAL ONCE
Status: DISCONTINUED | OUTPATIENT
Start: 2020-02-08 | End: 2020-02-08 | Stop reason: HOSPADM

## 2020-02-08 RX ADMIN — FLUTICASONE PROPIONATE 100 MCG: 50 SPRAY, METERED NASAL at 09:55

## 2020-02-08 RX ADMIN — METOPROLOL TARTRATE 75 MG: 25 TABLET ORAL at 05:33

## 2020-02-08 RX ADMIN — LISINOPRIL 5 MG: 5 TABLET ORAL at 05:33

## 2020-02-08 RX ADMIN — APIXABAN 5 MG: 5 TABLET, FILM COATED ORAL at 09:47

## 2020-02-08 RX ADMIN — ASPIRIN 81 MG 81 MG: 81 TABLET ORAL at 09:47

## 2020-02-08 RX ADMIN — SENNOSIDES AND DOCUSATE SODIUM 2 TABLET: 8.6; 5 TABLET ORAL at 09:47

## 2020-02-08 ASSESSMENT — ENCOUNTER SYMPTOMS
VOMITING: 0
ABDOMINAL PAIN: 0
BRUISES/BLEEDS EASILY: 0
COUGH: 0
FEVER: 0
NAUSEA: 0
CHILLS: 0
PALPITATIONS: 0
EYES NEGATIVE: 1
POLYDIPSIA: 0
SHORTNESS OF BREATH: 0

## 2020-02-08 NOTE — PROGRESS NOTES
Patient discharged to home per order.  Discharge instructions reviewed with patient and pt's brother; they verbalize understanding and signed copies placed in chart.  Patient has all belongings; signed copy of form in chart.  Patient left facility at 1145 via W/C accompanied by rehab staff and brother.  Have enjoyed working with this pleasant patient.

## 2020-02-08 NOTE — DISCHARGE PLANNING
Case management Summary:   Met with patient and his brother prior to discharge planned for tomorrow. Reviewed all follow up appointments.   Referral made to  and they are have accepted referral and are ready to follow.    REID gordon has delivered fww to patient.    During hospitalization, I have provided support and education and have been available for questions and information during hours of operation, communicated with therapy team and MD along with providing links/resources  to outside services.    Patient verbalizes agreement with all plans and has an understanding of the next steps within the post acute services.     CASE MANAGEMENT PLAN OF CARE   Individualized Goals:   1. I will confirm status of needed follow up for anticoagulation  2. Patient hopes to be more independent before he goes home     Outcome:   1.  Met: Patient will follow with [facility]  2.  Met; patient is more stable with his ambulation

## 2020-02-08 NOTE — PROGRESS NOTES
Hospital Medicine Daily Progress Note        Chief Complaint:  AFib, CHF, HTN    Interval History:  No overnight problems.    Review of Systems  Review of Systems   Constitutional: Negative for chills and fever.   HENT: Negative.    Eyes: Negative.    Respiratory: Negative for cough and shortness of breath.    Cardiovascular: Negative for chest pain and palpitations.   Gastrointestinal: Negative for abdominal pain, nausea and vomiting.   Skin: Negative for itching and rash.   Endo/Heme/Allergies: Negative for polydipsia. Does not bruise/bleed easily.        Physical Exam  Temp:  [36.4 °C (97.6 °F)-37.2 °C (98.9 °F)] 37.2 °C (98.9 °F)  Pulse:  [72-96] 72  Resp:  [18-20] 20  BP: (118-146)/() 119/85  SpO2:  [96 %] 96 %    Physical Exam  Vitals signs reviewed.   Constitutional:       General: He is not in acute distress.     Appearance: Normal appearance. He is not ill-appearing.   HENT:      Head: Normocephalic and atraumatic.      Right Ear: External ear normal.      Left Ear: External ear normal.      Nose: Nose normal.   Eyes:      General:         Right eye: No discharge.         Left eye: No discharge.      Extraocular Movements: Extraocular movements intact.      Conjunctiva/sclera: Conjunctivae normal.   Neck:      Musculoskeletal: Normal range of motion and neck supple.   Cardiovascular:      Rate and Rhythm: Normal rate and regular rhythm.   Pulmonary:      Effort: No respiratory distress.      Breath sounds: No wheezing.      Comments: Decreased BS  Abdominal:      General: Bowel sounds are normal. There is no distension.      Palpations: Abdomen is soft.      Tenderness: There is no tenderness.   Musculoskeletal:      Right lower leg: No edema.      Left lower leg: No edema.   Skin:     General: Skin is warm and dry.   Neurological:      Mental Status: He is alert.      Comments: awake         Fluids    Intake/Output Summary (Last 24 hours) at 2/8/2020 1033  Last data filed at 2/8/2020 0800  Gross per  24 hour   Intake 530 ml   Output --   Net 530 ml       Laboratory  Recent Labs     02/06/20  0540   WBC 8.1   RBC 4.01*   HEMOGLOBIN 12.7*   HEMATOCRIT 39.3*   MCV 98.0*   MCH 31.7   MCHC 32.3*   RDW 47.4   PLATELETCT 251   MPV 10.2     Recent Labs     02/06/20  0540 02/08/20  0511   SODIUM 143  --    POTASSIUM 3.3* 3.5*   CHLORIDE 107  --    CO2 28  --    GLUCOSE 92  --    BUN 15  --    CREATININE 1.04  --    CALCIUM 8.8  --                    Assessment/Plan  * Cerebrovascular accident (CVA) due to embolism (HCC)- (present on admission)  Assessment & Plan  On Eliquis, ASA, and Lipitor  Now off Provigil, remains on Remeron    Hypokalemia- (present on admission)  Assessment & Plan  Replete prior to discharge    CAD (coronary artery disease)- (present on admission)  Assessment & Plan  On maximal medical management w/ ASA, Lipitor, Lisinopril, and Metoprolol    Systolic CHF (HCC)- (present on admission)  Assessment & Plan  Echo (from [facility]) EF 25-30%, mod MR, and RVSP 40-45 mmHg  Closely monitor volume status  BNP improving    Hypertension- (present on admission)  Assessment & Plan  Blood pressure controlled on Lisinopril and Metoprolol    Prostate cancer (HCC)- (present on admission)  Assessment & Plan  Pt now taking PO  But Casodex remains on hold as pt still requiring meds to be crushed    Atrial fibrillation (HCC)- (present on admission)  Assessment & Plan  S/P RVR at [facility]  On Digoxin w/ non-toxic drug level  Anticoagulated on Eliquis    DNR

## 2020-02-08 NOTE — DISCHARGE PLANNING
02/07/20  1555   Discharge Instructions - Completed by Case Mgmt   Discharge Location Home with Home Health   Agency Name / Address / Phone [facility] at [phone] (they will call you to schedule visits)   Home Health Registered Nurse; Home Health Aide; Occupational Therapist; Physical Therapist; Speech Therapist   Medical equipment Provider / Phone [employer] [facility] [employer] at [phone]   Medical Equipment Ordered Front Wheel Walker   Comments Resources for private hired caregivers provided.            Follow-up With  Details  Why  Contact Info   Katie Boggs M.D. (Primary Care)  On 2/18/2020 Tuesday at 1:00 pm (records will be sent)  [address] [facility] [address]  [phone]     Concepción Pierre D.O. (Physiatry)  On 4/2/2020 Thursday at 1:30 pm (please check in at 1:00 pm)  [address] [facility] [address]  [phone]     Isaiah Lange M.D. (Cardiology)  On 3/12/2020  Thursday at 9:00 am (Please check in at 8:45 am)  [address]  [address]  [facility] [id number] [phone]     Katina Barnes M.D. (Neurology)  On 2/28/2020 Friday at 1:00 pm (please check in at 12:45 pm  [address]  [address]  [facility] [id number] [phone]

## 2020-02-10 NOTE — PROGRESS NOTES
DATE OF SERVICE:  02/04/2020    The patient seemed more alert at followup.  He was able to engage in the   conversation somewhat better.  The patient talked about his therapies in   general terms.  He continues to report that he wants to go home.  However, he   said he has benefited from therapy and he is stronger and better prepared to   be discharged.       ____________________________________     BAYRON MORGAN, PHD    JEISON / STEFANO    DD:  02/09/2020 18:18:13  DT:  02/09/2020 22:05:10    D#:  [id number]  Job#:  [id number]

## 2020-02-10 NOTE — DISCHARGE SUMMARY
Rehab Discharge Note    Admission: 1/22/2020    Discharge: 2/8/2020    Admission Diagnosis:   Active Hospital Problems    Diagnosis   • *Cerebrovascular accident (CVA) due to embolism (HCC)   • Hypokalemia   • Atrial fibrillation (HCC)   • Prostate cancer (HCC)   • Hypertension   • Systolic CHF (HCC)   • Dysarthria   • Dysphagia   • CAD (coronary artery disease)       Discharge Diagnosis:  Active Hospital Problems    Diagnosis   • *Cerebrovascular accident (CVA) due to embolism (HCC)   • Hypokalemia   • Atrial fibrillation (HCC)   • Prostate cancer (HCC)   • Hypertension   • Systolic CHF (HCC)   • Dysarthria   • Dysphagia   • CAD (coronary artery disease)       HPI prior to admission  The patient is a 89 y.o. male with a past medical history of coronary artery disease, hyperlipidemia, CHF, hypertension, prostate cancer; now admitted for acute inpatient rehabilitation with severe functional debility after an acute stroke.       On admission the patient and medical record report, he lives with his brother who was out of town for several days and when his mother returned patient had left facial droop and slurred speech.  He went to the outside hospital where he had imaging that showed bilateral strokes the largest in the right frontal lobe.  He also had small strokes in the right parietal temporal and occipital lobe as well as the left occipital lobe.  He went to atrial fibrillation with RVR which required multiple medications and adjustments.  He was treated for pneumonia with Zosyn.  He was found to have dysarthria but refused tube feeds.  He is currently on a puréed and nectar thick diet.  He was also noted to be cognitively impaired.  He had acute kidney injury that resolved.  He had an echocardiogram that showed systolic CHF with ejection fraction of 25 to 30%, moderate mitral regurgitation, moderate aortic regurgitation, RSVP 40 to 45 mg mercury.  He had carotid ultrasounds which were negative for significant  stenosis.  Hemoglobin A1c was 5.6.     Patient current reports continued slurred speech from his stroke.  He denies any focal weakness or paresthesias.  He is right-hand dominant.  He is aware that he has had a stroke but is not really sure what the cause of it was.  He denies any pain.  He has moved his bowels and denies any dysuria.  He does wear glasses and denies any double vision or blurry vision.  He is a poor historian in terms of his timeline.  He denies any previous history of difficulty with his thinking and reports he was driving prior to this stroke.  When asked about the scar on his forehead he reports he had a fall at home 3 weeks ago, but denies frequent falls.     Patient was evaluated by Rehab Medicine physician and Physical Therapy, Occupational Therapy and Speech Therapy and determined to be appropriate for acute inpatient rehab and was transferred to [facility] on 1/22/2020  1:23 PM.    Rehab Hospital Course    Bilateral strokes  Largest R MCA/frontal lobe  Cardioembolic  Left pronator drift   Left facial droop, improved  Left lateral lean, improved  Dysarthria, improved  Dysphagia, improved  Cognitive deficits, continues  Poor initiation, improved  Poor attention, improved  Continue Eliquis and statin for secondary stroke prophylaxis  Speech therapy to advance diet, still on nectars on discharge     Insomnia  Continue melatonin  Increased trazodone, too sedated, discontinued  Continue Remeron, started 1/28     Severe protein calorie malnutrition  Dietician consult  Supplements, milkshakes  Continue Remeron, started 1/28  Discussed treatment plan with his brother Ward     Chronic low back pain  Neck pain  Poor posture, scoliosis  PRN tylenol  Patient doesn't want Lidoderm patch     DVT prophylaxis  Eliquis     Appreciated the assistance of the hospitalist with his medical comorbidities:     Hypertension, metoprolol, lisinopril  Atrial fibrillation, digoxin,  metoprolol  Systolic CHF, EF 25%  Elevated Pro-BNP, 96617 --> 40304  History of prostate cancer, Casadex on hold as it cannot be crushed  Coronary artery disease, aspirin  Rash on back, improved, s/p hydrocortisone cream  Leukocytosis, resolved, urine culture negative    Functional Status at Discharge  Eatin - Standby Prompting/Supervision or Set-up  Eating Description:  Modified diet, Increased time, Verbal cueing  Groomin - Standby Prompting/Supervision or Set-up  Grooming Description:  (SBA in standing at sink, verbal prompting for thoroughness to clean beard)  Bathin - Standby Prompting/Supervision or Set-up  Bathing Description:  Grab bar, Verbal cueing, Supervision for safety, Increased time, Hand held shower(pt required consistent verbal cues for sequencing and attention to thoroughness, attempted to turn on water while fully clothed, turned off water with shampoo still in hair)  Upper Body Dressin - Standby Prompting/Supervision or Set-up  Upper Body Dressing Description:  Verbal cueing(verbal cue to sit for safety)  Lower Body Dressin - Standby Prompting/Supervsion or Set-up  Lower Body Dressing Description:  5 - Standby Prompting/Supervsion or Set-up  Discharge Location : Home  Patient Discharging with Assist of: Family   Level of Supervision Required: 24 Hour Supervision  Recommended Equipment for Discharge: Front-Wheeled Walker;Grab Bars in Tub / Shower;Grab Bars by Toilet;Shower Chair  Recommended Services Upon Discharge: Home Health Occupational Therapy  Long Term Goals Met: 2  Long Term Goals Not Met: 0  Criteria for Termination of Services: Maximum Function Achieved for Inpatient Rehabilitation  Walk:  5 - Standby Prompting/Supervision or Set-up  Distance Walked:  Walks a minimum of 150 feet  Walk Description:  Extra time, Verbal cueing, Supervision for safety, Walker(FWW, SBA, 290 FT x2)  Wheelchair:  2 - Max Assistance  Distance Propelled:  Propels  feet   Wheelchair  Description:  Extra time, Requires incidental assist, Verbal cueing  Stairs 5 - Standby Prompting/Supervision or Set-up  Stairs DescriptionSafety concerns, Verbal cueing, Supervision for safety, Hand rails, Ascends/descends 12 to 14 steps(Up/down 16 stairs, supervision/verbal cues, bilateral handrails)     Comprehension Mode:  Auditory  Comprehension:  5 - Stand-by Prompting/Supervision or Set-up  Comprehension Description:  Verbal cues(extra time.)  Expression Mode:  Vocal  Expression:  5 - Stand-by Prompting/Supervision or Set-up  Expression Description:  Verbal cueing  Social Interaction:  6 - Modified Independent  Social Interaction Description:  Increased time  Problem Solving:  3 - Moderate Assistance  Problem Solving Description:  Verbal cueing, Therapy schedule, Bed/chair alarm, Increased time, Seat belt  Memory:  3 - Moderate Assistance  Memory Description:  Verbal cueing, Therapy schedule, Seat belt  Progress since Admit: Patient made slow progress toward ST goals.  He was receiving a dysphagia 2 and nectar thick liquid diet texture at time of discharge. Patient displayed left side weakness of jak-facial muscles, impaired sensation, occasional anterior leakage, mild to moderate oral residue in left buccal sulci.  Patient completed a MBSS on 1/24/20, which revealed weak tongue base retraction, premature spillage to pyriforms.  During MBSS patient displayed silent aspiration on thin liquids after the swallow during a second swallow of residue.  His cough was weak.  Please see report on that date for full details. Compensatory swallow strategies for small bites and sips, use of double ( and effortful) swallows to clear oral and pharyngeal residue, and preventative cough followed by saliva swallow.   He had been participating therapeutic trials of thin liquids in 5/10 ml amounts and controled cup sips.  He had been tolerating 5/10 ml with use of effortful and double swallows, but displayed inconsistent  coughing post swallow with cup sips, with a pattern of increased cough with larger volumes.   Patient displayed extra ordinary slow processing speed initially.  This improved mildly, but still displays some delay in response, and needs cues to intiate in some circumstances.  Patient did demonstrate improved recall at a min to mod assistance level.  However functional problem solving is impaired with slow response and initiation, and low safety insight, signficant contributing factors.  Discharge Location : Home  Patient Discharging with Assist of: Family   Level of Supervision Required: 24 Hour Supervision  Recommended Services Upon Discharge: Home Health Speech Therapy  Long Term Goals Not Met: Patient will  Regular diet textures and thin liquids for 2/2 sessions, with no s/sx of aspiration.  Patient will  For transfers, and safety problem solving for home environment with 80% acc with spv.  Patient will Verbally in structured conversation with 100% intelligibility with use of compensatory speaking strategies with min cues to spv.  Reason(s) for Goals Not Met: Impaired speed of processing, left side inattention, impaired safety insight and insight into deficits, low initiation.  Patient nearly met LTG for intelligibility, however, this did fluxuate with fatigue.  Criteria for Termination of Services: Maximum Function Achieved for Inpatient Rehabilitation    Discharge Medication:     Medication List      START taking these medications      Instructions   apixaban 5mg Tabs  Commonly known as:  ELIQUIS   Take 1 Tab by mouth 2 Times a Day.  Dose:  5 mg     aspirin 81 MG Chew chewable tablet  Commonly known as:  ASA   Take 1 Tab by mouth every day.  Dose:  81 mg     atorvastatin 40 MG Tabs  Commonly known as:  LIPITOR   Take 1 Tab by mouth every evening.  Dose:  40 mg     digoxin 125 MCG Tabs  Commonly known as:  LANOXIN   Take 1 Tab by mouth every day at 6 PM.  Dose:  125 mcg     lisinopril 5 MG Tabs  Commonly known  as:  PRINIVIL   Take 1 Tab by mouth every day.  Dose:  5 mg     melatonin 3 MG Tabs   Take 1 Tab by mouth every bedtime.  Dose:  3 mg     Metoprolol Tartrate 75 MG Tabs   Take 75 mg by mouth 2 Times a Day.  Dose:  75 mg     mirtazapine 15 MG Tbdp  Commonly known as:  REMERON   Take 1 Tab by mouth every bedtime.  Dose:  15 mg     senna-docusate 8.6-50 MG Tabs  Commonly known as:  PERICOLACE or SENOKOT S   Take 2 Tabs by mouth 2 Times a Day.  Dose:  2 Tab              Discharge Location Home with Home Health   Agency Name / Address / Phone [facility] at [phone] (they will call you to schedule visits)   Home Health Registered Nurse; Home Health Aide; Occupational Therapist; Physical Therapist; Speech Therapist   Medical equipment Provider / Phone [employer] [facility] walker at [phone]   Medical Equipment Ordered Front Wheel Walker   Comments Resources for private hired caregivers provided.             Follow-up With   Details   Why   Contact Info   Katie Boggs M.D. (Primary Care)   On 2/18/2020 Tuesday at 1:00 pm (records will be sent)   [address] [facility] [address]  [phone]      Concepción Pierre D.O. (Physiatry)   On 4/2/2020 Thursday at 1:30 pm (please check in at 1:00 pm)   [address] [facility] [address]  [phone]      Isaiah Lange M.D. (Cardiology)   On 3/12/2020   Thursday at 9:00 am (Please check in at 8:45 am)   [address]  [address]  [facility] [id number] [phone]      Katina Barnes M.D. (Neurology)   On 2/28/2020 Friday at 1:00 pm (please check in at 12:45 pm   [address]  [address]  [facility] [id number] [phone]               Condition on Discharge:  Good.

## 2020-02-11 ENCOUNTER — HOME CARE VISIT (OUTPATIENT)
Dept: HOME HEALTH SERVICES | Facility: HOME HEALTHCARE | Age: 85
End: 2020-02-11
Payer: MEDICARE

## 2020-02-11 ENCOUNTER — ANTICOAGULATION MONITORING (OUTPATIENT)
Dept: VASCULAR LAB | Facility: MEDICAL CENTER | Age: 85
End: 2020-02-11

## 2020-02-11 VITALS
TEMPERATURE: 98.5 F | SYSTOLIC BLOOD PRESSURE: 110 MMHG | WEIGHT: 132 LBS | OXYGEN SATURATION: 97 % | HEART RATE: 84 BPM | BODY MASS INDEX: 20 KG/M2 | RESPIRATION RATE: 18 BRPM | HEIGHT: 68 IN | DIASTOLIC BLOOD PRESSURE: 70 MMHG

## 2020-02-11 PROCEDURE — 665001 SOC-HOME HEALTH

## 2020-02-11 PROCEDURE — G0493 RN CARE EA 15 MIN HH/HOSPICE: HCPCS

## 2020-02-11 PROCEDURE — 665998 HH PPS REVENUE CREDIT

## 2020-02-11 PROCEDURE — 665999 HH PPS REVENUE DEBIT

## 2020-02-11 SDOH — ECONOMIC STABILITY: HOUSING INSECURITY: HOME SAFETY: REVIEWED REMOVING AREA RUGS FOR SAFETY OF FALL RISK

## 2020-02-11 ASSESSMENT — ACTIVITIES OF DAILY LIVING (ADL)
CURRENT_FUNCTION: STAND BY ASSIST
TRANSPORTATION COMMENTS: FATIGUES EASILY
OASIS_M1830: 03
AMBULATION ASSISTANCE: STAND BY ASSIST

## 2020-02-11 ASSESSMENT — ENCOUNTER SYMPTOMS
POOR JUDGMENT: 1
ADDITIONAL INFORMATION: DENIES PAIN
DIFFICULTY THINKING: 1
NAUSEA: DENIES
SHORTNESS OF BREATH: T
VOMITING: DENIES
MUSCLE WEAKNESS: 1

## 2020-02-11 ASSESSMENT — PATIENT HEALTH QUESTIONNAIRE - PHQ9
CLINICAL INTERPRETATION OF PHQ2 SCORE: 0
1. LITTLE INTEREST OR PLEASURE IN DOING THINGS: 00
2. FEELING DOWN, DEPRESSED, IRRITABLE, OR HOPELESS: 00

## 2020-02-11 NOTE — PROGRESS NOTES
Received referral from [facility]. Medications reviewed. No clinically significant interactions noted.     Drug-Drug: aspirin and apixaban   Use of Direct Factor Xa Inhibitors with Salicylates may increase the risk of bleeding.   Last overridden by: Gauri Fortune M.D. on Feb 7, 2020 4:05 PM     Continue to assess the appropriateness of this combination.      Woodrow Guidry, PharmD, MS, [facility], C    This note was created using voice recognition software (Dragon). The accuracy of the dictation is limited by the abilities of the software. I have reviewed the note prior to signing, however some errors in grammar and context are still possible. If you have any questions related to this note please do not hesitate to contact our office.

## 2020-02-12 ENCOUNTER — HOME CARE VISIT (OUTPATIENT)
Dept: HOME HEALTH SERVICES | Facility: HOME HEALTHCARE | Age: 85
End: 2020-02-12
Payer: MEDICARE

## 2020-02-12 VITALS
HEART RATE: 85 BPM | RESPIRATION RATE: 18 BRPM | DIASTOLIC BLOOD PRESSURE: 60 MMHG | TEMPERATURE: 97.9 F | OXYGEN SATURATION: 96 % | SYSTOLIC BLOOD PRESSURE: 108 MMHG

## 2020-02-12 PROCEDURE — 665998 HH PPS REVENUE CREDIT

## 2020-02-12 PROCEDURE — G0151 HHCP-SERV OF PT,EA 15 MIN: HCPCS

## 2020-02-12 PROCEDURE — 665999 HH PPS REVENUE DEBIT

## 2020-02-12 ASSESSMENT — GAIT ASSESSMENTS
TRUNK: 0 - MARKED SWAY OR USES WALKING AID
STEP CONTINUITY: 1 - STEPS APPEAR CONTINUOUS
GAIT SCORE: 8
PATH SCORE: 1
PATH: 1 - MILD/MODERATE DEVIATION OR USES WALKING AID
BALANCE AND GAIT SCORE: 16
WALKING STANCE: 0 - HEELS APART
STEP SYMMETRY: 1 - RIGHT AND LEFT STEP LENGTH APPEAR EQUAL
INITIATION OF GAIT IMMEDIATELY AFTER GO: 1 - NO HESITANCY
TRUNK SCORE: 0

## 2020-02-12 ASSESSMENT — BALANCE ASSESSMENTS
EYES CLOSED AT MAXIMUM POSITION NUDGED: 0 - UNSTEADY
SITTING DOWN: 1 - USES ARMS OR NOT SMOOTH MOTION
NUDGED: 1 - STAGGERS, GRABS, CATCHES SELF
IMMEDIATE STANDING BALANCE FIRST 5 SECONDS: 1 - STEADY BUT USES WALKER OR OTHER SUPPORT
NUDGED SCORE: 1
SITTING BALANCE: 1 - STEADY, SAFE
ARISING SCORE: 1
STANDING BALANCE: 1 - STEADY BUT WIDE STANCE AND USES CANE OR OTHER SUPPORT
TURNING 360 DEGREES STEPS: 1 - CONTINUOUS STEPS
ARISES: 1 - ABLE, USES ARMS TO HELP
ATTEMPTS TO ARISE: 1 - ABLE, REQUIRES MORE THAN ONE ATTEMPT
BALANCE SCORE: 8

## 2020-02-12 ASSESSMENT — ACTIVITIES OF DAILY LIVING (ADL)
FEEDING_COMMENTS: SEE OT EVAL
GROOMING_COMMENTS: SEE OT EVAL
CURRENT_FUNCTION: ONE PERSON
AMBULATION ASSISTANCE ON FLAT SURFACES: 1
ADLS_COMMENTS: SEE OT EVAL
AMBULATION ASSISTANCE: STAND BY ASSIST
BATHING_COMMENTS: SEE OT EVAL

## 2020-02-12 ASSESSMENT — ENCOUNTER SYMPTOMS
POOR JUDGMENT: 1
DIFFICULTY THINKING: 1
MUSCLE WEAKNESS: 1

## 2020-02-13 ENCOUNTER — HOME CARE VISIT (OUTPATIENT)
Dept: HOME HEALTH SERVICES | Facility: HOME HEALTHCARE | Age: 85
End: 2020-02-13
Payer: MEDICARE

## 2020-02-13 VITALS
HEART RATE: 87 BPM | RESPIRATION RATE: 18 BRPM | SYSTOLIC BLOOD PRESSURE: 108 MMHG | DIASTOLIC BLOOD PRESSURE: 58 MMHG | TEMPERATURE: 97.8 F

## 2020-02-13 PROCEDURE — 665998 HH PPS REVENUE CREDIT

## 2020-02-13 PROCEDURE — 665999 HH PPS REVENUE DEBIT

## 2020-02-13 PROCEDURE — G0299 HHS/HOSPICE OF RN EA 15 MIN: HCPCS

## 2020-02-13 ASSESSMENT — ENCOUNTER SYMPTOMS
MUSCLE WEAKNESS: 1
VOMITING: DENIES
NAUSEA: DENIES

## 2020-02-14 ENCOUNTER — HOME CARE VISIT (OUTPATIENT)
Dept: HOME HEALTH SERVICES | Facility: HOME HEALTHCARE | Age: 85
End: 2020-02-14
Payer: MEDICARE

## 2020-02-14 VITALS
DIASTOLIC BLOOD PRESSURE: 72 MMHG | HEART RATE: 80 BPM | TEMPERATURE: 97.5 F | RESPIRATION RATE: 18 BRPM | SYSTOLIC BLOOD PRESSURE: 118 MMHG | OXYGEN SATURATION: 96 %

## 2020-02-14 PROCEDURE — 665998 HH PPS REVENUE CREDIT

## 2020-02-14 PROCEDURE — G0151 HHCP-SERV OF PT,EA 15 MIN: HCPCS

## 2020-02-14 PROCEDURE — G0152 HHCP-SERV OF OT,EA 15 MIN: HCPCS

## 2020-02-14 PROCEDURE — 665999 HH PPS REVENUE DEBIT

## 2020-02-14 ASSESSMENT — ENCOUNTER SYMPTOMS
DIFFICULTY THINKING: 1
POOR JUDGMENT: 1

## 2020-02-14 NOTE — DISCHARGE INSTRUCTIONS
Physical Therapy Discharge Instructions for Yousif Kimble    2/13/2020    Weight Bearing Status - Patient Should: Bear Weight as Tolerated Right Leg, Bear Weight as Tolerated Left Leg  Level of Assist Required for Ambulation: Supervision on Flat Surfaces, Assist for Balance on Curbs, Supervision on Stairs  Distance Patient May Ambulate: 200-300 FT or more as tolerated  Device Recommended for Ambulation: Front-Wheeled Walker  Level of Assist Required to Propel Wheelchair: (Activity is not applicable)  Level of Assist Required for Transfers: Supervision  Device Recommended for Transfers: Front-Wheeled Walker  Home Exercise Program: Refer to Home Exercise Program Handout for Details  Prosthesis / Orthosis Recommendation / Location: No Prosthesis  or Orthosis Recommended      [facility] NURSING DISCHARGE INSTRUCTIONS    Blood Pressure : 124/77  Weight: 61.7 kg (136 lb)  Nursing recommendations for Yousif Kimble at time of discharge are as follows:  Family Member verbalized understanding of all discharge instructions and prescriptions.     Review all your home medications and newly ordered medications with your doctor and/or pharmacist. Follow medication instructions as directed by your doctor and/or pharmacist.    Pain Management:   Discharge Pain Medication Instructions:  Comfort Goal: Comfort at Rest  Notify your primary care provider if pain is unrelieved with these measures, if the pain is new, or increased in intensity.    Discharge Skin Characteristics:    Discharge Skin Exam:       Skin / Wound Care Instructions: Please contact your primary care physician for any change in skin integrity.     If You Have Surgical Incisions / Wounds:  Monitor surgical site(s) for signs of increased swelling, redness or symptoms of drainage from the site or fever as this could indicate signs and symptoms of infection. If these symptoms are noted, notifiy your primary care provider.      Discharge Safety  Instructions: Should Not Be Left Alone In The House     Discharge Safety Concerns: Weakness, History Of Falls, Balance Problems (Dizziness, Light Headedness), Unsteady Gait  The interdisciplinary team has made recommendation that you should not be left alone  in the house due to history of falls, weakness and unsteady gait  Anti-embolic stockings are required during the day and off at night to increase circulation to the lower extremities.    Discharge Diet: 2 gram sodium Pureed/ Chopped      Discharge Liquids: Nectar thick Liquids  Discharge Bowel Function: Continent  Please contact your primary care physician for any changes in bowel habits.  Discharge Bowel Program:    Discharge Bladder Function: Continent  Discharge Urinary Devices: Brief      Nursing Discharge Plan:   Influenza Vaccine Indication: Not indicated: Previously immunized this influenza season and > 8 years of age    Case Management Discharge Instructions:   Discharge Location: Home with Home Health  Agency Name/Address/Phone: [facility] at [phone] (they will call you to schedule visits)  Home Health: Registered Nurse, Home Health Aide, Occupational Therapist, Physical Therapist, Speech Therapist  Outpatient Services:    DME Provider/Phone: [facility] at [phone]  Medical Equipment Ordered: Front Wheel Walker  Prescription Faxed to:        Discharge Medication Instructions:  Below are the medications your physician expects you to take upon discharge:    Document Released: 12/08/2003 Document Revised: 05/16/2017 Document Reviewed: 01/22/2016  [employer]vier Interactive Patient Education © 2017 [employer]      Depression / Suicide Risk    As you are discharged from this [facility] facility, it is important to learn how to keep safe from harming yourself.    Recognize the warning signs:  · Abrupt changes in personality, positive or negative- including increase in energy   · Giving away possessions  · Change in eating patterns-  significant weight changes-  positive or negative  · Change in sleeping patterns- unable to sleep or sleeping all the time   · Unwillingness or inability to communicate  · Depression  · Unusual sadness, discouragement and loneliness  · Talk of wanting to die  · Neglect of personal appearance   · Rebelliousness- reckless behavior  · Withdrawal from people/activities they love  · Confusion- inability to concentrate     If you or a loved one observes any of these behaviors or has concerns about self-harm, here's what you can do:  · Talk about it- your feelings and reasons for harming yourself  · Remove any means that you might use to hurt yourself (examples: pills, rope, extension cords, firearm)  · Get professional help from the community (Mental Health, Substance Abuse, psychological counseling)  · Do not be alone:Call your Safe Contact- someone whom you trust who will be there for you.  · Call your local CRISIS HOTLINE 571-5728 or 594-646-9990  · Call your local Children's Mobile Crisis Response Team [place] (313) 765-1631 or wwwOpenBuildings  · Call the toll free National Suicide Prevention Hotlines   · National Suicide Prevention Lifeline 695-174-CMQU (5185)  · National Hope Line Network 800-SUICIDE (015-2384)        Ischemic Stroke    An ischemic stroke (cerebrovascular accident, or CVA) is the sudden death of brain tissue that occurs when an area of the brain does not get enough oxygen. It is a medical emergency that must be treated right away. An ischemic stroke can cause permanent loss of brain function. This can cause problems with how different parts of your body function.  What are the causes?  This condition is caused by a decrease of oxygen supply to an area of the brain, which may be the result of:  · A small blood clot (embolus) or a buildup of plaque in the blood vessels (atherosclerosis) that blocks blood flow in the brain.  · An abnormal heart rhythm (atrial fibrillation).  · A blocked or  damaged artery in the head or neck.  What increases the risk?  Certain factors may make you more likely to develop this condition. Some of these factors are things that you can change, such as:  · Obesity.  · Smoking cigarettes.  · Taking oral birth control, especially if you also use tobacco.  · Physical inactivity.  · Excessive alcohol use.  · Use of illegal drugs, especially cocaine and methamphetamine.  Other risk factors include:  · High blood pressure (hypertension).  · High cholesterol.  · Diabetes mellitus.  · Heart disease.  · Being , , , or .  · Being over age 60.  · Family history of stroke.  · Previous history of blood clots, stroke, or transient ischemic attack (TIA).  · Sickle cell disease.  · Being a woman with a history of preeclampsia.  · Migraine headache.  · Sleep apnea.  · Irregular heartbeats, such as atrial fibrillation.  · Chronic inflammatory diseases, such as rheumatoid arthritis or lupus.  · Blood clotting disorders (hypercoagulable state).  What are the signs or symptoms?  Symptoms of this condition usually develop suddenly, or you may notice them after waking up from sleep. Symptoms may include sudden:  · Weakness or numbness in your face, arm, or leg, especially on one side of your body.  · Trouble walking or difficulty moving your arms or legs.  · Loss of balance or coordination.  · Confusion.  · Slurred speech (dysarthria).  · Trouble speaking, understanding speech, or both (aphasia).  · Vision changes--such as double vision, blurred vision, or loss of vision--in one or both eyes.  · Dizziness.  · Nausea and vomiting.  · Severe headache with no known cause. The headache is often described as the worst headache ever experienced.  If possible, make note of the exact time that you last felt like your normal self and what time your symptoms started. Tell your health care provider.  If symptoms come and go, this could be a sign of a  warning stroke, or TIA. Get help right away, even if you feel better.  How is this diagnosed?  This condition may be diagnosed based on:  · Your symptoms, your medical history, and a physical exam.  · CT scan of the brain.  · MRI.  · CT angiogram. This test uses a computer to take X-rays of your arteries. A dye may be injected into your blood to show the inside of your blood vessels more clearly.  · MRI angiogram. This is a type of MRI that is used to evaluate the blood vessels.  · Cerebral angiogram. This test uses X-rays and a dye to show the blood vessels in the brain and neck.  You may need to see a health care provider who specializes in stroke care. A stroke specialist can be seen in person or through communication using telephone or television technology (telemedicine).  Other tests may also be done to find the cause of the stroke, such as:  · Electrocardiogram (ECG).  · Continuous heart monitoring.  · Echocardiogram.  · Carotid ultrasound.  · A scan of the brain circulation.  · Blood tests.  · Sleep study to check for sleep apnea.    How is this treated?  Treatment for this condition will depend on the duration, severity, and cause of your symptoms and on the area of the brain affected. It is very important to get treatment at the first sign of stroke symptoms. Some treatments work better if they are done within 3-6 hours of the onset of stroke symptoms. These initial treatments may include:  · Aspirin.  · Medicines to control blood pressure.  · Medicine given by injection to dissolve the blood clot (thrombolytic).  · Treatments given directly to the affected artery to remove or dissolve the blood clot.  Other treatment options may include:  · Oxygen.  · IV fluids.  · Medicines to thin the blood (anticoagulants or antiplatelets).  · Procedures to increase blood flow.  Medicines and changes to your diet may be used to help treat and manage risk factors for stroke, such as diabetes, high cholesterol, and high  blood pressure.  After a stroke, you may work with physical, speech, mental health, or occupational therapists to help you recover.  Follow these instructions at home:  Medicines  · Take over-the-counter and prescription medicines only as told by your health care provider.  · If you were told to take a medicine to thin your blood, such as aspirin or an anticoagulant, take it exactly as told by your health care provider.  ¨ Taking too much blood-thinning medicine can cause bleeding.  ¨ If you do not take enough blood-thinning medicine, you will not have the protection that you need against another stroke and other problems.  · Understand the side effects of taking anticoagulant medicine. When taking this type of medicine, make sure you:  ¨ Hold pressure over any cuts for longer than usual.  ¨ Tell your dentist and other health care providers that you are taking anticoagulants before you have any procedures that may cause bleeding.  ¨ Avoid activities that may cause trauma or injury.  Eating and drinking  · Follow instructions from your health care provider about diet.  · Eat healthy foods.  · If your ability to swallow was affected by the stroke, you may need to take steps to avoid choking, such as:  ¨ Taking small bites when eating.  ¨ Eating foods that are soft or pureed.  Safety  · Follow instructions from your health care team about physical activity.  · Use a walker or cane as told by your health care provider.  · Take steps to create a safe home environment in order to reduce the risk of falls. This may include:  ¨ Having your home looked at by specialists.  ¨ Installing grab bars in the bedroom and bathroom.  ¨ Using safety equipment, such as raised toilets and a seat in the shower.  General instructions  · Do not use any tobacco products, such as cigarettes, chewing tobacco, and e-cigarettes. If you need help quitting, ask your health care provider.  · Limit alcohol intake to no more than 1 drink a day for  nonpregnant women and 2 drinks a day for men. One drink equals 12 oz of beer, 5 oz of wine, or 1½ oz of hard liquor.  · If you need help to stop using drugs or alcohol, ask your health care provider about a referral to a program or specialist.  · Maintain an active and healthy lifestyle. Get regular exercise as told by your health care provider.  · Keep all follow-up visits as told by your health care provider, including visits with all specialists on your health care team. This is important.  How is this prevented?  Your risk of another stroke can be decreased by managing high blood pressure, high cholesterol, diabetes, heart disease, sleep apnea, and obesity. It can also be decreased by quitting smoking, limiting alcohol, and staying physically active.  Your health care provider will continue to work with you on measures to prevent short-term and long-term complications of stroke.  Get help right away if:  You have:  · Sudden weakness or numbness in your face, arm, or leg, especially on one side of your body.  · Sudden confusion.  · Sudden trouble speaking, understanding, or both (aphasia).  · Sudden trouble seeing with one or both eyes.  · Sudden trouble walking or difficulty moving your arms or legs.  · Sudden dizziness.  · Sudden loss of balance or coordination.  · Sudden, severe headache with no known cause.  · A partial or total loss of consciousness.  · A seizure.  Any of these symptoms may represent a serious problem that is an emergency. Do not wait to see if the symptoms will go away. Get medical help right away. Call your local emergency services (911 in [place] Do not drive yourself to the hospital.   This information is not intended to replace advice given to you by your health care provider. Make sure you discuss any questions you have with your health care provider.  Document Released: 12/18/2006 Document Revised: 05/30/2017 Document Reviewed: 03/15/2017  [employer] Interactive Patient Education © 2017  [employer] Inc.      Atrial Fibrillation    Atrial fibrillation is a type of irregular or rapid heartbeat (arrhythmia). In atrial fibrillation, the heart quivers continuously in a chaotic pattern. This occurs when parts of the heart receive disorganized signals that make the heart unable to pump blood normally. This can increase the risk for stroke, heart failure, and other heart-related conditions. There are different types of atrial fibrillation, including:  · Paroxysmal atrial fibrillation. This type starts suddenly, and it usually stops on its own shortly after it starts.  · Persistent atrial fibrillation. This type often lasts longer than a week. It may stop on its own or with treatment.  · Long-lasting persistent atrial fibrillation. This type lasts longer than 12 months.  · Permanent atrial fibrillation. This type does not go away.  Talk with your health care provider to learn about the type of atrial fibrillation that you have.  What are the causes?  This condition is caused by some heart-related conditions or procedures, including:  · A heart attack.  · Coronary artery disease.  · Heart failure.  · Heart valve conditions.  · High blood pressure.  · Inflammation of the sac that surrounds the heart (pericarditis).  · Heart surgery.  · Certain heart rhythm disorders, such as Vann-Parkinson-White syndrome.  Other causes include:  · Pneumonia.  · Obstructive sleep apnea.  · Blockage of an artery in the lungs (pulmonary embolism, or PE).  · Lung cancer.  · Chronic lung disease.  · Thyroid problems, especially if the thyroid is overactive (hyperthyroidism).  · Caffeine.  · Excessive alcohol use or illegal drug use.  · Use of some medicines, including certain decongestants and diet pills.  Sometimes, the cause cannot be found.  What increases the risk?  This condition is more likely to develop in:  · People who are older in age.  · People who smoke.  · People who have diabetes mellitus.  · People who are overweight  (obese).  · Athletes who exercise vigorously.  What are the signs or symptoms?  Symptoms of this condition include:  · A feeling that your heart is beating rapidly or irregularly.  · A feeling of discomfort or pain in your chest.  · Shortness of breath.  · Sudden light-headedness or weakness.  · Getting tired easily during exercise.  In some cases, there are no symptoms.  How is this diagnosed?  Your health care provider may be able to detect atrial fibrillation when taking your pulse. If detected, this condition may be diagnosed with:  · An electrocardiogram (ECG).  · A Holter monitor test that records your heartbeat patterns over a 24-hour period.  · Transthoracic echocardiogram (TTE) to evaluate how blood flows through your heart.  · Transesophageal echocardiogram (DASIA) to view more detailed images of your heart.  · A stress test.  · Imaging tests, such as a CT scan or chest X-ray.  · Blood tests.  How is this treated?  The main goals of treatment are to prevent blood clots from forming and to keep your heart beating at a normal rate and rhythm. The type of treatment that you receive depends on many factors, such as your underlying medical conditions and how you feel when you are experiencing atrial fibrillation.  This condition may be treated with:  · Medicine to slow down the heart rate, bring the heart’s rhythm back to normal, or prevent clots from forming.  · Electrical cardioversion. This is a procedure that resets your heart’s rhythm by delivering a controlled, low-energy shock to the heart through your skin.  · Different types of ablation, such as catheter ablation, catheter ablation with pacemaker, or surgical ablation. These procedures destroy the heart tissues that send abnormal signals. When the pacemaker is used, it is placed under your skin to help your heart beat in a regular rhythm.  Follow these instructions at home:  · Take over-the counter and prescription medicines only as told by your health  care provider.  · If your health care provider prescribed a blood-thinning medicine (anticoagulant), take it exactly as told. Taking too much blood-thinning medicine can cause bleeding. If you do not take enough blood-thinning medicine, you will not have the protection that you need against stroke and other problems.  · Do not use tobacco products, including cigarettes, chewing tobacco, and e-cigarettes. If you need help quitting, ask your health care provider.  · If you have obstructive sleep apnea, manage your condition as told by your health care provider.  · Do not drink alcohol.  · Do not drink beverages that contain caffeine, such as coffee, soda, and tea.  · Maintain a healthy weight. Do not use diet pills unless your health care provider approves. Diet pills may make heart problems worse.  · Follow diet instructions as told by your health care provider.  · Exercise regularly as told by your health care provider.  · Keep all follow-up visits as told by your health care provider. This is important.  How is this prevented?  · Avoid drinking beverages that contain caffeine or alcohol.  · Avoid certain medicines, especially medicines that are used for breathing problems.  · Avoid certain herbs and herbal medicines, such as those that contain ephedra or ginseng.  · Do not use illegal drugs, such as cocaine and amphetamines.  · Do not smoke.  · Manage your high blood pressure.  Contact a health care provider if:  · You notice a change in the rate, rhythm, or strength of your heartbeat.  · You are taking an anticoagulant and you notice increased bruising.  · You tire more easily when you exercise or exert yourself.  Get help right away if:  · You have chest pain, abdominal pain, sweating, or weakness.  · You feel nauseous.  · You notice blood in your vomit, bowel movement, or urine.  · You have shortness of breath.  · You suddenly have swollen feet and ankles.  · You feel dizzy.  · You have sudden weakness or  numbness of the face, arm, or leg, especially on one side of the body.  · You have trouble speaking, trouble understanding, or both (aphasia).  · Your face or your eyelid droops on one side.  These symptoms may represent a serious problem that is an emergency. Do not wait to see if the symptoms will go away. Get medical help right away. Call your local emergency services (911 in the [place] Do not drive yourself to the hospital.   This information is not intended to replace advice given to you by your health care provider. Make sure you discuss any questions you have with your health care provider.  Document Released: 12/18/2006 Document Revised: 04/26/2017 Document Reviewed: 04/13/2016  [employer] Interactive Patient Education © 2017 [employer]      Hypertension    Hypertension is another name for high blood pressure. High blood pressure forces your heart to work harder to pump blood. A blood pressure reading has two numbers, which includes a higher number over a lower number (example: 110/72).  Follow these instructions at home:  · Have your blood pressure rechecked by your doctor.  · Only take medicine as told by your doctor. Follow the directions carefully. The medicine does not work as well if you skip doses. Skipping doses also puts you at risk for problems.  · Do not smoke.  · Monitor your blood pressure at home as told by your doctor.  Contact a doctor if:  · You think you are having a reaction to the medicine you are taking.  · You have repeat headaches or feel dizzy.  · You have puffiness (swelling) in your ankles.  · You have trouble with your vision.  Get help right away if:  · You get a very bad headache and are confused.  · You feel weak, numb, or faint.  · You get chest or belly (abdominal) pain.  · You throw up (vomit).  · You cannot breathe very well.  This information is not intended to replace advice given to you by your health care provider. Make sure you discuss any questions you have with your  health care provider.  Document Released: 06/05/2009 Document Revised: 05/25/2017 Document Reviewed: 10/10/2014  [employer]issued Interactive Patient Education © 2017 [employer]      Dysphagia Diet Level 2, Mechanically Altered    The dysphagia level 2 diet includes foods that are blended, chopped, ground, or mashed so they are easier to chew and swallow. The foods are soft, moist, and can be chopped into ¼-inch chunks (such as pancakes, pasta, and bananas).  In order to be on this diet, you must be able to chew. This diet helps you transition between the pureed textures of the dysphagia level 1 diet to more solid textures. This diet is helpful for people with mild to moderate swallowing difficulties. It reduces the risk of food getting caught in the windpipe, trachea, or lungs.   You may need help or supervision during meals while following this diet so that you eat safely. You will be on this diet until your health care provider advances the texture of your diet.   WHAT DO I NEED TO KNOW ABOUT THIS DIET?  Foods  · You may eat foods that are soft and moist.  ¨ You may need to use a , whisk, or masher to soften some of your foods.  ¨ You can moisten foods with gravies, sauces, vegetable or fruit juice, milk, half and half, or water when blending, mashing, or grinding your foods to the right consistency.  · If you were on the dysphagia level 1 diet, you may still eat any of the foods included in that diet.  · Avoid foods that are dry, hard, sticky, chewy, coarse, and crunchy. Also avoid large cuts of food.  · Take small bites. Each bite should contain ¼ inch or less of food.  Liquids  · Avoid liquids with seeds and chunks.  · Thicken liquids, if instructed by your health care provider. Your health care provider will tell you the consistency to which you should thicken your liquids for safe swallowing. To thicken a liquid, use a commercial thickener or a thickening food (such as rice cereal or potato flakes). Ask  your health care provider for specific recommendations on thickeners.  See your dietitian or health care provider regularly for help with your dietary changes.  WHAT FOODS CAN I EAT?  Grains  Store-bought soft breads that do not have nuts or seeds. Pancakes, sweet rolls, [place]ian pastries, and Romanian toast that have been moistened with syrup or sauce to form a slurry when blended. Well-cooked pasta, noodles, and bread dressing. Well-cooked noodles and pasta in sauce. Moist macaroni and cheese. Soft dumplings or spaetzle with gravy or butter. Cooked cereals (including oatmeal). Low-texture dry cereals, such as rice puff, corn, or wheat-flake cereals, with milk (if thin liquids are not allowed, make sure all of the milk is absorbed by the cereal before eating it).  Vegetables  Very soft, well-cooked vegetables in pieces less than ½ inch in size. Cooked potatoes that are moist, not crispy, and with sauce.  Fruits  Canned or cooked fruits that are soft or moist and do not have skin or seeds. Fresh, soft bananas. Fruit juices with a small amount of pulp (if thin liquids are allowed). Gelatin or plain gelatin with canned fruit, except pineapple.  Meat and Other Protein Sources  Tender, moist meats, poultry, or fish cooked with gravy or sauce and cubed to ¼-inch bites or smaller. Ground meat. Moist meatball or meatloaf. Fish without bones. Moist casseroles without rice. Tuna, egg, or meat salad without chunks or hard-to-chew vegetables, such as celery and onions. Smooth quiche without large chunks. Scrambled, poached, or soft-cooked eggs with butter, margarine, sauce, or gravy. Tofu. Well-cooked, moistened and mashed beans, peas, baked beans, and other legumes. Casseroles without rice (such as tuna noodle casserole or soft moist meat lasagna).  Dairy  Cream cheese. Yogurt. Cottage cheese. Ask your health care provider if milk is allowed.  Sweets/Desserts  Pudding. Custard. Soft fruit pies with crust on the bottom only.  Crisps and cobblers without seeds or nuts and with soft crusts. Soft, moist cakes. Icing. Pre-gelled cookies. Soft, moist cookies dunked in milk, coffee, or another liquid. Jelly. Soft, smooth chocolate bars that are easily chewed. Jams and preserves without seeds. Ask your health care provider whether you can have frozen desserts.  Fats and Oils  Butter. Margarine. Cream for cereal, depending on liquid consistency allowed. Gravy. Cream sauces. Mayonnaise. Salad dressings. Cream cheese. Cheese spreads, plain or with soft fruits or vegetables added. Sour cream. Sour cream dips with soft fruits or vegetables added. Whipped toppings.  Other  Sauces and salsas that have soft chunks that are about ½ inch or smaller.  The items listed above may not be a complete list of recommended foods or beverages. Contact your dietitian for more options.  WHAT FOODS ARE NOT RECOMMENDED?  Grains  All breads not listed in the recommended list. Breads that are hard or have nuts or seeds. Coarse cereals. Cereals that have nuts, seeds, dried fruits, or coconut. Rice. Corn.  Vegetables  Whole, raw, frozen, or dried vegetables. Tough, fibrous, chewy, or stringy cooked vegetables, such as celery, peas, broccoli, cabbage, [place] sprouts, and asparagus. Potato skins. Potato and other vegetable chips. Fried or Sinhala-fried potatoes. Cooked corn and peas.  Fruits  Whole raw, frozen, or dried fruits, including coconut. Pineapple. Fruits with seeds.  Meat and Other Protein Sources  Dry, tough meats, such as fisher, sausage, and hot dogs. Cheese slices and cubes. Peanut butter. Hard boiled or fried eggs. Nuts. Seeds. Pizza. Sandwiches. Dry casseroles or casseroles with rice or large chunks.  Dairy  Yogurt with nuts, seeds, or large chunks.  Sweets/Desserts  Coarse, hard, chewy, or sticky desserts. Any dessert with nuts, seeds, coconut, pineapple, or dried fruit. Ask your health care provider whether you can have frozen desserts.  Fats and  Oils  Avoid fats with chunky, large textures, such as those with nuts or fruits.  Other  Soups and casseroles with large chunks.  The items listed above may not be a complete list of foods and beverages to avoid. Contact your dietitian for more information.     This information is not intended to replace advice given to you by your health care provider. Make sure you discuss any questions you have with your health care provider.     Document Released: 12/18/2006 Document Revised: 01/08/2016 Document Reviewed: 12/01/2014  [employer] Interactive Patient Education ©2016 [employer]      Dehydration    Dehydration is a condition in which there is not enough fluid or water in the body. This happens when you lose more fluids than you take in. Important organs, such as the kidneys, brain, and heart, cannot function without a proper amount of fluids. Any loss of fluids from the body can lead to dehydration.  People age 65 or older have a higher risk of dehydration than younger adults because in older age, the body:  · Is less able to conserve water.  · Does not respond to temperature changes as well.  · Does not get thirsty as easily or quickly.  Dehydration can range from mild to severe. This condition should be treated right away to prevent it from becoming severe.  What are the causes?  Dehydration may be caused by:  · Vomiting.  · Diarrhea.  · Excessive sweating, such as from heat exposure or exercise.  · Not drinking enough fluid, especially:  ¨ When ill.  ¨ While doing activity that requires a lot of energy.  · Excessive urination.  · Fever.  · Infection.  · Certain medicines, such as medicines that cause the body to lose excess fluid (diuretics).  · Inability to access safe drinking water.  · Poorly controlled blood sugars.  · Reduced physical ability to get adequate water and food.  What increases the risk?  This condition is more likely to develop in people who:  · Have a long-term (chronic) illness, such as:  ¨ An  illness that may increase urination, such as diabetes.  ¨ Kidney, heart, or lung disease.  ¨ Neurological or psychological disorders, such as dementia.  · Are age 65 or older.  · Are disabled.  · Live in a place with high altitude.  What are the signs or symptoms?  Symptoms of mild dehydration may include:  · Thirst.  · Dry lips.  · Slightly dry mouth.  · Dry, warm skin.  · Dizziness.  Symptoms of moderate dehydration may include:  · Very dry mouth.  · Muscle cramps.  · Dark urine. Urine may be the color of tea.  · Decreased urine production.  · Decreased tear production.  · Heartbeat that is irregular or faster than normal (palpitations).  · Headache.  · Light-headedness, especially when you stand up from a sitting position.  · Fainting (syncope).  Symptoms of severe dehydration may include:  · Changes in skin, such as:  ¨ Cold and clammy skin.  ¨ Blotchy (mottled) or pale skin.  ¨ Skin that does not quickly return to normal after being lightly pinched and released (poor skin turgor).  · Changes in body fluids, such as:  ¨ Extreme thirst.  ¨ No tear production.  ¨ Inability to sweat when body temperature is high, such as in hot weather.  ¨ Very little urine production.  · Changes in vital signs, such as:  ¨ Weak pulse.  ¨ Pulse that is more than 100 beats a minute when sitting still.  ¨ Rapid breathing.  ¨ Low blood pressure.  · Other changes, such as:  ¨ Sunken eyes.  ¨ Cold hands and feet.  ¨ Confusion.  ¨ Lack of energy (lethargy).  ¨ Difficulty waking up from sleep.  ¨ Short-term weight loss.  ¨ Unconsciousness.  How is this diagnosed?  This condition is diagnosed based on your symptoms and a physical exam. Blood and urine tests may be done to help confirm the diagnosis.  How is this treated?  Treatment for this condition depends on the severity. Mild or moderate dehydration can often be treated at home. Treatment should be started right away. Do not wait until dehydration becomes severe. Severe dehydration is  an emergency and it needs to be treated in a hospital.  Treatment for mild dehydration may include:  · Drinking more fluids.  · Replacing salts and minerals in your blood (electrolytes).  Treatment for moderate dehydration may include:  · Drinking an oral rehydration solution (ORS). This is a drink that helps you replace fluids and electrolytes (rehydrate). It is found at pharmacies and retail stores.  Treatment for severe dehydration may include:  · Receiving fluids through an IV tube.  · Receiving an electrolyte solution through a tube passed through your nose and into your stomach (nasogastric tube or NG tube).  · Correcting abnormalities in electrolytes.  · Treating the underlying cause of dehydration.  Follow these instructions at home:  · If directed by your health care provider, drink an ORS:  ¨ Make an ORS by following instructions on the package.  ¨ Start by drinking small amounts, about ½ cup (120 mL) every 5-10 minutes.  ¨ Slowly increase how much you drink until you have taken the amount recommended by your health care provider.  · Drink enough clear fluid to keep your urine clear or pale yellow. If you were told to drink an ORS, finish the ORS first, then start slowly drinking other clear fluids. Drink fluids such as:  ¨ Water. Do not drink only water. Doing that can lead to having too little salt (sodium) in the body (hyponatremia).  ¨ Ice chips.  ¨ Fruit juice that you have added water to (diluted fruit juice).  ¨ Low-calorie sports drinks.  · Avoid:  ¨ Alcohol.  ¨ Drinks that contain a lot of sugar. These include high-calorie sports drinks, fruit juice that is not diluted, and soda.  ¨ Caffeine.  ¨ Foods that are greasy or contain a lot of fat or sugar.  · Take over-the-counter and prescription medicines only as told by your health care provider.  · Do not take sodium tablets. This can lead to having too much sodium in the body (hypernatremia).  · Eat foods that contain a healthy balance of  electrolytes, such as bananas, oranges, potatoes, tomatoes, and spinach.  · Keep all follow-up visits as told by your health care provider. This is important.  Contact a health care provider if:  · You have abdominal pain that:  ¨ Gets worse.  ¨ Stays in one area (localizes).  · You have a rash.  · You have a stiff neck.  · You are sleepier, more irritable, or more difficult to wake up than usual.  · You feel weak, dizzy, or very thirsty.  Get help right away if:  · You have:  ¨ Symptoms of severe dehydration.  ¨ A fever.  ¨ A severe headache.  ¨ Vomiting or diarrhea that gets worse or does not go away.  ¨ Diarrhea for more than 24 hours.  ¨ Blood or green matter (bile) in your vomit.  ¨ Blood in your stool. This may cause stool to look black and tarry.  ¨ Trouble breathing.  · You cannot drink fluids without vomiting.  · Your symptoms get worse with treatment.  · You have not urinated in 6-8 hours.  · You have urinated only a small amount of very dark urine over 6-8 hours.  · You faint.  · Your heart rate while sitting still is over 100 beats a minute.  This information is not intended to replace advice given to you by your health care provider. Make sure you discuss any questions you have with your health care provider.  Document Released: 03/09/2005 Document Revised: 07/07/2017 Document Reviewed: 02/10/2017  [employer] Interactive Patient Education © 2017 [employer]    Fall Prevention in the Home    Falls can cause injuries and can affect people from all age groups. There are many simple things that you can do to make your home safe and to help prevent falls.  What can I do on the outside of my home?  · Regularly repair the edges of walkways and driveways and fix any cracks.  · Remove high doorway thresholds.  · Trim any shrubbery on the main path into your home.  · Use bright outdoor lighting.  · Clear walkways of debris and clutter, including tools and rocks.  · Regularly check that handrails are securely  fastened and in good repair. Both sides of any steps should have handrails.  · Install guardrails along the edges of any raised decks or porches.  · Have leaves, snow, and ice cleared regularly.  · Use sand or salt on walkways during winter months.  · In the garage, clean up any spills right away, including grease or oil spills.  What can I do in the bathroom?  · Use night lights.  · Install grab bars by the toilet and in the tub and shower. Do not use towel bars as grab bars.  · Use non-skid mats or decals on the floor of the tub or shower.  · If you need to sit down while you are in the shower, use a plastic, non-slip stool.  · Keep the floor dry. Immediately clean up any water that spills on the floor.  · Remove soap buildup in the tub or shower on a regular basis.  · Attach bath mats securely with double-sided non-slip rug tape.  · Remove throw rugs and other tripping hazards from the floor.  What can I do in the bedroom?  · Use night lights.  · Make sure that a bedside light is easy to reach.  · Do not use oversized bedding that drapes onto the floor.  · Have a firm chair that has side arms to use for getting dressed.  · Remove throw rugs and other tripping hazards from the floor.  What can I do in the kitchen?  · Clean up any spills right away.  · Avoid walking on wet floors.  · Place frequently used items in easy-to-reach places.  · If you need to reach for something above you, use a sturdy step stool that has a grab bar.  · Keep electrical cables out of the way.  · Do not use floor polish or wax that makes floors slippery. If you have to use wax, make sure that it is non-skid floor wax.  · Remove throw rugs and other tripping hazards from the floor.  What can I do in the stairways?  · Do not leave any items on the stairs.  · Make sure that there are handrails on both sides of the stairs. Fix handrails that are broken or loose. Make sure that handrails are as long as the stairways.  · Check any carpeting to  make sure that it is firmly attached to the stairs. Fix any carpet that is loose or worn.  · Avoid having throw rugs at the top or bottom of stairways, or secure the rugs with carpet tape to prevent them from moving.  · Make sure that you have a light switch at the top of the stairs and the bottom of the stairs. If you do not have them, have them installed.  What are some other fall prevention tips?  · Wear closed-toe shoes that fit well and support your feet. Wear shoes that have rubber soles or low heels.  · When you use a stepladder, make sure that it is completely opened and that the sides are firmly locked. Have someone hold the ladder while you are using it. Do not climb a closed stepladder.  · Add color or contrast paint or tape to grab bars and handrails in your home. Place contrasting color strips on the first and last steps.  · Use mobility aids as needed, such as canes, walkers, scooters, and crutches.  · Turn on lights if it is dark. Replace any light bulbs that burn out.  · Set up furniture so that there are clear paths. Keep the furniture in the same spot.  · Fix any uneven floor surfaces.  · Choose a carpet design that does not hide the edge of steps of a stairway.  · Be aware of any and all pets.  · Review your medicines with your healthcare provider. Some medicines can cause dizziness or changes in blood pressure, which increase your risk of falling.  Talk with your health care provider about other ways that you can decrease your risk of falls. This may include working with a physical therapist or  to improve your strength, balance, and endurance.  This information is not intended to replace advice given to you by your health care provider. Make sure you discuss any questions you have with your health care provider.  Document Released: 12/08/2003 Document Revised: 05/16/2017 Document Reviewed: 01/22/2016  [employer] Interactive Patient Education © 2017 [employer]      Occupational Therapy  Discharge Instructions for Yousif Kimble    2/7/2020    Level of Assist Required for Eating: Requires Supervision with Eating  Level of Assist Required for Grooming: Requires Supervision with Grooming  Level of Assist Required for Dressing: Requires Supervision with Dressing  Level of Assist Required for Toileting: Requires Supervision with Toileting  Level of Assist Required for Toilet Transfer: Requires Supervision with Toilet Transfer  Equipment for Toilet Transfer: Grab Bars by Toilet  Level of Assist Required for Bathing: Requires Supervision with Bathing  Equipment for Bathing: Grab Bars in Tub / Shower, Shower Chair  Level of Assist Required for Shower Transfer: Requires Supervision with Shower Transfer  Equipment for Shower Transfer: Shower Chair, Grab Bars in Tub / Shower  Level of Assist Required for Home Mgmt: Requires Supervision with Home Management  Level of Assist Required for Meal Prep: Requires Supervision with Meal Preparation  Driving: May not Drive, Please Contact Physician for Further Information  Comments: It was great working with you Yousif! -ARAM Joyner/L

## 2020-02-15 ENCOUNTER — HOME CARE VISIT (OUTPATIENT)
Dept: HOME HEALTH SERVICES | Facility: HOME HEALTHCARE | Age: 85
End: 2020-02-15
Payer: MEDICARE

## 2020-02-15 VITALS
SYSTOLIC BLOOD PRESSURE: 112 MMHG | BODY MASS INDEX: 19.61 KG/M2 | TEMPERATURE: 99 F | DIASTOLIC BLOOD PRESSURE: 72 MMHG | HEART RATE: 83 BPM | WEIGHT: 129 LBS | OXYGEN SATURATION: 96 % | RESPIRATION RATE: 18 BRPM

## 2020-02-15 PROCEDURE — 665999 HH PPS REVENUE DEBIT

## 2020-02-15 PROCEDURE — G0153 HHCP-SVS OF S/L PATH,EA 15MN: HCPCS

## 2020-02-15 PROCEDURE — 665998 HH PPS REVENUE CREDIT

## 2020-02-15 ASSESSMENT — ENCOUNTER SYMPTOMS: DIFFICULTY THINKING: 1

## 2020-02-16 VITALS
OXYGEN SATURATION: 96 % | HEART RATE: 80 BPM | SYSTOLIC BLOOD PRESSURE: 118 MMHG | TEMPERATURE: 97.5 F | DIASTOLIC BLOOD PRESSURE: 72 MMHG | RESPIRATION RATE: 18 BRPM

## 2020-02-16 PROCEDURE — 665999 HH PPS REVENUE DEBIT

## 2020-02-16 PROCEDURE — 665998 HH PPS REVENUE CREDIT

## 2020-02-16 ASSESSMENT — ACTIVITIES OF DAILY LIVING (ADL)
PREPARING MEALS: NEEDS ASSISTANCE
GROOMING_WITHIN_DEFINED_LIMITS: 1
LAUNDRY: DEPENDENT
FEEDING_WITHIN_DEFINED_LIMITS: 1
LAUNDRY ASSESSED: 1
TOILETING: 1
CURRENT_FUNCTION: SUPERVISION
TOILETING EQUIPMENT USED: GRAB BAR
TOILETING: SUPERVISION
HOUSEKEEPING ASSESSED: 1
GROOMING_CURRENT_FUNCTION: INDEPENDENT
LIGHT HOUSEKEEPING: DEPENDENT
FEEDING ASSESSED: 1
DRESSING_UB_CURRENT_FUNCTION: SUPERVISION
GROOMING ASSESSED: 1
PHYSICAL TRANSFERS ASSESSED: 1
AMBULATION ASSISTANCE: STAND BY ASSIST
AMBULATION ASSISTANCE: 1
DRESSING_LB_CURRENT_FUNCTION: MODERATE ASSIST
PHYSICAL_TRANSFERS_DEVICES: FWW
FEEDING: INDEPENDENT

## 2020-02-16 ASSESSMENT — ENCOUNTER SYMPTOMS
POOR JUDGMENT: 1
AGGRESSION WITHIN DEFINED LIMITS: 1
ANGER WITHIN DEFINED LIMITS: 1

## 2020-02-17 ENCOUNTER — HOME CARE VISIT (OUTPATIENT)
Dept: HOME HEALTH SERVICES | Facility: HOME HEALTHCARE | Age: 85
End: 2020-02-17
Payer: MEDICARE

## 2020-02-17 VITALS
SYSTOLIC BLOOD PRESSURE: 110 MMHG | TEMPERATURE: 97.8 F | DIASTOLIC BLOOD PRESSURE: 60 MMHG | RESPIRATION RATE: 18 BRPM | OXYGEN SATURATION: 95 % | HEART RATE: 84 BPM

## 2020-02-17 VITALS
DIASTOLIC BLOOD PRESSURE: 60 MMHG | HEART RATE: 84 BPM | OXYGEN SATURATION: 95 % | TEMPERATURE: 97.8 F | SYSTOLIC BLOOD PRESSURE: 110 MMHG | RESPIRATION RATE: 18 BRPM

## 2020-02-17 PROCEDURE — 665999 HH PPS REVENUE DEBIT

## 2020-02-17 PROCEDURE — G0151 HHCP-SERV OF PT,EA 15 MIN: HCPCS

## 2020-02-17 PROCEDURE — 665998 HH PPS REVENUE CREDIT

## 2020-02-17 PROCEDURE — G0152 HHCP-SERV OF OT,EA 15 MIN: HCPCS

## 2020-02-17 ASSESSMENT — ENCOUNTER SYMPTOMS
DIFFICULTY THINKING: 1
POOR JUDGMENT: 1
DIFFICULTY THINKING: 1

## 2020-02-18 ENCOUNTER — HOME CARE VISIT (OUTPATIENT)
Dept: HOME HEALTH SERVICES | Facility: HOME HEALTHCARE | Age: 85
End: 2020-02-18
Payer: MEDICARE

## 2020-02-18 PROCEDURE — 665998 HH PPS REVENUE CREDIT

## 2020-02-18 PROCEDURE — 665999 HH PPS REVENUE DEBIT

## 2020-02-19 ENCOUNTER — HOME CARE VISIT (OUTPATIENT)
Dept: HOME HEALTH SERVICES | Facility: HOME HEALTHCARE | Age: 85
End: 2020-02-19
Payer: MEDICARE

## 2020-02-19 VITALS
RESPIRATION RATE: 18 BRPM | DIASTOLIC BLOOD PRESSURE: 60 MMHG | TEMPERATURE: 98 F | SYSTOLIC BLOOD PRESSURE: 108 MMHG | HEART RATE: 80 BPM | OXYGEN SATURATION: 95 %

## 2020-02-19 VITALS
DIASTOLIC BLOOD PRESSURE: 60 MMHG | SYSTOLIC BLOOD PRESSURE: 108 MMHG | WEIGHT: 130 LBS | TEMPERATURE: 98 F | RESPIRATION RATE: 18 BRPM | HEART RATE: 80 BPM | OXYGEN SATURATION: 95 % | BODY MASS INDEX: 19.77 KG/M2

## 2020-02-19 PROCEDURE — 665998 HH PPS REVENUE CREDIT

## 2020-02-19 PROCEDURE — 665999 HH PPS REVENUE DEBIT

## 2020-02-19 PROCEDURE — G0151 HHCP-SERV OF PT,EA 15 MIN: HCPCS

## 2020-02-19 PROCEDURE — G0299 HHS/HOSPICE OF RN EA 15 MIN: HCPCS

## 2020-02-19 ASSESSMENT — ENCOUNTER SYMPTOMS
POOR JUDGMENT: 1
DIFFICULTY THINKING: 1
MUSCLE WEAKNESS: 1
NAUSEA: DENIES
VOMITING: DENIES

## 2020-02-19 NOTE — PROGRESS NOTES
Phyllis , Please fax to pmd-  Mary Vizcarra, SLP Allison Garcia R.N.             Date & Time of fall: 2/15 during session (840 am)   Cause of fall: Mechanical- tripped on untied shoelaces and SLP caught before completely falling.   Location: Living Room   Was the fall witnessed?  If yes, by who? Yes, Details: SLP, caregiver, and brother   Actions taken by patient: none   Any new injury? no   Any recent medication changes? No   Review ed Post Fall Questionnaire: Yes  Post fall instructions: Patient to have shoes tied before ambulating   Actions Taken: Notified care team       aware of above, called pt/cg regarding above, reviewed fall prevention strategies ie tie shoe laces before ambulating, verbalized understanding

## 2020-02-20 ENCOUNTER — HOME CARE VISIT (OUTPATIENT)
Dept: HOME HEALTH SERVICES | Facility: HOME HEALTHCARE | Age: 85
End: 2020-02-20
Payer: MEDICARE

## 2020-02-20 VITALS
WEIGHT: 129 LBS | SYSTOLIC BLOOD PRESSURE: 120 MMHG | HEART RATE: 86 BPM | BODY MASS INDEX: 19.61 KG/M2 | TEMPERATURE: 98.2 F | RESPIRATION RATE: 18 BRPM | DIASTOLIC BLOOD PRESSURE: 76 MMHG | OXYGEN SATURATION: 93 %

## 2020-02-20 PROCEDURE — G0153 HHCP-SVS OF S/L PATH,EA 15MN: HCPCS

## 2020-02-20 PROCEDURE — G0152 HHCP-SERV OF OT,EA 15 MIN: HCPCS

## 2020-02-20 PROCEDURE — 665999 HH PPS REVENUE DEBIT

## 2020-02-20 PROCEDURE — 665998 HH PPS REVENUE CREDIT

## 2020-02-20 ASSESSMENT — ENCOUNTER SYMPTOMS
POOR JUDGMENT: 1
DIFFICULTY THINKING: 1

## 2020-02-21 ENCOUNTER — HOME CARE VISIT (OUTPATIENT)
Dept: HOME HEALTH SERVICES | Facility: HOME HEALTHCARE | Age: 85
End: 2020-02-21
Payer: MEDICARE

## 2020-02-21 VITALS
OXYGEN SATURATION: 98 % | SYSTOLIC BLOOD PRESSURE: 108 MMHG | RESPIRATION RATE: 18 BRPM | DIASTOLIC BLOOD PRESSURE: 60 MMHG | HEART RATE: 71 BPM | TEMPERATURE: 98.9 F

## 2020-02-21 VITALS
SYSTOLIC BLOOD PRESSURE: 112 MMHG | DIASTOLIC BLOOD PRESSURE: 60 MMHG | BODY MASS INDEX: 19.61 KG/M2 | WEIGHT: 129 LBS | TEMPERATURE: 97.8 F | HEART RATE: 76 BPM | RESPIRATION RATE: 18 BRPM | OXYGEN SATURATION: 95 %

## 2020-02-21 PROCEDURE — G0299 HHS/HOSPICE OF RN EA 15 MIN: HCPCS

## 2020-02-21 PROCEDURE — 665998 HH PPS REVENUE CREDIT

## 2020-02-21 PROCEDURE — 665999 HH PPS REVENUE DEBIT

## 2020-02-21 PROCEDURE — G0153 HHCP-SVS OF S/L PATH,EA 15MN: HCPCS

## 2020-02-21 ASSESSMENT — ENCOUNTER SYMPTOMS
POOR JUDGMENT: 1
TREMORS: 1
DIFFICULTY THINKING: 1
MUSCLE WEAKNESS: 1
VOMITING: DENIES
DIFFICULTY THINKING: 1
NAUSEA: DENIES

## 2020-02-22 PROCEDURE — 665999 HH PPS REVENUE DEBIT

## 2020-02-22 PROCEDURE — 665998 HH PPS REVENUE CREDIT

## 2020-02-23 PROCEDURE — 665999 HH PPS REVENUE DEBIT

## 2020-02-23 PROCEDURE — 665998 HH PPS REVENUE CREDIT

## 2020-02-24 ENCOUNTER — HOME CARE VISIT (OUTPATIENT)
Dept: HOME HEALTH SERVICES | Facility: HOME HEALTHCARE | Age: 85
End: 2020-02-24
Payer: MEDICARE

## 2020-02-24 VITALS
BODY MASS INDEX: 20.37 KG/M2 | OXYGEN SATURATION: 97 % | RESPIRATION RATE: 18 BRPM | WEIGHT: 134 LBS | TEMPERATURE: 98.5 F | SYSTOLIC BLOOD PRESSURE: 112 MMHG | HEART RATE: 76 BPM | DIASTOLIC BLOOD PRESSURE: 78 MMHG

## 2020-02-24 VITALS
OXYGEN SATURATION: 96 % | SYSTOLIC BLOOD PRESSURE: 128 MMHG | DIASTOLIC BLOOD PRESSURE: 70 MMHG | WEIGHT: 134 LBS | HEART RATE: 80 BPM | BODY MASS INDEX: 20.37 KG/M2 | TEMPERATURE: 97.8 F | RESPIRATION RATE: 18 BRPM

## 2020-02-24 PROCEDURE — G0151 HHCP-SERV OF PT,EA 15 MIN: HCPCS

## 2020-02-24 PROCEDURE — G0299 HHS/HOSPICE OF RN EA 15 MIN: HCPCS

## 2020-02-24 PROCEDURE — 665999 HH PPS REVENUE DEBIT

## 2020-02-24 PROCEDURE — 665998 HH PPS REVENUE CREDIT

## 2020-02-24 ASSESSMENT — ENCOUNTER SYMPTOMS
NAUSEA: DENIES
TREMORS: 1
DIFFICULTY THINKING: 1
POOR JUDGMENT: 1
MUSCLE WEAKNESS: 1
VOMITING: DENIES

## 2020-02-24 NOTE — PROGRESS NOTES
"Pt's weight of 134lbs today is outside of normal parameters and is to be reported to MD and CM. Pt weight on 2/21/2020 129lbs.  Pt vitals WFL and is asymptomatic. Pt wearing normal clothing and shoes as other visits, denies SOB.  Pt's brother reports pt \"ate better this weekend.\"  Case comm sent to MD and BASIL.    "

## 2020-02-25 ENCOUNTER — HOME CARE VISIT (OUTPATIENT)
Dept: HOME HEALTH SERVICES | Facility: HOME HEALTHCARE | Age: 85
End: 2020-02-25
Payer: MEDICARE

## 2020-02-25 VITALS
RESPIRATION RATE: 20 BRPM | TEMPERATURE: 98.6 F | DIASTOLIC BLOOD PRESSURE: 86 MMHG | WEIGHT: 133 LBS | HEART RATE: 78 BPM | BODY MASS INDEX: 20.22 KG/M2 | SYSTOLIC BLOOD PRESSURE: 110 MMHG | OXYGEN SATURATION: 92 %

## 2020-02-25 PROCEDURE — 665999 HH PPS REVENUE DEBIT

## 2020-02-25 PROCEDURE — 665998 HH PPS REVENUE CREDIT

## 2020-02-25 PROCEDURE — G0153 HHCP-SVS OF S/L PATH,EA 15MN: HCPCS

## 2020-02-25 PROCEDURE — G0152 HHCP-SERV OF OT,EA 15 MIN: HCPCS

## 2020-02-25 ASSESSMENT — ENCOUNTER SYMPTOMS
DIFFICULTY THINKING: 1
POOR JUDGMENT: 1

## 2020-02-25 NOTE — PROGRESS NOTES
Phyllis please fax to :Katie Boggs MD,  Phone: [phone]  Fax: [phone]  Patient gained 5 pounds in past 4 days , asymptomatic .  Obtained a 24 hour food diary to calculate the sodium intake, sodium intake greater than 2000 mg/24 hours yesterday,  Educated and  the patient on root cause identified to make adjustments., will re-check patient's weight and symptoms in the morning via phone call, or visit.   Please advise.

## 2020-02-26 ENCOUNTER — HOME CARE VISIT (OUTPATIENT)
Dept: HOME HEALTH SERVICES | Facility: HOME HEALTHCARE | Age: 85
End: 2020-02-26
Payer: MEDICARE

## 2020-02-26 VITALS
DIASTOLIC BLOOD PRESSURE: 86 MMHG | SYSTOLIC BLOOD PRESSURE: 110 MMHG | TEMPERATURE: 98.6 F | OXYGEN SATURATION: 92 % | HEART RATE: 78 BPM | RESPIRATION RATE: 20 BRPM

## 2020-02-26 VITALS
SYSTOLIC BLOOD PRESSURE: 110 MMHG | HEART RATE: 66 BPM | DIASTOLIC BLOOD PRESSURE: 66 MMHG | RESPIRATION RATE: 18 BRPM | OXYGEN SATURATION: 95 % | TEMPERATURE: 98 F

## 2020-02-26 PROCEDURE — 665998 HH PPS REVENUE CREDIT

## 2020-02-26 PROCEDURE — G0151 HHCP-SERV OF PT,EA 15 MIN: HCPCS

## 2020-02-26 PROCEDURE — 665999 HH PPS REVENUE DEBIT

## 2020-02-26 ASSESSMENT — GAIT ASSESSMENTS
PATH SCORE: 1
GAIT SCORE: 7
TRUNK: 1 - NO SWAY BUT FLEXION OF KNEES OR BACK OR SPREADS ARMS WHILE WALKING
INITIATION OF GAIT IMMEDIATELY AFTER GO: 1 - NO HESITANCY
WALKING STANCE: 0 - HEELS APART
PATH: 1 - MILD/MODERATE DEVIATION OR USES WALKING AID
TRUNK SCORE: 1
STEP CONTINUITY: 1 - STEPS APPEAR CONTINUOUS
BALANCE AND GAIT SCORE: 18
STEP SYMMETRY: 1 - RIGHT AND LEFT STEP LENGTH APPEAR EQUAL

## 2020-02-26 ASSESSMENT — ACTIVITIES OF DAILY LIVING (ADL)
FEEDING_COMMENTS: SEE OT NOTES
AMBULATION ASSISTANCE ON FLAT SURFACES: 1
AMBULATION ASSISTANCE ON UNEVEN SURFACES: 1
PHYSICAL TRANSFERS ASSESSED: 1
AMBULATION ASSISTANCE: 1
AMBULATION ASSISTANCE: STAND BY ASSIST
GROOMING_COMMENTS: SEE OT NOTES
BATHING_COMMENTS: SEE OT NOTES
CURRENT_FUNCTION: STAND BY ASSIST

## 2020-02-26 ASSESSMENT — BALANCE ASSESSMENTS
SITTING BALANCE: 1 - STEADY, SAFE
NUDGED SCORE: 1
IMMEDIATE STANDING BALANCE FIRST 5 SECONDS: 2 - STEADY WITHOUT WALKER OR OTHER SUPPORT
NUDGED: 1 - STAGGERS, GRABS, CATCHES SELF
STANDING BALANCE: 1 - STEADY BUT WIDE STANCE AND USES CANE OR OTHER SUPPORT
TURNING 360 DEGREES STEPS: 1 - CONTINUOUS STEPS
SITTING DOWN: 1 - USES ARMS OR NOT SMOOTH MOTION
EYES CLOSED AT MAXIMUM POSITION NUDGED: 1 - STEADY
ARISES: 1 - ABLE, USES ARMS TO HELP
BALANCE SCORE: 11
ATTEMPTS TO ARISE: 2 - ABLE TO RISE, ONE ATTEMPT
ARISING SCORE: 1

## 2020-02-26 ASSESSMENT — ENCOUNTER SYMPTOMS
POOR JUDGMENT: 1
DIFFICULTY THINKING: 1
DIFFICULTY THINKING: 1

## 2020-02-26 NOTE — PROGRESS NOTES
Phyllis-please fax to pmd  FY-called pt this morning regarding wt, talked to his brother -Ward, states pt lost one pound from yesterday, current wt -133 pounds, pt denies sob, denies dizziness, denies chest pain, denies fatigue, no edema noted, instructed brother to weigh pt daily and record wt, reviewed chf management and when to call home health agency, verbalized understanding

## 2020-02-27 ENCOUNTER — HOME CARE VISIT (OUTPATIENT)
Dept: HOME HEALTH SERVICES | Facility: HOME HEALTHCARE | Age: 85
End: 2020-02-27
Payer: MEDICARE

## 2020-02-27 VITALS
HEART RATE: 69 BPM | TEMPERATURE: 98.7 F | WEIGHT: 133 LBS | SYSTOLIC BLOOD PRESSURE: 128 MMHG | OXYGEN SATURATION: 96 % | DIASTOLIC BLOOD PRESSURE: 88 MMHG | BODY MASS INDEX: 20.22 KG/M2 | RESPIRATION RATE: 18 BRPM

## 2020-02-27 VITALS
TEMPERATURE: 98.7 F | RESPIRATION RATE: 18 BRPM | SYSTOLIC BLOOD PRESSURE: 128 MMHG | HEART RATE: 69 BPM | DIASTOLIC BLOOD PRESSURE: 80 MMHG | OXYGEN SATURATION: 96 %

## 2020-02-27 PROCEDURE — G0180 MD CERTIFICATION HHA PATIENT: HCPCS | Performed by: PHYSICAL MEDICINE & REHABILITATION

## 2020-02-27 PROCEDURE — G0152 HHCP-SERV OF OT,EA 15 MIN: HCPCS

## 2020-02-27 PROCEDURE — G0299 HHS/HOSPICE OF RN EA 15 MIN: HCPCS

## 2020-02-27 PROCEDURE — 665998 HH PPS REVENUE CREDIT

## 2020-02-27 PROCEDURE — G0153 HHCP-SVS OF S/L PATH,EA 15MN: HCPCS

## 2020-02-27 PROCEDURE — 665999 HH PPS REVENUE DEBIT

## 2020-02-27 ASSESSMENT — ENCOUNTER SYMPTOMS
MUSCLE WEAKNESS: 1
VOMITING: DENIES
DIFFICULTY THINKING: 1
POOR JUDGMENT: 1
NAUSEA: DENIES

## 2020-02-28 PROCEDURE — 665999 HH PPS REVENUE DEBIT

## 2020-02-28 PROCEDURE — 665998 HH PPS REVENUE CREDIT

## 2020-02-29 PROCEDURE — 665999 HH PPS REVENUE DEBIT

## 2020-02-29 PROCEDURE — 665998 HH PPS REVENUE CREDIT

## 2020-03-01 PROCEDURE — 665999 HH PPS REVENUE DEBIT

## 2020-03-01 PROCEDURE — 665998 HH PPS REVENUE CREDIT

## 2020-03-02 ENCOUNTER — HOME CARE VISIT (OUTPATIENT)
Dept: HOME HEALTH SERVICES | Facility: HOME HEALTHCARE | Age: 85
End: 2020-03-02
Payer: MEDICARE

## 2020-03-02 VITALS
DIASTOLIC BLOOD PRESSURE: 78 MMHG | SYSTOLIC BLOOD PRESSURE: 118 MMHG | HEART RATE: 74 BPM | WEIGHT: 130 LBS | OXYGEN SATURATION: 97 % | TEMPERATURE: 98.7 F | RESPIRATION RATE: 18 BRPM | BODY MASS INDEX: 19.77 KG/M2

## 2020-03-02 VITALS
TEMPERATURE: 98.7 F | OXYGEN SATURATION: 97 % | HEART RATE: 74 BPM | SYSTOLIC BLOOD PRESSURE: 118 MMHG | DIASTOLIC BLOOD PRESSURE: 78 MMHG | RESPIRATION RATE: 18 BRPM

## 2020-03-02 PROCEDURE — G0299 HHS/HOSPICE OF RN EA 15 MIN: HCPCS

## 2020-03-02 PROCEDURE — 665999 HH PPS REVENUE DEBIT

## 2020-03-02 PROCEDURE — G0151 HHCP-SERV OF PT,EA 15 MIN: HCPCS

## 2020-03-02 PROCEDURE — 665998 HH PPS REVENUE CREDIT

## 2020-03-02 PROCEDURE — G0152 HHCP-SERV OF OT,EA 15 MIN: HCPCS

## 2020-03-02 ASSESSMENT — FIBROSIS 4 INDEX
FIB4 SCORE: 1.87
FIB4 SCORE: 1.87

## 2020-03-02 ASSESSMENT — ENCOUNTER SYMPTOMS
DIFFICULTY THINKING: 1
POOR JUDGMENT: 1

## 2020-03-03 ENCOUNTER — HOME CARE VISIT (OUTPATIENT)
Dept: HOME HEALTH SERVICES | Facility: HOME HEALTHCARE | Age: 85
End: 2020-03-03
Payer: MEDICARE

## 2020-03-03 VITALS
OXYGEN SATURATION: 97 % | HEART RATE: 74 BPM | TEMPERATURE: 98.7 F | SYSTOLIC BLOOD PRESSURE: 118 MMHG | RESPIRATION RATE: 18 BRPM | BODY MASS INDEX: 19.77 KG/M2 | DIASTOLIC BLOOD PRESSURE: 78 MMHG | WEIGHT: 130 LBS

## 2020-03-03 VITALS
HEART RATE: 81 BPM | WEIGHT: 129 LBS | OXYGEN SATURATION: 93 % | DIASTOLIC BLOOD PRESSURE: 72 MMHG | BODY MASS INDEX: 19.61 KG/M2 | TEMPERATURE: 98 F | RESPIRATION RATE: 16 BRPM | SYSTOLIC BLOOD PRESSURE: 108 MMHG

## 2020-03-03 PROCEDURE — 665998 HH PPS REVENUE CREDIT

## 2020-03-03 PROCEDURE — 665999 HH PPS REVENUE DEBIT

## 2020-03-03 PROCEDURE — G0153 HHCP-SVS OF S/L PATH,EA 15MN: HCPCS

## 2020-03-03 ASSESSMENT — ACTIVITIES OF DAILY LIVING (ADL)
ORAL_CARE_SUPERVISION: 1
DRINKING_FROM_CUP_CURRENT_FUNCTIONINDEPENDENT: 1
TOILETING: 1
DRESSING_LB_CURRENT_FUNCTION: STAND BY ASSIST
TOILETING: INDEPENDENT
DRESSING_UB_CURRENT_FUNCTION: SUPERVISION
CURRENT_FUNCTION: SUPERVISION
BATHING_WITHIN_DEFINED_LIMITS: 1
AMBULATION ASSISTANCE: 1
FEEDING: INDEPENDENT
PHYSICAL TRANSFERS ASSESSED: 1
AMBULATION ASSISTANCE: SUPERVISION
FEEDING ASSESSED: 1

## 2020-03-03 ASSESSMENT — FIBROSIS 4 INDEX: FIB4 SCORE: 1.87

## 2020-03-03 ASSESSMENT — ENCOUNTER SYMPTOMS
VOMITING: DENIES
DIFFICULTY THINKING: 1
DIFFICULTY THINKING: 1
MUSCLE WEAKNESS: 1
NAUSEA: DENIES
POOR JUDGMENT: 1

## 2020-03-04 ENCOUNTER — HOME CARE VISIT (OUTPATIENT)
Dept: HOME HEALTH SERVICES | Facility: HOME HEALTHCARE | Age: 85
End: 2020-03-04
Payer: MEDICARE

## 2020-03-04 VITALS
TEMPERATURE: 98.5 F | DIASTOLIC BLOOD PRESSURE: 60 MMHG | SYSTOLIC BLOOD PRESSURE: 112 MMHG | HEART RATE: 67 BPM | OXYGEN SATURATION: 95 % | RESPIRATION RATE: 16 BRPM

## 2020-03-04 PROCEDURE — G0151 HHCP-SERV OF PT,EA 15 MIN: HCPCS

## 2020-03-04 PROCEDURE — 665999 HH PPS REVENUE DEBIT

## 2020-03-04 PROCEDURE — 665998 HH PPS REVENUE CREDIT

## 2020-03-04 ASSESSMENT — ENCOUNTER SYMPTOMS
DIFFICULTY THINKING: 1
POOR JUDGMENT: 1

## 2020-03-05 ENCOUNTER — HOME CARE VISIT (OUTPATIENT)
Dept: HOME HEALTH SERVICES | Facility: HOME HEALTHCARE | Age: 85
End: 2020-03-05
Payer: MEDICARE

## 2020-03-05 VITALS
OXYGEN SATURATION: 95 % | WEIGHT: 130 LBS | DIASTOLIC BLOOD PRESSURE: 68 MMHG | SYSTOLIC BLOOD PRESSURE: 110 MMHG | BODY MASS INDEX: 19.77 KG/M2 | HEART RATE: 68 BPM | RESPIRATION RATE: 16 BRPM | TEMPERATURE: 98.5 F

## 2020-03-05 PROCEDURE — 665998 HH PPS REVENUE CREDIT

## 2020-03-05 PROCEDURE — G0152 HHCP-SERV OF OT,EA 15 MIN: HCPCS

## 2020-03-05 PROCEDURE — 665999 HH PPS REVENUE DEBIT

## 2020-03-05 PROCEDURE — G0153 HHCP-SVS OF S/L PATH,EA 15MN: HCPCS

## 2020-03-05 ASSESSMENT — FIBROSIS 4 INDEX: FIB4 SCORE: 1.87

## 2020-03-05 ASSESSMENT — ENCOUNTER SYMPTOMS
POOR JUDGMENT: 1
DIFFICULTY THINKING: 1

## 2020-03-06 PROCEDURE — 665998 HH PPS REVENUE CREDIT

## 2020-03-06 PROCEDURE — 665999 HH PPS REVENUE DEBIT

## 2020-03-07 VITALS
HEART RATE: 68 BPM | SYSTOLIC BLOOD PRESSURE: 110 MMHG | TEMPERATURE: 98.5 F | DIASTOLIC BLOOD PRESSURE: 68 MMHG | RESPIRATION RATE: 16 BRPM | OXYGEN SATURATION: 95 %

## 2020-03-07 PROCEDURE — 665998 HH PPS REVENUE CREDIT

## 2020-03-07 PROCEDURE — 665999 HH PPS REVENUE DEBIT

## 2020-03-08 PROCEDURE — 665999 HH PPS REVENUE DEBIT

## 2020-03-08 PROCEDURE — 665998 HH PPS REVENUE CREDIT

## 2020-03-09 ENCOUNTER — HOME CARE VISIT (OUTPATIENT)
Dept: HOME HEALTH SERVICES | Facility: HOME HEALTHCARE | Age: 85
End: 2020-03-09
Payer: MEDICARE

## 2020-03-09 VITALS
DIASTOLIC BLOOD PRESSURE: 78 MMHG | TEMPERATURE: 98.4 F | RESPIRATION RATE: 16 BRPM | HEART RATE: 61 BPM | OXYGEN SATURATION: 97 % | SYSTOLIC BLOOD PRESSURE: 122 MMHG

## 2020-03-09 PROCEDURE — 665998 HH PPS REVENUE CREDIT

## 2020-03-09 PROCEDURE — 665999 HH PPS REVENUE DEBIT

## 2020-03-09 PROCEDURE — G0151 HHCP-SERV OF PT,EA 15 MIN: HCPCS

## 2020-03-09 ASSESSMENT — ENCOUNTER SYMPTOMS
POOR JUDGMENT: 1
DIFFICULTY THINKING: 1

## 2020-03-10 ENCOUNTER — HOME CARE VISIT (OUTPATIENT)
Dept: HOME HEALTH SERVICES | Facility: HOME HEALTHCARE | Age: 85
End: 2020-03-10
Payer: MEDICARE

## 2020-03-10 VITALS
HEART RATE: 70 BPM | WEIGHT: 128 LBS | SYSTOLIC BLOOD PRESSURE: 120 MMHG | DIASTOLIC BLOOD PRESSURE: 82 MMHG | BODY MASS INDEX: 19.46 KG/M2 | RESPIRATION RATE: 16 BRPM | OXYGEN SATURATION: 95 % | TEMPERATURE: 98.6 F

## 2020-03-10 PROCEDURE — 665999 HH PPS REVENUE DEBIT

## 2020-03-10 PROCEDURE — G0153 HHCP-SVS OF S/L PATH,EA 15MN: HCPCS

## 2020-03-10 PROCEDURE — 665998 HH PPS REVENUE CREDIT

## 2020-03-10 ASSESSMENT — ENCOUNTER SYMPTOMS
POOR JUDGMENT: 1
DIFFICULTY THINKING: 1

## 2020-03-10 ASSESSMENT — FIBROSIS 4 INDEX: FIB4 SCORE: 1.87

## 2020-03-11 ENCOUNTER — HOME CARE VISIT (OUTPATIENT)
Dept: HOME HEALTH SERVICES | Facility: HOME HEALTHCARE | Age: 85
End: 2020-03-11
Payer: MEDICARE

## 2020-03-11 PROCEDURE — 665998 HH PPS REVENUE CREDIT

## 2020-03-11 PROCEDURE — 665999 HH PPS REVENUE DEBIT

## 2020-03-11 PROCEDURE — G0151 HHCP-SERV OF PT,EA 15 MIN: HCPCS

## 2020-03-11 PROCEDURE — 665997 HH PPS REVENUE ADJ

## 2020-03-12 ENCOUNTER — HOME CARE VISIT (OUTPATIENT)
Dept: HOME HEALTH SERVICES | Facility: HOME HEALTHCARE | Age: 85
End: 2020-03-12
Payer: MEDICARE

## 2020-03-12 VITALS
OXYGEN SATURATION: 98 % | SYSTOLIC BLOOD PRESSURE: 112 MMHG | RESPIRATION RATE: 16 BRPM | TEMPERATURE: 99.1 F | HEART RATE: 53 BPM | DIASTOLIC BLOOD PRESSURE: 68 MMHG

## 2020-03-12 VITALS
DIASTOLIC BLOOD PRESSURE: 82 MMHG | SYSTOLIC BLOOD PRESSURE: 130 MMHG | WEIGHT: 128 LBS | OXYGEN SATURATION: 95 % | HEART RATE: 73 BPM | RESPIRATION RATE: 16 BRPM | BODY MASS INDEX: 19.46 KG/M2 | TEMPERATURE: 98.4 F

## 2020-03-12 PROCEDURE — 665997 HH PPS REVENUE ADJ

## 2020-03-12 PROCEDURE — 665001 SOC-HOME HEALTH

## 2020-03-12 PROCEDURE — 665999 HH PPS REVENUE DEBIT

## 2020-03-12 PROCEDURE — 665998 HH PPS REVENUE CREDIT

## 2020-03-12 PROCEDURE — G0153 HHCP-SVS OF S/L PATH,EA 15MN: HCPCS

## 2020-03-12 ASSESSMENT — GAIT ASSESSMENTS
GAIT SCORE: 9
PATH SCORE: 1
WALKING STANCE: 0 - HEELS APART
TRUNK: 1 - NO SWAY BUT FLEXION OF KNEES OR BACK OR SPREADS ARMS WHILE WALKING
STEP CONTINUITY: 1 - STEPS APPEAR CONTINUOUS
PATH: 1 - MILD/MODERATE DEVIATION OR USES WALKING AID
STEP SYMMETRY: 1 - RIGHT AND LEFT STEP LENGTH APPEAR EQUAL
BALANCE AND GAIT SCORE: 20
TRUNK SCORE: 1
INITIATION OF GAIT IMMEDIATELY AFTER GO: 1 - NO HESITANCY

## 2020-03-12 ASSESSMENT — PATIENT HEALTH QUESTIONNAIRE - PHQ9
5. POOR APPETITE OR OVEREATING: 0 - NOT AT ALL
SUM OF ALL RESPONSES TO PHQ QUESTIONS 1-9: 11
CLINICAL INTERPRETATION OF PHQ2 SCORE: 3

## 2020-03-12 ASSESSMENT — ENCOUNTER SYMPTOMS
POOR JUDGMENT: 1
POOR JUDGMENT: 1
DIFFICULTY THINKING: 1
DIFFICULTY THINKING: 1

## 2020-03-12 ASSESSMENT — BALANCE ASSESSMENTS
IMMEDIATE STANDING BALANCE FIRST 5 SECONDS: 1 - STEADY BUT USES WALKER OR OTHER SUPPORT
STANDING BALANCE: 1 - STEADY BUT WIDE STANCE AND USES CANE OR OTHER SUPPORT
ARISES: 1 - ABLE, USES ARMS TO HELP
ARISING SCORE: 1
BALANCE SCORE: 11
NUDGED SCORE: 1
EYES CLOSED AT MAXIMUM POSITION NUDGED: 1 - STEADY
ATTEMPTS TO ARISE: 2 - ABLE TO RISE, ONE ATTEMPT
SITTING BALANCE: 1 - STEADY, SAFE
TURNING 360 DEGREES STEPS: 1 - CONTINUOUS STEPS
SITTING DOWN: 1 - USES ARMS OR NOT SMOOTH MOTION
NUDGED: 1 - STAGGERS, GRABS, CATCHES SELF

## 2020-03-12 ASSESSMENT — ACTIVITIES OF DAILY LIVING (ADL)
OASIS_M1830: 02
HOME_HEALTH_OASIS: 01

## 2020-03-12 ASSESSMENT — FIBROSIS 4 INDEX: FIB4 SCORE: 1.87

## 2020-03-13 PROCEDURE — 665999 HH PPS REVENUE DEBIT

## 2020-03-13 PROCEDURE — 665998 HH PPS REVENUE CREDIT

## 2020-03-14 PROCEDURE — 665999 HH PPS REVENUE DEBIT

## 2020-03-14 PROCEDURE — 665998 HH PPS REVENUE CREDIT

## 2020-03-15 PROCEDURE — 665999 HH PPS REVENUE DEBIT

## 2020-03-15 PROCEDURE — 665998 HH PPS REVENUE CREDIT

## 2020-03-16 PROCEDURE — 665998 HH PPS REVENUE CREDIT

## 2020-03-16 PROCEDURE — 665999 HH PPS REVENUE DEBIT

## 2020-03-17 PROCEDURE — 665998 HH PPS REVENUE CREDIT

## 2020-03-17 PROCEDURE — 665999 HH PPS REVENUE DEBIT

## 2020-03-18 PROCEDURE — 665998 HH PPS REVENUE CREDIT

## 2020-03-18 PROCEDURE — 665999 HH PPS REVENUE DEBIT

## 2020-03-19 PROCEDURE — 665999 HH PPS REVENUE DEBIT

## 2020-03-19 PROCEDURE — 665998 HH PPS REVENUE CREDIT

## 2020-03-20 PROCEDURE — 665998 HH PPS REVENUE CREDIT

## 2020-03-20 PROCEDURE — 665999 HH PPS REVENUE DEBIT

## 2020-03-21 PROCEDURE — 665998 HH PPS REVENUE CREDIT

## 2020-03-21 PROCEDURE — 665999 HH PPS REVENUE DEBIT

## 2020-03-22 PROCEDURE — 665999 HH PPS REVENUE DEBIT

## 2020-03-22 PROCEDURE — 665998 HH PPS REVENUE CREDIT

## 2020-03-23 DIAGNOSIS — I63.40 CEREBROVASCULAR ACCIDENT (CVA) DUE TO EMBOLISM OF CEREBRAL ARTERY (HCC): ICD-10-CM

## 2020-03-23 PROCEDURE — 665999 HH PPS REVENUE DEBIT

## 2020-03-23 PROCEDURE — 665998 HH PPS REVENUE CREDIT

## 2020-04-02 ENCOUNTER — OFFICE VISIT (OUTPATIENT)
Dept: PHYSICAL MEDICINE AND REHAB | Facility: REHABILITATION | Age: 85
End: 2020-04-02
Payer: MEDICARE

## 2020-04-02 VITALS
BODY MASS INDEX: 18.94 KG/M2 | DIASTOLIC BLOOD PRESSURE: 76 MMHG | TEMPERATURE: 97.9 F | WEIGHT: 125 LBS | HEART RATE: 96 BPM | SYSTOLIC BLOOD PRESSURE: 114 MMHG | OXYGEN SATURATION: 98 % | HEIGHT: 68 IN

## 2020-04-02 DIAGNOSIS — R63.0 POOR APPETITE: ICD-10-CM

## 2020-04-02 DIAGNOSIS — R47.1 DYSARTHRIA: ICD-10-CM

## 2020-04-02 DIAGNOSIS — G47.00 INSOMNIA, UNSPECIFIED TYPE: ICD-10-CM

## 2020-04-02 DIAGNOSIS — G47.9 SLEEPING DIFFICULTY: ICD-10-CM

## 2020-04-02 DIAGNOSIS — E55.9 VITAMIN D DEFICIENCY: ICD-10-CM

## 2020-04-02 DIAGNOSIS — I63.40 CEREBROVASCULAR ACCIDENT (CVA) DUE TO EMBOLISM OF CEREBRAL ARTERY (HCC): Primary | ICD-10-CM

## 2020-04-02 DIAGNOSIS — R13.10 DYSPHAGIA, UNSPECIFIED TYPE: ICD-10-CM

## 2020-04-02 PROCEDURE — 99214 OFFICE O/P EST MOD 30 MIN: CPT | Performed by: PHYSICAL MEDICINE & REHABILITATION

## 2020-04-02 RX ORDER — MIRTAZAPINE 30 MG/1
30 TABLET, FILM COATED ORAL
Qty: 30 TAB | Refills: 2 | Status: SHIPPED | OUTPATIENT
Start: 2020-04-02

## 2020-04-02 ASSESSMENT — ENCOUNTER SYMPTOMS
PALPITATIONS: 0
SHORTNESS OF BREATH: 0
DIARRHEA: 0
COUGH: 0
CHILLS: 0
FALLS: 0
TINGLING: 0
CONSTIPATION: 0
FOCAL WEAKNESS: 0
SORE THROAT: 0
FEVER: 0
BRUISES/BLEEDS EASILY: 0
DIZZINESS: 0
MEMORY LOSS: 0

## 2020-04-02 ASSESSMENT — PATIENT HEALTH QUESTIONNAIRE - PHQ9
CLINICAL INTERPRETATION OF PHQ2 SCORE: 3
5. POOR APPETITE OR OVEREATING: 3 - NEARLY EVERY DAY
SUM OF ALL RESPONSES TO PHQ QUESTIONS 1-9: 13

## 2020-04-02 ASSESSMENT — FIBROSIS 4 INDEX: FIB4 SCORE: 1.87

## 2020-04-02 NOTE — PROGRESS NOTES
[facility]  [facility]  [facility] [place] [address]  Ph: [phone]    NEW PATIENT EVALUATION    *Patient established in PM&R practice, however, patient new to writer as patient is transferring care. Therefore, patient billed as established.       Patient Name: Yousif Kimble   Patient : 1930  Patient Age: 89 y.o.   PCP: Katie Boggs M.D.    Referring Physician: Dr. Gauri Fortune  Reason for Referral: ARU discharge follow-up  Examining Physician: Dr. Concepción Pierre DO  Date of Service: 2020    PM&R History to Date - Background Information:  Dates of Admission/Discharge to ARU: 2020-2020    SUBJECTIVE:   Patient Identification: Yousif Kimble is a 89 y.o. male with PMH significant for atrial fibrillation, prostate cancer, hypertension, systolic CHF, dysarthria, dysphagia, CAD and rehabilitation history significant for bilateral cardioembolic stroke, largest right MCA territory 2020 and is presenting to PM&R clinic for a NEW OUTPATIENT evaluation with the following chief complaint/s:    Chief Complaint: Continued cognitive deficits    Accompanied by Today: Brother, Ward  History of Present Illness: Patient has been doing really well.  Brother states that cognitively he still could use some help.  Home health is ended and there is a lapse between the ending of home health and initiation of physical therapy, speech therapy, occupational therapy on the outpatient side.  To start early April, but given current pandemic they are not sure if it will start or not.  Patient is still trying to complete crossword puzzle daily.  He used to do 2 a day but now can only get a few words.  No problems with bowel or bladder.  Sleep is erratic.  Brother has put him on a better sleeping hygiene regimen.  Sleeps 11 hours, but it is kind of scattered.  Brother has started getting him up at 6 AM and only letting him sleep for an hour or 2 in the afternoon and this is helped with  his sleep regimen.  Continues to have problems with poor appetite.  No neuropathic pain.  Ambulates well without assistive device.    Review of Systems:  Review of Systems   Constitutional: Negative for chills and fever.   HENT: Negative for congestion and sore throat.    Respiratory: Negative for cough and shortness of breath.    Cardiovascular: Negative for palpitations and leg swelling.   Gastrointestinal: Negative for constipation and diarrhea.   Genitourinary: Negative for dysuria, frequency and urgency.   Musculoskeletal: Negative for falls and joint pain.   Neurological: Negative for dizziness, tingling and focal weakness.   Endo/Heme/Allergies: Does not bruise/bleed easily.   Psychiatric/Behavioral: Negative for memory loss.      All other pertinent positive review of systems are noted above in HPI.   All other systems reviewed and are negative.    Past Medical History:  Past Medical History:   Diagnosis Date   • Arrhythmia    • Cancer (HCC)    • Congestive heart failure (HCC)    • Fall    • Hyperlipidemia    • Hypertension    • Myocardial infarct (HCC)    • Pneumonia    • Stroke (HCC)    • Urinary incontinence       Past Surgical History:   Procedure Laterality Date   • OTHER ORTHOPEDIC SURGERY      right ankle surgery   • OTHER ORTHOPEDIC SURGERY      left forearm surgery   • TONSILLECTOMY          Current Outpatient Medications:   •  mirtazapine (REMERON) 30 MG TABLET DISPERSIBLE, Take 1 Tab by mouth every bedtime., Disp: 30 Tab, Rfl: 2  •  apixaban (ELIQUIS) 5mg Tab, Take 1 Tab by mouth 2 Times a Day., Disp: 60 Tab, Rfl: 0  •  lisinopril (PRINIVIL) 5 MG Tab, Take 1 Tab by mouth every day., Disp: 30 Tab, Rfl: 0  •  metoprolol 75 MG Tab, Take 75 mg by mouth 2 Times a Day. (Patient taking differently: Take 50 mg by mouth 2 Times a Day.), Disp: 60 Tab, Rfl: 0  •  finasteride (PROSCAR) 5 MG Tab, Take 5 mg by mouth every day., Disp: , Rfl:   •  bicalutamide (CASODEX) 50 MG Tab, Take 50 mg by mouth every day.,  Disp: , Rfl:   •  atorvastatin (LIPITOR) 40 MG Tab, Take 1 Tab by mouth every evening. (Patient not taking: Reported on 3/12/2020), Disp: 30 Tab, Rfl: 0  •  digoxin (LANOXIN) 125 MCG Tab, Take 1 Tab by mouth every day at 6 PM. (Patient not taking: Reported on 3/12/2020), Disp: 30 Tab, Rfl: 0  No Known Allergies     Past Social History:  Social History     Socioeconomic History   • Marital status: Single     Spouse name: Not on file   • Number of children: Not on file   • Years of education: Not on file   • Highest education level: Not on file   Occupational History   • Not on file   Social Needs   • Financial resource strain: Not on file   • Food insecurity     Worry: Never true     Inability: Never true   • Transportation needs     Medical: No     Non-medical: No   Tobacco Use   • Smoking status: Never Smoker   • Smokeless tobacco: Never Used   Substance and Sexual Activity   • Alcohol use: Yes     Alcohol/week: 1.2 oz     Types: 2 Shots of liquor per week     Frequency: 2-3 times a week     Drinks per session: 1 or 2     Binge frequency: Never   • Drug use: Not Currently   • Sexual activity: Not on file   Lifestyle   • Physical activity     Days per week: Not on file     Minutes per session: Not on file   • Stress: Not on file   Relationships   • Social connections     Talks on phone: Not on file     Gets together: Not on file     Attends Christian service: Not on file     Active member of club or organization: Not on file     Attends meetings of clubs or organizations: Not on file     Relationship status: Not on file   • Intimate partner violence     Fear of current or ex partner: Not on file     Emotionally abused: Not on file     Physically abused: Not on file     Forced sexual activity: Not on file   Other Topics Concern   • Not on file   Social History Narrative   • Not on file        Family History:  Family History   Problem Relation Age of Onset   • Alcohol abuse Mother        Depression and Opioid  Screening  PHQ-9:  Depression Screen (PHQ-2/PHQ-9) 2/11/2020 3/12/2020 4/2/2020   PHQ-2 Total Score - - -   PHQ-2 Total Score 0 3 3   PHQ-9 Total Score - 11 13     Interpretation of PHQ-9 Total Score   Score Severity   1-4 No Depression   5-9 Mild Depression   10-14 Moderate Depression   15-19 Moderately Severe Depression   20-27 Severe Depression     Opioid Risk Score: No value filed.  Interpretation of Opioid Risk Score   Score 0-3 = Low risk of abuse. Do UDS at least once per year.  Score 4-7 = Moderate risk of abuse. Do UDS 1-4 times per year.  Score 8+ = High risk of abuse. Refer to specialist.      OBJECTIVE:   Vital Signs:  Vitals:    04/02/20 1313   BP: 114/76   Pulse: 96   Temp: 36.6 °C (97.9 °F)   SpO2: 98%        Pertinent Labs:  Lab Results   Component Value Date/Time    SODIUM 143 02/06/2020 05:40 AM    POTASSIUM 3.5 (L) 02/08/2020 05:11 AM    CHLORIDE 107 02/06/2020 05:40 AM    CO2 28 02/06/2020 05:40 AM    GLUCOSE 92 02/06/2020 05:40 AM    BUN 15 02/06/2020 05:40 AM    CREATININE 1.04 02/06/2020 05:40 AM       No results found for: HBA1C    Lab Results   Component Value Date/Time    WBC 8.1 02/06/2020 05:40 AM    RBC 4.01 (L) 02/06/2020 05:40 AM    HEMOGLOBIN 12.7 (L) 02/06/2020 05:40 AM    HEMATOCRIT 39.3 (L) 02/06/2020 05:40 AM    MCV 98.0 (H) 02/06/2020 05:40 AM    MCH 31.7 02/06/2020 05:40 AM    MCHC 32.3 (L) 02/06/2020 05:40 AM    MPV 10.2 02/06/2020 05:40 AM    NEUTSPOLYS 40.80 (L) 02/01/2020 05:24 AM    LYMPHOCYTES 46.70 (H) 02/01/2020 05:24 AM    MONOCYTES 7.90 02/01/2020 05:24 AM    EOSINOPHILS 3.90 02/01/2020 05:24 AM    BASOPHILS 0.30 02/01/2020 05:24 AM       Lab Results   Component Value Date/Time    ASTSGOT 23 01/29/2020 05:00 PM    ALTSGPT 19 01/29/2020 05:00 PM        Physical Exam:   GEN: No apparent distress  HEENT: Head normocephalic, atraumatic.  Sclera nonicteric bilaterally, no ocular discharge appreciated bilaterally.  CV: Extremities warm and well-perfused, no peripheral edema  appreciated bilaterally.  PULMONARY: Breathing nonlabored on room air, no respiratory accessory muscle use.  Not requiring supplemental oxygen.  ABD: Soft, nontender.  SKIN: No appreciable skin breakdown on exposed areas of skin.  PSYCH: Mood and affect within normal limits.  NEURO: Awake alert.  Conversational.  Logical thought content.  Ambulatory without assistive device  Minimal dysarthria appreciated.   Oriented to day, date, year, month, president, city, state  Able to spell world backwards rather quickly  Repetition intact  Able to identify objects in their function    Imaging: Patient's initial imaging not available to review in epic.    ASSESSMENT/PLAN: Yousif Kimble is a 89 y.o. male with rehabilitation history significant for bilateral cardioembolic stroke, largest right MCA territory 1/2020, here 4/2/2020 for ARU discharge follow-up. The following plan was discussed with the patient who is in agreement.     Visit Diagnoses     ICD-10-CM   1. Cerebrovascular accident (CVA) due to embolism of cerebral artery (HCC) I63.40   2. Poor appetite R63.0   3. Insomnia, unspecified type G47.00   4. Dysarthria R47.1   5. Dysphagia, unspecified type R13.10   6. Sleeping difficulty G47.9   7. Vitamin D deficiency E55.9        Rehab/Neuro:   1. Bilateral cardioembolic stroke, largest right MCA territory 1/2020: Reviewed all pertinent previous medical records leading up to today's clinic visit.  Initial imaging reports reviewed.  -Unable to get into outpatient therapies after being discharged from home health.  Does have appointment set up early April, unclear if outpatient therapies will stay open amidst public health concern for coronavirus  - Counseled importance of continuing to do cognitive exercises like crossword puzzle  - Counseled on importance of continuing to go to outpatient therapy even if it is at a much future date    Assessment of Current Functional Status: Patient is ambulatory without assistive device and  "does not need supervision.  Cognitive deficits appear to be his biggest barrier per his brother, Ward.  However, on very superficial cognitive screen patient is much improved compared to where he was prior.  Patient and brother agree he is improving even since ARU discharge.    Spasticity: No spasticity appreciated on exam    Neuropathic Pain: Patient denies neuropathic pain    Neurogenic Bladder: Patient denies bladder issues.  Continent, volitional.    Neurogenic Bowel: Patient denies bladder issues.  Continent, volitional.    Mood/Sleep:   1. Secondary to adjustment disorder resulting from #1 in \"neuro/rehab section\": Continued altered sleep cycle.  Brother has improved with sleep hygiene.  -Counseled importance of sleep hygiene and good sleep schedule for continued recovery.  -Counseled on increasing Remeron.  - Increase Remeron to 30 mg nightly.    GI/Diet:   1. Poor appetite secondary to #1 in \"rehab/neuro\" section:  -PRESCRIPTION sent for increase in Remeron to 30 mg nightly to assist with sleep and appetite.    Follow up: 3 months    Total time spent face to face with patient was 26 minutes. Greater then 50% of my visit was spent on counseling and coordination of care regarding the primary medical diagnosis, secondary medical complications, patient on their condition, best management practices, and risks and benefits of treatment as aforementioned in the assessment and plan. Extensive discussion involved the patient and brother, Ward.    Please note that this dictation was created using voice recognition software. I have made every reasonable attempt to correct obvious errors but there may be errors of grammar and content that I may have overlooked prior to finalization of this note.    Dr. Concepción Pierre DO, MS  Department of Physical Medicine & Rehabilitation  Neuro Rehabilitation Clinic  [facility]  4/2/2020 2:42 PM  "

## 2020-05-05 ENCOUNTER — TELEPHONE (OUTPATIENT)
Dept: PHYSICAL MEDICINE AND REHAB | Facility: REHABILITATION | Age: 85
End: 2020-05-05

## 2020-05-05 NOTE — TELEPHONE ENCOUNTER
"Dr. Pierre wrote, \"Ward should take Yousif to see his PCP. If he has declined that much, he may need to be evaluated to see if something else is going on. Home Health will not be a permanent solution to bathing etc, because they will only assist for so long. He should follow up with PCP to see what other options there are for longer term care assistance after ruling out that Yousif is not declining for another medical reason.\"    I let Ward know above. He said he would contact PCP.  "

## 2020-05-05 NOTE — TELEPHONE ENCOUNTER
Ward, Yousif's brother called and said that his condition has deteriorated since he was last seen by Dr. Pierre. He is not able to feed or bath him. The therapists he has been seeing suggest he have home care. Ward said Yousif is weak, and losing weight. He would like Yousif to have a home nurse and aid to help him bath and to be fed.

## 2020-06-15 ENCOUNTER — APPOINTMENT (RX ONLY)
Dept: URBAN - METROPOLITAN AREA CLINIC 31 | Facility: CLINIC | Age: 85
Setting detail: DERMATOLOGY
End: 2020-06-15

## 2020-06-15 DIAGNOSIS — L57.0 ACTINIC KERATOSIS: ICD-10-CM

## 2020-06-15 DIAGNOSIS — D18.0 HEMANGIOMA: ICD-10-CM

## 2020-06-15 DIAGNOSIS — L82.1 OTHER SEBORRHEIC KERATOSIS: ICD-10-CM

## 2020-06-15 DIAGNOSIS — Z85.828 PERSONAL HISTORY OF OTHER MALIGNANT NEOPLASM OF SKIN: ICD-10-CM

## 2020-06-15 DIAGNOSIS — L81.4 OTHER MELANIN HYPERPIGMENTATION: ICD-10-CM

## 2020-06-15 PROBLEM — C44.1122 BASAL CELL CARCINOMA OF SKIN OF RIGHT LOWER EYELID, INCLUDING CANTHUS: Status: ACTIVE | Noted: 2020-06-15

## 2020-06-15 PROBLEM — D18.01 HEMANGIOMA OF SKIN AND SUBCUTANEOUS TISSUE: Status: ACTIVE | Noted: 2020-06-15

## 2020-06-15 PROCEDURE — ? LIQUID NITROGEN

## 2020-06-15 PROCEDURE — 17003 DESTRUCT PREMALG LES 2-14: CPT

## 2020-06-15 PROCEDURE — ? ADDITIONAL NOTES

## 2020-06-15 PROCEDURE — ? COUNSELING

## 2020-06-15 PROCEDURE — 99214 OFFICE O/P EST MOD 30 MIN: CPT | Mod: 25

## 2020-06-15 PROCEDURE — 17000 DESTRUCT PREMALG LESION: CPT

## 2020-06-15 ASSESSMENT — LOCATION DETAILED DESCRIPTION DERM
LOCATION DETAILED: RIGHT SUPERIOR UPPER BACK
LOCATION DETAILED: RIGHT DORSAL THUMB METACARPOPHALANGEAL JOINT
LOCATION DETAILED: RIGHT ULNAR DORSAL HAND
LOCATION DETAILED: LEFT RADIAL DORSAL HAND
LOCATION DETAILED: LEFT SUPERIOR MEDIAL FOREHEAD
LOCATION DETAILED: RIGHT RADIAL DORSAL HAND
LOCATION DETAILED: RIGHT LATERAL MALAR CHEEK
LOCATION DETAILED: RIGHT MID-UPPER BACK
LOCATION DETAILED: RIGHT INFERIOR UPPER BACK

## 2020-06-15 ASSESSMENT — LOCATION ZONE DERM
LOCATION ZONE: HAND
LOCATION ZONE: TRUNK
LOCATION ZONE: FACE
LOCATION ZONE: FINGER

## 2020-06-15 ASSESSMENT — LOCATION SIMPLE DESCRIPTION DERM
LOCATION SIMPLE: RIGHT CHEEK
LOCATION SIMPLE: RIGHT UPPER BACK
LOCATION SIMPLE: RIGHT HAND
LOCATION SIMPLE: LEFT FOREHEAD
LOCATION SIMPLE: RIGHT THUMB
LOCATION SIMPLE: LEFT HAND

## 2020-06-15 NOTE — PROCEDURE: ADDITIONAL NOTES
Additional Notes: Pt cancelled his Mohs surgery in February, as he was hospitalized with a stroke. \\nRefer back to Dr payan for Mohs.
Detail Level: Simple

## 2020-07-07 ENCOUNTER — APPOINTMENT (RX ONLY)
Dept: URBAN - METROPOLITAN AREA CLINIC 31 | Facility: CLINIC | Age: 85
Setting detail: DERMATOLOGY
End: 2020-07-07

## 2020-07-07 PROBLEM — C44.1122 BASAL CELL CARCINOMA OF SKIN OF RIGHT LOWER EYELID, INCLUDING CANTHUS: Status: ACTIVE | Noted: 2020-07-07

## 2020-07-07 PROCEDURE — 13152 CMPLX RPR E/N/E/L 2.6-7.5 CM: CPT

## 2020-07-07 PROCEDURE — ? MEDICATION COUNSELING

## 2020-07-07 PROCEDURE — ? MOHS SURGERY

## 2020-07-07 PROCEDURE — 17311 MOHS 1 STAGE H/N/HF/G: CPT

## 2020-07-07 NOTE — PROCEDURE: MEDICATION COUNSELING
Arava Counseling:  Patient counseled regarding adverse effects of Arava including but not limited to nausea, vomiting, abnormalities in liver function tests. Patients may develop mouth sores, rash, diarrhea, and abnormalities in blood counts. The patient understands that monitoring is required including LFTs and blood counts.  There is a rare possibility of scarring of the liver and lung problems that can occur when taking methotrexate. Persistent nausea, loss of appetite, pale stools, dark urine, cough, and shortness of breath should be reported immediately. Patient advised to discontinue Arava treatment and consult with a physician prior to attempting conception. The patient will have to undergo a treatment to eliminate Arava from the body prior to conception.
Cimetidine Pregnancy And Lactation Text: This medication is Pregnancy Category B and is considered safe during pregnancy. It is also excreted in breast milk and breast feeding isn't recommended.
High Dose Vitamin A Counseling: Side effects reviewed, pt to contact office should one occur.
Hydroquinone Counseling:  Patient advised that medication may result in skin irritation, lightening (hypopigmentation), dryness, and burning.  In the event of skin irritation, the patient was advised to reduce the amount of the drug applied or use it less frequently.  Rarely, spots that are treated with hydroquinone can become darker (pseudoochronosis).  Should this occur, patient instructed to stop medication and call the office. The patient verbalized understanding of the proper use and possible adverse effects of hydroquinone.  All of the patient's questions and concerns were addressed.
Dupixent Counseling: I discussed with the patient the risks of dupilumab including but not limited to eye infection and irritation, cold sores, injection site reactions, worsening of asthma, allergic reactions and increased risk of parasitic infection.  Live vaccines should be avoided while taking dupilumab. Dupilumab will also interact with certain medications such as warfarin and cyclosporine. The patient understands that monitoring is required and they must alert us or the primary physician if symptoms of infection or other concerning signs are noted.
Thalidomide Counseling: I discussed with the patient the risks of thalidomide including but not limited to birth defects, anxiety, weakness, chest pain, dizziness, cough and severe allergy.
Dupixent Pregnancy And Lactation Text: This medication likely crosses the placenta but the risk for the fetus is uncertain. This medication is excreted in breast milk.
Niacinamide Counseling: I recommended taking niacin or niacinamide, also know as vitamin B3, twice daily. Recent evidence suggests that taking vitamin B3 (500 mg twice daily) can reduce the risk of actinic keratoses and non-melanoma skin cancers. Side effects of vitamin B3 include flushing and headache.
Sski Pregnancy And Lactation Text: This medication is Pregnancy Category D and isn't considered safe during pregnancy. It is excreted in breast milk.
Sarecycline Counseling: Patient advised regarding possible photosensitivity and discoloration of the teeth, skin, lips, tongue and gums.  Patient instructed to avoid sunlight, if possible.  When exposed to sunlight, patients should wear protective clothing, sunglasses, and sunscreen.  The patient was instructed to call the office immediately if the following severe adverse effects occur:  hearing changes, easy bruising/bleeding, severe headache, or vision changes.  The patient verbalized understanding of the proper use and possible adverse effects of sarecycline.  All of the patient's questions and concerns were addressed.
Tazorac Counseling:  Patient advised that medication is irritating and drying.  Patient may need to apply sparingly and wash off after an hour before eventually leaving it on overnight.  The patient verbalized understanding of the proper use and possible adverse effects of tazorac.  All of the patient's questions and concerns were addressed.
Cellcept Counseling:  I discussed with the patient the risks of mycophenolate mofetil including but not limited to infection/immunosuppression, GI upset, hypokalemia, hypercholesterolemia, bone marrow suppression, lymphoproliferative disorders, malignancy, GI ulceration/bleed/perforation, colitis, interstitial lung disease, kidney failure, progressive multifocal leukoencephalopathy, and birth defects.  The patient understands that monitoring is required including a baseline creatinine and regular CBC testing. In addition, patient must alert us immediately if symptoms of infection or other concerning signs are noted.
Bactrim Counseling:  I discussed with the patient the risks of sulfa antibiotics including but not limited to GI upset, allergic reaction, drug rash, diarrhea, dizziness, photosensitivity, and yeast infections.  Rarely, more serious reactions can occur including but not limited to aplastic anemia, agranulocytosis, methemoglobinemia, blood dyscrasias, liver or kidney failure, lung infiltrates or desquamative/blistering drug rashes.
High Dose Vitamin A Pregnancy And Lactation Text: High dose vitamin A therapy is contraindicated during pregnancy and breast feeding.
Cellcept Pregnancy And Lactation Text: This medication is Pregnancy Category D and isn't considered safe during pregnancy. It is unknown if this medication is excreted in breast milk.
Doxepin Counseling:  Patient advised that the medication is sedating and not to drive a car after taking this medication. Patient informed of potential adverse effects including but not limited to dry mouth, urinary retention, and blurry vision.  The patient verbalized understanding of the proper use and possible adverse effects of doxepin.  All of the patient's questions and concerns were addressed.
Arava Pregnancy And Lactation Text: This medication is Pregnancy Category X and is absolutely contraindicated during pregnancy. It is unknown if it is excreted in breast milk.
Hydroquinone Pregnancy And Lactation Text: This medication has not been assigned a Pregnancy Risk Category but animal studies failed to show danger with the topical medication. It is unknown if the medication is excreted in breast milk.
Sarecycline Pregnancy And Lactation Text: This medication is Pregnancy Category D and not consider safe during pregnancy. It is also excreted in breast milk.
Niacinamide Pregnancy And Lactation Text: These medications are considered safe during pregnancy.
Bactrim Pregnancy And Lactation Text: This medication is Pregnancy Category D and is known to cause fetal risk.  It is also excreted in breast milk.
Enbrel Counseling:  I discussed with the patient the risks of etanercept including but not limited to myelosuppression, immunosuppression, autoimmune hepatitis, demyelinating diseases, lymphoma, and infections.  The patient understands that monitoring is required including a PPD at baseline and must alert us or the primary physician if symptoms of infection or other concerning signs are noted.
Tazorac Pregnancy And Lactation Text: This medication is not safe during pregnancy. It is unknown if this medication is excreted in breast milk.
Topical Clindamycin Counseling: Patient counseled that this medication may cause skin irritation or allergic reactions.  In the event of skin irritation, the patient was advised to reduce the amount of the drug applied or use it less frequently.   The patient verbalized understanding of the proper use and possible adverse effects of clindamycin.  All of the patient's questions and concerns were addressed.
Tranexamic Acid Counseling:  Patient advised of the small risk of bleeding problems with tranexamic acid. They were also instructed to call if they developed any nausea, vomiting or diarrhea. All of the patient's questions and concerns were addressed.
Cyclophosphamide Counseling:  I discussed with the patient the risks of cyclophosphamide including but not limited to hair loss, hormonal abnormalities, decreased fertility, abdominal pain, diarrhea, nausea and vomiting, bone marrow suppression and infection. The patient understands that monitoring is required while taking this medication.
Imiquimod Counseling:  I discussed with the patient the risks of imiquimod including but not limited to erythema, scaling, itching, weeping, crusting, and pain.  Patient understands that the inflammatory response to imiquimod is variable from person to person and was educated regarded proper titration schedule.  If flu-like symptoms develop, patient knows to discontinue the medication and contact us.
Cephalexin Counseling: I counseled the patient regarding use of cephalexin as an antibiotic for prophylactic and/or therapeutic purposes. Cephalexin (commonly prescribed under brand name Keflex) is a cephalosporin antibiotic which is active against numerous classes of bacteria, including most skin bacteria. Side effects may include nausea, diarrhea, gastrointestinal upset, rash, hives, yeast infections, and in rare cases, hepatitis, kidney disease, seizures, fever, confusion, neurologic symptoms, and others. Patients with severe allergies to penicillin medications are cautioned that there is about a 10% incidence of cross-reactivity with cephalosporins. When possible, patients with penicillin allergies should use alternatives to cephalosporins for antibiotic therapy.
Clofazimine Counseling:  I discussed with the patient the risks of clofazimine including but not limited to skin and eye pigmentation, liver damage, nausea/vomiting, gastrointestinal bleeding and allergy.
Doxepin Pregnancy And Lactation Text: This medication is Pregnancy Category C and it isn't known if it is safe during pregnancy. It is also excreted in breast milk and breast feeding isn't recommended.
Enbrel Pregnancy And Lactation Text: This medication is Pregnancy Category B and is considered safe during pregnancy. It is unknown if this medication is excreted in breast milk.
Tetracycline Counseling: Patient counseled regarding possible photosensitivity and increased risk for sunburn.  Patient instructed to avoid sunlight, if possible.  When exposed to sunlight, patients should wear protective clothing, sunglasses, and sunscreen.  The patient was instructed to call the office immediately if the following severe adverse effects occur:  hearing changes, easy bruising/bleeding, severe headache, or vision changes.  The patient verbalized understanding of the proper use and possible adverse effects of tetracycline.  All of the patient's questions and concerns were addressed. Patient understands to avoid pregnancy while on therapy due to potential birth defects.
Nsaids Counseling: NSAID Counseling: I discussed with the patient that NSAIDs should be taken with food. Prolonged use of NSAIDs can result in the development of stomach ulcers.  Patient advised to stop taking NSAIDs if abdominal pain occurs.  The patient verbalized understanding of the proper use and possible adverse effects of NSAIDs.  All of the patient's questions and concerns were addressed.
Nsaids Pregnancy And Lactation Text: These medications are considered safe up to 30 weeks gestation. It is excreted in breast milk.
Hydroxyzine Counseling: Patient advised that the medication is sedating and not to drive a car after taking this medication.  Patient informed of potential adverse effects including but not limited to dry mouth, urinary retention, and blurry vision.  The patient verbalized understanding of the proper use and possible adverse effects of hydroxyzine.  All of the patient's questions and concerns were addressed.
Clofazimine Pregnancy And Lactation Text: This medication is Pregnancy Category C and isn't considered safe during pregnancy. It is excreted in breast milk.
Benzoyl Peroxide Counseling: Patient counseled that medicine may cause skin irritation and bleach clothing.  In the event of skin irritation, the patient was advised to reduce the amount of the drug applied or use it less frequently.   The patient verbalized understanding of the proper use and possible adverse effects of benzoyl peroxide.  All of the patient's questions and concerns were addressed.
Tranexamic Acid Pregnancy And Lactation Text: It is unknown if this medication is safe during pregnancy or breast feeding.
Imiquimod Pregnancy And Lactation Text: This medication is Pregnancy Category C. It is unknown if this medication is excreted in breast milk.
Cephalexin Pregnancy And Lactation Text: This medication is Pregnancy Category B and considered safe during pregnancy.  It is also excreted in breast milk but can be used safely for shorter doses.
Humira Counseling:  I discussed with the patient the risks of adalimumab including but not limited to myelosuppression, immunosuppression, autoimmune hepatitis, demyelinating diseases, lymphoma, and serious infections.  The patient understands that monitoring is required including a PPD at baseline and must alert us or the primary physician if symptoms of infection or other concerning signs are noted.
Topical Clindamycin Pregnancy And Lactation Text: This medication is Pregnancy Category B and is considered safe during pregnancy. It is unknown if it is excreted in breast milk.
Cyclophosphamide Pregnancy And Lactation Text: This medication is Pregnancy Category D and it isn't considered safe during pregnancy. This medication is excreted in breast milk.
Topical Sulfur Applications Counseling: Topical Sulfur Counseling: Patient counseled that this medication may cause skin irritation or allergic reactions.  In the event of skin irritation, the patient was advised to reduce the amount of the drug applied or use it less frequently.   The patient verbalized understanding of the proper use and possible adverse effects of topical sulfur application.  All of the patient's questions and concerns were addressed.
Valtrex Counseling: I discussed with the patient the risks of valacyclovir including but not limited to kidney damage, nausea, vomiting and severe allergy.  The patient understands that if the infection seems to be worsening or is not improving, they are to call.
Benzoyl Peroxide Pregnancy And Lactation Text: This medication is Pregnancy Category C. It is unknown if benzoyl peroxide is excreted in breast milk.
Clindamycin Counseling: I counseled the patient regarding use of clindamycin as an antibiotic for prophylactic and/or therapeutic purposes. Clindamycin is active against numerous classes of bacteria, including skin bacteria. Side effects may include nausea, diarrhea, gastrointestinal upset, rash, hives, yeast infections, and in rare cases, colitis.
Cyclosporine Counseling:  I discussed with the patient the risks of cyclosporine including but not limited to hypertension, gingival hyperplasia,myelosuppression, immunosuppression, liver damage, kidney damage, neurotoxicity, lymphoma, and serious infections. The patient understands that monitoring is required including baseline blood pressure, CBC, CMP, lipid panel and uric acid, and then 1-2 times monthly CMP and blood pressure.
Hydroxyzine Pregnancy And Lactation Text: This medication is not safe during pregnancy and should not be taken. It is also excreted in breast milk and breast feeding isn't recommended.
Colchicine Counseling:  Patient counseled regarding adverse effects including but not limited to stomach upset (nausea, vomiting, stomach pain, or diarrhea).  Patient instructed to limit alcohol consumption while taking this medication.  Colchicine may reduce blood counts especially with prolonged use.  The patient understands that monitoring of kidney function and blood counts may be required, especially at baseline. The patient verbalized understanding of the proper use and possible adverse effects of colchicine.  All of the patient's questions and concerns were addressed.
Minoxidil Counseling: Minoxidil is a topical medication which can increase blood flow where it is applied. It is uncertain how this medication increases hair growth. Side effects are uncommon and include stinging and allergic reactions.
Odomzo Counseling- I discussed with the patient the risks of Odomzo including but not limited to nausea, vomiting, diarrhea, constipation, weight loss, changes in the sense of taste, decreased appetite, muscle spasms, and hair loss.  The patient verbalized understanding of the proper use and possible adverse effects of Odomzo.  All of the patient's questions and concerns were addressed.
Stelara Counseling:  I discussed with the patient the risks of ustekinumab including but not limited to immunosuppression, malignancy, posterior leukoencephalopathy syndrome, and serious infections.  The patient understands that monitoring is required including a PPD at baseline and must alert us or the primary physician if symptoms of infection or other concerning signs are noted.
Ilumya Counseling: I discussed with the patient the risks of tildrakizumab including but not limited to immunosuppression, malignancy, posterior leukoencephalopathy syndrome, and serious infections.  The patient understands that monitoring is required including a PPD at baseline and must alert us or the primary physician if symptoms of infection or other concerning signs are noted.
Clindamycin Pregnancy And Lactation Text: This medication can be used in pregnancy if certain situations. Clindamycin is also present in breast milk.
Fluconazole Counseling:  Patient counseled regarding adverse effects of fluconazole including but not limited to headache, diarrhea, nausea, upset stomach, liver function test abnormalities, taste disturbance, and stomach pain.  There is a rare possibility of liver failure that can occur when taking fluconazole.  The patient understands that monitoring of LFTs and kidney function test may be required, especially at baseline. The patient verbalized understanding of the proper use and possible adverse effects of fluconazole.  All of the patient's questions and concerns were addressed.
Topical Sulfur Applications Pregnancy And Lactation Text: This medication is Pregnancy Category C and has an unknown safety profile during pregnancy. It is unknown if this topical medication is excreted in breast milk.
Carac Counseling:  I discussed with the patient the risks of Carac including but not limited to erythema, scaling, itching, weeping, crusting, and pain.
Cyclosporine Pregnancy And Lactation Text: This medication is Pregnancy Category C and it isn't know if it is safe during pregnancy. This medication is excreted in breast milk.
Dapsone Counseling: I discussed with the patient the risks of dapsone including but not limited to hemolytic anemia, agranulocytosis, rashes, methemoglobinemia, kidney failure, peripheral neuropathy, headaches, GI upset, and liver toxicity.  Patients who start dapsone require monitoring including baseline LFTs and weekly CBCs for the first month, then every month thereafter.  The patient verbalized understanding of the proper use and possible adverse effects of dapsone.  All of the patient's questions and concerns were addressed.
Mirvaso Counseling: Mirvaso is a topical medication which can decrease superficial blood flow where applied. Side effects are uncommon and include stinging, redness and allergic reactions.
Valtrex Pregnancy And Lactation Text: this medication is Pregnancy Category B and is considered safe during pregnancy. This medication is not directly found in breast milk but it's metabolite acyclovir is present.
Mirvaso Pregnancy And Lactation Text: This medication has not been assigned a Pregnancy Risk Category. It is unknown if the medication is excreted in breast milk.
Methotrexate Counseling:  Patient counseled regarding adverse effects of methotrexate including but not limited to nausea, vomiting, abnormalities in liver function tests. Patients may develop mouth sores, rash, diarrhea, and abnormalities in blood counts. The patient understands that monitoring is required including LFT's and blood counts.  There is a rare possibility of scarring of the liver and lung problems that can occur when taking methotrexate. Persistent nausea, loss of appetite, pale stools, dark urine, cough, and shortness of breath should be reported immediately. Patient advised to discontinue methotrexate treatment at least three months before attempting to become pregnant.  I discussed the need for folate supplements while taking methotrexate.  These supplements can decrease side effects during methotrexate treatment. The patient verbalized understanding of the proper use and possible adverse effects of methotrexate.  All of the patient's questions and concerns were addressed.
Taltz Counseling: I discussed with the patient the risks of ixekizumab including but not limited to immunosuppression, serious infections, worsening of inflammatory bowel disease and drug reactions.  The patient understands that monitoring is required including a PPD at baseline and must alert us or the primary physician if symptoms of infection or other concerning signs are noted.
Doxycycline Counseling:  Patient counseled regarding possible photosensitivity and increased risk for sunburn.  Patient instructed to avoid sunlight, if possible.  When exposed to sunlight, patients should wear protective clothing, sunglasses, and sunscreen.  The patient was instructed to call the office immediately if the following severe adverse effects occur:  hearing changes, easy bruising/bleeding, severe headache, or vision changes.  The patient verbalized understanding of the proper use and possible adverse effects of doxycycline.  All of the patient's questions and concerns were addressed.
Fluconazole Pregnancy And Lactation Text: This medication is Pregnancy Category C and it isn't know if it is safe during pregnancy. It is also excreted in breast milk.
Albendazole Counseling:  I discussed with the patient the risks of albendazole including but not limited to cytopenia, kidney damage, nausea/vomiting and severe allergy.  The patient understands that this medication is being used in an off-label manner.
Wartpeel Counseling:  I discussed with the patient the risks of Wartpeel including but not limited to erythema, scaling, itching, weeping, crusting, and pain.
Carac Pregnancy And Lactation Text: This medication is Pregnancy Category X and contraindicated in pregnancy and in women who may become pregnant. It is unknown if this medication is excreted in breast milk.
Doxycycline Pregnancy And Lactation Text: This medication is Pregnancy Category D and not consider safe during pregnancy. It is also excreted in breast milk but is considered safe for shorter treatment courses.
Otezla Counseling: The side effects of Otezla were discussed with the patient, including but not limited to worsening or new depression, weight loss, diarrhea, nausea, upper respiratory tract infection, and headache. Patient instructed to call the office should any adverse effect occur.  The patient verbalized understanding of the proper use and possible adverse effects of Otezla.  All the patient's questions and concerns were addressed.
Ilumya Pregnancy And Lactation Text: The risk during pregnancy and breastfeeding is uncertain with this medication.
Use Enhanced Medication Counseling?: No
Dapsone Pregnancy And Lactation Text: This medication is Pregnancy Category C and is not considered safe during pregnancy or breast feeding.
Griseofulvin Counseling:  I discussed with the patient the risks of griseofulvin including but not limited to photosensitivity, cytopenia, liver damage, nausea/vomiting and severe allergy.  The patient understands that this medication is best absorbed when taken with a fatty meal (e.g., ice cream or french fries).
Infliximab Counseling:  I discussed with the patient the risks of infliximab including but not limited to myelosuppression, immunosuppression, autoimmune hepatitis, demyelinating diseases, lymphoma, and serious infections.  The patient understands that monitoring is required including a PPD at baseline and must alert us or the primary physician if symptoms of infection or other concerning signs are noted.
Methotrexate Pregnancy And Lactation Text: This medication is Pregnancy Category X and is known to cause fetal harm. This medication is excreted in breast milk.
Calcipotriene Counseling:  I discussed with the patient the risks of calcipotriene including but not limited to erythema, scaling, itching, and irritation.
Albendazole Pregnancy And Lactation Text: This medication is Pregnancy Category C and it isn't known if it is safe during pregnancy. It is also excreted in breast milk.
Oxybutynin Counseling:  I discussed with the patient the risks of oxybutynin including but not limited to skin rash, drowsiness, dry mouth, difficulty urinating, and blurred vision.
Otezla Pregnancy And Lactation Text: This medication is Pregnancy Category C and it isn't known if it is safe during pregnancy. It is unknown if it is excreted in breast milk.
Erivedge Counseling- I discussed with the patient the risks of Erivedge including but not limited to nausea, vomiting, diarrhea, constipation, weight loss, changes in the sense of taste, decreased appetite, muscle spasms, and hair loss.  The patient verbalized understanding of the proper use and possible adverse effects of Erivedge.  All of the patient's questions and concerns were addressed.
Ivermectin Counseling:  Patient instructed to take medication on an empty stomach with a full glass of water.  Patient informed of potential adverse effects including but not limited to nausea, diarrhea, dizziness, itching, and swelling of the extremities or lymph nodes.  The patient verbalized understanding of the proper use and possible adverse effects of ivermectin.  All of the patient's questions and concerns were addressed.
Picato Counseling:  I discussed with the patient the risks of Picato including but not limited to erythema, scaling, itching, weeping, crusting, and pain.
Erythromycin Counseling:  I discussed with the patient the risks of erythromycin including but not limited to GI upset, allergic reaction, drug rash, diarrhea, increase in liver enzymes, and yeast infections.
Tremfya Counseling: I discussed with the patient the risks of guselkumab including but not limited to immunosuppression, serious infections, worsening of inflammatory bowel disease and drug reactions.  The patient understands that monitoring is required including a PPD at baseline and must alert us or the primary physician if symptoms of infection or other concerning signs are noted.
Griseofulvin Pregnancy And Lactation Text: This medication is Pregnancy Category X and is known to cause serious birth defects. It is unknown if this medication is excreted in breast milk but breast feeding should be avoided.
Zyclara Counseling:  I discussed with the patient the risks of imiquimod including but not limited to erythema, scaling, itching, weeping, crusting, and pain.  Patient understands that the inflammatory response to imiquimod is variable from person to person and was educated regarded proper titration schedule.  If flu-like symptoms develop, patient knows to discontinue the medication and contact us.
Calcipotriene Pregnancy And Lactation Text: This medication has not been proven safe during pregnancy. It is unknown if this medication is excreted in breast milk.
Prednisone Counseling:  I discussed with the patient the risks of prolonged use of prednisone including but not limited to weight gain, insomnia, osteoporosis, mood changes, diabetes, susceptibility to infection, glaucoma and high blood pressure.  In cases where prednisone use is prolonged, patients should be monitored with blood pressure checks, serum glucose levels and an eye exam.  Additionally, the patient may need to be placed on GI prophylaxis, PCP prophylaxis, and calcium and vitamin D supplementation and/or a bisphosphonate.  The patient verbalized understanding of the proper use and the possible adverse effects of prednisone.  All of the patient's questions and concerns were addressed.
5-Fu Counseling: 5-Fluorouracil Counseling:  I discussed with the patient the risks of 5-fluorouracil including but not limited to erythema, scaling, itching, weeping, crusting, and pain.
Erythromycin Pregnancy And Lactation Text: This medication is Pregnancy Category B and is considered safe during pregnancy. It is also excreted in breast milk.
Finasteride Male Counseling: Finasteride Counseling:  I discussed with the patient the risks of use of finasteride including but not limited to decreased libido, decreased ejaculate volume, gynecomastia, and depression. Women should not handle medication.  All of the patient's questions and concerns were addressed.
Detail Level: Detailed
Protopic Counseling: Patient may experience a mild burning sensation during topical application. Protopic is not approved in children less than 2 years of age. There have been case reports of hematologic and skin malignancies in patients using topical calcineurin inhibitors although causality is questionable.
Rituxan Counseling:  I discussed with the patient the risks of Rituxan infusions. Side effects can include infusion reactions, severe drug rashes including mucocutaneous reactions, reactivation of latent hepatitis and other infections and rarely progressive multifocal leukoencephalopathy.  All of the patient's questions and concerns were addressed.
Itraconazole Counseling:  I discussed with the patient the risks of itraconazole including but not limited to liver damage, nausea/vomiting, neuropathy, and severe allergy.  The patient understands that this medication is best absorbed when taken with acidic beverages such as non-diet cola or ginger ale.  The patient understands that monitoring is required including baseline LFTs and repeat LFTs at intervals.  The patient understands that they are to contact us or the primary physician if concerning signs are noted.
Propranolol Counseling:  I discussed with the patient the risks of propranolol including but not limited to low heart rate, low blood pressure, low blood sugar, restlessness and increased cold sensitivity. They should call the office if they experience any of these side effects.
Rituxan Pregnancy And Lactation Text: This medication is Pregnancy Category C and it isn't know if it is safe during pregnancy. It is unknown if this medication is excreted in breast milk but similar antibodies are known to be excreted.
Metronidazole Counseling:  I discussed with the patient the risks of metronidazole including but not limited to seizures, nausea/vomiting, a metallic taste in the mouth, nausea/vomiting and severe allergy.
Xeljanz Counseling: I discussed with the patient the risks of Xeljanz therapy including increased risk of infection, liver issues, headache, diarrhea, or cold symptoms. Live vaccines should be avoided. They were instructed to call if they have any problems.
Finasteride Pregnancy And Lactation Text: This medication is absolutely contraindicated during pregnancy. It is unknown if it is excreted in breast milk.
Protopic Pregnancy And Lactation Text: This medication is Pregnancy Category C. It is unknown if this medication is excreted in breast milk when applied topically.
Propranolol Pregnancy And Lactation Text: This medication is Pregnancy Category C and it isn't known if it is safe during pregnancy. It is excreted in breast milk.
Drysol Counseling:  I discussed with the patient the risks of drysol/aluminum chloride including but not limited to skin rash, itching, irritation, burning.
Acitretin Counseling:  I discussed with the patient the risks of acitretin including but not limited to hair loss, dry lips/skin/eyes, liver damage, hyperlipidemia, depression/suicidal ideation, photosensitivity.  Serious rare side effects can include but are not limited to pancreatitis, pseudotumor cerebri, bony changes, clot formation/stroke/heart attack.  Patient understands that alcohol is contraindicated since it can result in liver toxicity and significantly prolong the elimination of the drug by many years.
Ketoconazole Counseling:   Patient counseled regarding improving absorption with orange juice.  Adverse effects include but are not limited to breast enlargement, headache, diarrhea, nausea, upset stomach, liver function test abnormalities, taste disturbance, and stomach pain.  There is a rare possibility of liver failure that can occur when taking ketoconazole. The patient understands that monitoring of LFTs may be required, especially at baseline. The patient verbalized understanding of the proper use and possible adverse effects of ketoconazole.  All of the patient's questions and concerns were addressed.
Metronidazole Pregnancy And Lactation Text: This medication is Pregnancy Category B and considered safe during pregnancy.  It is also excreted in breast milk.
Minocycline Counseling: Patient advised regarding possible photosensitivity and discoloration of the teeth, skin, lips, tongue and gums.  Patient instructed to avoid sunlight, if possible.  When exposed to sunlight, patients should wear protective clothing, sunglasses, and sunscreen.  The patient was instructed to call the office immediately if the following severe adverse effects occur:  hearing changes, easy bruising/bleeding, severe headache, or vision changes.  The patient verbalized understanding of the proper use and possible adverse effects of minocycline.  All of the patient's questions and concerns were addressed.
Birth Control Pills Counseling: Birth Control Pill Counseling: I discussed with the patient the potential side effects of OCPs including but not limited to increased risk of stroke, heart attack, thrombophlebitis, deep venous thrombosis, hepatic adenomas, breast changes, GI upset, headaches, and depression.  The patient verbalized understanding of the proper use and possible adverse effects of OCPs. All of the patient's questions and concerns were addressed.
Rhofade Counseling: Rhofade is a topical medication which can decrease superficial blood flow where applied. Side effects are uncommon and include stinging, redness and allergic reactions.
Xelyukiz Pregnancy And Lactation Text: This medication is Pregnancy Category D and is not considered safe during pregnancy.  The risk during breast feeding is also uncertain.
Siliq Counseling:  I discussed with the patient the risks of Siliq including but not limited to new or worsening depression, suicidal thoughts and behavior, immunosuppression, malignancy, posterior leukoencephalopathy syndrome, and serious infections.  The patient understands that monitoring is required including a PPD at baseline and must alert us or the primary physician if symptoms of infection or other concerning signs are noted. There is also a special program designed to monitor depression which is required with Siliq.
Gabapentin Counseling: I discussed with the patient the risks of gabapentin including but not limited to dizziness, somnolence, fatigue and ataxia.
Ketoconazole Pregnancy And Lactation Text: This medication is Pregnancy Category C and it isn't know if it is safe during pregnancy. It is also excreted in breast milk and breast feeding isn't recommended.
Drysol Pregnancy And Lactation Text: This medication is considered safe during pregnancy and breast feeding.
Acitretin Pregnancy And Lactation Text: This medication is Pregnancy Category X and should not be given to women who are pregnant or may become pregnant in the future. This medication is excreted in breast milk.
Cimzia Counseling:  I discussed with the patient the risks of Cimzia including but not limited to immunosuppression, allergic reactions and infections.  The patient understands that monitoring is required including a PPD at baseline and must alert us or the primary physician if symptoms of infection or other concerning signs are noted.
Xolair Counseling:  Patient informed of potential adverse effects including but not limited to fever, muscle aches, rash and allergic reactions.  The patient verbalized understanding of the proper use and possible adverse effects of Xolair.  All of the patient's questions and concerns were addressed.
Xolair Pregnancy And Lactation Text: This medication is Pregnancy Category B and is considered safe during pregnancy. This medication is excreted in breast milk.
Simponi Counseling:  I discussed with the patient the risks of golimumab including but not limited to myelosuppression, immunosuppression, autoimmune hepatitis, demyelinating diseases, lymphoma, and serious infections.  The patient understands that monitoring is required including a PPD at baseline and must alert us or the primary physician if symptoms of infection or other concerning signs are noted.
Opioid Counseling: I discussed with the patient the potential side effects of opioids including but not limited to addiction, altered mental status, and depression. I stressed avoiding alcohol, benzodiazepines, muscle relaxants and sleep aids unless specifically okayed by a physician. The patient verbalized understanding of the proper use and possible adverse effects of opioids. All of the patient's questions and concerns were addressed. They were instructed to flush the remaining pills down the toilet if they did not need them for pain.
Terbinafine Counseling: Patient counseling regarding adverse effects of terbinafine including but not limited to headache, diarrhea, rash, upset stomach, liver function test abnormalities, itching, taste/smell disturbance, nausea, abdominal pain, and flatulence.  There is a rare possibility of liver failure that can occur when taking terbinafine.  The patient understands that a baseline LFT and kidney function test may be required. The patient verbalized understanding of the proper use and possible adverse effects of terbinafine.  All of the patient's questions and concerns were addressed.
Bexarotene Counseling:  I discussed with the patient the risks of bexarotene including but not limited to hair loss, dry lips/skin/eyes, liver abnormalities, hyperlipidemia, pancreatitis, depression/suicidal ideation, photosensitivity, drug rash/allergic reactions, hypothyroidism, anemia, leukopenia, infection, cataracts, and teratogenicity.  Patient understands that they will need regular blood tests to check lipid profile, liver function tests, white blood cell count, thyroid function tests and pregnancy test if applicable.
Elidel Counseling: Patient may experience a mild burning sensation during topical application. Elidel is not approved in children less than 2 years of age. There have been case reports of hematologic and skin malignancies in patients using topical calcineurin inhibitors although causality is questionable.
Birth Control Pills Pregnancy And Lactation Text: This medication should be avoided if pregnant and for the first 30 days post-partum.
Quinolones Counseling:  I discussed with the patient the risks of fluoroquinolones including but not limited to GI upset, allergic reaction, drug rash, diarrhea, dizziness, photosensitivity, yeast infections, liver function test abnormalities, tendonitis/tendon rupture.
Cimzia Pregnancy And Lactation Text: This medication crosses the placenta but can be considered safe in certain situations. Cimzia may be excreted in breast milk.
Glycopyrrolate Counseling:  I discussed with the patient the risks of glycopyrrolate including but not limited to skin rash, drowsiness, dry mouth, difficulty urinating, and blurred vision.
Solaraze Counseling:  I discussed with the patient the risks of Solaraze including but not limited to erythema, scaling, itching, weeping, crusting, and pain.
Solaraze Pregnancy And Lactation Text: This medication is Pregnancy Category B and is considered safe. There is some data to suggest avoiding during the third trimester. It is unknown if this medication is excreted in breast milk.
Bexarotene Pregnancy And Lactation Text: This medication is Pregnancy Category X and should not be given to women who are pregnant or may become pregnant. This medication should not be used if you are breast feeding.
Spironolactone Counseling: Patient advised regarding risks of diarrhea, abdominal pain, hyperkalemia, birth defects (for female patients), liver toxicity and renal toxicity. The patient may need blood work to monitor liver and kidney function and potassium levels while on therapy. The patient verbalized understanding of the proper use and possible adverse effects of spironolactone.  All of the patient's questions and concerns were addressed.
Opioid Pregnancy And Lactation Text: These medications can lead to premature delivery and should be avoided during pregnancy. These medications are also present in breast milk in small amounts.
Cosentyx Counseling:  I discussed with the patient the risks of Cosentyx including but not limited to worsening of Crohn's disease, immunosuppression, allergic reactions and infections.  The patient understands that monitoring is required including a PPD at baseline and must alert us or the primary physician if symptoms of infection or other concerning signs are noted.
Glycopyrrolate Pregnancy And Lactation Text: This medication is Pregnancy Category B and is considered safe during pregnancy. It is unknown if it is excreted breast milk.
Hydroxychloroquine Counseling:  I discussed with the patient that a baseline ophthalmologic exam is needed at the start of therapy and every year thereafter while on therapy. A CBC may also be warranted for monitoring.  The side effects of this medication were discussed with the patient, including but not limited to agranulocytosis, aplastic anemia, seizures, rashes, retinopathy, and liver toxicity. Patient instructed to call the office should any adverse effect occur.  The patient verbalized understanding of the proper use and possible adverse effects of Plaquenil.  All the patient's questions and concerns were addressed.
Topical Retinoid counseling:  Patient advised to apply a pea-sized amount only at bedtime and wait 30 minutes after washing their face before applying.  If too drying, patient may add a non-comedogenic moisturizer. The patient verbalized understanding of the proper use and possible adverse effects of retinoids.  All of the patient's questions and concerns were addressed.
Eucrisa Counseling: Patient may experience a mild burning sensation during topical application. Eucrisa is not approved in children less than 2 years of age.
Skyrizi Counseling: I discussed with the patient the risks of risankizumab-rzaa including but not limited to immunosuppression, and serious infections.  The patient understands that monitoring is required including a PPD at baseline and must alert us or the primary physician if symptoms of infection or other concerning signs are noted.
Isotretinoin Counseling: Patient should get monthly blood tests, not donate blood, not drive at night if vision affected, not share medication, and not undergo elective surgery for 6 months after tx completed. Side effects reviewed, pt to contact office should one occur.
Azithromycin Counseling:  I discussed with the patient the risks of azithromycin including but not limited to GI upset, allergic reaction, drug rash, diarrhea, and yeast infections.
Azathioprine Counseling:  I discussed with the patient the risks of azathioprine including but not limited to myelosuppression, immunosuppression, hepatotoxicity, lymphoma, and infections.  The patient understands that monitoring is required including baseline LFTs, Creatinine, possible TPMP genotyping and weekly CBCs for the first month and then every 2 weeks thereafter.  The patient verbalized understanding of the proper use and possible adverse effects of azathioprine.  All of the patient's questions and concerns were addressed.
Spironolactone Pregnancy And Lactation Text: This medication can cause feminization of the male fetus and should be avoided during pregnancy. The active metabolite is also found in breast milk.
Isotretinoin Pregnancy And Lactation Text: This medication is Pregnancy Category X and is considered extremely dangerous during pregnancy. It is unknown if it is excreted in breast milk.
Rifampin Counseling: I discussed with the patient the risks of rifampin including but not limited to liver damage, kidney damage, red-orange body fluids, nausea/vomiting and severe allergy.
Rifampin Pregnancy And Lactation Text: This medication is Pregnancy Category C and it isn't know if it is safe during pregnancy. It is also excreted in breast milk and should not be used if you are breast feeding.
Azithromycin Pregnancy And Lactation Text: This medication is considered safe during pregnancy and is also secreted in breast milk.
SSKI Counseling:  I discussed with the patient the risks of SSKI including but not limited to thyroid abnormalities, metallic taste, GI upset, fever, headache, acne, arthralgias, paraesthesias, lymphadenopathy, easy bleeding, arrhythmias, and allergic reaction.
Hydroxychloroquine Pregnancy And Lactation Text: This medication has been shown to cause fetal harm but it isn't assigned a Pregnancy Risk Category. There are small amounts excreted in breast milk.
Cimetidine Counseling:  I discussed with the patient the risks of Cimetidine including but not limited to gynecomastia, headache, diarrhea, nausea, drowsiness, arrhythmias, pancreatitis, skin rashes, psychosis, bone marrow suppression and kidney toxicity.

## 2020-07-07 NOTE — PROCEDURE: MOHS SURGERY
Body Location Override (Optional - Billing Will Still Be Based On Selected Body Map Location If Applicable): right lateral inferior eyelid
Mohs Case Number: [id number]
Date Of Previous Biopsy (Optional): 12/16/2020
Previous Accession (Optional): 
Biopsy Photograph Reviewed: Yes
Referring Physician (Optional): Pa Andrews MD
Consent Type: Consent 1 (Standard)
Eye Shield Used: No
Surgeon Performing Repair (Optional): Ravin Jerry M.D.
Initial Size Of Lesion: 0.6
X Size Of Lesion In Cm (Optional): 0.4
Number Of Stages: 1
Primary Defect Length In Cm (Final Defect Size - Required For Flaps/Grafts): 0
Repair Type: Complex Repair
Oculoplastic Surgeon Procedure Text (A): After obtaining clear surgical margins the patient was sent to oculoplastics for surgical repair.  The patient understands they will receive post-surgical care and follow-up from the referring physician's office.
Otolaryngologist Procedure Text (A): After obtaining clear surgical margins the patient was sent to otolaryngology for surgical repair.  The patient understands they will receive post-surgical care and follow-up from the referring physician's office.
Plastic Surgeon Procedure Text (A): After obtaining clear surgical margins the patient was sent to plastics for surgical repair.  The patient understands they will receive post-surgical care and follow-up from the referring physician's office.
Mid-Level Procedure Text (A): After obtaining clear surgical margins the patient was sent to a mid-level provider for surgical repair.  The patient understands they will receive post-surgical care and follow-up from the mid-level provider.
Provider Procedure Text (A): After obtaining clear surgical margins the defect was repaired by another provider.
Asc Procedure Text (A): After obtaining clear surgical margins the patient was sent to an ASC for surgical repair.  The patient understands they will receive post-surgical care and follow-up from the ASC physician.
Suturegard Retention Suture: 2-0 Nylon
Retention Suture Bite Size: 3 mm
Length To Time In Minutes Device Was In Place: 10
Simple / Intermediate / Complex Repair - Final Wound Length In Cm: 2.6
Undermining Type: Entire Wound
Debridement Text: The wound edges were debrided prior to proceeding with the closure to facilitate wound healing.
Helical Rim Text: The closure involved the helical rim.
Vermilion Border Text: The closure involved the vermilion border.
Nostril Rim Text: The closure involved the nostril rim.
Retention Suture Text: Retention sutures were placed to support the closure and prevent dehiscence.
Area H Indication Text: Tumors in this location are included in Area H (eyelids, eyebrows, nose, lips, chin, ear, pre-auricular, post-auricular, temple, genitalia, hands, feet, ankles and areola).  Tissue conservation is critical in these anatomic locations.
Area M Indication Text: Tumors in this location are included in Area M (cheek, forehead, scalp, neck, jawline and pretibial skin).  Mohs surgery is indicated for tumors in these anatomic locations.
Area L Indication Text: Tumors in this location are included in Area L (trunk and extremities).  Mohs surgery is indicated for larger tumors, or tumors with aggressive histologic features, in these anatomic locations.
Tumor Debulked?: curette
Surgical Defect Width In Cm (Optional): 0.8
Special Stains Stage 1 - Results: Base On Clearance Noted Above
Stage 2: Additional Anesthesia Type: 1% lidocaine with epinephrine
Include Tumor Staging In Mohs Note?: Please Select the Appropriate Response
Staging Info: By selecting yes to the question above you will include information on AJCC 8 tumor staging in your Mohs note. Information on tumor staging will be automatically added for SCCs on the head and neck. AJCC 8 includes tumor size, tumor depth, perineural involvement and bone invasion.
Tumor Depth: Less than 6mm from granular layer and no invasion beyond the subcutaneous fat
Medical Necessity Statement: Based on my medical judgement, Mohs surgery is the most appropriate treatment for this cancer compared to other treatments.
Alternatives Discussed Intro (Do Not Add Period): I discussed alternative treatments to Mohs surgery and specifically discussed the risks and benefits of
Consent 1/Introductory Paragraph: The rationale for Mohs was explained to the patient and consent was obtained. The risks, benefits and alternatives to therapy were discussed in detail. Specifically, the risks of infection, scarring, bleeding, prolonged wound healing, incomplete removal, allergy to anesthesia, nerve injury and recurrence were addressed. Prior to the procedure, the treatment site was clearly identified and confirmed by the patient. All components of Universal Protocol/PAUSE Rule completed.
Consent 2/Introductory Paragraph: Mohs surgery was explained to the patient and consent was obtained. The risks, benefits and alternatives to therapy were discussed in detail. Specifically, the risks of infection, scarring, bleeding, prolonged wound healing, incomplete removal, allergy to anesthesia, nerve injury and recurrence were addressed. Prior to the procedure, the treatment site was clearly identified and confirmed by the patient. All components of Universal Protocol/PAUSE Rule completed.
Consent 3/Introductory Paragraph: I gave the patient a chance to ask questions they had about the procedure.  Following this I explained the Mohs procedure and consent was obtained. The risks, benefits and alternatives to therapy were discussed in detail. Specifically, the risks of infection, scarring, bleeding, prolonged wound healing, incomplete removal, allergy to anesthesia, nerve injury and recurrence were addressed. Prior to the procedure, the treatment site was clearly identified and confirmed by the patient. All components of Universal Protocol/PAUSE Rule completed.
Consent (Temporal Branch)/Introductory Paragraph: The rationale for Mohs was explained to the patient and consent was obtained. The risks, benefits and alternatives to therapy were discussed in detail. Specifically, the risks of damage to the temporal branch of the facial nerve, infection, scarring, bleeding, prolonged wound healing, incomplete removal, allergy to anesthesia, and recurrence were addressed. Prior to the procedure, the treatment site was clearly identified and confirmed by the patient. All components of Universal Protocol/PAUSE Rule completed.
Consent (Marginal Mandibular)/Introductory Paragraph: The rationale for Mohs was explained to the patient and consent was obtained. The risks, benefits and alternatives to therapy were discussed in detail. Specifically, the risks of damage to the marginal mandibular branch of the facial nerve, infection, scarring, bleeding, prolonged wound healing, incomplete removal, allergy to anesthesia, and recurrence were addressed. Prior to the procedure, the treatment site was clearly identified and confirmed by the patient. All components of Universal Protocol/PAUSE Rule completed.
Consent (Spinal Accessory)/Introductory Paragraph: The rationale for Mohs was explained to the patient and consent was obtained. The risks, benefits and alternatives to therapy were discussed in detail. Specifically, the risks of damage to the spinal accessory nerve, infection, scarring, bleeding, prolonged wound healing, incomplete removal, allergy to anesthesia, and recurrence were addressed. Prior to the procedure, the treatment site was clearly identified and confirmed by the patient. All components of Universal Protocol/PAUSE Rule completed.
Consent (Near Eyelid Margin)/Introductory Paragraph: The rationale for Mohs was explained to the patient and consent was obtained. The risks, benefits and alternatives to therapy were discussed in detail. Specifically, the risks of ectropion or eyelid deformity, infection, scarring, bleeding, prolonged wound healing, incomplete removal, allergy to anesthesia, nerve injury and recurrence were addressed. Prior to the procedure, the treatment site was clearly identified and confirmed by the patient. All components of Universal Protocol/PAUSE Rule completed.
Consent (Ear)/Introductory Paragraph: The rationale for Mohs was explained to the patient and consent was obtained. The risks, benefits and alternatives to therapy were discussed in detail. Specifically, the risks of ear deformity, infection, scarring, bleeding, prolonged wound healing, incomplete removal, allergy to anesthesia, nerve injury and recurrence were addressed. Prior to the procedure, the treatment site was clearly identified and confirmed by the patient. All components of Universal Protocol/PAUSE Rule completed.
Consent (Nose)/Introductory Paragraph: The rationale for Mohs was explained to the patient and consent was obtained. The risks, benefits and alternatives to therapy were discussed in detail. Specifically, the risks of nasal deformity, changes in the flow of air through the nose, infection, scarring, bleeding, prolonged wound healing, incomplete removal, allergy to anesthesia, nerve injury and recurrence were addressed. Prior to the procedure, the treatment site was clearly identified and confirmed by the patient. All components of Universal Protocol/PAUSE Rule completed.
Consent (Lip)/Introductory Paragraph: The rationale for Mohs was explained to the patient and consent was obtained. The risks, benefits and alternatives to therapy were discussed in detail. Specifically, the risks of lip deformity, changes in the oral aperture, infection, scarring, bleeding, prolonged wound healing, incomplete removal, allergy to anesthesia, nerve injury and recurrence were addressed. Prior to the procedure, the treatment site was clearly identified and confirmed by the patient. All components of Universal Protocol/PAUSE Rule completed.
Consent (Scalp)/Introductory Paragraph: The rationale for Mohs was explained to the patient and consent was obtained. The risks, benefits and alternatives to therapy were discussed in detail. Specifically, the risks of changes in hair growth pattern secondary to repair, infection, scarring, bleeding, prolonged wound healing, incomplete removal, allergy to anesthesia, nerve injury and recurrence were addressed. Prior to the procedure, the treatment site was clearly identified and confirmed by the patient. All components of Universal Protocol/PAUSE Rule completed.
Detail Level: Detailed
Postop Diagnosis: same
Anesthesia Volume In Cc: 6
Hemostasis: Electrocautery
Estimated Blood Loss (Cc): minimal
Anesthesia Volume In Cc: 6.5
Epidermal Sutures: 6-0 Nylon
Additional Epidermal Sutures: 5-0 Silk
Epidermal Closure: running
Suturegard Intro: Intraoperative tissue expansion was performed, utilizing the SUTUREGARD device, in order to reduce wound tension.
Suturegard Body: The suture ends were repeatedly re-tightened and re-clamped to achieve the desired tissue expansion.
Hemigard Intro: Due to skin fragility and wound tension, it was decided to use HEMIGARD adhesive retention suture devices to permit a linear closure. The skin was cleaned and dried for a 6cm distance away from the wound. Excessive hair, if present, was removed to allow for adhesion.
Hemigard Postcare Instructions: The HEMIGARD strips are to remain completely dry for at least 5-7 days.
Donor Site Anesthesia Type: same as repair anesthesia
Epidermal Closure Graft Donor Site (Optional): simple interrupted
Graft Donor Site Bandage (Optional-Leave Blank If You Don't Want In Note): Steri-strips and a pressure bandage were applied to the donor site.
Closure 2 Information: This tab is for additional flaps and grafts, including complex repair and grafts and complex repair and flaps. You can also specify a different location for the additional defect, if the location is the same you do not need to select a new one. We will insert the automated text for the repair you select below just as we do for solitary flaps and grafts. Please note that at this time if you select a location with a different insurance zone you will need to override the ICD10 and CPT if appropriate.
Closure 3 Information: This tab is for additional flaps and grafts above and beyond our usual structured repairs.  Please note if you enter information here it will not currently bill and you will need to add the billing information manually.
Wound Care: Aquaphor
Dressing: pressure dressing with telfa
Wound Care (No Sutures): Petrolatum
Dressing (No Sutures): dry sterile dressing
Suture Removal: 14 days
Unna Boot Text: An Unna boot was placed to help immobilize the limb and facilitate more rapid healing.
Home Suture Removal Text: Patient was provided instructions on removing sutures and will remove their sutures at home.  If they have any questions or difficulties they will call the office.
Post-Care Instructions: I reviewed with the patient in detail post-care instructions. Patient is not to engage in any heavy lifting, exercise, or swimming for the next 14 days. Should the patient develop any fevers, chills, bleeding, severe pain patient will contact the office immediately.
Pain Refusal Text: I offered to prescribe pain medication but the patient refused to take this medication.
Mauc Instructions: By selecting yes to the question below the MAUC number will be added into the note.  This will be calculated automatically based on the diagnosis chosen, the size entered, the body zone selected (H,M,L) and the specific indications you chose. You will also have the option to override the Mohs AUC if you disagree with the automatically calculated number and this option is found in the Case Summary tab.
Where Do You Want The Question To Include Opioid Counseling Located?: Case Summary Tab
Eye Protection Verbiage: Before proceeding with the stage, a plastic scleral shield was inserted. The globe was anesthetized with a few drops of 1% lidocaine with 1:100,000 epinephrine. Then, an appropriate sized scleral shield was chosen and coated with lacrilube ointment. The shield was gently inserted and left in place for the duration of each stage. After the stage was completed, the shield was gently removed.
Mohs Method Verbiage: An incision at a 45 degree angle following the standard Mohs approach was done and the specimen was harvested as a microscopic controlled layer.
Surgeon/Pathologist Verbiage (Will Incorporate Name Of Surgeon From Intro If Not Blank): operated in two distinct and integrated capacities as the surgeon and pathologist.
Mohs Histo Method Verbiage: Each section was then chromacoded and processed in the Mohs lab using the Mohs protocol and submitted for frozen section.
Subsequent Stages Histo Method Verbiage: Using a similar technique to that described above, a thin layer of tissue was removed from all areas where tumor was visible on the previous stage.  The tissue was again oriented, mapped, dyed, and processed as above.
Mohs Rapid Report Verbiage: The area of clinically evident tumor was marked with skin marking ink and appropriately hatched.  The initial incision was made following the Mohs approach through the skin.  The specimen was taken to the lab, divided into the necessary number of pieces, chromacoded and processed according to the Mohs protocol.  This was repeated in successive stages until a tumor free defect was achieved.
Complex Repair Preamble Text (Leave Blank If You Do Not Want): Extensive wide undermining was performed.
Intermediate Repair Preamble Text (Leave Blank If You Do Not Want): Undermining was performed with blunt dissection.
Non-Graft Cartilage Fenestration Text: The cartilage was fenestrated with a 2mm punch biopsy to help facilitate healing.
Graft Cartilage Fenestration Text: The cartilage was fenestrated with a 2mm punch biopsy to help facilitate graft survival and healing.
Secondary Intention Text (Leave Blank If You Do Not Want): The defect will heal with secondary intention.
No Repair - Repaired With Adjacent Surgical Defect Text (Leave Blank If You Do Not Want): After obtaining clear surgical margins the defect was repaired concurrently with another surgical defect which was in close approximation.
Advancement Flap (Single) Text: The defect edges were debeveled with a #15 scalpel blade.  Given the location of the defect and the proximity to free margins a single advancement flap was deemed most appropriate.  Using a sterile surgical marker, an appropriate advancement flap was drawn incorporating the defect and placing the expected incisions within the relaxed skin tension lines where possible.    The area thus outlined was incised deep to adipose tissue with a #15 scalpel blade.  The skin margins were undermined to an appropriate distance in all directions utilizing iris scissors.
Advancement Flap (Double) Text: The defect edges were debeveled with a #15 scalpel blade.  Given the location of the defect and the proximity to free margins a double advancement flap was deemed most appropriate.  Using a sterile surgical marker, the appropriate advancement flaps were drawn incorporating the defect and placing the expected incisions within the relaxed skin tension lines where possible.    The area thus outlined was incised deep to adipose tissue with a #15 scalpel blade.  The skin margins were undermined to an appropriate distance in all directions utilizing iris scissors.
Burow's Advancement Flap Text: The defect edges were debeveled with a #15 scalpel blade.  Given the location of the defect and the proximity to free margins a Burow's advancement flap was deemed most appropriate.  Using a sterile surgical marker, the appropriate advancement flap was drawn incorporating the defect and placing the expected incisions within the relaxed skin tension lines where possible.    The area thus outlined was incised deep to adipose tissue with a #15 scalpel blade.  The skin margins were undermined to an appropriate distance in all directions utilizing iris scissors.
Chonodrocutaneous Helical Advancement Flap Text: The defect edges were debeveled with a #15 scalpel blade.  Given the location of the defect and the proximity to free margins a chondrocutaneous helical advancement flap was deemed most appropriate.  Using a sterile surgical marker, the appropriate advancement flap was drawn incorporating the defect and placing the expected incisions within the relaxed skin tension lines where possible.    The area thus outlined was incised deep to adipose tissue with a #15 scalpel blade.  The skin margins were undermined to an appropriate distance in all directions utilizing iris scissors.
Crescentic Advancement Flap Text: The defect edges were debeveled with a #15 scalpel blade.  Given the location of the defect and the proximity to free margins a crescentic advancement flap was deemed most appropriate.  Using a sterile surgical marker, the appropriate advancement flap was drawn incorporating the defect and placing the expected incisions within the relaxed skin tension lines where possible.    The area thus outlined was incised deep to adipose tissue with a #15 scalpel blade.  The skin margins were undermined to an appropriate distance in all directions utilizing iris scissors.
A-T Advancement Flap Text: The defect edges were debeveled with a #15 scalpel blade.  Given the location of the defect, shape of the defect and the proximity to free margins an A-T advancement flap was deemed most appropriate.  Using a sterile surgical marker, an appropriate advancement flap was drawn incorporating the defect and placing the expected incisions within the relaxed skin tension lines where possible.    The area thus outlined was incised deep to adipose tissue with a #15 scalpel blade.  The skin margins were undermined to an appropriate distance in all directions utilizing iris scissors.
O-T Advancement Flap Text: The defect edges were debeveled with a #15 scalpel blade.  Given the location of the defect, shape of the defect and the proximity to free margins an O-T advancement flap was deemed most appropriate.  Using a sterile surgical marker, an appropriate advancement flap was drawn incorporating the defect and placing the expected incisions within the relaxed skin tension lines where possible.    The area thus outlined was incised deep to adipose tissue with a #15 scalpel blade.  The skin margins were undermined to an appropriate distance in all directions utilizing iris scissors.
O-L Flap Text: The defect edges were debeveled with a #15 scalpel blade.  Given the location of the defect, shape of the defect and the proximity to free margins an O-L flap was deemed most appropriate.  Using a sterile surgical marker, an appropriate advancement flap was drawn incorporating the defect and placing the expected incisions within the relaxed skin tension lines where possible.    The area thus outlined was incised deep to adipose tissue with a #15 scalpel blade.  The skin margins were undermined to an appropriate distance in all directions utilizing iris scissors.
O-Z Flap Text: The defect edges were debeveled with a #15 scalpel blade.  Given the location of the defect, shape of the defect and the proximity to free margins an O-Z flap was deemed most appropriate.  Using a sterile surgical marker, an appropriate transposition flap was drawn incorporating the defect and placing the expected incisions within the relaxed skin tension lines where possible. The area thus outlined was incised deep to adipose tissue with a #15 scalpel blade.  The skin margins were undermined to an appropriate distance in all directions utilizing iris scissors.
Double O-Z Flap Text: The defect edges were debeveled with a #15 scalpel blade.  Given the location of the defect, shape of the defect and the proximity to free margins a Double O-Z flap was deemed most appropriate.  Using a sterile surgical marker, an appropriate transposition flap was drawn incorporating the defect and placing the expected incisions within the relaxed skin tension lines where possible. The area thus outlined was incised deep to adipose tissue with a #15 scalpel blade.  The skin margins were undermined to an appropriate distance in all directions utilizing iris scissors.
V-Y Flap Text: The defect edges were debeveled with a #15 scalpel blade.  Given the location of the defect, shape of the defect and the proximity to free margins a V-Y flap was deemed most appropriate.  Using a sterile surgical marker, an appropriate advancement flap was drawn incorporating the defect and placing the expected incisions within the relaxed skin tension lines where possible.    The area thus outlined was incised deep to adipose tissue with a #15 scalpel blade.  The skin margins were undermined to an appropriate distance in all directions utilizing iris scissors.
Advancement-Rotation Flap Text: The defect edges were debeveled with a #15 scalpel blade.  Given the location of the defect, shape of the defect and the proximity to free margins an advancement-rotation flap was deemed most appropriate.  Using a sterile surgical marker, an appropriate flap was drawn incorporating the defect and placing the expected incisions within the relaxed skin tension lines where possible. The area thus outlined was incised deep to adipose tissue with a #15 scalpel blade.  The skin margins were undermined to an appropriate distance in all directions utilizing iris scissors.
Mercedes Flap Text: The defect edges were debeveled with a #15 scalpel blade.  Given the location of the defect, shape of the defect and the proximity to free margins a Mercedes flap was deemed most appropriate.  Using a sterile surgical marker, an appropriate advancement flap was drawn incorporating the defect and placing the expected incisions within the relaxed skin tension lines where possible. The area thus outlined was incised deep to adipose tissue with a #15 scalpel blade.  The skin margins were undermined to an appropriate distance in all directions utilizing iris scissors.
Modified Advancement Flap Text: The defect edges were debeveled with a #15 scalpel blade.  Given the location of the defect, shape of the defect and the proximity to free margins a modified advancement flap was deemed most appropriate.  Using a sterile surgical marker, an appropriate advancement flap was drawn incorporating the defect and placing the expected incisions within the relaxed skin tension lines where possible.    The area thus outlined was incised deep to adipose tissue with a #15 scalpel blade.  The skin margins were undermined to an appropriate distance in all directions utilizing iris scissors.
Mucosal Advancement Flap Text: Given the location of the defect, shape of the defect and the proximity to free margins a mucosal advancement flap was deemed most appropriate. Incisions were made with a 15 blade scalpel in the appropriate fashion along the cutaneous vermilion border and the mucosal lip. The remaining actinically damaged mucosal tissue was excised.  The mucosal advancement flap was then elevated to the gingival sulcus with care taken to preserve the neurovascular structures and advanced into the primary defect. Care was taken to ensure that precise realignment of the vermilion border was achieved.
Peng Advancement Flap Text: The defect edges were debeveled with a #15 scalpel blade.  Given the location of the defect, shape of the defect and the proximity to free margins a Peng advancement flap was deemed most appropriate.  Using a sterile surgical marker, an appropriate advancement flap was drawn incorporating the defect and placing the expected incisions within the relaxed skin tension lines where possible. The area thus outlined was incised deep to adipose tissue with a #15 scalpel blade.  The skin margins were undermined to an appropriate distance in all directions utilizing iris scissors.
Hatchet Flap Text: The defect edges were debeveled with a #15 scalpel blade.  Given the location of the defect, shape of the defect and the proximity to free margins a hatchet flap was deemed most appropriate.  Using a sterile surgical marker, an appropriate hatchet flap was drawn incorporating the defect and placing the expected incisions within the relaxed skin tension lines where possible.    The area thus outlined was incised deep to adipose tissue with a #15 scalpel blade.  The skin margins were undermined to an appropriate distance in all directions utilizing iris scissors.
Rotation Flap Text: The defect edges were debeveled with a #15 scalpel blade.  Given the location of the defect, shape of the defect and the proximity to free margins a rotation flap was deemed most appropriate.  Using a sterile surgical marker, an appropriate rotation flap was drawn incorporating the defect and placing the expected incisions within the relaxed skin tension lines where possible.    The area thus outlined was incised deep to adipose tissue with a #15 scalpel blade.  The skin margins were undermined to an appropriate distance in all directions utilizing iris scissors.
Spiral Flap Text: The defect edges were debeveled with a #15 scalpel blade.  Given the location of the defect, shape of the defect and the proximity to free margins a spiral flap was deemed most appropriate.  Using a sterile surgical marker, an appropriate rotation flap was drawn incorporating the defect and placing the expected incisions within the relaxed skin tension lines where possible. The area thus outlined was incised deep to adipose tissue with a #15 scalpel blade.  The skin margins were undermined to an appropriate distance in all directions utilizing iris scissors.
Star Wedge Flap Text: The defect edges were debeveled with a #15 scalpel blade.  Given the location of the defect, shape of the defect and the proximity to free margins a star wedge flap was deemed most appropriate.  Using a sterile surgical marker, an appropriate rotation flap was drawn incorporating the defect and placing the expected incisions within the relaxed skin tension lines where possible. The area thus outlined was incised deep to adipose tissue with a #15 scalpel blade.  The skin margins were undermined to an appropriate distance in all directions utilizing iris scissors.
Transposition Flap Text: The defect edges were debeveled with a #15 scalpel blade.  Given the location of the defect and the proximity to free margins a transposition flap was deemed most appropriate.  Using a sterile surgical marker, an appropriate transposition flap was drawn incorporating the defect.    The area thus outlined was incised deep to adipose tissue with a #15 scalpel blade.  The skin margins were undermined to an appropriate distance in all directions utilizing iris scissors.
Muscle Hinge Flap Text: The defect edges were debeveled with a #15 scalpel blade.  Given the size, depth and location of the defect and the proximity to free margins a muscle hinge flap was deemed most appropriate.  Using a sterile surgical marker, an appropriate hinge flap was drawn incorporating the defect. The area thus outlined was incised with a #15 scalpel blade.  The skin margins were undermined to an appropriate distance in all directions utilizing iris scissors.
Melolabial Transposition Flap Text: The defect edges were debeveled with a #15 scalpel blade.  Given the location of the defect and the proximity to free margins a melolabial flap was deemed most appropriate.  Using a sterile surgical marker, an appropriate melolabial transposition flap was drawn incorporating the defect.    The area thus outlined was incised deep to adipose tissue with a #15 scalpel blade.  The skin margins were undermined to an appropriate distance in all directions utilizing iris scissors.
Rhombic Flap Text: The defect edges were debeveled with a #15 scalpel blade.  Given the location of the defect and the proximity to free margins a rhombic flap was deemed most appropriate.  Using a sterile surgical marker, an appropriate rhombic flap was drawn incorporating the defect.    The area thus outlined was incised deep to adipose tissue with a #15 scalpel blade.  The skin margins were undermined to an appropriate distance in all directions utilizing iris scissors.
Rhomboid Transposition Flap Text: The defect edges were debeveled with a #15 scalpel blade.  Given the location of the defect and the proximity to free margins a rhomboid transposition flap was deemed most appropriate.  Using a sterile surgical marker, an appropriate rhomboid flap was drawn incorporating the defect.    The area thus outlined was incised deep to adipose tissue with a #15 scalpel blade.  The skin margins were undermined to an appropriate distance in all directions utilizing iris scissors.
Bi-Rhombic Flap Text: The defect edges were debeveled with a #15 scalpel blade.  Given the location of the defect and the proximity to free margins a bi-rhombic flap was deemed most appropriate.  Using a sterile surgical marker, an appropriate rhombic flap was drawn incorporating the defect. The area thus outlined was incised deep to adipose tissue with a #15 scalpel blade.  The skin margins were undermined to an appropriate distance in all directions utilizing iris scissors.
Helical Rim Advancement Flap Text: The defect edges were debeveled with a #15 blade scalpel.  Given the location of the defect and the proximity to free margins (helical rim) a double helical rim advancement flap was deemed most appropriate.  Using a sterile surgical marker, the appropriate advancement flaps were drawn incorporating the defect and placing the expected incisions between the helical rim and antihelix where possible.  The area thus outlined was incised through and through with a #15 scalpel blade.  With a skin hook and iris scissors, the flaps were gently and sharply undermined and freed up.
Bilateral Helical Rim Advancement Flap Text: The defect edges were debeveled with a #15 blade scalpel.  Given the location of the defect and the proximity to free margins (helical rim) a bilateral helical rim advancement flap was deemed most appropriate.  Using a sterile surgical marker, the appropriate advancement flaps were drawn incorporating the defect and placing the expected incisions between the helical rim and antihelix where possible.  The area thus outlined was incised through and through with a #15 scalpel blade.  With a skin hook and iris scissors, the flaps were gently and sharply undermined and freed up.
Ear Star Wedge Flap Text: The defect edges were debeveled with a #15 blade scalpel.  Given the location of the defect and the proximity to free margins (helical rim) an ear star wedge flap was deemed most appropriate.  Using a sterile surgical marker, the appropriate flap was drawn incorporating the defect and placing the expected incisions between the helical rim and antihelix where possible.  The area thus outlined was incised through and through with a #15 scalpel blade.
Banner Transposition Flap Text: The defect edges were debeveled with a #15 scalpel blade.  Given the location of the defect and the proximity to free margins a Banner transposition flap was deemed most appropriate.  Using a sterile surgical marker, an appropriate flap drawn around the defect. The area thus outlined was incised deep to adipose tissue with a #15 scalpel blade.  The skin margins were undermined to an appropriate distance in all directions utilizing iris scissors.
Bilobed Flap Text: The defect edges were debeveled with a #15 scalpel blade.  Given the location of the defect and the proximity to free margins a bilobe flap was deemed most appropriate.  Using a sterile surgical marker, an appropriate bilobe flap drawn around the defect.    The area thus outlined was incised deep to adipose tissue with a #15 scalpel blade.  The skin margins were undermined to an appropriate distance in all directions utilizing iris scissors.
Bilobed Transposition Flap Text: The defect edges were debeveled with a #15 scalpel blade.  Given the location of the defect and the proximity to free margins a bilobed transposition flap was deemed most appropriate.  Using a sterile surgical marker, an appropriate bilobe flap drawn around the defect.    The area thus outlined was incised deep to adipose tissue with a #15 scalpel blade.  The skin margins were undermined to an appropriate distance in all directions utilizing iris scissors.
Trilobed Flap Text: The defect edges were debeveled with a #15 scalpel blade.  Given the location of the defect and the proximity to free margins a trilobed flap was deemed most appropriate.  Using a sterile surgical marker, an appropriate trilobed flap drawn around the defect.    The area thus outlined was incised deep to adipose tissue with a #15 scalpel blade.  The skin margins were undermined to an appropriate distance in all directions utilizing iris scissors.
Dorsal Nasal Flap Text: The defect edges were debeveled with a #15 scalpel blade.  Given the location of the defect and the proximity to free margins a dorsal nasal flap was deemed most appropriate.  Using a sterile surgical marker, an appropriate dorsal nasal flap was drawn around the defect.    The area thus outlined was incised deep to adipose tissue with a #15 scalpel blade.  The skin margins were undermined to an appropriate distance in all directions utilizing iris scissors.
Island Pedicle Flap Text: The defect edges were debeveled with a #15 scalpel blade.  Given the location of the defect, shape of the defect and the proximity to free margins an island pedicle advancement flap was deemed most appropriate.  Using a sterile surgical marker, an appropriate advancement flap was drawn incorporating the defect, outlining the appropriate donor tissue and placing the expected incisions within the relaxed skin tension lines where possible.    The area thus outlined was incised deep to adipose tissue with a #15 scalpel blade.  The skin margins were undermined to an appropriate distance in all directions around the primary defect and laterally outward around the island pedicle utilizing iris scissors.  There was minimal undermining beneath the pedicle flap.
Island Pedicle Flap With Canthal Suspension Text: The defect edges were debeveled with a #15 scalpel blade.  Given the location of the defect, shape of the defect and the proximity to free margins an island pedicle advancement flap was deemed most appropriate.  Using a sterile surgical marker, an appropriate advancement flap was drawn incorporating the defect, outlining the appropriate donor tissue and placing the expected incisions within the relaxed skin tension lines where possible. The area thus outlined was incised deep to adipose tissue with a #15 scalpel blade.  The skin margins were undermined to an appropriate distance in all directions around the primary defect and laterally outward around the island pedicle utilizing iris scissors.  There was minimal undermining beneath the pedicle flap. A suspension suture was placed in the canthal tendon to prevent tension and prevent ectropion.
Alar Island Pedicle Flap Text: The defect edges were debeveled with a #15 scalpel blade.  Given the location of the defect, shape of the defect and the proximity to the alar rim an island pedicle advancement flap was deemed most appropriate.  Using a sterile surgical marker, an appropriate advancement flap was drawn incorporating the defect, outlining the appropriate donor tissue and placing the expected incisions within the nasal ala running parallel to the alar rim. The area thus outlined was incised with a #15 scalpel blade.  The skin margins were undermined minimally to an appropriate distance in all directions around the primary defect and laterally outward around the island pedicle utilizing iris scissors.  There was minimal undermining beneath the pedicle flap.
Double Island Pedicle Flap Text: The defect edges were debeveled with a #15 scalpel blade.  Given the location of the defect, shape of the defect and the proximity to free margins a double island pedicle advancement flap was deemed most appropriate.  Using a sterile surgical marker, an appropriate advancement flap was drawn incorporating the defect, outlining the appropriate donor tissue and placing the expected incisions within the relaxed skin tension lines where possible.    The area thus outlined was incised deep to adipose tissue with a #15 scalpel blade.  The skin margins were undermined to an appropriate distance in all directions around the primary defect and laterally outward around the island pedicle utilizing iris scissors.  There was minimal undermining beneath the pedicle flap.
Island Pedicle Flap-Requiring Vessel Identification Text: The defect edges were debeveled with a #15 scalpel blade.  Given the location of the defect, shape of the defect and the proximity to free margins an island pedicle advancement flap was deemed most appropriate.  Using a sterile surgical marker, an appropriate advancement flap was drawn, based on the axial vessel mentioned above, incorporating the defect, outlining the appropriate donor tissue and placing the expected incisions within the relaxed skin tension lines where possible.    The area thus outlined was incised deep to adipose tissue with a #15 scalpel blade.  The skin margins were undermined to an appropriate distance in all directions around the primary defect and laterally outward around the island pedicle utilizing iris scissors.  There was minimal undermining beneath the pedicle flap.
Keystone Flap Text: The defect edges were debeveled with a #15 scalpel blade.  Given the location of the defect, shape of the defect a keystone flap was deemed most appropriate.  Using a sterile surgical marker, an appropriate keystone flap was drawn incorporating the defect, outlining the appropriate donor tissue and placing the expected incisions within the relaxed skin tension lines where possible. The area thus outlined was incised deep to adipose tissue with a #15 scalpel blade.  The skin margins were undermined to an appropriate distance in all directions around the primary defect and laterally outward around the flap utilizing iris scissors.
O-T Plasty Text: The defect edges were debeveled with a #15 scalpel blade.  Given the location of the defect, shape of the defect and the proximity to free margins an O-T plasty was deemed most appropriate.  Using a sterile surgical marker, an appropriate O-T plasty was drawn incorporating the defect and placing the expected incisions within the relaxed skin tension lines where possible.    The area thus outlined was incised deep to adipose tissue with a #15 scalpel blade.  The skin margins were undermined to an appropriate distance in all directions utilizing iris scissors.
O-Z Plasty Text: The defect edges were debeveled with a #15 scalpel blade.  Given the location of the defect, shape of the defect and the proximity to free margins an O-Z plasty (double transposition flap) was deemed most appropriate.  Using a sterile surgical marker, the appropriate transposition flaps were drawn incorporating the defect and placing the expected incisions within the relaxed skin tension lines where possible.    The area thus outlined was incised deep to adipose tissue with a #15 scalpel blade.  The skin margins were undermined to an appropriate distance in all directions utilizing iris scissors.  Hemostasis was achieved with electrocautery.  The flaps were then transposed into place, one clockwise and the other counterclockwise, and anchored with interrupted buried subcutaneous sutures.
Double O-Z Plasty Text: The defect edges were debeveled with a #15 scalpel blade.  Given the location of the defect, shape of the defect and the proximity to free margins a Double O-Z plasty (double transposition flap) was deemed most appropriate.  Using a sterile surgical marker, the appropriate transposition flaps were drawn incorporating the defect and placing the expected incisions within the relaxed skin tension lines where possible. The area thus outlined was incised deep to adipose tissue with a #15 scalpel blade.  The skin margins were undermined to an appropriate distance in all directions utilizing iris scissors.  Hemostasis was achieved with electrocautery.  The flaps were then transposed into place, one clockwise and the other counterclockwise, and anchored with interrupted buried subcutaneous sutures.
V-Y Plasty Text: The defect edges were debeveled with a #15 scalpel blade.  Given the location of the defect, shape of the defect and the proximity to free margins an V-Y advancement flap was deemed most appropriate.  Using a sterile surgical marker, an appropriate advancement flap was drawn incorporating the defect and placing the expected incisions within the relaxed skin tension lines where possible.    The area thus outlined was incised deep to adipose tissue with a #15 scalpel blade.  The skin margins were undermined to an appropriate distance in all directions utilizing iris scissors.
H Plasty Text: Given the location of the defect, shape of the defect and the proximity to free margins a H-plasty was deemed most appropriate for repair.  Using a sterile surgical marker, the appropriate advancement arms of the H-plasty were drawn incorporating the defect and placing the expected incisions within the relaxed skin tension lines where possible. The area thus outlined was incised deep to adipose tissue with a #15 scalpel blade. The skin margins were undermined to an appropriate distance in all directions utilizing iris scissors.  The opposing advancement arms were then advanced into place in opposite direction and anchored with interrupted buried subcutaneous sutures.
W Plasty Text: The lesion was extirpated to the level of the fat with a #15 scalpel blade.  Given the location of the defect, shape of the defect and the proximity to free margins a W-plasty was deemed most appropriate for repair.  Using a sterile surgical marker, the appropriate transposition arms of the W-plasty were drawn incorporating the defect and placing the expected incisions within the relaxed skin tension lines where possible.    The area thus outlined was incised deep to adipose tissue with a #15 scalpel blade.  The skin margins were undermined to an appropriate distance in all directions utilizing iris scissors.  The opposing transposition arms were then transposed into place in opposite direction and anchored with interrupted buried subcutaneous sutures.
Z Plasty Text: The lesion was extirpated to the level of the fat with a #15 scalpel blade.  Given the location of the defect, shape of the defect and the proximity to free margins a Z-plasty was deemed most appropriate for repair.  Using a sterile surgical marker, the appropriate transposition arms of the Z-plasty were drawn incorporating the defect and placing the expected incisions within the relaxed skin tension lines where possible.    The area thus outlined was incised deep to adipose tissue with a #15 scalpel blade.  The skin margins were undermined to an appropriate distance in all directions utilizing iris scissors.  The opposing transposition arms were then transposed into place in opposite direction and anchored with interrupted buried subcutaneous sutures.
Cheek Interpolation Flap Text: A decision was made to reconstruct the defect utilizing an interpolation axial flap and a staged reconstruction.  A telfa template was made of the defect.  This telfa template was then used to outline the Cheek Interpolation flap.  The donor area for the pedicle flap was then injected with anesthesia.  The flap was excised through the skin and subcutaneous tissue down to the layer of the underlying musculature.  The interpolation flap was carefully excised within this deep plane to maintain its blood supply.  The edges of the donor site were undermined.   The donor site was closed in a primary fashion.  The pedicle was then rotated into position and sutured.  Once the tube was sutured into place, adequate blood supply was confirmed with blanching and refill.  The pedicle was then wrapped with xeroform gauze and dressed appropriately with a telfa and gauze bandage to ensure continued blood supply and protect the attached pedicle.
Cheek-To-Nose Interpolation Flap Text: A decision was made to reconstruct the defect utilizing an interpolation axial flap and a staged reconstruction.  A telfa template was made of the defect.  This telfa template was then used to outline the Cheek-To-Nose Interpolation flap.  The donor area for the pedicle flap was then injected with anesthesia.  The flap was excised through the skin and subcutaneous tissue down to the layer of the underlying musculature.  The interpolation flap was carefully excised within this deep plane to maintain its blood supply.  The edges of the donor site were undermined.   The donor site was closed in a primary fashion.  The pedicle was then rotated into position and sutured.  Once the tube was sutured into place, adequate blood supply was confirmed with blanching and refill.  The pedicle was then wrapped with xeroform gauze and dressed appropriately with a telfa and gauze bandage to ensure continued blood supply and protect the attached pedicle.
Interpolation Flap Text: A decision was made to reconstruct the defect utilizing an interpolation axial flap and a staged reconstruction.  A telfa template was made of the defect.  This telfa template was then used to outline the interpolation flap.  The donor area for the pedicle flap was then injected with anesthesia.  The flap was excised through the skin and subcutaneous tissue down to the layer of the underlying musculature.  The interpolation flap was carefully excised within this deep plane to maintain its blood supply.  The edges of the donor site were undermined.   The donor site was closed in a primary fashion.  The pedicle was then rotated into position and sutured.  Once the tube was sutured into place, adequate blood supply was confirmed with blanching and refill.  The pedicle was then wrapped with xeroform gauze and dressed appropriately with a telfa and gauze bandage to ensure continued blood supply and protect the attached pedicle.
Melolabial Interpolation Flap Text: A decision was made to reconstruct the defect utilizing an interpolation axial flap and a staged reconstruction.  A telfa template was made of the defect.  This telfa template was then used to outline the melolabial interpolation flap.  The donor area for the pedicle flap was then injected with anesthesia.  The flap was excised through the skin and subcutaneous tissue down to the layer of the underlying musculature.  The pedicle flap was carefully excised within this deep plane to maintain its blood supply.  The edges of the donor site were undermined.   The donor site was closed in a primary fashion.  The pedicle was then rotated into position and sutured.  Once the tube was sutured into place, adequate blood supply was confirmed with blanching and refill.  The pedicle was then wrapped with xeroform gauze and dressed appropriately with a telfa and gauze bandage to ensure continued blood supply and protect the attached pedicle.
Mastoid Interpolation Flap Text: A decision was made to reconstruct the defect utilizing an interpolation axial flap and a staged reconstruction.  A telfa template was made of the defect.  This telfa template was then used to outline the mastoid interpolation flap.  The donor area for the pedicle flap was then injected with anesthesia.  The flap was excised through the skin and subcutaneous tissue down to the layer of the underlying musculature.  The pedicle flap was carefully excised within this deep plane to maintain its blood supply.  The edges of the donor site were undermined.   The donor site was closed in a primary fashion.  The pedicle was then rotated into position and sutured.  Once the tube was sutured into place, adequate blood supply was confirmed with blanching and refill.  The pedicle was then wrapped with xeroform gauze and dressed appropriately with a telfa and gauze bandage to ensure continued blood supply and protect the attached pedicle.
Posterior Auricular Interpolation Flap Text: A decision was made to reconstruct the defect utilizing an interpolation axial flap and a staged reconstruction.  A telfa template was made of the defect.  This telfa template was then used to outline the posterior auricular interpolation flap.  The donor area for the pedicle flap was then injected with anesthesia.  The flap was excised through the skin and subcutaneous tissue down to the layer of the underlying musculature.  The pedicle flap was carefully excised within this deep plane to maintain its blood supply.  The edges of the donor site were undermined.   The donor site was closed in a primary fashion.  The pedicle was then rotated into position and sutured.  Once the tube was sutured into place, adequate blood supply was confirmed with blanching and refill.  The pedicle was then wrapped with xeroform gauze and dressed appropriately with a telfa and gauze bandage to ensure continued blood supply and protect the attached pedicle.
Paramedian Forehead Flap Text: A decision was made to reconstruct the defect utilizing an interpolation axial flap and a staged reconstruction.  A telfa template was made of the defect.  This telfa template was then used to outline the paramedian forehead pedicle flap.  The donor area for the pedicle flap was then injected with anesthesia.  The flap was excised through the skin and subcutaneous tissue down to the layer of the underlying musculature.  The pedicle flap was carefully excised within this deep plane to maintain its blood supply.  The edges of the donor site were undermined.   The donor site was closed in a primary fashion.  The pedicle was then rotated into position and sutured.  Once the tube was sutured into place, adequate blood supply was confirmed with blanching and refill.  The pedicle was then wrapped with xeroform gauze and dressed appropriately with a telfa and gauze bandage to ensure continued blood supply and protect the attached pedicle.
Cheiloplasty (Less Than 50%) Text: A decision was made to reconstruct the defect with a  cheiloplasty.  The defect was undermined extensively.  Additional obicularis oris muscle was excised with a 15 blade scalpel.  The defect was converted into a full thickness wedge, of less than 50% of the vertical height of the lip, to facilite a better cosmetic result.  Small vessels were then tied off with 5-0 monocyrl. The obicularis oris, superficial fascia, adipose and dermis were then reapproximated.  After the deeper layers were approximated the epidermis was reapproximated with particular care given to realign the vermilion border.
Cheiloplasty (Complex) Text: A decision was made to reconstruct the defect with a  cheiloplasty.  The defect was undermined extensively.  Additional obicularis oris muscle was excised with a 15 blade scalpel.  The defect was converted into a full thickness wedge to facilite a better cosmetic result.  Small vessels were then tied off with 5-0 monocyrl. The obicularis oris, superficial fascia, adipose and dermis were then reapproximated.  After the deeper layers were approximated the epidermis was reapproximated with particular care given to realign the vermilion border.
Ear Wedge Repair Text: A wedge excision was completed by carrying down an excision through the full thickness of the ear and cartilage with an inward facing Burow's triangle. The wound was then closed in a layered fashion.
Full Thickness Lip Wedge Repair (Flap) Text: Given the location of the defect and the proximity to free margins a full thickness wedge repair was deemed most appropriate.  Using a sterile surgical marker, the appropriate repair was drawn incorporating the defect and placing the expected incisions perpendicular to the vermilion border.  The vermilion border was also meticulously outlined to ensure appropriate reapproximation during the repair.  The area thus outlined was incised through and through with a #15 scalpel blade.  The muscularis and dermis were reaproximated with deep sutures following hemostasis. Care was taken to realign the vermilion border before proceeding with the superficial closure.  Once the vermilion was realigned the superfical and mucosal closure was finished.
Ftsg Text: The defect edges were debeveled with a #15 scalpel blade.  Given the location of the defect, shape of the defect and the proximity to free margins a full thickness skin graft was deemed most appropriate.  Using a sterile surgical marker, the primary defect shape was transferred to the donor site. The area thus outlined was incised deep to adipose tissue with a #15 scalpel blade.  The harvested graft was then trimmed of adipose tissue until only dermis and epidermis was left.  The skin margins of the secondary defect were undermined to an appropriate distance in all directions utilizing iris scissors.  The secondary defect was closed with interrupted buried subcutaneous sutures.  The skin edges were then re-apposed with running  sutures.  The skin graft was then placed in the primary defect and oriented appropriately.
Split-Thickness Skin Graft Text: The defect edges were debeveled with a #15 scalpel blade.  Given the location of the defect, shape of the defect and the proximity to free margins a split thickness skin graft was deemed most appropriate.  Using a sterile surgical marker, the primary defect shape was transferred to the donor site. The split thickness graft was then harvested.  The skin graft was then placed in the primary defect and oriented appropriately.
Burow's Graft Text: The defect edges were debeveled with a #15 scalpel blade.  Given the location of the defect, shape of the defect, the proximity to free margins and the presence of a standing cone deformity a Burow's skin graft was deemed most appropriate. The standing cone was removed and this tissue was then trimmed to the shape of the primary defect. The adipose tissue was also removed until only dermis and epidermis were left.  The skin margins of the secondary defect were undermined to an appropriate distance in all directions utilizing iris scissors.  The secondary defect was closed with interrupted buried subcutaneous sutures.  The skin edges were then re-apposed with running  sutures.  The skin graft was then placed in the primary defect and oriented appropriately.
Cartilage Graft Text: The defect edges were debeveled with a #15 scalpel blade.  Given the location of the defect, shape of the defect, the fact the defect involved a full thickness cartilage defect a cartilage graft was deemed most appropriate.  An appropriate donor site was identified, cleansed, and anesthetized. The cartilage graft was then harvested and transferred to the recipient site, oriented appropriately and then sutured into place.  The secondary defect was then repaired using a primary closure.
Composite Graft Text: The defect edges were debeveled with a #15 scalpel blade.  Given the location of the defect, shape of the defect, the proximity to free margins and the fact the defect was full thickness a composite graft was deemed most appropriate.  The defect was outline and then transferred to the donor site.  A full thickness graft was then excised from the donor site. The graft was then placed in the primary defect, oriented appropriately and then sutured into place.  The secondary defect was then repaired using a primary closure.
Epidermal Autograft Text: The defect edges were debeveled with a #15 scalpel blade.  Given the location of the defect, shape of the defect and the proximity to free margins an epidermal autograft was deemed most appropriate.  Using a sterile surgical marker, the primary defect shape was transferred to the donor site. The epidermal graft was then harvested.  The skin graft was then placed in the primary defect and oriented appropriately.
Dermal Autograft Text: The defect edges were debeveled with a #15 scalpel blade.  Given the location of the defect, shape of the defect and the proximity to free margins a dermal autograft was deemed most appropriate.  Using a sterile surgical marker, the primary defect shape was transferred to the donor site. The area thus outlined was incised deep to adipose tissue with a #15 scalpel blade.  The harvested graft was then trimmed of adipose and epidermal tissue until only dermis was left.  The skin graft was then placed in the primary defect and oriented appropriately.
Skin Substitute Text: The defect edges were debeveled with a #15 scalpel blade.  Given the location of the defect, shape of the defect and the proximity to free margins a skin substitute graft was deemed most appropriate.  The graft material was trimmed to fit the size of the defect. The graft was then placed in the primary defect and oriented appropriately.
Tissue Cultured Epidermal Autograft Text: The defect edges were debeveled with a #15 scalpel blade.  Given the location of the defect, shape of the defect and the proximity to free margins a tissue cultured epidermal autograft was deemed most appropriate.  The graft was then trimmed to fit the size of the defect.  The graft was then placed in the primary defect and oriented appropriately.
Xenograft Text: The defect edges were debeveled with a #15 scalpel blade.  Given the location of the defect, shape of the defect and the proximity to free margins a xenograft was deemed most appropriate.  The graft was then trimmed to fit the size of the defect.  The graft was then placed in the primary defect and oriented appropriately.
Purse String (Simple) Text: Given the location of the defect and the characteristics of the surrounding skin a purse string closure was deemed most appropriate.  Undermining was performed circumfirentially around the surgical defect.  A purse string suture was then placed and tightened.
Purse String (Intermediate) Text: Given the location of the defect and the characteristics of the surrounding skin a purse string intermediate closure was deemed most appropriate.  Undermining was performed circumfirentially around the surgical defect.  A purse string suture was then placed and tightened.
Partial Purse String (Simple) Text: Given the location of the defect and the characteristics of the surrounding skin a simple purse string closure was deemed most appropriate.  Undermining was performed circumfirentially around the surgical defect.  A purse string suture was then placed and tightened. Wound tension only allowed a partial closure of the circular defect.
Partial Purse String (Intermediate) Text: Given the location of the defect and the characteristics of the surrounding skin an intermediate purse string closure was deemed most appropriate.  Undermining was performed circumfirentially around the surgical defect.  A purse string suture was then placed and tightened. Wound tension only allowed a partial closure of the circular defect.
Localized Dermabrasion With Wire Brush Text: The patient was draped in routine manner.  Localized dermabrasion using 3 x 17 mm wire brush was performed in routine manner to papillary dermis. This spot dermabrasion is being performed to complete skin cancer reconstruction. It also will eliminate the other sun damaged precancerous cells that are known to be part of the regional effect of a lifetime's worth of sun exposure. This localized dermabrasion is therapeutic and should not be considered cosmetic in any regard.
Tarsorrhaphy Text: A tarsorrhaphy was performed using Frost sutures.
Complex Repair And Flap Additional Text (Will Appearing After The Standard Complex Repair Text): The complex repair was not sufficient to completely close the primary defect. The remaining additional defect was repaired with the flap mentioned below.
Complex Repair And Graft Additional Text (Will Appearing After The Standard Complex Repair Text): The complex repair was not sufficient to completely close the primary defect. The remaining additional defect was repaired with the graft mentioned below.
Manual Repair Warning Statement: We plan on removing the manually selected variable below in favor of our much easier automatic structured text blocks found in the previous tab. We decided to do this to help make the flow better and give you the full power of structured data. Manual selection is never going to be ideal in our platform and I would encourage you to avoid using manual selection from this point on, especially since I will be sunsetting this feature. It is important that you do one of two things with the customized text below. First, you can save all of the text in a word file so you can have it for future reference. Second, transfer the text to the appropriate area in the Library tab. Lastly, if there is a flap or graft type which we do not have you need to let us know right away so I can add it in before the variable is hidden. No need to panic, we plan to give you roughly 6 months to make the change.
Same Histology In Subsequent Stages Text: The pattern and morphology of the tumor is as described in the first stage.
No Residual Tumor Seen Histology Text: There were no malignant cells seen in the sections examined.
Inflammation Suggestive Of Cancer Camouflage Histology Text: There was a dense lymphocytic infiltrate which prevented adequate histologic evaluation of adjacent structures.
Bcc Histology Text: There were numerous aggregates of basaloid cells.
Bcc Infiltrative Histology Text: There were numerous aggregates of basaloid cells demonstrating an infiltrative pattern.
Mart-1 - Positive Histology Text: MART-1 staining demonstrates areas of higher density and clustering of melanocytes with Pagetoid spread upwards within the epidermis. The surgical margins are positive for tumor cells.
Mart-1 - Negative Histology Text: MART-1 staining demonstrates a normal density and pattern of melanocytes along the dermal-epidermal junction. The surgical margins are negative for tumor cells.
Information: Selecting Yes will display possible errors in your note based on the variables you have selected. This validation is only offered as a suggestion for you. PLEASE NOTE THAT THE VALIDATION TEXT WILL BE REMOVED WHEN YOU FINALIZE YOUR NOTE. IF YOU WANT TO FAX A PRELIMINARY NOTE YOU WILL NEED TO TOGGLE THIS TO 'NO' IF YOU DO NOT WANT IT IN YOUR FAXED NOTE.

## 2020-12-15 ENCOUNTER — APPOINTMENT (RX ONLY)
Dept: URBAN - METROPOLITAN AREA CLINIC 31 | Facility: CLINIC | Age: 85
Setting detail: DERMATOLOGY
End: 2020-12-15

## 2020-12-15 DIAGNOSIS — L57.0 ACTINIC KERATOSIS: ICD-10-CM

## 2020-12-15 DIAGNOSIS — Z85.828 PERSONAL HISTORY OF OTHER MALIGNANT NEOPLASM OF SKIN: ICD-10-CM

## 2020-12-15 DIAGNOSIS — L82.1 OTHER SEBORRHEIC KERATOSIS: ICD-10-CM

## 2020-12-15 DIAGNOSIS — L81.4 OTHER MELANIN HYPERPIGMENTATION: ICD-10-CM

## 2020-12-15 DIAGNOSIS — D18.0 HEMANGIOMA: ICD-10-CM

## 2020-12-15 DIAGNOSIS — D22 MELANOCYTIC NEVI: ICD-10-CM

## 2020-12-15 PROBLEM — D18.01 HEMANGIOMA OF SKIN AND SUBCUTANEOUS TISSUE: Status: ACTIVE | Noted: 2020-12-15

## 2020-12-15 PROBLEM — D22.5 MELANOCYTIC NEVI OF TRUNK: Status: ACTIVE | Noted: 2020-12-15

## 2020-12-15 PROCEDURE — ? LIQUID NITROGEN

## 2020-12-15 PROCEDURE — 17003 DESTRUCT PREMALG LES 2-14: CPT

## 2020-12-15 PROCEDURE — ? COUNSELING

## 2020-12-15 PROCEDURE — 17000 DESTRUCT PREMALG LESION: CPT

## 2020-12-15 PROCEDURE — 99213 OFFICE O/P EST LOW 20 MIN: CPT | Mod: 25

## 2020-12-15 ASSESSMENT — LOCATION DETAILED DESCRIPTION DERM
LOCATION DETAILED: RIGHT MID-UPPER BACK
LOCATION DETAILED: LEFT DISTAL DORSAL FOREARM
LOCATION DETAILED: RIGHT SUPERIOR UPPER BACK
LOCATION DETAILED: RIGHT SUPERIOR LATERAL UPPER BACK
LOCATION DETAILED: RIGHT SUPERIOR CENTRAL MALAR CHEEK
LOCATION DETAILED: LEFT RADIAL DORSAL HAND
LOCATION DETAILED: RIGHT RADIAL DORSAL HAND
LOCATION DETAILED: RIGHT INFERIOR MEDIAL UPPER BACK

## 2020-12-15 ASSESSMENT — LOCATION ZONE DERM
LOCATION ZONE: FACE
LOCATION ZONE: TRUNK
LOCATION ZONE: ARM
LOCATION ZONE: HAND

## 2020-12-15 ASSESSMENT — LOCATION SIMPLE DESCRIPTION DERM
LOCATION SIMPLE: LEFT HAND
LOCATION SIMPLE: RIGHT HAND
LOCATION SIMPLE: RIGHT CHEEK
LOCATION SIMPLE: LEFT FOREARM
LOCATION SIMPLE: RIGHT UPPER BACK

## 2021-01-10 ENCOUNTER — APPOINTMENT (OUTPATIENT)
Dept: RADIOLOGY | Facility: MEDICAL CENTER | Age: 86
DRG: 025 | End: 2021-01-10
Attending: SURGERY
Payer: MEDICARE

## 2021-01-10 ENCOUNTER — HOSPITAL ENCOUNTER (OUTPATIENT)
Dept: RADIOLOGY | Facility: MEDICAL CENTER | Age: 86
End: 2021-01-10
Payer: MEDICARE

## 2021-01-10 ENCOUNTER — HOSPITAL ENCOUNTER (INPATIENT)
Facility: MEDICAL CENTER | Age: 86
LOS: 11 days | DRG: 025 | End: 2021-01-21
Attending: SURGERY | Admitting: SURGERY
Payer: MEDICARE

## 2021-01-10 ENCOUNTER — APPOINTMENT (OUTPATIENT)
Dept: RADIOLOGY | Facility: MEDICAL CENTER | Age: 86
DRG: 025 | End: 2021-01-10
Attending: EMERGENCY MEDICINE
Payer: MEDICARE

## 2021-01-10 DIAGNOSIS — S06.5X0A TRAUMATIC SUBDURAL HEMATOMA WITHOUT LOSS OF CONSCIOUSNESS, INITIAL ENCOUNTER (HCC): ICD-10-CM

## 2021-01-10 DIAGNOSIS — Z79.01 CHRONIC ANTICOAGULATION: ICD-10-CM

## 2021-01-10 PROBLEM — C91.10 CHRONIC LYMPHOCYTIC LEUKEMIA (HCC): Status: ACTIVE | Noted: 2020-10-23

## 2021-01-10 PROBLEM — R73.9 HYPERGLYCEMIA: Status: ACTIVE | Noted: 2021-01-10

## 2021-01-10 PROBLEM — I63.9 STROKE (HCC): Status: ACTIVE | Noted: 2021-01-10

## 2021-01-10 PROBLEM — Z53.09 CONTRAINDICATION TO DEEP VEIN THROMBOSIS (DVT) PROPHYLAXIS: Status: ACTIVE | Noted: 2021-01-10

## 2021-01-10 PROBLEM — T14.90XA TRAUMA: Status: ACTIVE | Noted: 2021-01-10

## 2021-01-10 PROBLEM — S06.5XAA SUBDURAL HEMATOMA (HCC): Status: ACTIVE | Noted: 2021-01-10

## 2021-01-10 PROBLEM — Z11.9 SCREENING EXAMINATION FOR INFECTIOUS DISEASE: Status: ACTIVE | Noted: 2021-01-10

## 2021-01-10 PROBLEM — E87.1 HYPONATREMIA: Status: ACTIVE | Noted: 2021-01-10

## 2021-01-10 PROBLEM — J18.9 PNEUMONIA: Status: ACTIVE | Noted: 2021-01-10

## 2021-01-10 PROBLEM — I10 ESSENTIAL HYPERTENSION: Status: ACTIVE | Noted: 2021-01-10

## 2021-01-10 PROBLEM — I10 ESSENTIAL HYPERTENSION, BENIGN: Status: ACTIVE | Noted: 2021-01-10

## 2021-01-10 PROBLEM — I48.91 AF (ATRIAL FIBRILLATION) (HCC): Status: ACTIVE | Noted: 2021-01-10

## 2021-01-10 LAB
ABO + RH BLD: NORMAL
ABO GROUP BLD: NORMAL
ALBUMIN SERPL BCP-MCNC: 3.6 G/DL (ref 3.2–4.9)
ALBUMIN/GLOB SERPL: 0.9 G/DL
ALP SERPL-CCNC: 99 U/L (ref 30–99)
ALT SERPL-CCNC: 16 U/L (ref 2–50)
ANION GAP SERPL CALC-SCNC: 10 MMOL/L (ref 7–16)
APTT PPP: 33.1 SEC (ref 24.7–36)
APTT PPP: 34.6 SEC (ref 24.7–36)
AST SERPL-CCNC: 23 U/L (ref 12–45)
BARCODED ABORH UBTYP: 8400
BARCODED ABORH UBTYP: 8400
BARCODED PRD CODE UBPRD: NORMAL
BARCODED PRD CODE UBPRD: NORMAL
BARCODED UNIT NUM UBUNT: NORMAL
BARCODED UNIT NUM UBUNT: NORMAL
BILIRUB SERPL-MCNC: 1 MG/DL (ref 0.1–1.5)
BLD GP AB SCN SERPL QL: NORMAL
BUN SERPL-MCNC: 19 MG/DL (ref 8–22)
CALCIUM SERPL-MCNC: 9.2 MG/DL (ref 8.5–10.5)
CFT BLD TEG: 4.6 MIN (ref 5–10)
CFT BLD TEG: 7.4 MIN (ref 5–10)
CHLORIDE SERPL-SCNC: 102 MMOL/L (ref 96–112)
CLOT ANGLE BLD TEG: 67.2 DEGREES (ref 53–72)
CLOT ANGLE BLD TEG: 75.7 DEGREES (ref 53–72)
CLOT LYSIS 30M P MA LENFR BLD TEG: 0 % (ref 0–8)
CLOT LYSIS 30M P MA LENFR BLD TEG: 0.2 % (ref 0–8)
CO2 SERPL-SCNC: 21 MMOL/L (ref 20–33)
COMPONENT F 8504F: NORMAL
COMPONENT F 8504F: NORMAL
COVID ORDER STATUS COVID19: NORMAL
COVID ORDER STATUS COVID19: NORMAL
CREAT SERPL-MCNC: 0.82 MG/DL (ref 0.5–1.4)
CT.EXTRINSIC BLD ROTEM: 0.9 MIN (ref 1–3)
CT.EXTRINSIC BLD ROTEM: 1.7 MIN (ref 1–3)
ERYTHROCYTE [DISTWIDTH] IN BLOOD BY AUTOMATED COUNT: 52.1 FL (ref 35.9–50)
ETHANOL BLD-MCNC: <10.1 MG/DL (ref 0–10)
GLOBULIN SER CALC-MCNC: 3.9 G/DL (ref 1.9–3.5)
GLUCOSE BLD-MCNC: 119 MG/DL (ref 65–99)
GLUCOSE SERPL-MCNC: 151 MG/DL (ref 65–99)
HCT VFR BLD AUTO: 38.5 % (ref 42–52)
HGB BLD-MCNC: 12.6 G/DL (ref 14–18)
INR PPP: 1.25 (ref 0.87–1.13)
INR PPP: 1.31 (ref 0.87–1.13)
MCF BLD TEG: 71.1 MM (ref 50–70)
MCF BLD TEG: 74.4 MM (ref 50–70)
MCH RBC QN AUTO: 31.7 PG (ref 27–33)
MCHC RBC AUTO-ENTMCNC: 32.7 G/DL (ref 33.7–35.3)
MCV RBC AUTO: 97 FL (ref 81.4–97.8)
PA AA BLD-ACNC: 50.4 %
PA ADP BLD-ACNC: 65.3 %
PLATELET # BLD AUTO: 287 K/UL (ref 164–446)
PMV BLD AUTO: 9.7 FL (ref 9–12.9)
POTASSIUM SERPL-SCNC: 3.6 MMOL/L (ref 3.6–5.5)
PRODUCT TYPE UPROD: NORMAL
PRODUCT TYPE UPROD: NORMAL
PROT SERPL-MCNC: 7.5 G/DL (ref 6–8.2)
PROTHROMBIN TIME: 16 SEC (ref 12–14.6)
PROTHROMBIN TIME: 16.7 SEC (ref 12–14.6)
RBC # BLD AUTO: 3.97 M/UL (ref 4.7–6.1)
RH BLD: NORMAL
SARS-COV-2 RDRP RESP QL NAA+PROBE: NOTDETECTED
SARS-COV-2 RNA RESP QL NAA+PROBE: NOTDETECTED
SODIUM SERPL-SCNC: 133 MMOL/L (ref 135–145)
SPECIMEN SOURCE: NORMAL
SPECIMEN SOURCE: NORMAL
TEG ALGORITHM TGALG: ABNORMAL
TEG ALGORITHM TGALG: ABNORMAL
UNIT STATUS USTAT: NORMAL
UNIT STATUS USTAT: NORMAL
WBC # BLD AUTO: 15.9 K/UL (ref 4.8–10.8)

## 2021-01-10 PROCEDURE — 71045 X-RAY EXAM CHEST 1 VIEW: CPT

## 2021-01-10 PROCEDURE — U0003 INFECTIOUS AGENT DETECTION BY NUCLEIC ACID (DNA OR RNA); SEVERE ACUTE RESPIRATORY SYNDROME CORONAVIRUS 2 (SARS-COV-2) (CORONAVIRUS DISEASE [COVID-19]), AMPLIFIED PROBE TECHNIQUE, MAKING USE OF HIGH THROUGHPUT TECHNOLOGIES AS DESCRIBED BY CMS-2020-01-R: HCPCS

## 2021-01-10 PROCEDURE — 700105 HCHG RX REV CODE 258: Performed by: NURSE PRACTITIONER

## 2021-01-10 PROCEDURE — G0390 TRAUMA RESPONS W/HOSP CRITI: HCPCS

## 2021-01-10 PROCEDURE — 82077 ASSAY SPEC XCP UR&BREATH IA: CPT

## 2021-01-10 PROCEDURE — 80053 COMPREHEN METABOLIC PANEL: CPT

## 2021-01-10 PROCEDURE — 86850 RBC ANTIBODY SCREEN: CPT

## 2021-01-10 PROCEDURE — 85576 BLOOD PLATELET AGGREGATION: CPT | Mod: 91

## 2021-01-10 PROCEDURE — 85027 COMPLETE CBC AUTOMATED: CPT

## 2021-01-10 PROCEDURE — 700111 HCHG RX REV CODE 636 W/ 250 OVERRIDE (IP): Performed by: NURSE PRACTITIONER

## 2021-01-10 PROCEDURE — 82962 GLUCOSE BLOOD TEST: CPT

## 2021-01-10 PROCEDURE — 700101 HCHG RX REV CODE 250: Performed by: SURGERY

## 2021-01-10 PROCEDURE — P9017 PLASMA 1 DONOR FRZ W/IN 8 HR: HCPCS

## 2021-01-10 PROCEDURE — 86900 BLOOD TYPING SEROLOGIC ABO: CPT

## 2021-01-10 PROCEDURE — 86901 BLOOD TYPING SEROLOGIC RH(D): CPT

## 2021-01-10 PROCEDURE — 700101 HCHG RX REV CODE 250: Performed by: NURSE PRACTITIONER

## 2021-01-10 PROCEDURE — U0005 INFEC AGEN DETEC AMPLI PROBE: HCPCS

## 2021-01-10 PROCEDURE — 99291 CRITICAL CARE FIRST HOUR: CPT

## 2021-01-10 PROCEDURE — P9034 PLATELETS, PHERESIS: HCPCS

## 2021-01-10 PROCEDURE — U0004 COV-19 TEST NON-CDC HGH THRU: HCPCS

## 2021-01-10 PROCEDURE — 770022 HCHG ROOM/CARE - ICU (200)

## 2021-01-10 PROCEDURE — 36430 TRANSFUSION BLD/BLD COMPNT: CPT

## 2021-01-10 PROCEDURE — 70450 CT HEAD/BRAIN W/O DYE: CPT

## 2021-01-10 PROCEDURE — 85610 PROTHROMBIN TIME: CPT

## 2021-01-10 PROCEDURE — 85347 COAGULATION TIME ACTIVATED: CPT

## 2021-01-10 PROCEDURE — 30233K1 TRANSFUSION OF NONAUTOLOGOUS FROZEN PLASMA INTO PERIPHERAL VEIN, PERCUTANEOUS APPROACH: ICD-10-PCS | Performed by: SURGERY

## 2021-01-10 PROCEDURE — 85730 THROMBOPLASTIN TIME PARTIAL: CPT

## 2021-01-10 PROCEDURE — 30233R1 TRANSFUSION OF NONAUTOLOGOUS PLATELETS INTO PERIPHERAL VEIN, PERCUTANEOUS APPROACH: ICD-10-PCS | Performed by: SURGERY

## 2021-01-10 PROCEDURE — 85384 FIBRINOGEN ACTIVITY: CPT

## 2021-01-10 RX ORDER — AMLODIPINE BESYLATE 10 MG/1
10 TABLET ORAL DAILY
Status: DISCONTINUED | OUTPATIENT
Start: 2021-01-11 | End: 2021-01-14

## 2021-01-10 RX ORDER — MORPHINE SULFATE 4 MG/ML
2 INJECTION, SOLUTION INTRAMUSCULAR; INTRAVENOUS
Status: DISCONTINUED | OUTPATIENT
Start: 2021-01-10 | End: 2021-01-21 | Stop reason: HOSPADM

## 2021-01-10 RX ORDER — AMOXICILLIN 250 MG
1 CAPSULE ORAL
Status: DISCONTINUED | OUTPATIENT
Start: 2021-01-10 | End: 2021-01-11

## 2021-01-10 RX ORDER — DOXYCYCLINE HYCLATE 100 MG
100 TABLET ORAL
Status: ON HOLD | COMMUNITY
Start: 2021-01-09 | End: 2021-01-21

## 2021-01-10 RX ORDER — AMLODIPINE BESYLATE 10 MG/1
10 TABLET ORAL DAILY
COMMUNITY
Start: 2021-01-10 | End: 2021-02-09

## 2021-01-10 RX ORDER — FAMOTIDINE 20 MG/1
20 TABLET, FILM COATED ORAL 2 TIMES DAILY
Status: DISCONTINUED | OUTPATIENT
Start: 2021-01-10 | End: 2021-01-10

## 2021-01-10 RX ORDER — ENEMA 19; 7 G/133ML; G/133ML
1 ENEMA RECTAL
Status: DISCONTINUED | OUTPATIENT
Start: 2021-01-10 | End: 2021-01-21 | Stop reason: HOSPADM

## 2021-01-10 RX ORDER — OXYCODONE HYDROCHLORIDE 5 MG/1
2.5 TABLET ORAL
Status: DISCONTINUED | OUTPATIENT
Start: 2021-01-10 | End: 2021-01-11

## 2021-01-10 RX ORDER — METOPROLOL TARTRATE 1 MG/ML
5 INJECTION, SOLUTION INTRAVENOUS EVERY 6 HOURS
Status: DISCONTINUED | OUTPATIENT
Start: 2021-01-11 | End: 2021-01-10

## 2021-01-10 RX ORDER — DIGOXIN 125 MCG
125 TABLET ORAL EVERY EVENING
Status: DISCONTINUED | OUTPATIENT
Start: 2021-01-10 | End: 2021-01-11

## 2021-01-10 RX ORDER — DOCUSATE SODIUM 100 MG/1
100 CAPSULE, LIQUID FILLED ORAL 2 TIMES DAILY
Status: DISCONTINUED | OUTPATIENT
Start: 2021-01-10 | End: 2021-01-11

## 2021-01-10 RX ORDER — ONDANSETRON 2 MG/ML
4 INJECTION INTRAMUSCULAR; INTRAVENOUS EVERY 4 HOURS PRN
Status: DISCONTINUED | OUTPATIENT
Start: 2021-01-10 | End: 2021-01-21 | Stop reason: HOSPADM

## 2021-01-10 RX ORDER — METOPROLOL TARTRATE 1 MG/ML
5 INJECTION, SOLUTION INTRAVENOUS EVERY 6 HOURS PRN
Status: DISCONTINUED | OUTPATIENT
Start: 2021-01-10 | End: 2021-01-21 | Stop reason: HOSPADM

## 2021-01-10 RX ORDER — METOPROLOL TARTRATE 1 MG/ML
5 INJECTION, SOLUTION INTRAVENOUS
Status: DISCONTINUED | OUTPATIENT
Start: 2021-01-10 | End: 2021-01-10

## 2021-01-10 RX ORDER — AMOXICILLIN 250 MG
1 CAPSULE ORAL NIGHTLY
Status: DISCONTINUED | OUTPATIENT
Start: 2021-01-10 | End: 2021-01-11

## 2021-01-10 RX ORDER — SODIUM CHLORIDE 9 MG/ML
INJECTION, SOLUTION INTRAVENOUS CONTINUOUS
Status: DISCONTINUED | OUTPATIENT
Start: 2021-01-10 | End: 2021-01-12

## 2021-01-10 RX ORDER — DEXAMETHASONE 4 MG/1
4 TABLET ORAL ONCE
Status: DISCONTINUED | OUTPATIENT
Start: 2021-01-10 | End: 2021-01-10 | Stop reason: CLARIF

## 2021-01-10 RX ORDER — METOPROLOL TARTRATE 1 MG/ML
5 INJECTION, SOLUTION INTRAVENOUS EVERY 6 HOURS
Status: DISCONTINUED | OUTPATIENT
Start: 2021-01-10 | End: 2021-01-10

## 2021-01-10 RX ORDER — POLYETHYLENE GLYCOL 3350 17 G/17G
1 POWDER, FOR SOLUTION ORAL 2 TIMES DAILY
Status: DISCONTINUED | OUTPATIENT
Start: 2021-01-10 | End: 2021-01-11

## 2021-01-10 RX ORDER — DEXTROSE MONOHYDRATE 25 G/50ML
50 INJECTION, SOLUTION INTRAVENOUS
Status: DISCONTINUED | OUTPATIENT
Start: 2021-01-10 | End: 2021-01-11

## 2021-01-10 RX ORDER — OXYCODONE HYDROCHLORIDE 5 MG/1
5 TABLET ORAL
Status: DISCONTINUED | OUTPATIENT
Start: 2021-01-10 | End: 2021-01-11

## 2021-01-10 RX ORDER — LISINOPRIL 5 MG/1
5 TABLET ORAL DAILY
Status: DISCONTINUED | OUTPATIENT
Start: 2021-01-11 | End: 2021-01-14

## 2021-01-10 RX ORDER — BISACODYL 10 MG
10 SUPPOSITORY, RECTAL RECTAL
Status: DISCONTINUED | OUTPATIENT
Start: 2021-01-10 | End: 2021-01-21 | Stop reason: HOSPADM

## 2021-01-10 RX ORDER — CEFDINIR 300 MG/1
300 CAPSULE ORAL
Status: ON HOLD | COMMUNITY
Start: 2021-01-09 | End: 2021-01-21

## 2021-01-10 RX ORDER — METOPROLOL TARTRATE 50 MG/1
75 TABLET, FILM COATED ORAL 2 TIMES DAILY
Status: DISCONTINUED | OUTPATIENT
Start: 2021-01-10 | End: 2021-01-11

## 2021-01-10 RX ORDER — LEVETIRACETAM 5 MG/ML
500 INJECTION INTRAVASCULAR 2 TIMES DAILY
Status: DISCONTINUED | OUTPATIENT
Start: 2021-01-10 | End: 2021-01-11

## 2021-01-10 RX ADMIN — MORPHINE SULFATE 2 MG: 4 INJECTION INTRAVENOUS at 19:27

## 2021-01-10 RX ADMIN — SODIUM CHLORIDE: 9 INJECTION, SOLUTION INTRAVENOUS at 14:26

## 2021-01-10 RX ADMIN — DOXYCYCLINE 100 MG: 100 INJECTION, POWDER, LYOPHILIZED, FOR SOLUTION INTRAVENOUS at 17:58

## 2021-01-10 RX ADMIN — LEVETIRACETAM INJECTION 500 MG: 5 INJECTION INTRAVENOUS at 16:26

## 2021-01-10 RX ADMIN — CEFTRIAXONE SODIUM 1 G: 1 INJECTION, POWDER, FOR SOLUTION INTRAMUSCULAR; INTRAVENOUS at 17:01

## 2021-01-10 RX ADMIN — SODIUM CHLORIDE 2.5 MG/HR: 9 INJECTION, SOLUTION INTRAVENOUS at 23:15

## 2021-01-10 RX ADMIN — METOPROLOL TARTRATE 5 MG: 5 INJECTION, SOLUTION INTRAVENOUS at 21:02

## 2021-01-10 ASSESSMENT — COGNITIVE AND FUNCTIONAL STATUS - GENERAL
TOILETING: TOTAL
MOVING FROM LYING ON BACK TO SITTING ON SIDE OF FLAT BED: UNABLE
DRESSING REGULAR LOWER BODY CLOTHING: TOTAL
CLIMB 3 TO 5 STEPS WITH RAILING: TOTAL
PERSONAL GROOMING: TOTAL
STANDING UP FROM CHAIR USING ARMS: TOTAL
SUGGESTED CMS G CODE MODIFIER MOBILITY: CN
SUGGESTED CMS G CODE MODIFIER DAILY ACTIVITY: CN
TURNING FROM BACK TO SIDE WHILE IN FLAT BAD: UNABLE
HELP NEEDED FOR BATHING: TOTAL
EATING MEALS: TOTAL
WALKING IN HOSPITAL ROOM: TOTAL
DAILY ACTIVITIY SCORE: 6
MOVING TO AND FROM BED TO CHAIR: UNABLE
MOBILITY SCORE: 6
DRESSING REGULAR UPPER BODY CLOTHING: TOTAL

## 2021-01-10 ASSESSMENT — PAIN DESCRIPTION - PAIN TYPE
TYPE: ACUTE PAIN;CHRONIC PAIN
TYPE: ACUTE PAIN
TYPE: ACUTE PAIN;CHRONIC PAIN

## 2021-01-10 ASSESSMENT — FIBROSIS 4 INDEX
FIB4 SCORE: 1.89
FIB4 SCORE: 1.8

## 2021-01-10 NOTE — ASSESSMENT & PLAN NOTE
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  Trauma Yellow Transfer Activation from [facility].  Jere Meyer MD. Trauma Surgery.

## 2021-01-10 NOTE — ED PROVIDER NOTES
ED Provider Note    CHIEF COMPLAINT  No chief complaint on file.      HPI  Yousif Kimble is a 89 y.o. male with a history of hypertension, hyperlipidemia, congestive heart failure, atrial fibrillation on chronic anticoagulation with Eliquis, CVA who presents by EMS on transfer from [facility] after having a fall yesterday.  The patient lives with his brother who is his power of , apparently fell and hit his head.  Today he was complaining of a headache and was seen at the [facility] emergency department.  The CAT scan showed a large subdural hemorrhage with a mass-effect.  The patient was noted to be DNR/DNI.  The ER physician contacted family, and his brother wanted surgery if it would save the patient's life.  On arrival, the patient is responsive.  Complains of a headache to the back of his head.  The denies any neck pain, chest pain, back pain, or abdominal pain.  He can follow commands, squeeze my hand, and move his extremities without difficulty.  He is a poor historian cannot tell me what really occurred yesterday.  He is unable to tell me his past medical problems.    REVIEW OF SYSTEMS  See HPI for further details. All other systems are negative.     PAST MEDICAL HISTORY  Past Medical History:   Diagnosis Date   • Arrhythmia    • Cancer (HCC)    • Congestive heart failure (HCC)    • Fall    • Hyperlipidemia    • Hypertension    • Myocardial infarct (HCC)    • Pneumonia    • Stroke (HCC)    • Urinary incontinence        FAMILY HISTORY  Family History   Problem Relation Age of Onset   • Alcohol abuse Mother        SOCIAL HISTORY  Social History     Tobacco Use   • Smoking status: Never Smoker   • Smokeless tobacco: Never Used   Substance Use Topics   • Alcohol use: Yes     Alcohol/week: 1.2 oz     Types: 2 Shots of liquor per week     Frequency: 2-3 times a week     Drinks per session: 1 or 2     Binge frequency: Never   • Drug use: Not Currently      Social History     Substance and Sexual  "Activity   Drug Use Not Currently       SURGICAL HISTORY  Past Surgical History:   Procedure Laterality Date   • OTHER ORTHOPEDIC SURGERY      right ankle surgery   • OTHER ORTHOPEDIC SURGERY      left forearm surgery   • TONSILLECTOMY         CURRENT MEDICATIONS  Home Medications     Reviewed by Lennie Petit R.N. (Registered Nurse) on 01/10/21 at 1434  Med List Status: Complete    Medication Last Dose Status    amLODIPine (NORVASC) 10 MG Tab 1/9/2021 Active    apixaban (ELIQUIS) 5mg Tab 1/10/2021 Active    atorvastatin (LIPITOR) 40 MG Tab 1/9/2021 Active    cefdinir (OMNICEF) 300 MG Cap 1/10/2021 Active    digoxin (LANOXIN) 125 MCG Tab 1/9/2021 Active    doxycycline (VIBRAMYCIN) 100 MG Tab 1/10/2021 Active    lisinopril (PRINIVIL) 5 MG Tab NOT STARTED Active    metoprolol 75 MG Tab 1/9/2021 Active    mirtazapine (REMERON) 30 MG TABLET DISPERSIBLE 1/9/2021 Active                ALLERGIES  No Known Allergies    PHYSICAL EXAM0  VITAL SIGNS: Blood Pressure 133/89   Pulse 91   Temperature 36.7 °C (98.1 °F) (Temporal)   Respiration (Abnormal) 23   Height 1.727 m (5' 8\")   Weight 56 kg (123 lb 7.3 oz)   Oxygen Saturation 98%   Body Mass Index 18.77 kg/m²   Constitutional: Awake, alert, in no acute distress, Non-toxic appearance.   HENT: Atraumatic.  There is a small laceration over the left lateral eyebrow area.  Bilateral external ears normal, mucous membranes moist, throat nonerythematous without exudates, nose is normal.  Eyes: PERRL, EOMI, conjunctiva moist, noninjected.  Neck: Nontender, Normal range of motion, No nuchal rigidity, No stridor.   Lymphatic: No lymphadenopathy noted.   Cardiovascular: Regular rate and rhythm, no murmurs, rubs, gallops.  Thorax & Lungs:  Good breath sounds bilaterally, no wheezes, rales, or retractions.  No chest tenderness.  Abdomen: Bowel sounds normal, Soft, nontender, nondistended, no rebound, guarding, masses.  Back: No CVA or spinal tenderness.  Extremities: Intact " distal pulses, No edema, No tenderness.   Skin: Warm, Dry, No rashes.   Musculoskeletal: No joint swelling or tenderness.  Neurologic: Alert & oriented x 1, cannot tell me where he is at or the date, cranial nerves II through XII shows no obvious facial asymmetry, speech is sparse, sensory appears intact to light touch, and motor function shows 5/5 strength to the upper extremities, 5/5 strength to the right lower extremity, 4+/5 strength to the left lower extremity.  No pronator drift of the upper extremities, mild drift to the left lower extremity.  Psychiatric: Affect somewhat flat, judgment impaired, mood normal.      LABS  Labs Reviewed   CBC WITHOUT DIFFERENTIAL - Abnormal; Notable for the following components:       Result Value    WBC 15.9 (*)     RBC 3.97 (*)     Hemoglobin 12.6 (*)     Hematocrit 38.5 (*)     MCHC 32.7 (*)     RDW 52.1 (*)     All other components within normal limits   COMP METABOLIC PANEL - Abnormal; Notable for the following components:    Sodium 133 (*)     Glucose 151 (*)     Globulin 3.9 (*)     All other components within normal limits   PROTHROMBIN TIME - Abnormal; Notable for the following components:    PT 16.7 (*)     INR 1.31 (*)     All other components within normal limits   PLATELET MAPPING WITH BASIC TEG - Abnormal; Notable for the following components:    Maximum Clot Strength-MA 71.1 (*)     All other components within normal limits   SARS-COV-2, PCR (IN-HOUSE)    Narrative:     Droplet, Special Contact, and Eye Protection  MD request abbot and cephid RUSH 2-4 hours please  Have you been in close contact with a person who is suspected  or known to be positive for COVID-19 within the last 30 days  (e.g. last seen that person < 30 days ago)->Unknown   COVID/SARS COV-2    Narrative:     Droplet, Special Contact, and Eye Protection  MD request abbot and cephid RUSH 2-4 hours please  Have you been in close contact with a person who is suspected  or known to be positive for  COVID-19 within the last 30 days  (e.g. last seen that person < 30 days ago)->Unknown   DIAGNOSTIC ALCOHOL   APTT   COD (ADULT)   ESTIMATED GFR   COVID/SARS COV-2    Narrative:     Droplet, Special Contact, and Eye Protection  Have you been in close contact with a person who is suspected  or known to be positive for COVID-19 within the last 30 days  (e.g. last seen that person < 30 days ago)->Unknown   SARS-COV-2, PCR (IN-HOUSE)    Narrative:     Droplet, Special Contact, and Eye Protection  Have you been in close contact with a person who is suspected  or known to be positive for COVID-19 within the last 30 days  (e.g. last seen that person < 30 days ago)->Unknown   ABO RH CONFIRM   BASIC TEG       All labs reviewed by me.      RADIOLOGY/PROCEDURES  OUTSIDE IMAGES-DX UPPER EXTREMITY, LEFT   Final Result      CT-HEAD W/O   Final Result      Stable large left holohemispheric acute subdural hematoma      Stable small anterior parafalcine and right anterior frontal acute subdural hematoma      Stable 9 mm rightward midline shift      Right anterior MCA territory encephalomalacia      OUTSIDE IMAGES-CT HEAD   Final Result      DX-CHEST-LIMITED (1 VIEW)   Final Result      Cardiac silhouette enlargement with aortic ectasia and small right pleural fluid      Left pleural plaques and chronic fractures, indicating the calcification is likely due to trauma favored over asbestos-related pleural disease      Right perihilar and basilar atelectasis with aspiration or other consolidation not excluded      Artifact partially limits the chest      US-ABORTED US PROCEDURE    (Results Pending)       The radiologist's interpretations of all radiological studies have been reviewed by me.        COURSE & MEDICAL DECISION MAKING  Pertinent Labs & Imaging studies reviewed. (See chart for details)  The patient presents after suffering a head injury.  He is awake, fairly alert, does not show any significant neurologic deficits.  He has  received Kcentra to reverse his Eliquis prior to arrival.  Dr. Meyer of trauma surgery was present shortly after arrival.  Results from outside hospital were reviewed.  Dr. Dante Sparrow of neurosurgery was consulted and requested a repeat CT scan of the head.  This showed a stable large left holohemispheric acute subdural hematoma, along with anterior parafalcine hemorrhage unchanged, and a small acute subdural hemorrhage adjacent to the right frontal lobe which appeared unchanged.  The patient was admitted to the trauma ICU.  Critical care time of 35 minutes.    Chest x-ray showed cardiac silhouette enlargement with aortic ectasia and small right pleural fluid.  CBC showed a white count of 15,900, hemoglobin 12.6, platelets 287, chemistry sodium 133, glucose 151, otherwise normal, INR 1.31.         FINAL IMPRESSION  1. Traumatic subdural hematoma without loss of consciousness, initial encounter ([facility])    2. Chronic anticoagulation          Electronically signed by: Gui Barragan M.D., 1/10/2021 1:42 PM

## 2021-01-10 NOTE — H&P
Trauma History and Physical  1/10/2021    Attending Physician: Jere Meyer MD.     CC: Trauma The patient was triaged as a Trauma Yellow Transfer in accordance with established pre hospital protols. An expeditious primary and secondary survey with required adjuncts was conducted. See Trauma Narrator for full details.    HPI: Yousif Kimble is a very pleasant 89 y.o. male.  Trauma transfer from outside hospital for subdural hematoma with shift.  ED reports neurosurgery  aware.  Per report [facility].  C-spine cleared at other hospital  He is awake and alert and interactive.   He is able to state his name.   He states he does not know what happened to him.   History obtained by report   Per report recently discharged from hospital .  Fall last night.  Laceration left eyebrow.  He reports no headache no change in vision.  He states no headache no change in vision.  He states no neck pain no weakness or tingling his body.  He reports no chest pain no difficulty breathing  He states no abdominal pain  He states no extremity pain    Past Medical History:   Diagnosis Date   • Arrhythmia    • Cancer (HCC)    • Congestive heart failure (HCC)    • Fall    • Hyperlipidemia    • Hypertension    • Myocardial infarct (HCC)    • Pneumonia    • Stroke (HCC)    • Urinary incontinence        Past Surgical History:   Procedure Laterality Date   • OTHER ORTHOPEDIC SURGERY      right ankle surgery   • OTHER ORTHOPEDIC SURGERY      left forearm surgery   • TONSILLECTOMY         Current Facility-Administered Medications   Medication Dose Route Frequency Provider Last Rate Last Admin   • Respiratory Therapy Consult   Nebulization Continuous RT LEENA DiazP.R.N.       • Pharmacy Consult Request ...Pain Management Review 1 Each  1 Each Other PHARMACY TO DOSE LEENA DiazP.R.N.       • docusate sodium (COLACE) capsule 100 mg  100 mg Oral BID REID Diaz.P.R.N.       • senna-docusate  (PERICOLACE or SENOKOT S) 8.6-50 MG per tablet 1 Tab  1 Tab Oral Nightly Ana Cristina Enriqueey, A.P.R.N.       • senna-docusate (PERICOLACE or SENOKOT S) 8.6-50 MG per tablet 1 Tab  1 Tab Oral Q24HRS PRN Ana Cristina Arreaga, A.P.R.N.       • polyethylene glycol/lytes (MIRALAX) PACKET 1 Packet  1 Packet Oral BID Ana Cristina Arreaga, A.P.R.N.       • magnesium hydroxide (MILK OF MAGNESIA) suspension 30 mL  30 mL Oral DAILY Ana Cristina Arreaga, A.P.R.N.   Stopped at 01/10/21 1415   • bisacodyl (DULCOLAX) suppository 10 mg  10 mg Rectal Q24HRS PRN Ana Cristina Arreaga, A.P.R.N.       • fleet enema 133 mL  1 Each Rectal Once PRN Ana Cristina Arreaga, A.P.R.N.       • NS infusion   Intravenous Continuous Ana Cristina Arreaga, A.P.R.N. 100 mL/hr at 01/10/21 1426 New Bag at 01/10/21 1426   • oxyCODONE immediate-release (ROXICODONE) tablet 2.5 mg  2.5 mg Oral Q3HRS PRN Ana Cristina Arreaga, A.P.R.N.       • oxyCODONE immediate-release (ROXICODONE) tablet 5 mg  5 mg Oral Q3HRS PRN Ana Cristina Arreaga, A.P.R.N.       • morphine (pf) 4 mg/mL injection 2 mg  2 mg Intravenous Q3HRS PRN Ana Cristina Enriqueey, A.P.R.N.       • ondansetron (ZOFRAN) syringe/vial injection 4 mg  4 mg Intravenous Q4HRS PRN Ana Cristina Enriqueey, A.P.R.N.       • levETIRAcetam (Keppra) 500 mg in 100 mL NaCl IV premix  500 mg Intravenous BID Ana Cristina Arreaga, A.P.R.N. 400 mL/hr at 01/10/21 1626 500 mg at 01/10/21 1626   • insulin regular (HumuLIN R,NovoLIN R) injection  1-6 Units Subcutaneous Q6HRS Ana Cristina Enriqueey, A.P.R.N.   Stopped at 01/10/21 1415    And   • glucose 4 g chewable tablet 16 g  16 g Oral Q15 MIN PRN Ana Cristina Arreaga, A.P.R.N.        And   • dextrose 50% (D50W) injection 50 mL  50 mL Intravenous Q15 MIN PRN Ana Cristina Arreaga, A.P.R.N.       • cefTRIAXone (ROCEPHIN) 1 g in  mL IVPB  1 g Intravenous Q24HRS Ana Cristina Arreaga, A.P.R.N.       • doxycycline (VIBRAMYCIN) 100 mg in  mL IVPB  100 mg Intravenous Q12HRS Ana Cristina Arreaga, A.P.R.N.           Social History     Socioeconomic History   • Marital status: Single     Spouse name: Not on file   • Number  "of children: Not on file   • Years of education: Not on file   • Highest education level: Not on file   Occupational History   • Not on file   Social Needs   • Financial resource strain: Not on file   • Food insecurity     Worry: Never true     Inability: Never true   • Transportation needs     Medical: No     Non-medical: No   Tobacco Use   • Smoking status: Never Smoker   • Smokeless tobacco: Never Used   Substance and Sexual Activity   • Alcohol use: Yes     Alcohol/week: 1.2 oz     Types: 2 Shots of liquor per week     Frequency: 2-3 times a week     Drinks per session: 1 or 2     Binge frequency: Never   • Drug use: Not Currently   • Sexual activity: Not on file   Lifestyle   • Physical activity     Days per week: Not on file     Minutes per session: Not on file   • Stress: Not on file   Relationships   • Social connections     Talks on phone: Not on file     Gets together: Not on file     Attends [employer] service: Not on file     Active member of club or organization: Not on file     Attends meetings of clubs or organizations: Not on file     Relationship status: Not on file   • Intimate partner violence     Fear of current or ex partner: Not on file     Emotionally abused: Not on file     Physically abused: Not on file     Forced sexual activity: Not on file   Other Topics Concern   • Not on file   Social History Narrative   • Not on file       Family History   Problem Relation Age of Onset   • Alcohol abuse Mother        Allergies:  Patient has no known allergies.    Review of Systems:  Unable to obtain patient confused    Physical Exam:  /87   Pulse 98   Temp 36.2 °C (97.2 °F) (Temporal)   Resp (!) 40   Ht 1.727 m (5' 8\")   Wt 56 kg (123 lb 7.3 oz)   SpO2 98%     Constitutional: Awake, alert, oriented only to self  GCS 14. E4 V4 M6.  Head:  cephalohematoma.  Dressing over laceration no bleeding.  No bleeding ears nose or mouth.  Neck: No tracheal deviation. No midline cervical spine " tenderness.  Cardiovascular: irRegular rate, skin warm brisk capillary pulmonary/Chest: No cough.  No distress.  No chest wall tenderness bilaterally.  No crepitus. Positive breath sounds bilaterally.   Abdominal: Soft, nondistended. Nontender to palpation. n.   Musculoskeletal: Able to move arms and legs against gravity.    No tenderness on exam.    Back: Midline thoracic and lumbar spines are nontender to palpation. No step-offs. Mild sacral erythema present.  Neurological: Awake alert cooperative   Oriented only to self   skin: Skin is warm and dry.  Fragile  Psychiatric: Altered mental status.  Oriented only to self    Labs:  Recent Labs     01/10/21  1328   WBC 15.9*   RBC 3.97*   HEMOGLOBIN 12.6*   HEMATOCRIT 38.5*   MCV 97.0   MCH 31.7   MCHC 32.7*   RDW 52.1*   PLATELETCT 287   MPV 9.7     Recent Labs     01/10/21  1328   SODIUM 133*   POTASSIUM 3.6   CHLORIDE 102   CO2 21   GLUCOSE 151*   BUN 19   CREATININE 0.82   CALCIUM 9.2     Recent Labs     01/10/21  1328   APTT 33.1   INR 1.31*     Recent Labs     01/10/21  1328   ASTSGOT 23   ALTSGPT 16   TBILIRUBIN 1.0   ALKPHOSPHAT 99   GLOBULIN 3.9*   INR 1.31*       Radiology:  OUTSIDE IMAGES-DX UPPER EXTREMITY, LEFT   Final Result      CT-HEAD W/O   Final Result      Stable large left holohemispheric acute subdural hematoma      Stable small anterior parafalcine and right anterior frontal acute subdural hematoma      Stable 9 mm rightward midline shift      Right anterior MCA territory encephalomalacia      OUTSIDE IMAGES-CT HEAD   Final Result      DX-CHEST-LIMITED (1 VIEW)   Final Result      Cardiac silhouette enlargement with aortic ectasia and small right pleural fluid      Left pleural plaques and chronic fractures, indicating the calcification is likely due to trauma favored over asbestos-related pleural disease      Right perihilar and basilar atelectasis with aspiration or other consolidation not excluded      Artifact partially limits the chest       US-ABORTED US PROCEDURE    (Results Pending)         Assessment:  Yousif Kimble is a very pleasant 89 y.o. male.  Trauma transfer from outside hospital for subdural hematoma with shift.  Recently discharged after stroke on Eliquis  Report received ALANA Bosch at outside hospital  Emergency department consulted neurosurgery  Critically injured at risk for decompensation and death  Plan:   Active Hospital Problems    Diagnosis   • Subdural hematoma (HCC) [S06.5X9A]     Priority: High   • AF (atrial fibrillation) (HCC) [I48.91]     Priority: High   • Contraindication to deep vein thrombosis (DVT) prophylaxis [Z53.09]     Priority: Medium   • Screening examination for infectious disease [Z11.9]     Priority: Medium   • Essential hypertension [I10]     Priority: Medium   • Pneumonia [J18.9]     Priority: Medium   • Trauma [T14.90XA]     Priority: Low   • Stroke (HCC) [I63.9]     Priority: Low   • Hyponatremia [E87.1]   • Hyperglycemia [R73.9]   • Leukocytosis [D72.829]     Follow-up neurosurgical evaluation recommendations  Pain control as needed  provide encourage and support   monitor neurostatus and comfort level  Adjust medications and comfort measures as needed  Follow-up imaging  Follow-up coagulation studies    Card  ICU telemetry   monitor for signs of bleeding  Continue to monitor to maintain adequate HR and BP  Follow up labs    Pulm  continue aggressive pulmonary hygiene  Encourage cough deep breath, IS  scd dvt prohylaxis  Follow-up imaging    GI  Nutritional support when cleared with consultants    Bowel regime  Monitor abdominal exan  Monitor glucose levels adjust therapy as needed    Renal  Monitor urine output, fluid balance  Follow-up labs    Follow-up white blood cell count    Plan tertiary survey      Critical care time spent 55 minutes  Discussed findings and plan with patient  Discussed with ED provider    Jere Meyer MD, FACS  [facility]  [phone]

## 2021-01-10 NOTE — ASSESSMENT & PLAN NOTE
Systemic anticoagulation contraindicated secondary to elevated bleeding risk.  1/12 Trauma screening bilateral lower extremity venous duplex negative for above knee DVT.  1/19 Trauma screening bilateral lower extremity venous duplex negative for above knee DVT.

## 2021-01-10 NOTE — PROGRESS NOTES
Pt arrived to S110 at 14: 05    Temp: 96.4 F warm blankets applied  Wt: 56 kg  HR: 105  BP: 123/85  Rr: 30   SpO2: 97 on 2L NC    2 RN skin assessment:   Swollen and dry feet  Bruising to bilateral knees, and left arm   Wounds to left elbow, left eye, and left forearm  Bilateral elbows red but blanching  Rash to abdomen and chest    Belongings:   Gray sweat pants, green sweater, and white t-shirt in Pt's closet. 2 blankets, house slippers, and bed sheets.

## 2021-01-10 NOTE — ED NOTES
Med Rec complete per pt's brother  Allergies Reviewed    Pt started a 7 day course of CEFDINIR and DOXYCYLINE on 01/09    Pt takes ELIQUIS

## 2021-01-10 NOTE — ASSESSMENT & PLAN NOTE
ABX course initiated at [facility] on 1/7/2021, MRSA negative.   Transitioned to Cefdinir/Doxycycline at [facility] for additional 7 days  1/10: Persistent RLL infiltrate, Rocephin and Doxycycline x7 days  Trend serial chest radiographs and laboratory studies

## 2021-01-10 NOTE — ED NOTES
Per [facility] Report:    Pt is Yousif Kimble, 89 years old male Transferred from [facility] for a confirmed Subdural Hemorrhage with a shift. Pt was at him home and had a ground level fall yesterday. Pt is on Eliquis.  Pt is AO2-3. GCS 14.     Kcentra started by [facility].   Nicardipine gtt at 10mg/hr  20mg of labetolol in the ER at [facility].    Hx of stroke, afib  Medications: Eliquis, digoxin  Allergies: None

## 2021-01-10 NOTE — ASSESSMENT & PLAN NOTE
Admission SARS-CoV-2 testing negative. Repeat SARS-CoV-2 testing not indicated. Isolation precautions de-escalated.  1/20 Repeat SARS-CoV-2 testing for Interval surgery following admission testing negative.

## 2021-01-10 NOTE — ASSESSMENT & PLAN NOTE
Chronic condition treated with amlodipine, lisinopril, and metoprolol.  1/10 Resumed metoprolol.  1/14 Resumed lisinopril and amlodipine.

## 2021-01-10 NOTE — ASSESSMENT & PLAN NOTE
Large left hemispheric subdural hematoma causing significant mass effect on the underlying cerebral hemisphere with left to right midline shift .Small anterior right frontal subdural hematoma and small anterior interhemispheric subdural hematoma.  Repeat interval head CT stable, but waning GCS.  1/11 Craniotomy with evacuation.  Post traumatic pharmacologic seizure prophylaxis for 1 week.  Speech Language Pathology cognitive evaluation.  Dante Sparrow MD. Neurosurgeon. [facility].

## 2021-01-10 NOTE — ASSESSMENT & PLAN NOTE
Chronic condition treated with Digoxin and metoprolol.  Maintenance medications resumed on admission.    1/16 Digoxin dose increased.  1/17 Metoprolol dosage increased.

## 2021-01-10 NOTE — DISCHARGE PLANNING
Medical Social Work    Trauma Response    Referral: Trauma Yellow Response    Intervention: SW responded to trauma yellow.  Pt was BIB [facility] from [facility] after a fall.  Pt with reported GCS 14 and A&Ox2 upon arrival.  Pts name is Yousif Kimble (: 1930).  ELIZABETH obtained the following pt information: Per EMS, pt was just discharged from the hospital yesterday and while at home pt had a ground level fall.  SW was informed that pt resides with his brother, Ward Doss ([phone]).     Plan: SW will remain available as needed.

## 2021-01-11 ENCOUNTER — APPOINTMENT (OUTPATIENT)
Dept: RADIOLOGY | Facility: MEDICAL CENTER | Age: 86
DRG: 025 | End: 2021-01-11
Attending: SURGERY
Payer: MEDICARE

## 2021-01-11 ENCOUNTER — APPOINTMENT (OUTPATIENT)
Dept: RADIOLOGY | Facility: MEDICAL CENTER | Age: 86
DRG: 025 | End: 2021-01-11
Attending: NEUROLOGICAL SURGERY
Payer: MEDICARE

## 2021-01-11 ENCOUNTER — ANESTHESIA EVENT (OUTPATIENT)
Dept: SURGERY | Facility: MEDICAL CENTER | Age: 86
DRG: 025 | End: 2021-01-11
Payer: MEDICARE

## 2021-01-11 ENCOUNTER — ANESTHESIA (OUTPATIENT)
Dept: SURGERY | Facility: MEDICAL CENTER | Age: 86
DRG: 025 | End: 2021-01-11
Payer: MEDICARE

## 2021-01-11 PROBLEM — R33.8 ACUTE URINARY RETENTION: Status: ACTIVE | Noted: 2021-01-11

## 2021-01-11 LAB
ALBUMIN SERPL BCP-MCNC: 3.3 G/DL (ref 3.2–4.9)
ALBUMIN/GLOB SERPL: 1.1 G/DL
ALP SERPL-CCNC: 76 U/L (ref 30–99)
ALT SERPL-CCNC: 11 U/L (ref 2–50)
ANION GAP SERPL CALC-SCNC: 12 MMOL/L (ref 7–16)
ANISOCYTOSIS BLD QL SMEAR: ABNORMAL
AST SERPL-CCNC: 19 U/L (ref 12–45)
BARCODED ABORH UBTYP: 6200
BARCODED PRD CODE UBPRD: NORMAL
BARCODED UNIT NUM UBUNT: NORMAL
BASOPHILS # BLD AUTO: 0 % (ref 0–1.8)
BASOPHILS # BLD: 0 K/UL (ref 0–0.12)
BILIRUB SERPL-MCNC: 0.7 MG/DL (ref 0.1–1.5)
BUN SERPL-MCNC: 15 MG/DL (ref 8–22)
CALCIUM SERPL-MCNC: 8.6 MG/DL (ref 8.5–10.5)
CFT BLD TEG: 5.3 MIN (ref 5–10)
CFT BLD TEG: 7.1 MIN (ref 5–10)
CHLORIDE SERPL-SCNC: 105 MMOL/L (ref 96–112)
CLOT ANGLE BLD TEG: 69.6 DEGREES (ref 53–72)
CLOT ANGLE BLD TEG: 70.7 DEGREES (ref 53–72)
CLOT LYSIS 30M P MA LENFR BLD TEG: 0 % (ref 0–8)
CLOT LYSIS 30M P MA LENFR BLD TEG: 0 % (ref 0–8)
CO2 SERPL-SCNC: 22 MMOL/L (ref 20–33)
COMPONENT CT 8504CT: NORMAL
COMPONENT P 8504P: NORMAL
COMPONENT P 8504P: NORMAL
CREAT SERPL-MCNC: 0.85 MG/DL (ref 0.5–1.4)
CRP SERPL HS-MCNC: 8.41 MG/DL (ref 0–0.75)
CT.EXTRINSIC BLD ROTEM: 1.3 MIN (ref 1–3)
CT.EXTRINSIC BLD ROTEM: 1.4 MIN (ref 1–3)
DIGOXIN SERPL-MCNC: 0.5 NG/ML (ref 0.8–2)
EOSINOPHIL # BLD AUTO: 0 K/UL (ref 0–0.51)
EOSINOPHIL NFR BLD: 0 % (ref 0–6.9)
ERYTHROCYTE [DISTWIDTH] IN BLOOD BY AUTOMATED COUNT: 51.5 FL (ref 35.9–50)
GLOBULIN SER CALC-MCNC: 3.1 G/DL (ref 1.9–3.5)
GLUCOSE BLD-MCNC: 103 MG/DL (ref 65–99)
GLUCOSE SERPL-MCNC: 110 MG/DL (ref 65–99)
HCT VFR BLD AUTO: 28.4 % (ref 42–52)
HGB BLD-MCNC: 9.4 G/DL (ref 14–18)
IRON SATN MFR SERPL: 14 % (ref 15–55)
IRON SERPL-MCNC: 31 UG/DL (ref 50–180)
LYMPHOCYTES # BLD AUTO: 2.56 K/UL (ref 1–4.8)
LYMPHOCYTES NFR BLD: 17.4 % (ref 22–41)
MACROCYTES BLD QL SMEAR: ABNORMAL
MAGNESIUM SERPL-MCNC: 1.4 MG/DL (ref 1.5–2.5)
MANUAL DIFF BLD: NORMAL
MCF BLD TEG: 72.3 MM (ref 50–70)
MCF BLD TEG: 74 MM (ref 50–70)
MCH RBC QN AUTO: 31.3 PG (ref 27–33)
MCHC RBC AUTO-ENTMCNC: 32.3 G/DL (ref 33.7–35.3)
MCV RBC AUTO: 96.9 FL (ref 81.4–97.8)
METAMYELOCYTES NFR BLD MANUAL: 1.7 %
MONOCYTES # BLD AUTO: 1.03 K/UL (ref 0–0.85)
MONOCYTES NFR BLD AUTO: 7 % (ref 0–13.4)
MORPHOLOGY BLD-IMP: NORMAL
NEUTROPHILS # BLD AUTO: 10.86 K/UL (ref 1.82–7.42)
NEUTROPHILS NFR BLD: 73 % (ref 44–72)
NEUTS BAND NFR BLD MANUAL: 0.9 % (ref 0–10)
NRBC # BLD AUTO: 0 K/UL
NRBC BLD-RTO: 0 /100 WBC
PA AA BLD-ACNC: 25.9 %
PA AA BLD-ACNC: 36.2 %
PA ADP BLD-ACNC: 38.4 %
PA ADP BLD-ACNC: 87.3 %
PLATELET # BLD AUTO: 331 K/UL (ref 164–446)
PLATELET BLD QL SMEAR: NORMAL
PMV BLD AUTO: 9.4 FL (ref 9–12.9)
POTASSIUM SERPL-SCNC: 3.6 MMOL/L (ref 3.6–5.5)
PREALB SERPL-MCNC: 16.5 MG/DL (ref 18–38)
PRODUCT TYPE UPROD: NORMAL
PROT SERPL-MCNC: 6.4 G/DL (ref 6–8.2)
RBC # BLD AUTO: 2.94 M/UL (ref 4.7–6.1)
RBC BLD AUTO: PRESENT
SODIUM SERPL-SCNC: 139 MMOL/L (ref 135–145)
TEG ALGORITHM TGALG: ABNORMAL
TEG ALGORITHM TGALG: ABNORMAL
TIBC SERPL-MCNC: 216 UG/DL (ref 250–450)
UIBC SERPL-MCNC: 185 UG/DL (ref 110–370)
UNIT STATUS USTAT: NORMAL
WBC # BLD AUTO: 14.7 K/UL (ref 4.8–10.8)

## 2021-01-11 PROCEDURE — 80053 COMPREHEN METABOLIC PANEL: CPT

## 2021-01-11 PROCEDURE — 83540 ASSAY OF IRON: CPT

## 2021-01-11 PROCEDURE — 80162 ASSAY OF DIGOXIN TOTAL: CPT

## 2021-01-11 PROCEDURE — 500331 HCHG COTTONOID, SURG PATTIE: Performed by: NEUROLOGICAL SURGERY

## 2021-01-11 PROCEDURE — 700101 HCHG RX REV CODE 250: Performed by: NEUROLOGICAL SURGERY

## 2021-01-11 PROCEDURE — 700102 HCHG RX REV CODE 250 W/ 637 OVERRIDE(OP): Performed by: SURGERY

## 2021-01-11 PROCEDURE — 500075 HCHG BLADE, CLIPPER NEURO: Performed by: NEUROLOGICAL SURGERY

## 2021-01-11 PROCEDURE — 700105 HCHG RX REV CODE 258: Performed by: ANESTHESIOLOGY

## 2021-01-11 PROCEDURE — 51798 US URINE CAPACITY MEASURE: CPT

## 2021-01-11 PROCEDURE — 700111 HCHG RX REV CODE 636 W/ 250 OVERRIDE (IP): Performed by: SURGERY

## 2021-01-11 PROCEDURE — 85007 BL SMEAR W/DIFF WBC COUNT: CPT

## 2021-01-11 PROCEDURE — 500889 HCHG PACK, NEURO: Performed by: NEUROLOGICAL SURGERY

## 2021-01-11 PROCEDURE — 03HY32Z INSERTION OF MONITORING DEVICE INTO UPPER ARTERY, PERCUTANEOUS APPROACH: ICD-10-PCS | Performed by: ANESTHESIOLOGY

## 2021-01-11 PROCEDURE — 160029 HCHG SURGERY MINUTES - 1ST 30 MINS LEVEL 4: Performed by: NEUROLOGICAL SURGERY

## 2021-01-11 PROCEDURE — 84134 ASSAY OF PREALBUMIN: CPT

## 2021-01-11 PROCEDURE — 160009 HCHG ANES TIME/MIN: Performed by: NEUROLOGICAL SURGERY

## 2021-01-11 PROCEDURE — 110454 HCHG SHELL REV 250: Performed by: NEUROLOGICAL SURGERY

## 2021-01-11 PROCEDURE — 700111 HCHG RX REV CODE 636 W/ 250 OVERRIDE (IP): Performed by: ANESTHESIOLOGY

## 2021-01-11 PROCEDURE — 501838 HCHG SUTURE GENERAL: Performed by: NEUROLOGICAL SURGERY

## 2021-01-11 PROCEDURE — A9270 NON-COVERED ITEM OR SERVICE: HCPCS | Performed by: SURGERY

## 2021-01-11 PROCEDURE — 160048 HCHG OR STATISTICAL LEVEL 1-5: Performed by: NEUROLOGICAL SURGERY

## 2021-01-11 PROCEDURE — 00C40ZZ EXTIRPATION OF MATTER FROM INTRACRANIAL SUBDURAL SPACE, OPEN APPROACH: ICD-10-PCS | Performed by: NEUROLOGICAL SURGERY

## 2021-01-11 PROCEDURE — 500367 HCHG DRAIN KIT, HEMOVAC: Performed by: NEUROLOGICAL SURGERY

## 2021-01-11 PROCEDURE — A4340 INDWELLING CATHETER SPECIAL: HCPCS | Performed by: NEUROLOGICAL SURGERY

## 2021-01-11 PROCEDURE — P9012 CRYOPRECIPITATE EACH UNIT: HCPCS | Mod: 91

## 2021-01-11 PROCEDURE — 700105 HCHG RX REV CODE 258: Performed by: NURSE PRACTITIONER

## 2021-01-11 PROCEDURE — 700101 HCHG RX REV CODE 250: Performed by: NURSE PRACTITIONER

## 2021-01-11 PROCEDURE — 302140 GU CART: Performed by: SURGERY

## 2021-01-11 PROCEDURE — 160035 HCHG PACU - 1ST 60 MINS PHASE I: Performed by: NEUROLOGICAL SURGERY

## 2021-01-11 PROCEDURE — P9034 PLATELETS, PHERESIS: HCPCS

## 2021-01-11 PROCEDURE — 85384 FIBRINOGEN ACTIVITY: CPT

## 2021-01-11 PROCEDURE — 85027 COMPLETE CBC AUTOMATED: CPT

## 2021-01-11 PROCEDURE — 160041 HCHG SURGERY MINUTES - EA ADDL 1 MIN LEVEL 4: Performed by: NEUROLOGICAL SURGERY

## 2021-01-11 PROCEDURE — 0T9B80Z DRAINAGE OF BLADDER WITH DRAINAGE DEVICE, VIA NATURAL OR ARTIFICIAL OPENING ENDOSCOPIC: ICD-10-PCS | Performed by: UROLOGY

## 2021-01-11 PROCEDURE — 306565 RIGID MIT RESTRAINT(PAIR): Performed by: SURGERY

## 2021-01-11 PROCEDURE — 83735 ASSAY OF MAGNESIUM: CPT

## 2021-01-11 PROCEDURE — 160002 HCHG RECOVERY MINUTES (STAT): Performed by: NEUROLOGICAL SURGERY

## 2021-01-11 PROCEDURE — 86140 C-REACTIVE PROTEIN: CPT

## 2021-01-11 PROCEDURE — 700111 HCHG RX REV CODE 636 W/ 250 OVERRIDE (IP): Performed by: NURSE PRACTITIONER

## 2021-01-11 PROCEDURE — 36430 TRANSFUSION BLD/BLD COMPNT: CPT

## 2021-01-11 PROCEDURE — 83550 IRON BINDING TEST: CPT

## 2021-01-11 PROCEDURE — 700101 HCHG RX REV CODE 250: Performed by: ANESTHESIOLOGY

## 2021-01-11 PROCEDURE — 70450 CT HEAD/BRAIN W/O DYE: CPT

## 2021-01-11 PROCEDURE — 85576 BLOOD PLATELET AGGREGATION: CPT | Mod: 91

## 2021-01-11 PROCEDURE — 99233 SBSQ HOSP IP/OBS HIGH 50: CPT | Performed by: SURGERY

## 2021-01-11 PROCEDURE — 00U20KZ SUPPLEMENT DURA MATER WITH NONAUTOLOGOUS TISSUE SUBSTITUTE, OPEN APPROACH: ICD-10-PCS | Performed by: NEUROLOGICAL SURGERY

## 2021-01-11 PROCEDURE — C1713 ANCHOR/SCREW BN/BN,TIS/BN: HCPCS | Performed by: NEUROLOGICAL SURGERY

## 2021-01-11 PROCEDURE — 770022 HCHG ROOM/CARE - ICU (200)

## 2021-01-11 PROCEDURE — 85347 COAGULATION TIME ACTIVATED: CPT

## 2021-01-11 PROCEDURE — 700101 HCHG RX REV CODE 250: Performed by: SURGERY

## 2021-01-11 DEVICE — GRAFT DURAMATRIX ONLAY PLUS 3IN X 3IN OR 7.5CM X 7.5CM: Type: IMPLANTABLE DEVICE | Site: BRAIN | Status: FUNCTIONAL

## 2021-01-11 DEVICE — SCREW STRYK NC 1.5X4MM (6NCX40=240) CONSIGNED QTY 240 PRE-LOAD 80/PK: Type: IMPLANTABLE DEVICE | Site: BRAIN | Status: FUNCTIONAL

## 2021-01-11 DEVICE — PLATE NC BURR HOLE COVER FOR SHUNT 14MM (6NCX6=36): Type: IMPLANTABLE DEVICE | Site: BRAIN | Status: FUNCTIONAL

## 2021-01-11 RX ORDER — AMOXICILLIN 250 MG
1 CAPSULE ORAL NIGHTLY
Status: DISCONTINUED | OUTPATIENT
Start: 2021-01-11 | End: 2021-01-21 | Stop reason: HOSPADM

## 2021-01-11 RX ORDER — AMOXICILLIN 250 MG
1 CAPSULE ORAL
Status: DISCONTINUED | OUTPATIENT
Start: 2021-01-11 | End: 2021-01-21 | Stop reason: HOSPADM

## 2021-01-11 RX ORDER — MIDAZOLAM HYDROCHLORIDE 1 MG/ML
1-5 INJECTION INTRAMUSCULAR; INTRAVENOUS ONCE
Status: COMPLETED | OUTPATIENT
Start: 2021-01-11 | End: 2021-01-11

## 2021-01-11 RX ORDER — LEVETIRACETAM 500 MG/1
500 TABLET ORAL 2 TIMES DAILY
Status: DISPENSED | OUTPATIENT
Start: 2021-01-11 | End: 2021-01-17

## 2021-01-11 RX ORDER — HALOPERIDOL 5 MG/ML
1 INJECTION INTRAMUSCULAR
Status: DISCONTINUED | OUTPATIENT
Start: 2021-01-11 | End: 2021-01-11 | Stop reason: HOSPADM

## 2021-01-11 RX ORDER — LABETALOL HYDROCHLORIDE 5 MG/ML
5 INJECTION, SOLUTION INTRAVENOUS
Status: DISCONTINUED | OUTPATIENT
Start: 2021-01-11 | End: 2021-01-11 | Stop reason: HOSPADM

## 2021-01-11 RX ORDER — LIDOCAINE HYDROCHLORIDE 40 MG/ML
SOLUTION TOPICAL PRN
Status: DISCONTINUED | OUTPATIENT
Start: 2021-01-11 | End: 2021-01-11 | Stop reason: SURG

## 2021-01-11 RX ORDER — OXYCODONE HCL 5 MG/5 ML
10 SOLUTION, ORAL ORAL
Status: DISCONTINUED | OUTPATIENT
Start: 2021-01-11 | End: 2021-01-11 | Stop reason: HOSPADM

## 2021-01-11 RX ORDER — METOPROLOL TARTRATE 50 MG/1
75 TABLET, FILM COATED ORAL 2 TIMES DAILY
Status: DISCONTINUED | OUTPATIENT
Start: 2021-01-11 | End: 2021-01-17

## 2021-01-11 RX ORDER — NEOSTIGMINE METHYLSULFATE 1 MG/ML
INJECTION, SOLUTION INTRAVENOUS PRN
Status: DISCONTINUED | OUTPATIENT
Start: 2021-01-11 | End: 2021-01-11 | Stop reason: SURG

## 2021-01-11 RX ORDER — DIGOXIN 125 MCG
125 TABLET ORAL EVERY EVENING
Status: DISCONTINUED | OUTPATIENT
Start: 2021-01-11 | End: 2021-01-15

## 2021-01-11 RX ORDER — OXYCODONE HYDROCHLORIDE 5 MG/1
2.5 TABLET ORAL
Status: DISCONTINUED | OUTPATIENT
Start: 2021-01-11 | End: 2021-01-21 | Stop reason: HOSPADM

## 2021-01-11 RX ORDER — DIPHENHYDRAMINE HYDROCHLORIDE 50 MG/ML
12.5 INJECTION INTRAMUSCULAR; INTRAVENOUS
Status: DISCONTINUED | OUTPATIENT
Start: 2021-01-11 | End: 2021-01-11 | Stop reason: HOSPADM

## 2021-01-11 RX ORDER — SODIUM CHLORIDE, SODIUM LACTATE, POTASSIUM CHLORIDE, CALCIUM CHLORIDE 600; 310; 30; 20 MG/100ML; MG/100ML; MG/100ML; MG/100ML
INJECTION, SOLUTION INTRAVENOUS CONTINUOUS
Status: DISCONTINUED | OUTPATIENT
Start: 2021-01-11 | End: 2021-01-11 | Stop reason: HOSPADM

## 2021-01-11 RX ORDER — OXYCODONE HYDROCHLORIDE 5 MG/1
5 TABLET ORAL
Status: DISCONTINUED | OUTPATIENT
Start: 2021-01-11 | End: 2021-01-21 | Stop reason: HOSPADM

## 2021-01-11 RX ORDER — ONDANSETRON 2 MG/ML
INJECTION INTRAMUSCULAR; INTRAVENOUS PRN
Status: DISCONTINUED | OUTPATIENT
Start: 2021-01-11 | End: 2021-01-11 | Stop reason: SURG

## 2021-01-11 RX ORDER — ONDANSETRON 2 MG/ML
4 INJECTION INTRAMUSCULAR; INTRAVENOUS
Status: DISCONTINUED | OUTPATIENT
Start: 2021-01-11 | End: 2021-01-11 | Stop reason: HOSPADM

## 2021-01-11 RX ORDER — SODIUM CHLORIDE 9 MG/ML
INJECTION, SOLUTION INTRAVENOUS
Status: DISCONTINUED | OUTPATIENT
Start: 2021-01-11 | End: 2021-01-11 | Stop reason: SURG

## 2021-01-11 RX ORDER — POTASSIUM CHLORIDE 7.45 MG/ML
10 INJECTION INTRAVENOUS
Status: DISCONTINUED | OUTPATIENT
Start: 2021-01-11 | End: 2021-01-11

## 2021-01-11 RX ORDER — LIDOCAINE HYDROCHLORIDE 20 MG/ML
INJECTION, SOLUTION EPIDURAL; INFILTRATION; INTRACAUDAL; PERINEURAL PRN
Status: DISCONTINUED | OUTPATIENT
Start: 2021-01-11 | End: 2021-01-11 | Stop reason: SURG

## 2021-01-11 RX ORDER — MIDAZOLAM HYDROCHLORIDE 1 MG/ML
1 INJECTION INTRAMUSCULAR; INTRAVENOUS
Status: DISCONTINUED | OUTPATIENT
Start: 2021-01-11 | End: 2021-01-11 | Stop reason: HOSPADM

## 2021-01-11 RX ORDER — GLYCOPYRROLATE 0.2 MG/ML
INJECTION INTRAMUSCULAR; INTRAVENOUS PRN
Status: DISCONTINUED | OUTPATIENT
Start: 2021-01-11 | End: 2021-01-11 | Stop reason: SURG

## 2021-01-11 RX ORDER — OXYCODONE HCL 5 MG/5 ML
5 SOLUTION, ORAL ORAL
Status: DISCONTINUED | OUTPATIENT
Start: 2021-01-11 | End: 2021-01-11 | Stop reason: HOSPADM

## 2021-01-11 RX ORDER — DOCUSATE SODIUM 50 MG/5ML
100 LIQUID ORAL 2 TIMES DAILY
Status: DISCONTINUED | OUTPATIENT
Start: 2021-01-11 | End: 2021-01-21 | Stop reason: HOSPADM

## 2021-01-11 RX ORDER — CEFAZOLIN SODIUM 1 G/3ML
INJECTION, POWDER, FOR SOLUTION INTRAMUSCULAR; INTRAVENOUS PRN
Status: DISCONTINUED | OUTPATIENT
Start: 2021-01-11 | End: 2021-01-11 | Stop reason: SURG

## 2021-01-11 RX ORDER — POLYETHYLENE GLYCOL 3350 17 G/17G
1 POWDER, FOR SOLUTION ORAL 2 TIMES DAILY
Status: DISCONTINUED | OUTPATIENT
Start: 2021-01-11 | End: 2021-01-21 | Stop reason: HOSPADM

## 2021-01-11 RX ORDER — BUPIVACAINE HYDROCHLORIDE AND EPINEPHRINE 5; 5 MG/ML; UG/ML
INJECTION, SOLUTION PERINEURAL
Status: DISCONTINUED | OUTPATIENT
Start: 2021-01-11 | End: 2021-01-11 | Stop reason: HOSPADM

## 2021-01-11 RX ORDER — MEPERIDINE HYDROCHLORIDE 25 MG/ML
25 INJECTION INTRAMUSCULAR; INTRAVENOUS; SUBCUTANEOUS
Status: DISCONTINUED | OUTPATIENT
Start: 2021-01-11 | End: 2021-01-11 | Stop reason: HOSPADM

## 2021-01-11 RX ORDER — MEPERIDINE HYDROCHLORIDE 25 MG/ML
INJECTION INTRAMUSCULAR; INTRAVENOUS; SUBCUTANEOUS PRN
Status: DISCONTINUED | OUTPATIENT
Start: 2021-01-11 | End: 2021-01-11 | Stop reason: SURG

## 2021-01-11 RX ORDER — ROCURONIUM BROMIDE 10 MG/ML
INJECTION, SOLUTION INTRAVENOUS PRN
Status: DISCONTINUED | OUTPATIENT
Start: 2021-01-11 | End: 2021-01-11 | Stop reason: SURG

## 2021-01-11 RX ADMIN — DOCUSATE SODIUM 50 MG AND SENNOSIDES 8.6 MG 1 TABLET: 8.6; 5 TABLET, FILM COATED ORAL at 22:07

## 2021-01-11 RX ADMIN — MORPHINE SULFATE 2 MG: 4 INJECTION INTRAVENOUS at 20:45

## 2021-01-11 RX ADMIN — DOXYCYCLINE 100 MG: 100 INJECTION, POWDER, LYOPHILIZED, FOR SOLUTION INTRAVENOUS at 17:25

## 2021-01-11 RX ADMIN — ROCURONIUM BROMIDE 5 MG: 10 INJECTION, SOLUTION INTRAVENOUS at 05:39

## 2021-01-11 RX ADMIN — PROPOFOL 20 MG: 10 INJECTION, EMULSION INTRAVENOUS at 04:24

## 2021-01-11 RX ADMIN — MORPHINE SULFATE 2 MG: 4 INJECTION INTRAVENOUS at 16:20

## 2021-01-11 RX ADMIN — LIDOCAINE HYDROCHLORIDE 160 MG: 40 SOLUTION TOPICAL at 04:26

## 2021-01-11 RX ADMIN — ROCURONIUM BROMIDE 30 MG: 10 INJECTION, SOLUTION INTRAVENOUS at 04:24

## 2021-01-11 RX ADMIN — POTASSIUM BICARBONATE 50 MEQ: 977.5 TABLET, EFFERVESCENT ORAL at 17:24

## 2021-01-11 RX ADMIN — METOPROLOL TARTRATE 1 MG: 5 INJECTION, SOLUTION INTRAVENOUS at 05:57

## 2021-01-11 RX ADMIN — CEFAZOLIN 2 G: 330 INJECTION, POWDER, FOR SOLUTION INTRAMUSCULAR; INTRAVENOUS at 04:18

## 2021-01-11 RX ADMIN — SODIUM CHLORIDE: 9 INJECTION, SOLUTION INTRAVENOUS at 09:08

## 2021-01-11 RX ADMIN — PROPOFOL 20 MG: 10 INJECTION, EMULSION INTRAVENOUS at 04:22

## 2021-01-11 RX ADMIN — LEVETIRACETAM 500 MG: 500 TABLET, FILM COATED ORAL at 17:27

## 2021-01-11 RX ADMIN — MEPERIDINE HYDROCHLORIDE 12.5 MG: 25 INJECTION INTRAMUSCULAR; INTRAVENOUS; SUBCUTANEOUS at 04:47

## 2021-01-11 RX ADMIN — MIDAZOLAM HYDROCHLORIDE 3 MG: 1 INJECTION, SOLUTION INTRAMUSCULAR; INTRAVENOUS at 14:26

## 2021-01-11 RX ADMIN — GLYCOPYRROLATE 0.4 MG: 0.2 INJECTION INTRAMUSCULAR; INTRAVENOUS at 06:11

## 2021-01-11 RX ADMIN — METOPROLOL TARTRATE 5 MG: 5 INJECTION, SOLUTION INTRAVENOUS at 14:43

## 2021-01-11 RX ADMIN — NEOSTIGMINE METHYLSULFATE 2 MG: 1 INJECTION INTRAVENOUS at 06:11

## 2021-01-11 RX ADMIN — SODIUM CHLORIDE 5 MG/HR: 9 INJECTION, SOLUTION INTRAVENOUS at 09:09

## 2021-01-11 RX ADMIN — DOCUSATE SODIUM 100 MG: 50 LIQUID ORAL at 17:25

## 2021-01-11 RX ADMIN — DIGOXIN 125 MCG: 125 TABLET ORAL at 17:24

## 2021-01-11 RX ADMIN — ONDANSETRON 4 MG: 2 INJECTION INTRAMUSCULAR; INTRAVENOUS at 06:03

## 2021-01-11 RX ADMIN — SODIUM CHLORIDE: 9 INJECTION, SOLUTION INTRAVENOUS at 05:41

## 2021-01-11 RX ADMIN — METOPROLOL TARTRATE 1 MG: 5 INJECTION, SOLUTION INTRAVENOUS at 04:57

## 2021-01-11 RX ADMIN — LIDOCAINE HYDROCHLORIDE 20 MG: 20 INJECTION, SOLUTION EPIDURAL; INFILTRATION; INTRACAUDAL at 04:22

## 2021-01-11 RX ADMIN — POLYETHYLENE GLYCOL 3350 1 PACKET: 17 POWDER, FOR SOLUTION ORAL at 17:24

## 2021-01-11 RX ADMIN — PROPOFOL 20 MG: 10 INJECTION, EMULSION INTRAVENOUS at 06:07

## 2021-01-11 RX ADMIN — METOPROLOL TARTRATE 75 MG: 50 TABLET, FILM COATED ORAL at 17:24

## 2021-01-11 RX ADMIN — ALFENTANIL HYDROCHLORIDE 500 MCG: 500 INJECTION INTRAVENOUS at 04:22

## 2021-01-11 RX ADMIN — MORPHINE SULFATE 2 MG: 4 INJECTION INTRAVENOUS at 11:17

## 2021-01-11 ASSESSMENT — PAIN SCALES - GENERAL: PAIN_LEVEL: 0

## 2021-01-11 ASSESSMENT — FIBROSIS 4 INDEX: FIB4 SCORE: 1.8

## 2021-01-11 ASSESSMENT — PAIN DESCRIPTION - PAIN TYPE: TYPE: REFERRED PAIN

## 2021-01-11 NOTE — ANESTHESIA POSTPROCEDURE EVALUATION
Patient: Yousif Kimble    Procedure Summary     Date: 01/11/21 Room / Location: [facility] 04 / SURGERY [facility]    Anesthesia Start: 0418 Anesthesia Stop: 0620    Procedure: CRANIOTOMY FOR SUBDURAL HEMATOMA (Left Head) Diagnosis:     Surgeons: Dante Sparrow M.D. Responsible Provider: Isaiah Borges M.D.    Anesthesia Type: general ASA Status: 4 - Emergent          Final Anesthesia Type: general  Last vitals  BP   Blood Pressure : 147/82    Temp   37.6 °C (99.6 °F)    Pulse   Pulse: 80   Resp   (!) 24    SpO2   97 %      Anesthesia Post Evaluation    Patient location during evaluation: PACU  Patient participation: complete - patient participated  Level of consciousness: awake and alert  Pain score: 0    Airway patency: patent  Anesthetic complications: no  Cardiovascular status: hemodynamically stable  Respiratory status: acceptable  Hydration status: euvolemic    PONV: none           Nurse Pain Score: 0 (NPRS)

## 2021-01-11 NOTE — ANESTHESIA PROCEDURE NOTES
Arterial Line  Performed by: Isaiah Borges M.D.  Authorized by: Isaiah Borges M.D.     Start Time:  1/11/2021 4:28 AM  End Time:  1/11/2021 4:38 AM  Patient Location:  OR      Catheter Size:  20 G  Laterality:  Right  Site:  Radial artery  Line Secured:  Antimicrobial disc

## 2021-01-11 NOTE — ANESTHESIA PREPROCEDURE EVALUATION
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)   (+) Stroke (HCC)      CARDIAC   (+) AF (atrial fibrillation) (HCC)   (+) Atrial fibrillation (HCC)   (+) CAD (coronary artery disease)   (+) Cerebrovascular accident (CVA) due to embolism (HCC)   (+) Essential hypertension   (+) Essential hypertension, benign   (+) Hypertension   (+) Systolic CHF (HCC)      Other   (+) Chronic lymphocytic leukemia (HCC)       Physical Exam    Airway   Mallampati: II  TM distance: >3 FB  Neck ROM: full       Cardiovascular - normal exam  Rhythm: regular  Rate: normal     Dental - normal exam           Pulmonary - normal exam  Breath sounds clear to auscultation  (+) decreased breath sounds     Abdominal    Neurological - normal exam                 Anesthesia Plan    ASA 4- EMERGENT   ASA physical status 4 criteria: CVA or TIA - recent (< 3 months)ASA physical status emergent criteria: compromised vital organ, limb or tissue and acute hemorrhage    Plan - general       Airway plan will be ETT        Induction: intravenous    Postoperative Plan: Postoperative administration of opioids is intended.    Pertinent diagnostic labs and testing reviewed    Informed Consent:    Anesthetic plan and risks discussed with patient.    Use of blood products discussed with: patient whom consented to blood products.

## 2021-01-11 NOTE — PROGRESS NOTES
"Trauma Progress Note 1/10/2021 9:18 PM    Briefly, this is a 89 y.o. male with subdural hematoma post GLF. Patient was released from [facility] for pneumonia on 1/9.     Approximate resuscitation given to this point includes: , 2 U FFP, 1U platelets.    BP (!) 189/121   Pulse (!) 107   Temp 37.5 °C (99.5 °F)   Resp (!) 28   Ht 1.727 m (5' 8\")   Wt 56 kg (123 lb 7.3 oz)   SpO2 97%   BMI 18.77 kg/m²     Hemoglobin: 12.6 g/dL  Hematocrit: 39.5%      Arterial Blood Gas:        Recent Labs     01/10/21  1328   APTT 33.1   INR 1.31*      Recent Labs     01/10/21  1328   REACTMIN 7.4   CLOTKINET 1.7   CLOTANGL 67.2   MAXCLOTS 71.1*   [id number] 0.0   PRCINADP 65.3   PRCINAA 50.4       Urine Output:     Assessment:  Awake  Mumbling to questions  Hypertensive with SBP >170 and DBP >110    Additional plans:  Patient presented on Nicardipine gtt.Resume to maintain adequate BP RN to call and get specific parameters and medication per neurosurgery recommendation   Unable to swallow antihypertensive or afib medications, IV added  NPO pending surgery 1/11  Monitor fluid status   Frequent neuro checks  Repeat Teg pending after Dr. Sparrow's request of 1unit plat, 2 un FFP.  Repeat head CT per Dr. Sparrow.     "

## 2021-01-11 NOTE — PROGRESS NOTES
1745: Orders received from Dr Sparrow for 1 un plts and 2 un FFP. Recheck TEG, PTT/INR after transfusions. Will implement.    1820: Brother Ward updated on plan of care.

## 2021-01-11 NOTE — CONSULTS
UROLOGY Consult Note:    Lisseth Minor P.A.-C.  Date & Time note created:    1/11/2021   3:34 PM         Patient ID:   Name:             Yousif Kimble     YOB: 1930  Age:                 89 y.o.  male   MRN:               [id number]                                                             Reason for Consult:      Mendoza not draining    History of Present Illness:    89 yo M consulted to our service for his mendoza cath not draining. Per nurse, catheter was placed during craniotomy and evac surgery this morning at 0700. 14 fr coude was inserted and did not drain since its placement. Patient is unable to give any past medical history and we have no records of him through our office.     Review of Systems:      Unable to obtain ROS     Past Medical History:   Past Medical History:   Diagnosis Date   • Arrhythmia    • Cancer (HCC)    • Congestive heart failure (HCC)    • Fall    • Hyperlipidemia    • Hypertension    • Myocardial infarct (HCC)    • Pneumonia    • Stroke (HCC)    • Urinary incontinence      Active Hospital Problems    Diagnosis   • Acute urinary retention [R33.8]     Priority: High   • Subdural hematoma (HCC) [S06.5X9A]     Priority: High   • Chronic anticoagulation [Z79.01]     Priority: High   • Contraindication to deep vein thrombosis (DVT) prophylaxis [Z53.09]     Priority: Medium   • Screening examination for infectious disease [Z11.9]     Priority: Medium   • AF (atrial fibrillation) (HCC) [I48.91]     Priority: Medium   • Essential hypertension [I10]     Priority: Medium   • Pneumonia [J18.9]     Priority: Medium   • Iron deficiency anemia [D50.9]     Priority: Medium   • Trauma [T14.90XA]     Priority: Low   • Stroke (HCC) [I63.9]     Priority: Low   • Cancer (HCC) [C80.1]     Priority: Low   • Dysphagia [R13.10]     Priority: Low       Past Surgical History:  Past Surgical History:   Procedure Laterality Date   • CRANIOTOMY Left 1/11/2021    Procedure: CRANIOTOMY FOR SUBDURAL  HEMATOMA;  Surgeon: Dante Sparrow M.D.;  Location: SURGERY [facility];  Service: Neurosurgery   • OTHER ORTHOPEDIC SURGERY      right ankle surgery   • OTHER ORTHOPEDIC SURGERY      left forearm surgery   • TONSILLECTOMY         Hospital Medications:    Current Facility-Administered Medications:   •  levETIRAcetam (KEPPRA) tablet 500 mg, 500 mg, Enteral Tube, BID, Kei Murray M.D.  •  potassium bicarbonate (KLYTE) effervescent tablet 50 mEq, 50 mEq, Enteral Tube, Once, Kei Murray M.D.  •  Pharmacy Consult: Enteral tube insertion - review meds/change route/product selection, 1 Each, Other, PHARMACY TO DOSE, Kei Murray M.D.  •  digoxin (LANOXIN) tablet 125 mcg, 125 mcg, Enteral Tube, Q EVENING, Jere Meyer M.D.  •  docusate sodium 100mg/10mL (COLACE) solution 100 mg, 100 mg, Enteral Tube, BID, Jere Meyer M.D.  •  [START ON 1/12/2021] magnesium hydroxide (MILK OF MAGNESIA) suspension 30 mL, 30 mL, Enteral Tube, DAILY, Jere Meyer M.D.  •  metoprolol (LOPRESSOR) tablet 75 mg, 75 mg, Enteral Tube, BID, Jere Meyer M.D.  •  polyethylene glycol/lytes (MIRALAX) PACKET 1 Packet, 1 Packet, Enteral Tube, BID, Jere Meyer M.D.  •  senna-docusate (PERICOLACE or SENOKOT S) 8.6-50 MG per tablet 1 Tab, 1 Tab, Enteral Tube, Nightly, Jere Meyer M.D.  •  oxyCODONE immediate-release (ROXICODONE) tablet 2.5 mg, 2.5 mg, Enteral Tube, Q3HRS PRN, Jere Meyer M.D.  •  oxyCODONE immediate-release (ROXICODONE) tablet 5 mg, 5 mg, Enteral Tube, Q3HRS PRN, Jere Meyer M.D.  •  senna-docusate (PERICOLACE or SENOKOT S) 8.6-50 MG per tablet 1 Tab, 1 Tab, Enteral Tube, Q24HRS PRN, Jere J Deeter, M.D.  •  Respiratory Therapy Consult, , Nebulization, Continuous RT, ENOCH Diaz  •  Pharmacy Consult Request ...Pain Management Review 1 Each, 1 Each, Other, PHARMACY TO DOSE, ENOCH Diaz  •  bisacodyl (DULCOLAX) suppository 10 mg, 10 mg, Rectal, Q24HRS PRN, Ana Cristina  Whitley, A.P.R.N.  •  fleet enema 133 mL, 1 Each, Rectal, Once PRN, Ana Cristina Arreaga A.P.R.N.  •  NS infusion, , Intravenous, Continuous, Jolanta Thao A.P.N., Last Rate: 50 mL/hr at 01/11/21 0908, New Bag at 01/11/21 0908  •  morphine (pf) 4 mg/mL injection 2 mg, 2 mg, Intravenous, Q3HRS PRN, Ana Cristina Arreaga A.P.R.N., 2 mg at 01/11/21 1117  •  ondansetron (ZOFRAN) syringe/vial injection 4 mg, 4 mg, Intravenous, Q4HRS PRN, Ana Cristina Arreaga A.P.R.N.  •  cefTRIAXone (ROCEPHIN) 1 g in  mL IVPB, 1 g, Intravenous, Q24HRS, Ana Cristina Arreaga A.P.R.N., Stopped at 01/10/21 1731  •  doxycycline (VIBRAMYCIN) 100 mg in  mL IVPB, 100 mg, Intravenous, Q12HRS, Ana Cristina Arreaga A.P.R.N., Stopped at 01/10/21 1858  •  [Held by provider] amLODIPine (NORVASC) tablet 10 mg, 10 mg, Oral, DAILY, Jolanta Thao, A.P.N.  •  [Held by provider] lisinopril (PRINIVIL) tablet 5 mg, 5 mg, Oral, DAILY, Jolanta Thao, A.P.N.  •  niCARdipine (CARDENE) 25 mg in  mL Infusion, 0-5 mg/hr, Intravenous, Continuous, Jolanta Thao, A.P.N., Stopped at 01/11/21 1105  •  Metoprolol Tartrate (LOPRESSOR) injection 5 mg, 5 mg, Intravenous, Q6HRS PRN, Jolanta Thao, A.P.N., 5 mg at 01/11/21 1443    Current Outpatient Medications:  Medications Prior to Admission   Medication Sig Dispense Refill Last Dose   • amLODIPine (NORVASC) 10 MG Tab Take 10 mg by mouth every day.   1/9/2021 at Unknown time   • cefdinir (OMNICEF) 300 MG Cap Take 300 mg by mouth 2 (two) times a day. Pt started a 7 day course of CEFDINIR on 01/09   1/10/2021 at AM   • doxycycline (VIBRAMYCIN) 100 MG Tab Take 100 mg by mouth 2 (two) times a day. Pt started a 7 day course of DOXYCYLINE on 01/09   1/10/2021 at AM   • mirtazapine (REMERON) 30 MG TABLET DISPERSIBLE Take 1 Tab by mouth every bedtime. 30 Tab 2 1/9/2021 at PM   • apixaban (ELIQUIS) 5mg Tab Take 1 Tab by mouth 2 Times a Day. 60 Tab 0 1/10/2021 at AM   • atorvastatin (LIPITOR) 40 MG Tab Take 1 Tab by mouth every evening.  30 Tab 0 1/9/2021 at PM   • digoxin (LANOXIN) 125 MCG Tab Take 1 Tab by mouth every day at 6 PM. (Patient taking differently: Take 125 mcg by mouth every evening.) 30 Tab 0 1/9/2021 at PM   • lisinopril (PRINIVIL) 5 MG Tab Take 1 Tab by mouth every day. (Patient taking differently: Take 5 mg by mouth every morning.) 30 Tab 0 NOT STARTED at [facility]   • metoprolol 75 MG Tab Take 75 mg by mouth 2 Times a Day. 60 Tab 0 1/9/2021 at PM       Medication Allergy:  No Known Allergies    Family History:  Family History   Problem Relation Age of Onset   • Alcohol abuse Mother        Social History:  Social History     Socioeconomic History   • Marital status: Single     Spouse name: Not on file   • Number of children: Not on file   • Years of education: Not on file   • Highest education level: Not on file   Occupational History   • Not on file   Social Needs   • Financial resource strain: Not on file   • Food insecurity     Worry: Never true     Inability: Never true   • Transportation needs     Medical: No     Non-medical: No   Tobacco Use   • Smoking status: Never Smoker   • Smokeless tobacco: Never Used   Substance and Sexual Activity   • Alcohol use: Yes     Alcohol/week: 1.2 oz     Types: 2 Shots of liquor per week     Frequency: 2-3 times a week     Drinks per session: 1 or 2     Binge frequency: Never   • Drug use: Not Currently   • Sexual activity: Not on file   Lifestyle   • Physical activity     Days per week: Not on file     Minutes per session: Not on file   • Stress: Not on file   Relationships   • Social connections     Talks on phone: Not on file     Gets together: Not on file     Attends [employer] service: Not on file     Active member of club or organization: Not on file     Attends meetings of clubs or organizations: Not on file     Relationship status: Not on file   • Intimate partner violence     Fear of current or ex partner: Not on file     Emotionally abused: Not on file     Physically abused: Not on file  "    Forced sexual activity: Not on file   Other Topics Concern   • Not on file   Social History Narrative   • Not on file         Physical Exam:  Vitals/ General Appearance:   Weight/BMI: Body mass index is 18.3 kg/m².  /73   Pulse 91   Temp 38 °C (100.4 °F)   Resp (!) 22   Ht 1.727 m (5' 8\")   Wt 54.6 kg (120 lb 5.9 oz)   SpO2 98%   Vitals:    01/11/21 1145 01/11/21 1200 01/11/21 1215 01/11/21 1515   BP: 130/75 133/84 137/73    Pulse: 93 89 91    Resp: (!) 23 20 (!) 22    Temp:    38 °C (100.4 °F)   TempSrc:       SpO2: 98% 98% 98%    Weight:       Height:         Oxygen Therapy:  Pulse Oximetry: 98 %, O2 (LPM): 3, O2 Delivery Device: Nasal Cannula    Constitutional:  Cathectic, distressed  HENMT:  Head wrapping in place.  Eyes:  EOMI, Conjunctiva normal, No discharge.  Neck:  Normal range of motion, No cervical tenderness,  no JVD.  : Normal external genitalia. 14 fr coude cath in place with no output. Blood around urethral meatus  Skin: Warm, Dry, No erythema, No rash, no induration.    MDM (Data Review):     Records reviewed and summarized in current documentation    Lab Data Review:  Recent Results (from the past 24 hour(s))   ACCU-CHEK GLUCOSE    Collection Time: 01/10/21  5:56 PM   Result Value Ref Range    Glucose - Accu-Ck 119 (H) 65 - 99 mg/dL   ABO Rh Confirm    Collection Time: 01/10/21  6:31 PM   Result Value Ref Range    ABO Rh Confirm A POS    PLATELETS REQUEST    Collection Time: 01/10/21  6:31 PM   Result Value Ref Range    Component P       PTP                 Plts,Pheresis       [id number]   transfused   01/10/21   19:58      Product Type Platelets  Pheresis LR     Dispense Status transfused     Unit Number (Barcoded) [id number]     Product Code (Barcoded) U5298Q53     Blood Type (Barcoded) 6200     Component P       P2                  Plts,Pheresis       [id number]   issued       01/11/21   04:01      Product Type Platelets  Pheresis LR     Dispense Status issued     Unit " Number (Barcoded) [id number]     Product Code (Barcoded) Y6104Z16     Blood Type (Barcoded) 6200    FRESH FROZEN PLASMA    Collection Time: 01/10/21  6:31 PM   Result Value Ref Range    Component F       TA3                 Thawed Plasma 3     [id number]   transfused   01/10/21   18:28      Product Type Thawed Apheresis Plasma 3rd Cont     Dispense Status transfused     Unit Number (Barcoded) [id number]     Product Code (Barcoded) P0094W73     Blood Type (Barcoded) 8400     Component F       TA2                 Thawed Plasma 2     [id number]   transfused   01/10/21   19:10      Product Type Thawed Apheresis Plasma 2nd Cont     Dispense Status transfused     Unit Number (Barcoded) [id number]     Product Code (Barcoded) J2927U03     Blood Type (Barcoded) 8400    BASIC TEG    Collection Time: 01/10/21  9:30 PM   Result Value Ref Range    Reaction Time Initial-R 4.6 (L) 5.0 - 10.0 min    Clot Kinetics-K 0.9 (L) 1.0 - 3.0 min    Clot Angle-Angle 75.7 (H) 53.0 - 72.0 degrees    Maximum Clot Strength-MA 74.4 (H) 50.0 - 70.0 mm    Lysis 30 minutes-LY30 0.2 0.0 - 8.0 %    TEG Algorithm Link Algorithm    APTT    Collection Time: 01/10/21  9:30 PM   Result Value Ref Range    APTT 34.6 24.7 - 36.0 sec   Prothrombin Time    Collection Time: 01/10/21  9:30 PM   Result Value Ref Range    PT 16.0 (H) 12.0 - 14.6 sec    INR 1.25 (H) 0.87 - 1.13   ACCU-CHEK GLUCOSE    Collection Time: 01/10/21 11:52 PM   Result Value Ref Range    Glucose - Accu-Ck 103 (H) 65 - 99 mg/dL   PLATELET MAPPING WITH BASIC TEG    Collection Time: 01/11/21 12:45 AM   Result Value Ref Range    Reaction Time Initial-R 5.3 5.0 - 10.0 min    Clot Kinetics-K 1.4 1.0 - 3.0 min    Clot Angle-Angle 70.7 53.0 - 72.0 degrees    Maximum Clot Strength-MA 72.3 (H) 50.0 - 70.0 mm    Lysis 30 minutes-LY30 0.0 0.0 - 8.0 %    % Inhibition ADP 87.3 %    % Inhibition AA 36.2 %    TEG Algorithm Link Algorithm    CRYOPRECIPITATE    Collection Time: 01/11/21  4:50  AM   Result Value Ref Range    Component Ct       CTP                 Cryoprecipitate     [id number]   issued       01/11/21   05:08      Product Type CTP     Dispense Status issued     Unit Number (Barcoded) [id number]     Product Code (Barcoded) I6130P83     Blood Type (Barcoded) 6200    Comp Metabolic Panel (CMP): Tomorrow AM    Collection Time: 01/11/21  7:17 AM   Result Value Ref Range    Sodium 139 135 - 145 mmol/L    Potassium 3.6 3.6 - 5.5 mmol/L    Chloride 105 96 - 112 mmol/L    Co2 22 20 - 33 mmol/L    Anion Gap 12.0 7.0 - 16.0    Glucose 110 (H) 65 - 99 mg/dL    Bun 15 8 - 22 mg/dL    Creatinine 0.85 0.50 - 1.40 mg/dL    Calcium 8.6 8.5 - 10.5 mg/dL    AST(SGOT) 19 12 - 45 U/L    ALT(SGPT) 11 2 - 50 U/L    Alkaline Phosphatase 76 30 - 99 U/L    Total Bilirubin 0.7 0.1 - 1.5 mg/dL    Albumin 3.3 3.2 - 4.9 g/dL    Total Protein 6.4 6.0 - 8.2 g/dL    Globulin 3.1 1.9 - 3.5 g/dL    A-G Ratio 1.1 g/dL   IRON/TOTAL IRON BIND    Collection Time: 01/11/21  7:17 AM   Result Value Ref Range    Iron 31 (L) 50 - 180 ug/dL    Total Iron Binding 216 (L) 250 - 450 ug/dL    Unsat Iron Binding 185 110 - 370 ug/dL    % Saturation 14 (L) 15 - 55 %   ESTIMATED GFR    Collection Time: 01/11/21  7:17 AM   Result Value Ref Range    GFR If African American >60 >60 mL/min/1.73 m 2    GFR If Non African American >60 >60 mL/min/1.73 m 2   CBC WITH DIFFERENTIAL    Collection Time: 01/11/21 10:00 AM   Result Value Ref Range    WBC 14.7 (H) 4.8 - 10.8 K/uL    RBC 2.94 (L) 4.70 - 6.10 M/uL    Hemoglobin 9.4 (L) 14.0 - 18.0 g/dL    Hematocrit 28.4 (L) 42.0 - 52.0 %    MCV 96.9 81.4 - 97.8 fL    MCH 31.3 27.0 - 33.0 pg    MCHC 32.3 (L) 33.7 - 35.3 g/dL    RDW 51.5 (H) 35.9 - 50.0 fL    Platelet Count 331 164 - 446 K/uL    MPV 9.4 9.0 - 12.9 fL    Neutrophils-Polys 73.00 (H) 44.00 - 72.00 %    Lymphocytes 17.40 (L) 22.00 - 41.00 %    Monocytes 7.00 0.00 - 13.40 %    Eosinophils 0.00 0.00 - 6.90 %    Basophils 0.00 0.00 - 1.80 %     Nucleated RBC 0.00 /100 WBC    Neutrophils (Absolute) 10.86 (H) 1.82 - 7.42 K/uL    Lymphs (Absolute) 2.56 1.00 - 4.80 K/uL    Monos (Absolute) 1.03 (H) 0.00 - 0.85 K/uL    Eos (Absolute) 0.00 0.00 - 0.51 K/uL    Baso (Absolute) 0.00 0.00 - 0.12 K/uL    NRBC (Absolute) 0.00 K/uL    Anisocytosis 1+     Macrocytosis 1+    DIFFERENTIAL MANUAL    Collection Time: 01/11/21 10:00 AM   Result Value Ref Range    Bands-Stabs 0.90 0.00 - 10.00 %    Metamyelocytes 1.70 %    Manual Diff Status PERFORMED    PERIPHERAL SMEAR REVIEW    Collection Time: 01/11/21 10:00 AM   Result Value Ref Range    Peripheral Smear Review see below    PLATELET ESTIMATE    Collection Time: 01/11/21 10:00 AM   Result Value Ref Range    Plt Estimation Normal    MORPHOLOGY    Collection Time: 01/11/21 10:00 AM   Result Value Ref Range    RBC Morphology Present    DIGOXIN    Collection Time: 01/11/21  2:00 PM   Result Value Ref Range    Digoxin 0.5 (L) 0.8 - 2.0 ng/mL       Imaging/Procedures Review:    Reviewed    MDM (Assessment and Plan):     Active Hospital Problems    Diagnosis   • Acute urinary retention [R33.8]     Priority: High   • Subdural hematoma (HCC) [S06.5X9A]     Priority: High   • Chronic anticoagulation [Z79.01]     Priority: High   • Contraindication to deep vein thrombosis (DVT) prophylaxis [Z53.09]     Priority: Medium   • Screening examination for infectious disease [Z11.9]     Priority: Medium   • AF (atrial fibrillation) (HCC) [I48.91]     Priority: Medium   • Essential hypertension [I10]     Priority: Medium   • Pneumonia [J18.9]     Priority: Medium   • Iron deficiency anemia [D50.9]     Priority: Medium   • Trauma [T14.90XA]     Priority: Low   • Stroke (HCC) [I63.9]     Priority: Low   • Cancer (HCC) [C80.1]     Priority: Low   • Dysphagia [R13.10]     Priority: Low       89 yo M with urinary retention, urethral bleeding.     I attempted to irrigate pre-existing 14 fr coude cath. Sterile water pushed easily in but did not get  backflow. It was at that point that the balloon was deflated and catheter was removed. When the catheter was removed, copious amounts of urethral bleeding began. Patient experienced bladder spasms, producing large amounts of gross hematuria. It was at that time that a 20 fr 3 way cath was attempted to be placed but was met with resistance. Large amounts of urethral bleeding persisted. It was at that point that cathter placement was abandoned and a cystoscopy would be required.     Cystoscopy and complex catheterization was performed by Dr. Louise and details will be included in separate documentation. Patient was found to have several false passages with prostatic bleeding, hopefully tamponaded by catheter. Urine was clear yellow upon placement of 18 fr [place] tip catheter over wire.     Plan:  - Mendoza cath to remain in place for 2 weeks. Hand irrigate PRN clots.  - Suspect prior radiation treatment for prostate cancer although we do not have records of his past medical history  - TOV in 2 weeks as outpatient in our office if discharged at that time  - Urology will continue to follow    Plan of care directed by Dr. Louise who is in agreement with aforementioned plan. Case discussed with trauma, nurse, and urology. Thank you for this consult please contact us with questions.      Addendum:   -likely has hx of prostate cancer, unable to visualize prostate well due to hematuria. Based on radiation tattoo he likely had prostate radiation.   -ventral false passage on cystoscopy with active urethral bleeding.   -mendoza placed over a wire see note

## 2021-01-11 NOTE — DISCHARGE PLANNING
Received pt's Advance Directive and Directive to Physicians from Ward. Faxed to [facility] to upload into chart. Hard copies placed on pt's physical chart.     Directive to Physicians - If Yousif is diagnosed to have a terminal condition or in a permanent unconscious condition:   Pt does not want to have artifical nutrition.   Pt does want to have artifical hydration.    Please see his ACP documents for further details

## 2021-01-11 NOTE — DISCHARGE PLANNING
"Care Transition Team Assessment     Due to patient's current medical condition, he is unable to assess at this time. LSW obtained the following information used in this assessment from his brother, Kellen Doss [phone]. Kellen verified accuracy of facesheet.     Kellen reports that patient has an advance directive and living will which he is e-mailing to LSW to upload into pt's chart. Kellen shared that pt is not  and has a daughter who suffered a massive stroke \"a while ago\" and since then she resides at an assisted living facility in [place], [place]. Kellen report that he is her \"decision maker as well.\"     Pt lives in a single level house with Kellen in [place]. Prior to current hospitalization, pt \"was needing more help from me\" kellen shared however Kellen reports that pt was ambulatory and \"able to get around.\" Pt is retired and has Medicare & .  No hx of substance abuse or hx of mh. PCP is Katie Boggs and pharmacy is either [facility] or [facility].     Barriers to dc: Medically complex - requires ICU level of care at this time.     LSW will continue to assist with social/dc needs     Care Transition Team Assessment    Information Source  Orientation : Unable to Assess  Information Given By: Relative  Informant's Name: kellen   Who is responsible for making decisions for patient? : Other  Name(s) of Primary Decision Maker: Kellen (brother)    Readmission Evaluation  Is this a readmission?: No    Discharge Preparedness  What is your plan after discharge?: Uncertain - pending medical team collaboration  What are your discharge supports?: Sibling  Prior Functional Level: Ambulatory, Needs Assist with Activities of Daily Living, Independent with Medication Management    Functional Assesment  Prior Functional Level: Ambulatory, Needs Assist with Activities of Daily Living, Independent with Medication Management    Finances  Financial Barriers to Discharge: No  Prescription Coverage: Yes    Advance " Directive  Advance Directive?: Living Will, DPOA for Health Care  Durable Power of  Name and Contact : Ward sending LSW documents    Psychological Assessment  History of Substance Abuse: None  History of Psychiatric Problems: No  Non-compliant with Treatment: No  Newly Diagnosed Illness: Yes    Discharge Risks or Barriers  Discharge risks or barriers?: Post-acute placement / services, Complex medical needs  Patient risk factors: Complex medical needs, Lack of outside supports    Anticipated Discharge Information  Discharge Disposition: Still a Patient (30)

## 2021-01-11 NOTE — ANESTHESIA PROCEDURE NOTES
Airway    Date/Time: 1/11/2021 4:26 AM  Performed by: Isaiah Borges M.D.  Authorized by: Isaiah Borges M.D.     Location:  OR  Urgency:  Elective  Indications for Airway Management:  Anesthesia      Spontaneous Ventilation: absent    Sedation Level:  Deep  Preoxygenated: Yes    Patient Position:  Sniffing  Final Airway Type:  Endotracheal airway  Final Endotracheal Airway:  ETT  Cuffed: Yes    Technique Used for Successful ETT Placement:  Direct laryngoscopy  Devices/Methods Used in Placement:  Intubating stylet    Insertion Site:  Oral  Blade Type:  Lott  Laryngoscope Blade/Videolaryngoscope Blade Size:  2  ETT Size (mm):  8.0  Measured from:  Lips  ETT to Lips (cm):  25  Placement Verified by: auscultation and capnometry    Cormack-Lehane Classification:  Grade IIb - view of arytenoids or posterior of glottis only  Number of Attempts at Approach:  1

## 2021-01-11 NOTE — PROGRESS NOTES
1700: Dr Allen neurosurgery at bedside.    1720: Ana Cristina ZHAO notified of bleeding from left forehead laceration. Orders received to apply steri strips and gauze.

## 2021-01-11 NOTE — PROGRESS NOTES
"TRAUMA TERTIARY SURVEY     Mental status adequate for full examination?: No, limited participation in exam. Altered mental status.    Spine cleared (radiologically and/or clinically): No    PHYSICAL EXAMINATION:  Vitals: Blood Pressure 137/73   Pulse 91   Temperature 37 °C (98.6 °F)   Respiration (Abnormal) 22   Height 1.727 m (5' 8\")   Weight 54.6 kg (120 lb 5.9 oz)   Oxygen Saturation 98%   Body Mass Index 18.30 kg/m²   Constitutional:     General Appearance: Mild distress.  HEENT:    Post crani dressing in place with hemovac in place. The pupils are equal, round, and reactive to light bilaterally. The extraocular muscles cannot be assessed.. The nares and oropharynx are partially obscured by blood and secretions.   Neck:    The trachea is midline.  Respiratory:   Inspection: Unlabored respirations, no intercostal retractions, paradoxical motion, or accessory muscle use.   Palpation:  The chest is nontender. The clavicles are non deformed bilaterally..   Auscultation: normal, clear to auscultation.  Cardiovascular:   Auscultation: normal and regular rate and rhythm.   Peripheral Pulses: Normal.   Abdomen:   Abdomen is soft, nontender, without organomegaly or masses.  Genitourinary:   (MALE): Blood from meatus.  Musculoskeletal:   The pelvis is stable.  No significant angulation, deformity, or soft tissue injury involving the upper and lower extremities. Normal range of motion.   Back:   Unable to assess, limited participation in exam  Skin:   The skin is warm.  Neurologic:    Andreas Coma Scale (GCS)11 E4V2M5. Neurologic examination revealed no command following, moves all extremities spontaneously.  Psychiatric:   Unable to assess    IMAGING:  DX-ABDOMEN FOR TUBE PLACEMENT   Final Result      Cortrak feeding tube tip projects in the region of the distal stomach.      CT-HEAD W/O   Final Result         Grossly similar left holohemispheric subdural hematoma. There is minimal new extension to the posterior " falx and left tentorium.      Grossly similar small anterior parafalcine subdural hematoma.      Unchanged 9 mm left to right midline shift.                  OUTSIDE IMAGES-DX UPPER EXTREMITY, LEFT   Final Result      CT-HEAD W/O   Final Result      Stable large left holohemispheric acute subdural hematoma      Stable small anterior parafalcine and right anterior frontal acute subdural hematoma      Stable 9 mm rightward midline shift      Right anterior MCA territory encephalomalacia      OUTSIDE IMAGES-CT HEAD   Final Result      DX-CHEST-LIMITED (1 VIEW)   Final Result      Cardiac silhouette enlargement with aortic ectasia and small right pleural fluid      Left pleural plaques and chronic fractures, indicating the calcification is likely due to trauma favored over asbestos-related pleural disease      Right perihilar and basilar atelectasis with aspiration or other consolidation not excluded      Artifact partially limits the chest      US-ABORTED US PROCEDURE    (Results Pending)     All current laboratory studies/radiology exams reviewed: Yes    Completed Consultations:  Dr. Sparrow, neurosurgery    Pending Consultations:  None    Newly Identified Diagnoses and Injuries:  None noted at time of exam    TOTAL RAP SCORE:  RAP Score Total: 11      ETOH Screening     Reason for no ETOH Intervention: Traumatic Brain Injury

## 2021-01-11 NOTE — ASSESSMENT & PLAN NOTE
Arreaga placed in OR  Began to have bleeding upon arrival to ICU  Unable to advance  Urology placed catheter over wire  Arreaga in place for 2 weeks

## 2021-01-11 NOTE — ASSESSMENT & PLAN NOTE
Secondary to altered mental status from traumatic brain injury  1/11 Cortrak placed, tube feeds commenced.  1/20 Laparoscopic gastrostomy tube placement.

## 2021-01-11 NOTE — CONSULTS
DATE OF SERVICE:  01/10/2021     HISTORY OF PRESENT ILLNESS:  The patient is a 89-year-old male with history of   hypertension, hyperlipidemia, congestive heart failure, atrial fibrillation   on chronic anticoagulation and Eliquis.  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     PAST MEDICAL HISTORY:  As above is remarkable for arrhythmia, cancer,   congestive heart failure, hyperlipidemia, hypertension, myocardial infarction,   pneumonia, stroke, and urinary incontinence.     MEDICATIONS:  Current medications at home include Norvasc, Eliquis, Lipitor,   Omnicef, Lanoxin, Vibramycin, Prinivil, metoprolol, Remeron, and he was   recently reversed from his Eliquis with Kcentra.     PHYSICAL EXAMINATION:    GENERAL:  Lying in bed.  Does not appear to be uncomfortable.  VITAL SIGNS:  Blood pressure is 133/89, pulse is 90, respiratory rate normal.  CONSTITUTIONAL:  He is awake, alert, does not speak.  HEENT:  Atraumatic.  Eyes:  Pupils equal, round and reactive to light.  HEART:  Regular rate and rhythm.  HEART AND LUNGS:  Clear chest sounds.  NEUROLOGIC:  I really cannot get him to speak.  He does not tell me his name,   date, or time.  He has no facial droop.  He withdraws all extremities   symmetrically, especially his lower extremities with good strength.  I really   cannot get strength testing.     LABORATORY DATA:  His white count is 15.9, H and H is 12.6 and 38.5.  Sodium   is 133.  PT is 16.7 and INR 1.31.     His TEG shows that he is inhibited as far as his AA.     DIAGNOSTIC STUDIES:  CT scan shows a stable large left holohemispheric acute   subdural hematoma and some anterior parafalcine hematoma as well.     PLAN:  After talking with his brother, he wants everything done, I think we    will try to get him corrected as far as his AA is concerned, and maybe try to   get his PT done a little bit before we take him to surgery, but a craniotomy   with evacuation of his hematoma may be beneficial.  This obviously carries a   lot of risk in this patient who is 89 years of age and has multiple medical   problems and there is certainly a good chance he may not improve.     We will make sure he stabilize, I have him on the operating room schedule for   tomorrow morning at 11.        ______________________________  MD ZOEY CABRERA/JOANA    DD:  01/10/2021 18:01  DT:  01/10/2021 18:31    Job#:  [id number]    CC:MARJAN PINEDA MD

## 2021-01-11 NOTE — PROGRESS NOTES
Trauma / Surgical Daily Progress Note    Date of Service  1/11/2021    Chief Complaint  89 y.o. male admitted 1/10/2021 with injury    Interval Events  New admit  Post-op  Depressed GCS persists  Cortrak to be placed  Full Code - confirmed  Continued treatment for pneumonia, present prior to admit  Outpatient therapy for atrial fibrillation continued    Vital Signs for last 24 hours  Temp:  [36 °C (96.8 °F)-37.6 °C (99.6 °F)] 37 °C (98.6 °F)  Pulse:  [] 127  Resp:  [11-68] 34  BP: ()/() 125/70  SpO2:  [93 %-100 %] 98 %    Hemodynamic parameters for last 24 hours       Respiratory Data     Respiration: (!) 34, Pulse Oximetry: 98 %     Work Of Breathing / Effort: Mild  RUL Breath Sounds: Diminished, RML Breath Sounds: Diminished, RLL Breath Sounds: Diminished, PRISCA Breath Sounds: Diminished, LLL Breath Sounds: Diminished    Physical Exam  Physical Exam  Vitals signs and nursing note reviewed.   Constitutional:       Appearance: He is underweight.      Interventions: Nasal cannula in place.   HENT:      Head:      Comments: Bulky dressing in place over scalp     Right Ear: External ear normal.      Left Ear: External ear normal.      Nose: Nose normal.      Mouth/Throat:      Mouth: Mucous membranes are dry.   Eyes:      Pupils: Pupils are equal, round, and reactive to light.   Neck:      Musculoskeletal: Normal range of motion.   Cardiovascular:      Rate and Rhythm: Normal rate and regular rhythm.  No extrasystoles are present.     Pulses: Normal pulses.   Pulmonary:      Effort: Pulmonary effort is normal.      Breath sounds: Examination of the right-lower field reveals decreased breath sounds. Examination of the left-lower field reveals decreased breath sounds. Decreased breath sounds present.   Abdominal:      General: Abdomen is flat.      Palpations: Abdomen is soft.      Tenderness: There is no abdominal tenderness.   Musculoskeletal: Normal range of motion.   Skin:     General: Skin is warm  and dry.      Capillary Refill: Capillary refill takes less than 2 seconds.   Neurological:      Mental Status: He is lethargic.      GCS: GCS eye subscore is 3. GCS verbal subscore is 4. GCS motor subscore is 5.         Laboratory  Recent Results (from the past 24 hour(s))   DIAGNOSTIC ALCOHOL    Collection Time: 01/10/21  1:28 PM   Result Value Ref Range    Diagnostic Alcohol <10.1 0.0 - 10.0 mg/dL   CBC WITHOUT DIFFERENTIAL    Collection Time: 01/10/21  1:28 PM   Result Value Ref Range    WBC 15.9 (H) 4.8 - 10.8 K/uL    RBC 3.97 (L) 4.70 - 6.10 M/uL    Hemoglobin 12.6 (L) 14.0 - 18.0 g/dL    Hematocrit 38.5 (L) 42.0 - 52.0 %    MCV 97.0 81.4 - 97.8 fL    MCH 31.7 27.0 - 33.0 pg    MCHC 32.7 (L) 33.7 - 35.3 g/dL    RDW 52.1 (H) 35.9 - 50.0 fL    Platelet Count 287 164 - 446 K/uL    MPV 9.7 9.0 - 12.9 fL   Comp Metabolic Panel    Collection Time: 01/10/21  1:28 PM   Result Value Ref Range    Sodium 133 (L) 135 - 145 mmol/L    Potassium 3.6 3.6 - 5.5 mmol/L    Chloride 102 96 - 112 mmol/L    Co2 21 20 - 33 mmol/L    Anion Gap 10.0 7.0 - 16.0    Glucose 151 (H) 65 - 99 mg/dL    Bun 19 8 - 22 mg/dL    Creatinine 0.82 0.50 - 1.40 mg/dL    Calcium 9.2 8.5 - 10.5 mg/dL    AST(SGOT) 23 12 - 45 U/L    ALT(SGPT) 16 2 - 50 U/L    Alkaline Phosphatase 99 30 - 99 U/L    Total Bilirubin 1.0 0.1 - 1.5 mg/dL    Albumin 3.6 3.2 - 4.9 g/dL    Total Protein 7.5 6.0 - 8.2 g/dL    Globulin 3.9 (H) 1.9 - 3.5 g/dL    A-G Ratio 0.9 g/dL   Prothrombin Time    Collection Time: 01/10/21  1:28 PM   Result Value Ref Range    PT 16.7 (H) 12.0 - 14.6 sec    INR 1.31 (H) 0.87 - 1.13   APTT    Collection Time: 01/10/21  1:28 PM   Result Value Ref Range    APTT 33.1 24.7 - 36.0 sec   PLATELET MAPPING WITH BASIC TEG    Collection Time: 01/10/21  1:28 PM   Result Value Ref Range    Reaction Time Initial-R 7.4 5.0 - 10.0 min    Clot Kinetics-K 1.7 1.0 - 3.0 min    Clot Angle-Angle 67.2 53.0 - 72.0 degrees    Maximum Clot Strength-MA 71.1 (H) 50.0 -  70.0 mm    Lysis 30 minutes-LY30 0.0 0.0 - 8.0 %    % Inhibition ADP 65.3 %    % Inhibition AA 50.4 %    TEG Algorithm Link Algorithm    COD - Adult (Type and Screen)    Collection Time: 01/10/21  1:28 PM   Result Value Ref Range    ABO Grouping Only A     Rh Grouping Only POS     Antibody Screen-Cod NEG    ESTIMATED GFR    Collection Time: 01/10/21  1:28 PM   Result Value Ref Range    GFR If African American >60 >60 mL/min/1.73 m 2    GFR If Non African American >60 >60 mL/min/1.73 m 2   SARS-CoV-2, PCR (In-House): Collect dry nasal swab AND NP swab in VTM    Collection Time: 01/10/21  1:40 PM    Specimen: Respirate   Result Value Ref Range    SARS-CoV-2 Source NP Swab     SARS-CoV-2 by PCR NotDetected    COVID/SARS CoV-2 PCR    Collection Time: 01/10/21  1:40 PM   Result Value Ref Range    COVID Order Status Received    COVID/SARS CoV-2 PCR    Collection Time: 01/10/21  1:40 PM   Result Value Ref Range    COVID Order Status Received    SARS-CoV-2, PCR (In-House)    Collection Time: 01/10/21  1:40 PM   Result Value Ref Range    SARS-CoV-2 Source Nasal Swab     SARS-CoV-2 (RdRp gene) NotDetected    ACCU-CHEK GLUCOSE    Collection Time: 01/10/21  5:56 PM   Result Value Ref Range    Glucose - Accu-Ck 119 (H) 65 - 99 mg/dL   ABO Rh Confirm    Collection Time: 01/10/21  6:31 PM   Result Value Ref Range    ABO Rh Confirm A POS    PLATELETS REQUEST    Collection Time: 01/10/21  6:31 PM   Result Value Ref Range    Component P       PTP                 Plts,Pheresis       [id number]   transfused   01/10/21   19:58      Product Type Platelets  Pheresis LR     Dispense Status transfused     Unit Number (Barcoded) [id number]     Product Code (Barcoded) I4068R08     Blood Type (Barcoded) 6200     Component P       P2                  Plts,Pheresis       [id number]   issued       01/11/21   04:01      Product Type Platelets  Pheresis LR     Dispense Status issued     Unit Number (Barcoded) [id number]     Product Code  (Barcoded) F7447G75     Blood Type (Barcoded) 6200    FRESH FROZEN PLASMA    Collection Time: 01/10/21  6:31 PM   Result Value Ref Range    Component F       TA3                 Thawed Plasma 3     [id number]   transfused   01/10/21   18:28      Product Type Thawed Apheresis Plasma 3rd Cont     Dispense Status transfused     Unit Number (Barcoded) [id number]     Product Code (Barcoded) V7151T00     Blood Type (Barcoded) 8400     Component F       TA2                 Thawed Plasma 2     [id number]   transfused   01/10/21   19:10      Product Type Thawed Apheresis Plasma 2nd Cont     Dispense Status transfused     Unit Number (Barcoded) [id number]     Product Code (Barcoded) J6180N64     Blood Type (Barcoded) 8400    BASIC TEG    Collection Time: 01/10/21  9:30 PM   Result Value Ref Range    Reaction Time Initial-R 4.6 (L) 5.0 - 10.0 min    Clot Kinetics-K 0.9 (L) 1.0 - 3.0 min    Clot Angle-Angle 75.7 (H) 53.0 - 72.0 degrees    Maximum Clot Strength-MA 74.4 (H) 50.0 - 70.0 mm    Lysis 30 minutes-LY30 0.2 0.0 - 8.0 %    TEG Algorithm Link Algorithm    APTT    Collection Time: 01/10/21  9:30 PM   Result Value Ref Range    APTT 34.6 24.7 - 36.0 sec   Prothrombin Time    Collection Time: 01/10/21  9:30 PM   Result Value Ref Range    PT 16.0 (H) 12.0 - 14.6 sec    INR 1.25 (H) 0.87 - 1.13   ACCU-CHEK GLUCOSE    Collection Time: 01/10/21 11:52 PM   Result Value Ref Range    Glucose - Accu-Ck 103 (H) 65 - 99 mg/dL   PLATELET MAPPING WITH BASIC TEG    Collection Time: 01/11/21 12:45 AM   Result Value Ref Range    Reaction Time Initial-R 5.3 5.0 - 10.0 min    Clot Kinetics-K 1.4 1.0 - 3.0 min    Clot Angle-Angle 70.7 53.0 - 72.0 degrees    Maximum Clot Strength-MA 72.3 (H) 50.0 - 70.0 mm    Lysis 30 minutes-LY30 0.0 0.0 - 8.0 %    % Inhibition ADP 87.3 %    % Inhibition AA 36.2 %    TEG Algorithm Link Algorithm    CRYOPRECIPITATE    Collection Time: 01/11/21  4:50 AM   Result Value Ref Range    Component Ct        CTP                 Cryoprecipitate     [id number]   issued       01/11/21   05:08      Product Type CTP     Dispense Status issued     Unit Number (Barcoded) [id number]     Product Code (Barcoded) A4316O06     Blood Type (Barcoded) 6200    Comp Metabolic Panel (CMP): Tomorrow AM    Collection Time: 01/11/21  7:17 AM   Result Value Ref Range    Sodium 139 135 - 145 mmol/L    Potassium 3.6 3.6 - 5.5 mmol/L    Chloride 105 96 - 112 mmol/L    Co2 22 20 - 33 mmol/L    Anion Gap 12.0 7.0 - 16.0    Glucose 110 (H) 65 - 99 mg/dL    Bun 15 8 - 22 mg/dL    Creatinine 0.85 0.50 - 1.40 mg/dL    Calcium 8.6 8.5 - 10.5 mg/dL    AST(SGOT) 19 12 - 45 U/L    ALT(SGPT) 11 2 - 50 U/L    Alkaline Phosphatase 76 30 - 99 U/L    Total Bilirubin 0.7 0.1 - 1.5 mg/dL    Albumin 3.3 3.2 - 4.9 g/dL    Total Protein 6.4 6.0 - 8.2 g/dL    Globulin 3.1 1.9 - 3.5 g/dL    A-G Ratio 1.1 g/dL   IRON/TOTAL IRON BIND    Collection Time: 01/11/21  7:17 AM   Result Value Ref Range    Iron 31 (L) 50 - 180 ug/dL    Total Iron Binding 216 (L) 250 - 450 ug/dL    Unsat Iron Binding 185 110 - 370 ug/dL    % Saturation 14 (L) 15 - 55 %   ESTIMATED GFR    Collection Time: 01/11/21  7:17 AM   Result Value Ref Range    GFR If African American >60 >60 mL/min/1.73 m 2    GFR If Non African American >60 >60 mL/min/1.73 m 2   CBC WITH DIFFERENTIAL    Collection Time: 01/11/21 10:00 AM   Result Value Ref Range    WBC 14.7 (H) 4.8 - 10.8 K/uL    RBC 2.94 (L) 4.70 - 6.10 M/uL    Hemoglobin 9.4 (L) 14.0 - 18.0 g/dL    Hematocrit 28.4 (L) 42.0 - 52.0 %    MCV 96.9 81.4 - 97.8 fL    MCH 31.3 27.0 - 33.0 pg    MCHC 32.3 (L) 33.7 - 35.3 g/dL    RDW 51.5 (H) 35.9 - 50.0 fL    Platelet Count 331 164 - 446 K/uL    MPV 9.4 9.0 - 12.9 fL    Nucleated RBC 0.00 /100 WBC    NRBC (Absolute) 0.00 K/uL       Fluids    Intake/Output Summary (Last 24 hours) at 1/11/2021 1043  Last data filed at 1/11/2021 0421  Gross per 24 hour   Intake 1863.75 ml   Output 60 ml   Net 1803.75 ml        Core Measures & Quality Metrics  Labs reviewed and Medications reviewed  Arreaga catheter: Critically Ill - Requiring Accurate Measurement of Urinary Output      DVT Prophylaxis: Contraindicated - High bleeding risk  DVT prophylaxis - mechanical: SCDs  Ulcer prophylaxis: Not indicated  Antibiotics: Treating active infection/contamination beyond 24 hours perioperative coverage      FAZAL Score  ETOH Screening    Assessment/Plan  Chronic anticoagulation- (present on admission)  Assessment & Plan  Takes ASA and Eliquis outpatient.  KCentra given prior to arrival  TEG with platelet mapping showed AA 50.4  Per neurosurgery 1 un plts and 2 un FFP given  Repeat TEG with elevated ADP  Follow up TEG/PM as clinically warranted      Subdural hematoma (HCC)- (present on admission)  Assessment & Plan  Large left hemispheric subdural hematoma measuring 15mm in greatest transverse dimensions causing significant mass effect on the underlying cerebral hemisphere with left to right midline shift of 1cm. Also small anterior right frontal subdural hematoma measuring 4mm and small anterior interhemispheric subdural hematoma measuring 5mm.  Repeat interval head CT stable, but waning GCS  1/11 Craniotomy with evacuation  Post traumatic pharmacologic seizure prophylaxis for 1 week.  Speech Language Pathology cognitive evaluation.  Dante Sparrow MD. Neurosurgeon. [facility].    Iron deficiency anemia- (present on admission)  Assessment & Plan  Anemic on arrival  Iron replacement per pharmacy kinetics    Pneumonia- (present on admission)  Assessment & Plan  ABX course initiated at [facility] on 1/7/2021, MRSA negative.   Transitioned to Cefdinir/Doxycycline at [facility] for additional 7 days  1/10: Persistent RLL infiltrate, Rocephin and Doxycycline x7 days  Trend serial chest radiographs and laboratory studies    Essential hypertension- (present on admission)  Assessment & Plan  Chronic condition treated with Norvasc and  metoprolol.  Holding maintenance medication during acute traumatic illness.   Nicardipine per Neurosurgery with goal SBP<165mmHg      AF (atrial fibrillation) (HCC)- (present on admission)  Assessment & Plan  Chronic condition treated with Digoxin and metoprolol - resumed at admit    Screening examination for infectious disease- (present on admission)  Assessment & Plan  Admission SARS-CoV-2 testing negative. LOW RISK patient. Repeat SARS-CoV-2 testing not indicated. Isolation precautions de-escalated.    Contraindication to deep vein thrombosis (DVT) prophylaxis- (present on admission)  Assessment & Plan  Systemic anticoagulation contraindicated secondary to elevated bleeding risk.  1/11 Surveillance venous duplex scanning ordered.    Cancer (HCC)- (present on admission)  Assessment & Plan  History of CLL and prostate CA    Stroke (HCC)- (present on admission)  Assessment & Plan  Premorbid    Trauma- (present on admission)  Assessment & Plan  GLF on eliquis.  Trauma Yellow Transfer Activation from [facility].  Jere Meyer MD. Trauma Surgery.    Dysphagia- (present on admission)  Assessment & Plan  Secondary to altered mental status from traumatic brain injury  1/11 Cortrak placed, tube feeds commenced      I independently reviewed pertinent clinical lab tests since admission and ordered additional follow up clinical lab tests.  I independently reviewed pertinent radiographic images and the radiologist's reports since admission and ordered additional follow up radiographic studies.  The details of the available patient records in [facility] (including laboratory tests, culture data, medications, imaging, and other pertinent diagnostic tests) and that information was utilized as warranted in today's medical decision making for this patient.  I personally evaluated the patient condition at bedside.    Care interventions include:   Review of interval medical and surgical history.  Review of current  medications and outpatient medication reconciliation.  Review of interval imaging studies and radiologist interpretation.  Management of acute closed head injury.  Management of thrombotic surveillance and risk mitigation.  Management of cardiac arrhythmias.    Aggregated care time spent evaluating, reassessing, reviewing documentation, providing care, and managing this patient exclusive of procedures: 35 minutes    Jaya Murray MD

## 2021-01-11 NOTE — OP REPORT
DATE OF SERVICE:  01/11/2021     PREOPERATIVE DIAGNOSIS:  Acute left subdural hematoma.     POSTOPERATIVE DIAGNOSIS:  Acute left subdural hematoma.     PROCEDURES PERFORMED:  ____ left frontotemporal craniotomy with evacuation of   acute subdural hematoma.     SURGEON:  Dante Sparrow MD     ASSISTANTS:  None.     ANESTHESIA:  General anesthetic.     ANESTHESIOLOGISTS:  Isaiah Borges MD      PREPARATION:  This was an emergent case.  I had talked to the brother who was   next of kin and he wanted everything done for the patient.     DESCRIPTION OF PROCEDURE:  The patient was brought to the operating room and   following induction, he was intubated and placed under general anesthetic.  He   was turned to his right with his left side up.  His head was placed in a   Hernandez head galloway that was a padded horseshoe.  Shoulders were taped down   at the side and all pressure points were padded.  The head was shaved, prepped   and draped in a sterile fashion.  Timeout was performed.  A curvilinear   incision was made from the temporal region carrying it up and then anteriorly.    Hemostasis was obtained with monopolar cautery and bipolar cautery and Kevin   clips.  Temporalis muscle was opened and the skin flap was reflected   anteriorly with subperiosteal dissection.  A towel clip was used for pulling   the flap forward and maintaining its position.  Three bur holes were placed,   one temporal, one posterior frontal, one frontal.  The dura was dissected free   and a craniotomy was turned with the use of the B1 bit.     The dura was opened in a curvilinear fashion and reflected inferiorly.  We   found a large solid clot measuring almost 2 cm, compressing on the brain.  We   took 60 mL of the clot out in solid form from frontal, parietal and temporal   area and suctioned out at least as much clot as well.  We irrigated copiously,   probably about 2 liters to get the space clear of any residual blood   products.     Dura was  closed with a 4-0 Nurolon suture.  Dural tackers were placed.     We placed Duragen over this as well as DuraSeal as there was a small dural   tear superiorly, although I got a fairly good approximation ____.  Dural   tackers were placed circumferentially just prior to this.     The bone flap was secured with Leibinger bur hole covers.  Medium size Hemovac   was placed.  Temporalis muscle was closed with 2-0 Vicryl suture and   subcuticular layer was closed with 2-0 Vicryl suture.  Skin was closed with   staples.  Although, he had been on Eliquis, he had been reversed with Kcentra   and blood products and we actually had minimal bleeding at the time of   surgery.  There was a contusion of the frontal lobe on the left, which was   about dime size but was not hemorrhagic.  There was also a small posterior   temporal contusion that was also seen.  Either these sites could have been the   source of hemorrhage.  The patient tolerated the procedure without   complication.  He was subsequently extubated and he went back to the recovery   room in satisfactory condition.        ______________________________  LUZMARIA LAZAR MD    JKM/SUB    DD:  01/11/2021 07:46  DT:  01/11/2021 08:14    Job#:  [id number]

## 2021-01-11 NOTE — ANESTHESIA TIME REPORT
Anesthesia Start and Stop Event Times     Date Time Event    1/11/2021 0256 Ready for Procedure     0418 Anesthesia Start     0620 Anesthesia Stop        Responsible Staff  01/11/21    Name Role Begin End    Isaiah Borges M.D. Anesth 0418 0620        Preop Diagnosis (Free Text):  Pre-op Diagnosis             Preop Diagnosis (Codes):    Post op Diagnosis  Subdural hematoma caused by concussion (HCC)      Premium Reason  B. 1st Call    Comments:

## 2021-01-11 NOTE — DISCHARGE PLANNING
"Dr. Murray called LSW and discussed pt's code status. Currently,. Pt is a full code however there is mentioned from the ERP that pt is noted to be DNR/DNI. LSW reviewed media looking for any ACP documents however no success. LSW then called pt's brother, Ward [phone] to discuss. Ward reports that pt has an advance directive and living will which he will e-mail to LSW. LSW dicussed pt's current code status (full) to which Ward confirmed. He reports that pt would want all life saving measures, CPR and intubation, unless pt became or was a \"vegetable.\" LSW informed bedside RN and worte Dr. Murray on volte about conversation.   "

## 2021-01-11 NOTE — PROGRESS NOTES
Pt not following commands or answering questions  VSS.  Hvac compressed with minimal output.  Art line in right wrist.  Bandages wrapped around head, CDI.  Report given to ICU RN.

## 2021-01-11 NOTE — ASSESSMENT & PLAN NOTE
Takes ASA and Eliquis outpatient. KCentra given prior to arrival.  Admission thromboelastogram demonstrated moderately elevated ragged tonic acid pathway platelet inhibition.  1 platelet pheresis pack and 2 units of FFP administered.

## 2021-01-11 NOTE — DIETARY
"Nutrition Support Assessment     Nutrition services:   Day 1 of admit.  89 yo male with admitting diagnosis: ground level fall    Current problem list:  1. Chronic anticoagulation - aspirin and eliquis  2. SDH  3. Anemia  4. HTN  5. A-fib  6. History of CLL and prostate cancer  7. 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 Dysphagia and TBI - pt unable to safely/adequately take po diet  2. cortrak to be placed for enteral access  3. Increased nutritional needs secondary to TBI to be met with TF at goal    4. Pt will benefit from Trauma TF formula     Malnutrition Risk: unable to determine weight history at this time    Recommendations/Plan:  1. When cortrak is placed and confirmed start and advance TF per protocol  2. Impact 1.5 full goal 45 ml/hr will provide 1620 kcals, 102 g protein, 834 ml H20/day  3. Po diet when safe/appropriate and pt can adequately consume    "

## 2021-01-12 ENCOUNTER — APPOINTMENT (OUTPATIENT)
Dept: RADIOLOGY | Facility: MEDICAL CENTER | Age: 86
DRG: 025 | End: 2021-01-12
Attending: NURSE PRACTITIONER
Payer: MEDICARE

## 2021-01-12 ENCOUNTER — APPOINTMENT (OUTPATIENT)
Dept: RADIOLOGY | Facility: MEDICAL CENTER | Age: 86
DRG: 025 | End: 2021-01-12
Attending: SURGERY
Payer: MEDICARE

## 2021-01-12 PROBLEM — F05 ACUTE HYPERACTIVE DELIRIUM DUE TO MULTIPLE ETIOLOGIES: Status: ACTIVE | Noted: 2021-01-12

## 2021-01-12 LAB
ANION GAP SERPL CALC-SCNC: 8 MMOL/L (ref 7–16)
ANISOCYTOSIS BLD QL SMEAR: ABNORMAL
BASOPHILS # BLD AUTO: 0 % (ref 0–1.8)
BASOPHILS # BLD: 0 K/UL (ref 0–0.12)
BUN SERPL-MCNC: 23 MG/DL (ref 8–22)
CALCIUM SERPL-MCNC: 9.2 MG/DL (ref 8.5–10.5)
CHLORIDE SERPL-SCNC: 107 MMOL/L (ref 96–112)
CO2 SERPL-SCNC: 24 MMOL/L (ref 20–33)
CREAT SERPL-MCNC: 1.01 MG/DL (ref 0.5–1.4)
CRP SERPL HS-MCNC: 15.97 MG/DL (ref 0–0.75)
EOSINOPHIL # BLD AUTO: 0 K/UL (ref 0–0.51)
EOSINOPHIL NFR BLD: 0 % (ref 0–6.9)
ERYTHROCYTE [DISTWIDTH] IN BLOOD BY AUTOMATED COUNT: 55.1 FL (ref 35.9–50)
GLUCOSE SERPL-MCNC: 127 MG/DL (ref 65–99)
HCT VFR BLD AUTO: 27 % (ref 42–52)
HGB BLD-MCNC: 8.6 G/DL (ref 14–18)
HYPOCHROMIA BLD QL SMEAR: ABNORMAL
IRON SATN MFR SERPL: 8 % (ref 15–55)
IRON SERPL-MCNC: 16 UG/DL (ref 50–180)
LYMPHOCYTES # BLD AUTO: 6.97 K/UL (ref 1–4.8)
LYMPHOCYTES NFR BLD: 30.7 % (ref 22–41)
MACROCYTES BLD QL SMEAR: ABNORMAL
MANUAL DIFF BLD: NORMAL
MCH RBC QN AUTO: 32.1 PG (ref 27–33)
MCHC RBC AUTO-ENTMCNC: 31.9 G/DL (ref 33.7–35.3)
MCV RBC AUTO: 100.7 FL (ref 81.4–97.8)
MICROCYTES BLD QL SMEAR: ABNORMAL
MONOCYTES # BLD AUTO: 1.59 K/UL (ref 0–0.85)
MONOCYTES NFR BLD AUTO: 7 % (ref 0–13.4)
MORPHOLOGY BLD-IMP: NORMAL
NEUTROPHILS # BLD AUTO: 14.14 K/UL (ref 1.82–7.42)
NEUTROPHILS NFR BLD: 62.3 % (ref 44–72)
NRBC # BLD AUTO: 0 K/UL
NRBC BLD-RTO: 0 /100 WBC
OVALOCYTES BLD QL SMEAR: NORMAL
PLATELET # BLD AUTO: 284 K/UL (ref 164–446)
PLATELET BLD QL SMEAR: NORMAL
PMV BLD AUTO: 10.3 FL (ref 9–12.9)
POIKILOCYTOSIS BLD QL SMEAR: NORMAL
POTASSIUM SERPL-SCNC: 4.6 MMOL/L (ref 3.6–5.5)
RBC # BLD AUTO: 2.68 M/UL (ref 4.7–6.1)
RBC BLD AUTO: PRESENT
SODIUM SERPL-SCNC: 139 MMOL/L (ref 135–145)
TIBC SERPL-MCNC: 202 UG/DL (ref 250–450)
UIBC SERPL-MCNC: 186 UG/DL (ref 110–370)
WBC # BLD AUTO: 22.7 K/UL (ref 4.8–10.8)

## 2021-01-12 PROCEDURE — 93970 EXTREMITY STUDY: CPT | Mod: 26,GZ | Performed by: INTERNAL MEDICINE

## 2021-01-12 PROCEDURE — 700101 HCHG RX REV CODE 250: Performed by: NURSE PRACTITIONER

## 2021-01-12 PROCEDURE — 83540 ASSAY OF IRON: CPT

## 2021-01-12 PROCEDURE — 700102 HCHG RX REV CODE 250 W/ 637 OVERRIDE(OP): Performed by: SURGERY

## 2021-01-12 PROCEDURE — 70450 CT HEAD/BRAIN W/O DYE: CPT

## 2021-01-12 PROCEDURE — 83550 IRON BINDING TEST: CPT

## 2021-01-12 PROCEDURE — 97162 PT EVAL MOD COMPLEX 30 MIN: CPT

## 2021-01-12 PROCEDURE — 86140 C-REACTIVE PROTEIN: CPT

## 2021-01-12 PROCEDURE — A9270 NON-COVERED ITEM OR SERVICE: HCPCS | Performed by: SURGERY

## 2021-01-12 PROCEDURE — 700105 HCHG RX REV CODE 258: Performed by: NURSE PRACTITIONER

## 2021-01-12 PROCEDURE — 93970 EXTREMITY STUDY: CPT

## 2021-01-12 PROCEDURE — 700111 HCHG RX REV CODE 636 W/ 250 OVERRIDE (IP): Performed by: NURSE PRACTITIONER

## 2021-01-12 PROCEDURE — 85007 BL SMEAR W/DIFF WBC COUNT: CPT

## 2021-01-12 PROCEDURE — 770022 HCHG ROOM/CARE - ICU (200)

## 2021-01-12 PROCEDURE — 99233 SBSQ HOSP IP/OBS HIGH 50: CPT | Performed by: SURGERY

## 2021-01-12 PROCEDURE — 85027 COMPLETE CBC AUTOMATED: CPT

## 2021-01-12 PROCEDURE — 700101 HCHG RX REV CODE 250: Performed by: SURGERY

## 2021-01-12 PROCEDURE — 80048 BASIC METABOLIC PNL TOTAL CA: CPT

## 2021-01-12 PROCEDURE — 97166 OT EVAL MOD COMPLEX 45 MIN: CPT

## 2021-01-12 RX ADMIN — METOPROLOL TARTRATE 75 MG: 50 TABLET, FILM COATED ORAL at 17:29

## 2021-01-12 RX ADMIN — DIGOXIN 125 MCG: 125 TABLET ORAL at 17:29

## 2021-01-12 RX ADMIN — DOCUSATE SODIUM 50 MG AND SENNOSIDES 8.6 MG 1 TABLET: 8.6; 5 TABLET, FILM COATED ORAL at 20:35

## 2021-01-12 RX ADMIN — DOCUSATE SODIUM 100 MG: 50 LIQUID ORAL at 17:33

## 2021-01-12 RX ADMIN — DOXYCYCLINE 100 MG: 100 INJECTION, POWDER, LYOPHILIZED, FOR SOLUTION INTRAVENOUS at 05:10

## 2021-01-12 RX ADMIN — LEVETIRACETAM 500 MG: 500 TABLET, FILM COATED ORAL at 05:09

## 2021-01-12 RX ADMIN — LEVETIRACETAM 500 MG: 500 TABLET, FILM COATED ORAL at 17:29

## 2021-01-12 RX ADMIN — CEFTRIAXONE SODIUM 1 G: 1 INJECTION, POWDER, FOR SOLUTION INTRAMUSCULAR; INTRAVENOUS at 05:10

## 2021-01-12 RX ADMIN — POLYETHYLENE GLYCOL 3350 1 PACKET: 17 POWDER, FOR SOLUTION ORAL at 17:30

## 2021-01-12 RX ADMIN — DOCUSATE SODIUM 100 MG: 50 LIQUID ORAL at 05:09

## 2021-01-12 RX ADMIN — SODIUM CHLORIDE: 9 INJECTION, SOLUTION INTRAVENOUS at 08:07

## 2021-01-12 RX ADMIN — METOPROLOL TARTRATE 75 MG: 50 TABLET, FILM COATED ORAL at 05:09

## 2021-01-12 RX ADMIN — DOXYCYCLINE 100 MG: 100 INJECTION, POWDER, LYOPHILIZED, FOR SOLUTION INTRAVENOUS at 17:30

## 2021-01-12 ASSESSMENT — COGNITIVE AND FUNCTIONAL STATUS - GENERAL
WALKING IN HOSPITAL ROOM: A LOT
TOILETING: TOTAL
PERSONAL GROOMING: TOTAL
MOVING FROM LYING ON BACK TO SITTING ON SIDE OF FLAT BED: UNABLE
DRESSING REGULAR LOWER BODY CLOTHING: TOTAL
CLIMB 3 TO 5 STEPS WITH RAILING: A LOT
DRESSING REGULAR UPPER BODY CLOTHING: TOTAL
MOVING TO AND FROM BED TO CHAIR: UNABLE
TURNING FROM BACK TO SIDE WHILE IN FLAT BAD: UNABLE
DAILY ACTIVITIY SCORE: 6
MOBILITY SCORE: 10
STANDING UP FROM CHAIR USING ARMS: A LITTLE
SUGGESTED CMS G CODE MODIFIER DAILY ACTIVITY: CN
SUGGESTED CMS G CODE MODIFIER MOBILITY: CL
EATING MEALS: TOTAL
HELP NEEDED FOR BATHING: TOTAL

## 2021-01-12 ASSESSMENT — FIBROSIS 4 INDEX: FIB4 SCORE: 1.56

## 2021-01-12 ASSESSMENT — PAIN DESCRIPTION - PAIN TYPE
TYPE: ACUTE PAIN

## 2021-01-12 ASSESSMENT — GAIT ASSESSMENTS: GAIT LEVEL OF ASSIST: UNABLE TO PARTICIPATE

## 2021-01-12 NOTE — PROGRESS NOTES
Neurosurgery Progress Note    Subjective:  Pt in bed, just stood with therapy, min verbalization    Exam:  AA, did not fc's for me, but would look at me when speaking, incisoin with drsg- old drainage, hvac- 20ml    BP  Min: 101/63  Max: 176/82  Pulse  Av  Min: 62  Max: 160  Resp  Av.7  Min: 16  Max: 56  Temp  Av.1 °C (98.7 °F)  Min: 36.5 °C (97.7 °F)  Max: 38 °C (100.4 °F)  SpO2  Av.3 %  Min: 94 %  Max: 100 %    No data recorded    Recent Labs     01/10/21  1328 21  1000 21  0600   WBC 15.9* 14.7* 22.7*   RBC 3.97* 2.94* 2.68*   HEMOGLOBIN 12.6* 9.4* 8.6*   HEMATOCRIT 38.5* 28.4* 27.0*   MCV 97.0 96.9 100.7*   MCH 31.7 31.3 32.1   MCHC 32.7* 32.3* 31.9*   RDW 52.1* 51.5* 55.1*   PLATELETCT 287 331 284   MPV 9.7 9.4 10.3     Recent Labs     01/10/21  1328 21  0717 21  0600   SODIUM 133* 139 139   POTASSIUM 3.6 3.6 4.6   CHLORIDE 102 105 107   CO2 21 22 24   GLUCOSE 151* 110* 127*   BUN 19 15 23*   CREATININE 0.82 0.85 1.01   CALCIUM 9.2 8.6 9.2     Recent Labs     01/10/21  1328 01/10/21  2130   APTT 33.1 34.6   INR 1.31* 1.25*     Recent Labs     01/10/21  1328 01/10/21  2130 21  0045 21  1445   REACTMIN 7.4 4.6* 5.3 7.1   CLOTKINET 1.7 0.9* 1.4 1.3   CLOTANGL 67.2 75.7* 70.7 69.6   MAXCLOTS 71.1* 74.4* 72.3* 74.0*   MPR29EGG 0.0 0.2 0.0 0.0   PRCINADP 65.3  --  87.3 38.4   PRCINAA 50.4  --  36.2 25.9       Intake/Output       21 07 - 21 0659 21 - 2159       Total  Total       Intake    I.V.  --  1282.5 1282.5  --  -- --    Volume (mL) (NS infusion) -- 1282.5 1282.5 -- -- --    Other  --  240 240  --  -- --    Medications (PO/Enteral Liquids) -- 240 240 -- -- --    NG/GT  50  340 390  --  -- --    Intake (mL) (Enteral Tube 21 Cortrak - Gastric 12 Fr. Right nare) 50 340 390 -- -- --    Total Intake 50 1862.5 1912.5 -- -- --       Output    Urine  550  445 995  60  -- 60    Output  (mL) ([REMOVED] Urethral Catheter Coude 14 Fr.) 0 -- 0 -- -- --    Output (mL) (Urethral Catheter 18 Fr.) 550 445 995 60 -- 60    Drains  0  50 50  --  -- --    Output (mL) (Closed/Suction Drain Left Scalp Hemovac) -- 50 50 -- -- --    Residual Amount (mL) (Discarded) (Enteral Tube 01/11/21 Cortrak - Gastric 12 Fr. Right nare) 0 -- 0 -- -- --    Total Output  60 -- 60       Net I/O     -500 1367.5 867.5 -60 -- -60            Intake/Output Summary (Last 24 hours) at 1/12/2021 0854  Last data filed at 1/12/2021 0800  Gross per 24 hour   Intake 1912.5 ml   Output 1105 ml   Net 807.5 ml       $ Bladder Scan Results (mL): 350    • levETIRAcetam  500 mg BID   • Pharmacy  1 Each PHARMACY TO DOSE   • digoxin  125 mcg Q EVENING   • docusate sodium 100mg/10mL  100 mg BID   • magnesium hydroxide  30 mL DAILY   • metoprolol  75 mg BID   • polyethylene glycol/lytes  1 Packet BID   • senna-docusate  1 Tab Nightly   • oxyCODONE immediate-release  2.5 mg Q3HRS PRN   • oxyCODONE immediate-release  5 mg Q3HRS PRN   • senna-docusate  1 Tab Q24HRS PRN   • Respiratory Therapy Consult   Continuous RT   • Pharmacy Consult Request  1 Each PHARMACY TO DOSE   • bisacodyl  10 mg Q24HRS PRN   • fleet  1 Each Once PRN   • NS   Continuous   • morphine injection  2 mg Q3HRS PRN   • ondansetron  4 mg Q4HRS PRN   • cefTRIAXone (ROCEPHIN) IV  1 g Q24HRS   • doxycycline  100 mg Q12HRS   • [Held by provider] amLODIPine  10 mg DAILY   • [Held by provider] lisinopril  5 mg DAILY   • niCARdipine infusion  0-5 mg/hr Continuous   • Metoprolol Tartrate  5 mg Q6HRS PRN       Assessment and Plan:  Hospital day # 2 GLF on eliquis  POD# 1 s/p left frontotemporal crani for evac of sdh  Chemical prophylactic DVT therapy: No  Start date/time: tbd    NM as above  PT/OT  Neuro checks q2  CT 1/12- ml shift unchanged, sm amt of residual hematoma  Dc drain, leave incision karoline unless draining

## 2021-01-12 NOTE — PROGRESS NOTES
Note to reader: this note follows the APSO format rather than the historical SOAP format. Assessment and plan located at the top of the note for ease of use.    Chief Complaint  89 y.o. year old male here with urinary retention.     Assessment/Plan  Interval History   89 y.o. year old male here with urinary retention s/p bedside cysto with complex catheterization. Found to have urethral false passage with possible hx of prostate cancer.     Plan:  - Arreaga cath to remain for two weeks.   - Will have voiding trial in two weeks inpatient or in our office if discharged at that time.   - Will attempt to obtain medical records.   - Urology signing off. We do not anticipate further urological intervention at this time. Please contact us with any questions.     Patient seen and examined    1/12. Arreaga draining clear yellow urine. Minimal urethral meatal bleeding noted. Nonverbal but resting.          Review of Systems  Physical Exam   Review of Systems   Unable to perform ROS: Patient nonverbal     Vitals:    01/12/21 0900 01/12/21 1000 01/12/21 1100 01/12/21 1200   BP: 105/62 120/68 153/78 117/69   Pulse: 83 67 83 99   Resp: (!) 24 (!) 21 (!) 24 (!) 33   Temp:    37.6 °C (99.7 °F)   TempSrc:    Temporal   SpO2: 100% 100% 100% 99%   Weight:       Height:         Physical Exam   Constitutional: No distress.   Neck: Normal range of motion.   Genitourinary:    Genitourinary Comments: Arreaga cath in place draining clear yellow urine     Skin: Skin is warm. He is diaphoretic.   Nursing note and vitals reviewed.       Hematology Chemistry   Lab Results   Component Value Date/Time    WBC 22.7 (H) 01/12/2021 06:00 AM    HEMOGLOBIN 8.6 (L) 01/12/2021 06:00 AM    HEMATOCRIT 27.0 (L) 01/12/2021 06:00 AM    PLATELETCT 284 01/12/2021 06:00 AM     Lab Results   Component Value Date/Time    SODIUM 139 01/12/2021 06:00 AM    POTASSIUM 4.6 01/12/2021 06:00 AM    CHLORIDE 107 01/12/2021 06:00 AM    CO2 24 01/12/2021 06:00 AM    GLUCOSE 127  (H) 01/12/2021 06:00 AM    BUN 23 (H) 01/12/2021 06:00 AM    CREATININE 1.01 01/12/2021 06:00 AM         Labs not explicitly included in this progress note were reviewed by the author.   Radiology/imaging not explicitly included in this progress note was reviewed by the author.     Labs reviewed and Medications reviewed

## 2021-01-12 NOTE — PROGRESS NOTES
Trauma / Surgical Daily Progress Note    Date of Service  1/12/2021    Chief Complaint  89 y.o. male admitted 1/10/2021 with injury    Interval Events  Continued treatment for pneumonia, present prior to admit  Outpatient therapy for atrial fibrillation with good rate control  Neurosurgical documentation reviewed, awaiting clearance for anti-coagulation  Urology documentation reviewed, signed off  Ongoing issues with agitation and confusion    Vital Signs for last 24 hours  Temp:  [36.5 °C (97.7 °F)-38 °C (100.4 °F)] 37.6 °C (99.7 °F)  Pulse:  [] 99  Resp:  [20-56] 33  BP: (101-163)/(60-99) 117/69  SpO2:  [94 %-100 %] 99 %    Hemodynamic parameters for last 24 hours       Respiratory Data     Respiration: (!) 33, Pulse Oximetry: 99 %     Work Of Breathing / Effort: Moderate;Shallow       Physical Exam  Physical Exam  Vitals signs and nursing note reviewed.   Constitutional:       Appearance: He is underweight.      Interventions: Nasal cannula in place.   HENT:      Head:      Comments: Bulky dressing in place over scalp     Right Ear: External ear normal.      Left Ear: External ear normal.      Nose: Nose normal.      Mouth/Throat:      Mouth: Mucous membranes are dry.   Eyes:      Pupils: Pupils are equal, round, and reactive to light.   Neck:      Musculoskeletal: Normal range of motion.   Cardiovascular:      Rate and Rhythm: Normal rate and regular rhythm.  No extrasystoles are present.     Pulses: Normal pulses.   Pulmonary:      Effort: Pulmonary effort is normal.      Breath sounds: Examination of the right-lower field reveals decreased breath sounds. Examination of the left-lower field reveals decreased breath sounds. Decreased breath sounds present.   Abdominal:      General: Abdomen is flat.      Palpations: Abdomen is soft.      Tenderness: There is no abdominal tenderness.   Genitourinary:     Comments: Arreaga in place draining clear yellow urine  Musculoskeletal: Normal range of motion.   Skin:      General: Skin is warm and dry.      Capillary Refill: Capillary refill takes less than 2 seconds.   Neurological:      Mental Status: He is lethargic.      GCS: GCS eye subscore is 3. GCS verbal subscore is 4. GCS motor subscore is 5.         Laboratory  Recent Results (from the past 24 hour(s))   PLATELET MAPPING WITH BASIC TEG    Collection Time: 01/11/21  2:45 PM   Result Value Ref Range    Reaction Time Initial-R 7.1 5.0 - 10.0 min    Clot Kinetics-K 1.3 1.0 - 3.0 min    Clot Angle-Angle 69.6 53.0 - 72.0 degrees    Maximum Clot Strength-MA 74.0 (H) 50.0 - 70.0 mm    Lysis 30 minutes-LY30 0.0 0.0 - 8.0 %    % Inhibition ADP 38.4 %    % Inhibition AA 25.9 %    TEG Algorithm Link Algorithm    Prealbumin    Collection Time: 01/11/21  3:20 PM   Result Value Ref Range    Pre-Albumin 16.5 (L) 18.0 - 38.0 mg/dL   CRP Quantitive (Non-Cardiac)    Collection Time: 01/11/21  6:30 PM   Result Value Ref Range    Stat C-Reactive Protein 8.41 (H) 0.00 - 0.75 mg/dL   MAGNESIUM    Collection Time: 01/11/21  6:30 PM   Result Value Ref Range    Magnesium 1.4 (L) 1.5 - 2.5 mg/dL   IRON/TOTAL IRON BIND    Collection Time: 01/12/21  6:00 AM   Result Value Ref Range    Iron 16 (L) 50 - 180 ug/dL    Total Iron Binding 202 (L) 250 - 450 ug/dL    Unsat Iron Binding 186 110 - 370 ug/dL    % Saturation 8 (L) 15 - 55 %   CBC WITH DIFFERENTIAL    Collection Time: 01/12/21  6:00 AM   Result Value Ref Range    WBC 22.7 (H) 4.8 - 10.8 K/uL    RBC 2.68 (L) 4.70 - 6.10 M/uL    Hemoglobin 8.6 (L) 14.0 - 18.0 g/dL    Hematocrit 27.0 (L) 42.0 - 52.0 %    .7 (H) 81.4 - 97.8 fL    MCH 32.1 27.0 - 33.0 pg    MCHC 31.9 (L) 33.7 - 35.3 g/dL    RDW 55.1 (H) 35.9 - 50.0 fL    Platelet Count 284 164 - 446 K/uL    MPV 10.3 9.0 - 12.9 fL    Neutrophils-Polys 62.30 44.00 - 72.00 %    Lymphocytes 30.70 22.00 - 41.00 %    Monocytes 7.00 0.00 - 13.40 %    Eosinophils 0.00 0.00 - 6.90 %    Basophils 0.00 0.00 - 1.80 %    Nucleated RBC 0.00 /100 WBC     Neutrophils (Absolute) 14.14 (H) 1.82 - 7.42 K/uL    Lymphs (Absolute) 6.97 (H) 1.00 - 4.80 K/uL    Monos (Absolute) 1.59 (H) 0.00 - 0.85 K/uL    Eos (Absolute) 0.00 0.00 - 0.51 K/uL    Baso (Absolute) 0.00 0.00 - 0.12 K/uL    NRBC (Absolute) 0.00 K/uL    Hypochromia 1+     Anisocytosis 1+     Macrocytosis 1+     Microcytosis 1+    Basic Metabolic Panel    Collection Time: 01/12/21  6:00 AM   Result Value Ref Range    Sodium 139 135 - 145 mmol/L    Potassium 4.6 3.6 - 5.5 mmol/L    Chloride 107 96 - 112 mmol/L    Co2 24 20 - 33 mmol/L    Glucose 127 (H) 65 - 99 mg/dL    Bun 23 (H) 8 - 22 mg/dL    Creatinine 1.01 0.50 - 1.40 mg/dL    Calcium 9.2 8.5 - 10.5 mg/dL    Anion Gap 8.0 7.0 - 16.0   CRP QUANTITIVE (NON-CARDIAC)    Collection Time: 01/12/21  6:00 AM   Result Value Ref Range    Stat C-Reactive Protein 15.97 (H) 0.00 - 0.75 mg/dL   DIFFERENTIAL MANUAL    Collection Time: 01/12/21  6:00 AM   Result Value Ref Range    Manual Diff Status PERFORMED    PERIPHERAL SMEAR REVIEW    Collection Time: 01/12/21  6:00 AM   Result Value Ref Range    Peripheral Smear Review see below    PLATELET ESTIMATE    Collection Time: 01/12/21  6:00 AM   Result Value Ref Range    Plt Estimation Normal    MORPHOLOGY    Collection Time: 01/12/21  6:00 AM   Result Value Ref Range    RBC Morphology Present     Poikilocytosis 1+     Ovalocytes 1+    ESTIMATED GFR    Collection Time: 01/12/21  6:00 AM   Result Value Ref Range    GFR If African American >60 >60 mL/min/1.73 m 2    GFR If Non African American >60 >60 mL/min/1.73 m 2       Fluids    Intake/Output Summary (Last 24 hours) at 1/12/2021 1436  Last data filed at 1/12/2021 1400  Gross per 24 hour   Intake 1912.5 ml   Output 1275 ml   Net 637.5 ml       Core Measures & Quality Metrics  Labs reviewed and Medications reviewed  Arreaga catheter: Critically Ill - Requiring Accurate Measurement of Urinary Output      DVT Prophylaxis: Contraindicated - High bleeding risk  DVT prophylaxis -  mechanical: SCDs  Ulcer prophylaxis: Not indicated  Antibiotics: Treating active infection/contamination beyond 24 hours perioperative coverage      RAP Score Total: 11    ETOH Screening     Reason for no ETOH Intervention: Traumatic Brain Injury        Assessment/Plan  Acute hyperactive delirium due to multiple etiologies- (present on admission)  Assessment & Plan  Advanced age, ICU admission, traumatic brain hemorrhage, surgery  Limit psychotropic medications  Restraints as needed    Acute urinary retention- (present on admission)  Assessment & Plan  Arreaga placed in OR  Began to have bleeding upon arrival to ICU  Unable to advance  Urology placed catheter over wire  Arreaga in place for 2 weeks    Chronic anticoagulation- (present on admission)  Assessment & Plan  Takes ASA and Eliquis outpatient.  KCentra given prior to arrival  TEG with platelet mapping showed AA 50.4  Per neurosurgery 1 un plts and 2 un FFP given  Repeat TEG with elevated ADP  Follow up TEG/PM as clinically warranted      Subdural hematoma (HCC)- (present on admission)  Assessment & Plan  Large left hemispheric subdural hematoma measuring 15mm in greatest transverse dimensions causing significant mass effect on the underlying cerebral hemisphere with left to right midline shift of 1cm. Also small anterior right frontal subdural hematoma measuring 4mm and small anterior interhemispheric subdural hematoma measuring 5mm.  Repeat interval head CT stable, but waning GCS  1/11 Craniotomy with evacuation  Post traumatic pharmacologic seizure prophylaxis for 1 week.  Speech Language Pathology cognitive evaluation.  Dante Sparrow MD. Neurosurgeon. [facility].    Iron deficiency anemia- (present on admission)  Assessment & Plan  Anemic on arrival  Iron replacement per pharmacy kinetics    Pneumonia- (present on admission)  Assessment & Plan  ABX course initiated at [facility] on 1/7/2021, MRSA negative.   Transitioned to Cefdinir/Doxycycline at [facility]  for additional 7 days  1/10: Persistent RLL infiltrate, Rocephin and Doxycycline x7 days  Trend serial chest radiographs and laboratory studies    Essential hypertension- (present on admission)  Assessment & Plan  Chronic condition treated with Norvasc and metoprolol.  Holding maintenance medication during acute traumatic illness.   Nicardipine per Neurosurgery with goal SBP<165mmHg      AF (atrial fibrillation) (HCC)- (present on admission)  Assessment & Plan  Chronic condition treated with Digoxin and metoprolol - resumed at admit    Screening examination for infectious disease- (present on admission)  Assessment & Plan  Admission SARS-CoV-2 testing negative. LOW RISK patient. Repeat SARS-CoV-2 testing not indicated. Isolation precautions de-escalated.    Contraindication to deep vein thrombosis (DVT) prophylaxis- (present on admission)  Assessment & Plan  Systemic anticoagulation contraindicated secondary to elevated bleeding risk.  1/11 Surveillance venous duplex scanning ordered.    Cancer (HCC)- (present on admission)  Assessment & Plan  History of CLL and prostate CA    Stroke (HCC)- (present on admission)  Assessment & Plan  Premorbid    Trauma- (present on admission)  Assessment & Plan  GLF on eliquis.  Trauma Yellow Transfer Activation from [facility].  Jere Meyer MD. Trauma Surgery.    Dysphagia- (present on admission)  Assessment & Plan  Secondary to altered mental status from traumatic brain injury  1/11 Cortrak placed, tube feeds commenced    I independently reviewed pertinent clinical lab tests from the last 48 hours and ordered additional follow up clinical lab tests.  I independently reviewed pertinent radiographic images and the radiologist's reports from the last 48 hours and ordered additional follow up radiographic studies.  The details of the available patient records in [facility] (including laboratory tests, culture data, medications, imaging, and other pertinent diagnostic  tests) and that information was utilized as warranted in today's medical decision making for this patient.  I personally evaluated the patient condition at bedside.    Care interventions include:   Review of interval medical and surgical history.  Review of current medications and outpatient medication reconciliation.  Review of interval imaging studies and radiologist interpretation.  Management of acute closed head injury.  Management of thrombotic surveillance and risk mitigation.  Management of cardiac arrhythmias.  Management of acute hyperactive delirium.   Management of dysphagia and nasoenteric nutrition    Aggregated care time spent evaluating, reassessing, reviewing documentation, providing care, and managing this patient exclusive of procedures: 35 minutes    Jaya Murray MD

## 2021-01-12 NOTE — ASSESSMENT & PLAN NOTE
Advanced age, ICU admission, traumatic brain hemorrhage, surgery  Limit psychotropic medications  Restraints as needed

## 2021-01-12 NOTE — PROCEDURES
[facility] [place] Operative Report    Pre-operative Diagnosis: 1. Urinary retention  2. Blood at the meatus  3. Difficulty with catheterization   Post-operative Diagnosis: Same as above  +urethral false passage in bulbar urethra, mendoza trauma related   Procedure: 1. Cystourethroscopyscopy  2. Complex placement of mendoza catheter over a guidewire and using cystoscope     Surgeon Subhash Louise M.D., MD   Assistant: None   Anesthesia: ICU sedation only   Estimated Blood Loss: minimal       Specimens: 1. none   Drains: 18F mendoza ([place]) catheter with 10cc in balloon   Wound class II clean contaminated   Condition and complications Continue ICU care   Disposition:  Continue ICU care, catheter x2 weeks then voiding trial   Findings: 1. Cystoscopy findings: urethral false passage (ventral bulbar) and active bleeding in the bulbar urethra. Able to traverse easily and no dilation needed.   2. Limited cystoscopy     Indications for Procedure:    Mr. Kimble is a 89yo m who had emergency craniotomy last night for subdural bleed. Catheter placed in OR 12fr and not draining after procedure and large hematuria. No catheter able to be replaced and urology was called. Based on exam he appears to of had prostate or rectal radiation, unable to obtain history re: prostate cancer.      Risks discussed, but not limited to, were infection, sepsis, bleeding, bladder injury, need for secondary procedure, possible need for mendoza post op, possible admission post operatively, and the cardiovascular and pulmonary complications of anesthesia/surgery including DVT, Mi, PE, and death.      Procedure in Detail:patient already on ceftriaxone.     A 16 flexible cystoscope with 30-degree  lens was introduced per urethra sterilely. There was large blood and limited view. True lumen was visualized and scope passed easily. Limited view of the bladder given hematuria from urethra, and very full bladder. Ptfe wire placed into the bladder and scope  backloaded. Catheter placed easily over the wire and mendoza balloon 10cc. No irrigation necessary.     Blood loss was zero.  The patient tolerated the procedure well.       Subhash Louise MD  [facility] [id number]  [place] [phone]

## 2021-01-12 NOTE — PROGRESS NOTES
Called neurosurgery office at 1515 to report new drainage to L crani site, sanguinous bleeding along staple line. Message with information left.     Neurosurgery called back at 1640. RN to continue to monitor and change dressing as needed. Try to keep pressure to dressing as appropriate and team will evaluate tomorrow.

## 2021-01-12 NOTE — THERAPY
"Physical Therapy   Initial Evaluation     Patient Name: Yousif Kimble  Age:  89 y.o., Sex:  male  Medical Record #: [id number]  Today's Date: 1/12/2021     Precautions: Fall Risk    Assessment  Patient is a 89 y.o. male admitted with SDH following GLF. Pt s/p craniotomy with SDH evacuation on 1/11. Pt seen for PT evaluation at this time. Pt with delayed initiation and minimal one-word verbalizations but does appear to be following most simple commands. Pt required most assist for supine <> sit but was able to sit EOB and complete STS with min A and HHA. Attempted side steps along EOB, pt with uncoordinated steps, decr foot clearance and poor foot placement. At this time recommend postacute placement to maximize safety and functional independence prior to return home. Will follow.     Plan  Recommend Physical Therapy 3 times per week until therapy goals are met for the following treatments:  Bed Mobility, Gait Training, Neuro Re-Education / Balance, Self Care/Home Evaluation, Stair Training, Therapeutic Activities and Therapeutic Exercises  DC Equipment Recommendations: Unable to determine at this time  Discharge Recommendations: Recommend post-acute placement for additional physical therapy services prior to discharge home       01/12/21 0831   Prior Living Situation   Prior Services None (brother states pt recently \"was needing more help\")   Housing / Facility 1 Story House   Equipment Owned reports was ambulatory, does not mention AD   Lives with -  brother   Comments info above obtained from SW note per conversation with pt's brother. pt with one-word verbalizations sparingly during session.    Prior Level of Functional Mobility   Bed Mobility Independent   Transfer Status Independent   Ambulation Independent   Distance Ambulation (Feet) limited community likely   Cognition    Level of Consciousness Lethargic   Comments one word answers inconsistently. makes eye contact when engaged, slow initiation but appears to be " trying to follow simple 1-step commands   Passive ROM Lower Body   Comments HS tightness   Active ROM Lower Body    Comments weakness, stiff, decr initiation   Strength Lower Body   Comments functional to stand, unable to formally test d/t cognition/weakness?   Coordination Lower Body    Comments uncoordinated attempts at side stepping   Balance Assessment   Sitting Balance (Static) Fair -   Sitting Balance (Dynamic) Fair -   Standing Balance (Static) Fair -   Standing Balance (Dynamic) Poor   Weight Shift Sitting Fair   Weight Shift Standing Poor   Comments standing with HHA   Gait Analysis   Gait Level Of Assist Unable to Participate   Comments attempted side stepping along EOB, uncoordinated, poor foot clearance   Bed Mobility    Supine to Sit Maximal Assist   Sit to Supine Moderate Assist   Scooting Maximal Assist   Functional Mobility   Sit to Stand Minimal Assist   Comments with HHA   Short Term Goals    Short Term Goal # 1 pt will perform supine <> sit with min A in 6 visits for improved independence   Short Term Goal # 2 pt will complete all functional xfrs with SPV in 6 visits for improved independence   Short Term Goal # 3 pt will ambulate 50ft with min A and LRAD in 6 visits to initiate gait

## 2021-01-12 NOTE — THERAPY
Occupational Therapy   Initial Evaluation     Patient Name: Yousif Kimble  Age:  89 y.o., Sex:  male  Medical Record #: [id number]  Today's Date: 1/12/2021     Precautions  Precautions: Fall Risk    Assessment  Patient is 89 y.o. male with a diagnosis of SDH.  Pt currently limited by decreased functional mobility, activity tolerance, cognition, sensation, balance, and pain which are affecting pt's ability to complete ADLs/IADLs at baseline. Pt would benefit from OT services in the acute care setting to maximize functional recovery.       Plan    Recommend Occupational Therapy 3 times per week until therapy goals are met for the following treatments:  Self Care/Activities of Daily Living and Therapeutic Activities.       Discharge Recommendations: (P) Recommend post-acute placement for additional occupational therapy services prior to discharge home        01/12/21 0819   Prior Living Situation   Prior Services None   Housing / Facility 1 Story House   Equipment Owned Unable to Determine At This Time   Lives with - Patient's Self Care Capacity Other (Comments)  (per chart pt lives with his brother)   Prior Level of ADL Function   Self Feeding Unable To Determine At This Time   Dressing Unable To Determine At This Time   ADL Assessment   Upper Body Dressing Maximal Assist   Lower Body Dressing Total Assist   Toileting Total Assist   Functional Mobility   Sit to Stand Minimal Assist   Bed, Chair, Wheelchair Transfer Unable to Participate   Short Term Goals   Short Term Goal # 1 min A with UB dressing   Short Term Goal # 2 mod A with LB dressing   Short Term Goal # 3 min A with ADL txfs

## 2021-01-12 NOTE — THERAPY
"Missed Therapy     Patient Name: Yousif Kimble  Age:  89 y.o., Sex:  male  Medical Record #: [id number]  Today's Date: 1/12/2021    Discussed missed therapy with RN.       01/12/21 1040   Treatment Variance   Reason For Missed Therapy Medical - Patient not Able To Participate   Interdisciplinary Plan of Care Collaboration   IDT Collaboration with  Nursing;Speech Therapist   Patient Position at End of Therapy In Bed   Collaboration Comments SLP spoke with RN. Patient lethargic this AM following \"difficult day\" yesterday. No yet appropriate to participate in cognitive evaluation. SLP to hold this date and will follow up as patient is able to participate.      "

## 2021-01-13 DIAGNOSIS — Z23 NEED FOR VACCINATION: ICD-10-CM

## 2021-01-13 LAB
ANION GAP SERPL CALC-SCNC: 12 MMOL/L (ref 7–16)
ANISOCYTOSIS BLD QL SMEAR: ABNORMAL
BASOPHILS # BLD AUTO: 0 % (ref 0–1.8)
BASOPHILS # BLD: 0 K/UL (ref 0–0.12)
BUN SERPL-MCNC: 37 MG/DL (ref 8–22)
CALCIUM SERPL-MCNC: 9.2 MG/DL (ref 8.5–10.5)
CHLORIDE SERPL-SCNC: 107 MMOL/L (ref 96–112)
CO2 SERPL-SCNC: 23 MMOL/L (ref 20–33)
CREAT SERPL-MCNC: 0.95 MG/DL (ref 0.5–1.4)
EOSINOPHIL # BLD AUTO: 0 K/UL (ref 0–0.51)
EOSINOPHIL NFR BLD: 0 % (ref 0–6.9)
ERYTHROCYTE [DISTWIDTH] IN BLOOD BY AUTOMATED COUNT: 54.5 FL (ref 35.9–50)
GLUCOSE SERPL-MCNC: 129 MG/DL (ref 65–99)
HCT VFR BLD AUTO: 28.1 % (ref 42–52)
HGB BLD-MCNC: 8.8 G/DL (ref 14–18)
IRON SATN MFR SERPL: 8 % (ref 15–55)
IRON SERPL-MCNC: 14 UG/DL (ref 50–180)
LYMPHOCYTES # BLD AUTO: 7.82 K/UL (ref 1–4.8)
LYMPHOCYTES NFR BLD: 33 % (ref 22–41)
MACROCYTES BLD QL SMEAR: ABNORMAL
MANUAL DIFF BLD: NORMAL
MCH RBC QN AUTO: 31.4 PG (ref 27–33)
MCHC RBC AUTO-ENTMCNC: 31.3 G/DL (ref 33.7–35.3)
MCV RBC AUTO: 100.4 FL (ref 81.4–97.8)
MONOCYTES # BLD AUTO: 1.23 K/UL (ref 0–0.85)
MONOCYTES NFR BLD AUTO: 5.2 % (ref 0–13.4)
MORPHOLOGY BLD-IMP: NORMAL
NEUTROPHILS # BLD AUTO: 14.62 K/UL (ref 1.82–7.42)
NEUTROPHILS NFR BLD: 61.7 % (ref 44–72)
NRBC # BLD AUTO: 0 K/UL
NRBC BLD-RTO: 0 /100 WBC
OVALOCYTES BLD QL SMEAR: NORMAL
PLATELET # BLD AUTO: 283 K/UL (ref 164–446)
PLATELET BLD QL SMEAR: NORMAL
PMV BLD AUTO: 10.7 FL (ref 9–12.9)
POIKILOCYTOSIS BLD QL SMEAR: NORMAL
POTASSIUM SERPL-SCNC: 4.1 MMOL/L (ref 3.6–5.5)
RBC # BLD AUTO: 2.8 M/UL (ref 4.7–6.1)
RBC BLD AUTO: PRESENT
SODIUM SERPL-SCNC: 142 MMOL/L (ref 135–145)
TARGETS BLD QL SMEAR: NORMAL
TIBC SERPL-MCNC: 183 UG/DL (ref 250–450)
UIBC SERPL-MCNC: 169 UG/DL (ref 110–370)
WBC # BLD AUTO: 23.7 K/UL (ref 4.8–10.8)

## 2021-01-13 PROCEDURE — 85007 BL SMEAR W/DIFF WBC COUNT: CPT

## 2021-01-13 PROCEDURE — 83540 ASSAY OF IRON: CPT

## 2021-01-13 PROCEDURE — A9270 NON-COVERED ITEM OR SERVICE: HCPCS | Performed by: SURGERY

## 2021-01-13 PROCEDURE — 700102 HCHG RX REV CODE 250 W/ 637 OVERRIDE(OP): Performed by: SURGERY

## 2021-01-13 PROCEDURE — 770022 HCHG ROOM/CARE - ICU (200)

## 2021-01-13 PROCEDURE — 700111 HCHG RX REV CODE 636 W/ 250 OVERRIDE (IP): Performed by: SURGERY

## 2021-01-13 PROCEDURE — 306565 RIGID MIT RESTRAINT(PAIR): Performed by: SURGERY

## 2021-01-13 PROCEDURE — 700111 HCHG RX REV CODE 636 W/ 250 OVERRIDE (IP): Performed by: NURSE PRACTITIONER

## 2021-01-13 PROCEDURE — 700105 HCHG RX REV CODE 258: Performed by: SURGERY

## 2021-01-13 PROCEDURE — 700105 HCHG RX REV CODE 258: Performed by: NURSE PRACTITIONER

## 2021-01-13 PROCEDURE — 85027 COMPLETE CBC AUTOMATED: CPT

## 2021-01-13 PROCEDURE — 83550 IRON BINDING TEST: CPT

## 2021-01-13 PROCEDURE — 700101 HCHG RX REV CODE 250: Performed by: NURSE PRACTITIONER

## 2021-01-13 PROCEDURE — 99233 SBSQ HOSP IP/OBS HIGH 50: CPT | Performed by: SURGERY

## 2021-01-13 PROCEDURE — 80048 BASIC METABOLIC PNL TOTAL CA: CPT

## 2021-01-13 RX ADMIN — METOPROLOL TARTRATE 75 MG: 50 TABLET, FILM COATED ORAL at 17:18

## 2021-01-13 RX ADMIN — OXYCODONE 5 MG: 5 TABLET ORAL at 20:06

## 2021-01-13 RX ADMIN — LEVETIRACETAM 500 MG: 500 TABLET, FILM COATED ORAL at 05:54

## 2021-01-13 RX ADMIN — DIGOXIN 125 MCG: 125 TABLET ORAL at 17:17

## 2021-01-13 RX ADMIN — METOPROLOL TARTRATE 75 MG: 50 TABLET, FILM COATED ORAL at 05:54

## 2021-01-13 RX ADMIN — DOXYCYCLINE 100 MG: 100 INJECTION, POWDER, LYOPHILIZED, FOR SOLUTION INTRAVENOUS at 17:18

## 2021-01-13 RX ADMIN — DOXYCYCLINE 100 MG: 100 INJECTION, POWDER, LYOPHILIZED, FOR SOLUTION INTRAVENOUS at 06:30

## 2021-01-13 RX ADMIN — LEVETIRACETAM 500 MG: 500 TABLET, FILM COATED ORAL at 17:17

## 2021-01-13 RX ADMIN — DOCUSATE SODIUM 100 MG: 50 LIQUID ORAL at 05:54

## 2021-01-13 RX ADMIN — POLYETHYLENE GLYCOL 3350 1 PACKET: 17 POWDER, FOR SOLUTION ORAL at 05:54

## 2021-01-13 RX ADMIN — SODIUM CHLORIDE 125 MG: 9 INJECTION, SOLUTION INTRAVENOUS at 17:18

## 2021-01-13 RX ADMIN — CEFTRIAXONE SODIUM 1 G: 1 INJECTION, POWDER, FOR SOLUTION INTRAMUSCULAR; INTRAVENOUS at 05:54

## 2021-01-13 ASSESSMENT — FIBROSIS 4 INDEX: FIB4 SCORE: 1.82

## 2021-01-13 ASSESSMENT — PAIN SCALES - WONG BAKER: WONGBAKER_NUMERICALRESPONSE: HURTS JUST A LITTLE BIT

## 2021-01-13 NOTE — THERAPY
Missed Therapy     Patient Name: Yousif Kimble  Age:  89 y.o., Sex:  male  Medical Record #: [id number]  Today's Date: 1/13/2021    Discussed missed therapy with RN.       01/13/21 1104   Treatment Variance   Reason For Missed Therapy Medical - Patient with Nursing;Medical - Other (Please Comment)  (RN reporting patient not yet appropirate to participate)   Interdisciplinary Plan of Care Collaboration   IDT Collaboration with  Nursing   Patient Position at End of Therapy In Bed   Collaboration Comments RN stating patient is not verbalizing at this time, not yet appropriate to particiapte in cognitive evaluation. SLP to hold this date. SLP inquired if orders for a swallow evaluation are needed. RN stated she will follow up with MD

## 2021-01-13 NOTE — DISCHARGE PLANNING
Anticipated Discharge Disposition: Uncertain-pending medical team collaboration    Action: Pt was discussed in IDT rounds. Pt is medically complex and requires ICU level of care. No DC needs at this time.    Barriers to Discharge: Medical clearance    Plan: Care coordination will continue to follow up with DC needs and barriers.

## 2021-01-13 NOTE — PROGRESS NOTES
Neurosurgery Progress Note    Subjective:  Pt in bed, seen by Dr. Sparrow and myself, no acute events    Exam:  AA, fc's x4, aphasia, incision with staples- c/d    BP  Min: 117/69  Max: 165/81  Pulse  Av.2  Min: 67  Max: 111  Resp  Av.9  Min: 18  Max: 33  Temp  Av.4 °C (99.3 °F)  Min: 36.8 °C (98.2 °F)  Max: 37.7 °C (99.8 °F)  SpO2  Av.2 %  Min: 92 %  Max: 100 %    No data recorded    Recent Labs     21  1000 21  0600 21  0621   WBC 14.7* 22.7* 23.7*   RBC 2.94* 2.68* 2.80*   HEMOGLOBIN 9.4* 8.6* 8.8*   HEMATOCRIT 28.4* 27.0* 28.1*   MCV 96.9 100.7* 100.4*   MCH 31.3 32.1 31.4   MCHC 32.3* 31.9* 31.3*   RDW 51.5* 55.1* 54.5*   PLATELETCT 331 284 283   MPV 9.4 10.3 10.7     Recent Labs     21  0717 21  0600 21  0621   SODIUM 139 139 142   POTASSIUM 3.6 4.6 4.1   CHLORIDE 105 107 107   CO2 22 24 23   GLUCOSE 110* 127* 129*   BUN 15 23* 37*   CREATININE 0.85 1.01 0.95   CALCIUM 8.6 9.2 9.2     Recent Labs     01/10/21  1328 01/10/21  2130   APTT 33.1 34.6   INR 1.31* 1.25*     Recent Labs     01/10/21  1328 01/10/21  2130 21  0045 21  1445   REACTMIN 7.4 4.6* 5.3 7.1   CLOTKINET 1.7 0.9* 1.4 1.3   CLOTANGL 67.2 75.7* 70.7 69.6   MAXCLOTS 71.1* 74.4* 72.3* 74.0*   LUT72ELW 0.0 0.2 0.0 0.0   PRCINADP 65.3  --  87.3 38.4   PRCINAA 50.4  --  36.2 25.9       Intake/Output       21 07 - 21 0659 21 - 21 0659      [id number] Total  Total       Intake    I.V.  --  -- --  1300  -- 1300    Volume (mL) (NS infusion) -- -- -- 1300 -- 1300    Other  --  -- --  30  -- 30    Medications (PO/Enteral Liquids) -- -- -- 30 -- 30    NG/GT  --  540 540  --  -- --    Intake (mL) (Enteral Tube 21 Cortrak - Gastric 12 Fr. Right nare) -- 540 540 -- -- --    IV Piggyback  --  -- --  200  -- 200    Volume (mL) (cefTRIAXone (ROCEPHIN) 1 g in  mL IVPB) -- -- -- 200 -- 200    Enteral  --  60 60  --  -- --    Free  Water / Tube Flush -- 60 60 -- -- --    Total Intake --  -- 1530       Output    Urine  380  550 930  100  -- 100    Output (mL) (Urethral Catheter 18 Fr.) 380 550 930 100 -- 100    Total Output 380 550 930 100 -- 100       Net I/O     -380 50 -330 1430 -- 1430            Intake/Output Summary (Last 24 hours) at 1/13/2021 0902  Last data filed at 1/13/2021 0800  Gross per 24 hour   Intake 2130 ml   Output 970 ml   Net 1160 ml            • ferric gluconate (FERRLECIT) IV  125 mg Q24HR    Followed by   • [START ON 1/14/2021] ferric gluconate (FERRLECIT) IV  125 mg Q24HR    Followed by   • [START ON 1/15/2021] ferric gluconate (FERRLECIT) IV  250 mg Q24HR   • levETIRAcetam  500 mg BID   • Pharmacy  1 Each PHARMACY TO DOSE   • digoxin  125 mcg Q EVENING   • docusate sodium 100mg/10mL  100 mg BID   • magnesium hydroxide  30 mL DAILY   • metoprolol  75 mg BID   • polyethylene glycol/lytes  1 Packet BID   • senna-docusate  1 Tab Nightly   • oxyCODONE immediate-release  2.5 mg Q3HRS PRN   • oxyCODONE immediate-release  5 mg Q3HRS PRN   • senna-docusate  1 Tab Q24HRS PRN   • Respiratory Therapy Consult   Continuous RT   • Pharmacy Consult Request  1 Each PHARMACY TO DOSE   • bisacodyl  10 mg Q24HRS PRN   • fleet  1 Each Once PRN   • morphine injection  2 mg Q3HRS PRN   • ondansetron  4 mg Q4HRS PRN   • cefTRIAXone (ROCEPHIN) IV  1 g Q24HRS   • doxycycline  100 mg Q12HRS   • [Held by provider] amLODIPine  10 mg DAILY   • [Held by provider] lisinopril  5 mg DAILY   • niCARdipine infusion  0-5 mg/hr Continuous   • Metoprolol Tartrate  5 mg Q6HRS PRN       Assessment and Plan:  Hospital day # 3 GLF on eliquis  POD# 2 s/p left frontotemporal crani for evac of sdh  Chemical prophylactic DVT therapy: No  Start date/time: tbd    NM as above  PT/OT  Neuro checks q4  CT 1/12- ml shift unchanged, sm amt of residual hematoma

## 2021-01-13 NOTE — WOUND TEAM
Wound consult placed on 1/10/21 regarding sacrococcygeal area. Chart and images reviewed. Discussed with bedside RN. This RN in with bedside RN. Pt AOx1 and aphasic at baseline. Pillows removed from under right side. Pt was turned to the right side. Sacral mepilex removed and sacrococcygeal area assessed. Area appears to be intact. Pictures obtained. mepilex reapplied and pillows placed under the left side to offload pressure. Pt returned to semi supine position.

## 2021-01-13 NOTE — PROGRESS NOTES
Trauma / Surgical Daily Progress Note    Date of Service  1/13/2021    Chief Complaint  89 y.o. male admitted 1/10/2021 with head bleed after ground level fall while on anti-coagulation    Interval Events  Hospital day #4  Pt currently requires ICU care and hospital admission  Seen on rounds and discussed with multidisciplinary team  Injuries and physiologic derangements result in significant risk of long term morbidity  Events and interventions include:  Integration of multiple data points and associated complex medical decisions   Supportive care for TBI  Nutritional support-ongoing tube feeds  Pulmonary toilet- at high risk for decompensation  Pain control    Review of Systems  Review of Systems   Unable to perform ROS: Mental status change        Vital Signs for last 24 hours  Temp:  [36.8 °C (98.2 °F)-37.7 °C (99.8 °F)] 37.1 °C (98.7 °F)  Pulse:  [] 102  Resp:  [18-34] 27  BP: (125-165)/() 159/108  SpO2:  [92 %-100 %] 97 %    Hemodynamic parameters for last 24 hours       Respiratory Data     Respiration: (!) 27, Pulse Oximetry: 97 %     Work Of Breathing / Effort: Within Normal Limits  RUL Breath Sounds: Clear, RML Breath Sounds: Diminished, RLL Breath Sounds: Diminished, PRISCA Breath Sounds: Clear, LLL Breath Sounds: Diminished    Physical Exam  Physical Exam  Constitutional:       General: He is not in acute distress.     Appearance: He is not toxic-appearing.   HENT:      Head:      Comments: Incision clean     Right Ear: External ear normal.      Left Ear: External ear normal.      Mouth/Throat:      Mouth: Mucous membranes are moist.   Eyes:      Extraocular Movements: Extraocular movements intact.      Pupils: Pupils are equal, round, and reactive to light.   Neck:      Musculoskeletal: Normal range of motion.   Cardiovascular:      Rate and Rhythm: Tachycardia present.      Heart sounds: Normal heart sounds.   Pulmonary:      Effort: Pulmonary effort is normal. No respiratory distress.       Breath sounds: No stridor.   Abdominal:      General: Abdomen is flat.      Palpations: Abdomen is soft.      Tenderness: There is no abdominal tenderness. There is no guarding.   Genitourinary:     Comments: Arreaga in place  Musculoskeletal: Normal range of motion.   Skin:     General: Skin is warm and dry.   Neurological:      Comments: Not following  Aphasic  Moves all   Psychiatric:      Comments: Unable to assess         Laboratory  Recent Results (from the past 24 hour(s))   IRON/TOTAL IRON BIND    Collection Time: 01/13/21  6:21 AM   Result Value Ref Range    Iron 14 (L) 50 - 180 ug/dL    Total Iron Binding 183 (L) 250 - 450 ug/dL    Unsat Iron Binding 169 110 - 370 ug/dL    % Saturation 8 (L) 15 - 55 %   CBC WITH DIFFERENTIAL    Collection Time: 01/13/21  6:21 AM   Result Value Ref Range    WBC 23.7 (H) 4.8 - 10.8 K/uL    RBC 2.80 (L) 4.70 - 6.10 M/uL    Hemoglobin 8.8 (L) 14.0 - 18.0 g/dL    Hematocrit 28.1 (L) 42.0 - 52.0 %    .4 (H) 81.4 - 97.8 fL    MCH 31.4 27.0 - 33.0 pg    MCHC 31.3 (L) 33.7 - 35.3 g/dL    RDW 54.5 (H) 35.9 - 50.0 fL    Platelet Count 283 164 - 446 K/uL    MPV 10.7 9.0 - 12.9 fL    Neutrophils-Polys 61.70 44.00 - 72.00 %    Lymphocytes 33.00 22.00 - 41.00 %    Monocytes 5.20 0.00 - 13.40 %    Eosinophils 0.00 0.00 - 6.90 %    Basophils 0.00 0.00 - 1.80 %    Nucleated RBC 0.00 /100 WBC    Neutrophils (Absolute) 14.62 (H) 1.82 - 7.42 K/uL    Lymphs (Absolute) 7.82 (H) 1.00 - 4.80 K/uL    Monos (Absolute) 1.23 (H) 0.00 - 0.85 K/uL    Eos (Absolute) 0.00 0.00 - 0.51 K/uL    Baso (Absolute) 0.00 0.00 - 0.12 K/uL    NRBC (Absolute) 0.00 K/uL    Anisocytosis 1+     Macrocytosis 1+    Basic Metabolic Panel    Collection Time: 01/13/21  6:21 AM   Result Value Ref Range    Sodium 142 135 - 145 mmol/L    Potassium 4.1 3.6 - 5.5 mmol/L    Chloride 107 96 - 112 mmol/L    Co2 23 20 - 33 mmol/L    Glucose 129 (H) 65 - 99 mg/dL    Bun 37 (H) 8 - 22 mg/dL    Creatinine 0.95 0.50 - 1.40 mg/dL     Calcium 9.2 8.5 - 10.5 mg/dL    Anion Gap 12.0 7.0 - 16.0   ESTIMATED GFR    Collection Time: 01/13/21  6:21 AM   Result Value Ref Range    GFR If African American >60 >60 mL/min/1.73 m 2    GFR If Non African American >60 >60 mL/min/1.73 m 2   DIFFERENTIAL MANUAL    Collection Time: 01/13/21  6:21 AM   Result Value Ref Range    Manual Diff Status PERFORMED    PERIPHERAL SMEAR REVIEW    Collection Time: 01/13/21  6:21 AM   Result Value Ref Range    Peripheral Smear Review see below    PLATELET ESTIMATE    Collection Time: 01/13/21  6:21 AM   Result Value Ref Range    Plt Estimation Normal    MORPHOLOGY    Collection Time: 01/13/21  6:21 AM   Result Value Ref Range    RBC Morphology Present     Poikilocytosis 1+     Ovalocytes 1+     Target Cells 1+        Fluids    Intake/Output Summary (Last 24 hours) at 1/13/2021 1447  Last data filed at 1/13/2021 0800  Gross per 24 hour   Intake 2130 ml   Output 800 ml   Net 1330 ml       Core Measures & Quality Metrics    Arreaga catheter: Critically Ill - Requiring Accurate Measurement of Urinary Output      DVT Prophylaxis: Contraindicated - High bleeding risk      Antibiotics: Treating active infection/contamination beyond 24 hours perioperative coverage      FAZAL Score  ETOH Screening    Assessment/Plan  Acute hyperactive delirium due to multiple etiologies- (present on admission)  Assessment & Plan  Advanced age, ICU admission, traumatic brain hemorrhage, surgery  Limit psychotropic medications  Restraints as needed    Acute urinary retention- (present on admission)  Assessment & Plan  Arreaga placed in OR  Began to have bleeding upon arrival to ICU  Unable to advance  Urology placed catheter over wire  Arreaga in place for 2 weeks    Chronic anticoagulation- (present on admission)  Assessment & Plan  Takes ASA and Eliquis outpatient.  KCentra given prior to arrival  TEG with platelet mapping showed AA 50.4  Per neurosurgery 1 un plts and 2 un FFP given  Repeat TEG with elevated  ADP  Follow up TEG/PM as clinically warranted      Subdural hematoma (HCC)- (present on admission)  Assessment & Plan  Large left hemispheric subdural hematoma measuring 15mm in greatest transverse dimensions causing significant mass effect on the underlying cerebral hemisphere with left to right midline shift of 1cm. Also small anterior right frontal subdural hematoma measuring 4mm and small anterior interhemispheric subdural hematoma measuring 5mm.  Repeat interval head CT stable, but waning GCS  1/11 Craniotomy with evacuation  Post traumatic pharmacologic seizure prophylaxis for 1 week.  Speech Language Pathology cognitive evaluation.  Dante Sparrow MD. Neurosurgeon. [facility].    Iron deficiency anemia- (present on admission)  Assessment & Plan  Anemic on arrival  Iron replacement per pharmacy kinetics    Pneumonia- (present on admission)  Assessment & Plan  ABX course initiated at [facility] on 1/7/2021, MRSA negative.   Transitioned to Cefdinir/Doxycycline at [facility] for additional 7 days  1/10: Persistent RLL infiltrate, Rocephin and Doxycycline x7 days  Trend serial chest radiographs and laboratory studies    Essential hypertension- (present on admission)  Assessment & Plan  Chronic condition treated with Norvasc and metoprolol.  Holding maintenance medication during acute traumatic illness.   Nicardipine per Neurosurgery with goal SBP<165mmHg      AF (atrial fibrillation) (HCC)- (present on admission)  Assessment & Plan  Chronic condition treated with Digoxin and metoprolol - resumed at admit    Screening examination for infectious disease- (present on admission)  Assessment & Plan  Admission SARS-CoV-2 testing negative. LOW RISK patient. Repeat SARS-CoV-2 testing not indicated. Isolation precautions de-escalated.    Contraindication to deep vein thrombosis (DVT) prophylaxis- (present on admission)  Assessment & Plan  Systemic anticoagulation contraindicated secondary to elevated bleeding risk.  1/11  Surveillance venous duplex scanning ordered.    Cancer (HCC)- (present on admission)  Assessment & Plan  History of CLL and prostate CA    Stroke (HCC)- (present on admission)  Assessment & Plan  Premorbid    Trauma- (present on admission)  Assessment & Plan  GLF on eliquis.  Trauma Yellow Transfer Activation from [facility].  Jere Meyer MD. Trauma Surgery.    Dysphagia- (present on admission)  Assessment & Plan  Secondary to altered mental status from traumatic brain injury  1/11 Cortrak placed, tube feeds commenced        Discussed patient condition with RN, RT, Pharmacy, Dietary and .

## 2021-01-14 LAB
ANION GAP SERPL CALC-SCNC: 6 MMOL/L (ref 7–16)
ANISOCYTOSIS BLD QL SMEAR: ABNORMAL
BASOPHILS # BLD AUTO: 0 % (ref 0–1.8)
BASOPHILS # BLD: 0 K/UL (ref 0–0.12)
BUN SERPL-MCNC: 39 MG/DL (ref 8–22)
CALCIUM SERPL-MCNC: 9.5 MG/DL (ref 8.5–10.5)
CHLORIDE SERPL-SCNC: 110 MMOL/L (ref 96–112)
CO2 SERPL-SCNC: 27 MMOL/L (ref 20–33)
CREAT SERPL-MCNC: 0.97 MG/DL (ref 0.5–1.4)
DIGOXIN SERPL-MCNC: 0.7 NG/ML (ref 0.8–2)
EOSINOPHIL # BLD AUTO: 0 K/UL (ref 0–0.51)
EOSINOPHIL NFR BLD: 0 % (ref 0–6.9)
ERYTHROCYTE [DISTWIDTH] IN BLOOD BY AUTOMATED COUNT: 55.1 FL (ref 35.9–50)
GLUCOSE SERPL-MCNC: 128 MG/DL (ref 65–99)
HCT VFR BLD AUTO: 29.2 % (ref 42–52)
HGB BLD-MCNC: 9 G/DL (ref 14–18)
HYPOCHROMIA BLD QL SMEAR: ABNORMAL
LYMPHOCYTES # BLD AUTO: 9.75 K/UL (ref 1–4.8)
LYMPHOCYTES NFR BLD: 53 % (ref 22–41)
MACROCYTES BLD QL SMEAR: ABNORMAL
MAGNESIUM SERPL-MCNC: 1.7 MG/DL (ref 1.5–2.5)
MANUAL DIFF BLD: NORMAL
MCH RBC QN AUTO: 31.4 PG (ref 27–33)
MCHC RBC AUTO-ENTMCNC: 30.8 G/DL (ref 33.7–35.3)
MCV RBC AUTO: 101.7 FL (ref 81.4–97.8)
MONOCYTES # BLD AUTO: 1.12 K/UL (ref 0–0.85)
MONOCYTES NFR BLD AUTO: 6.1 % (ref 0–13.4)
MORPHOLOGY BLD-IMP: NORMAL
NEUTROPHILS # BLD AUTO: 7.53 K/UL (ref 1.82–7.42)
NEUTROPHILS NFR BLD: 40.9 % (ref 44–72)
NRBC # BLD AUTO: 0 K/UL
NRBC BLD-RTO: 0 /100 WBC
PHOSPHATE SERPL-MCNC: 1.2 MG/DL (ref 2.5–4.5)
PLATELET # BLD AUTO: 292 K/UL (ref 164–446)
PLATELET BLD QL SMEAR: NORMAL
PMV BLD AUTO: 11 FL (ref 9–12.9)
POLYCHROMASIA BLD QL SMEAR: NORMAL
POTASSIUM SERPL-SCNC: 3.8 MMOL/L (ref 3.6–5.5)
RBC # BLD AUTO: 2.87 M/UL (ref 4.7–6.1)
RBC BLD AUTO: PRESENT
SODIUM SERPL-SCNC: 143 MMOL/L (ref 135–145)
WBC # BLD AUTO: 18.4 K/UL (ref 4.8–10.8)

## 2021-01-14 PROCEDURE — A9270 NON-COVERED ITEM OR SERVICE: HCPCS | Performed by: SURGERY

## 2021-01-14 PROCEDURE — 700101 HCHG RX REV CODE 250: Performed by: NURSE PRACTITIONER

## 2021-01-14 PROCEDURE — 84100 ASSAY OF PHOSPHORUS: CPT

## 2021-01-14 PROCEDURE — 80048 BASIC METABOLIC PNL TOTAL CA: CPT

## 2021-01-14 PROCEDURE — 700111 HCHG RX REV CODE 636 W/ 250 OVERRIDE (IP): Performed by: NURSE PRACTITIONER

## 2021-01-14 PROCEDURE — 700102 HCHG RX REV CODE 250 W/ 637 OVERRIDE(OP): Performed by: SURGERY

## 2021-01-14 PROCEDURE — 700105 HCHG RX REV CODE 258: Performed by: SURGERY

## 2021-01-14 PROCEDURE — 700111 HCHG RX REV CODE 636 W/ 250 OVERRIDE (IP): Performed by: SURGERY

## 2021-01-14 PROCEDURE — 770022 HCHG ROOM/CARE - ICU (200)

## 2021-01-14 PROCEDURE — 83735 ASSAY OF MAGNESIUM: CPT

## 2021-01-14 PROCEDURE — 700101 HCHG RX REV CODE 250: Performed by: SURGERY

## 2021-01-14 PROCEDURE — 97530 THERAPEUTIC ACTIVITIES: CPT

## 2021-01-14 PROCEDURE — 700105 HCHG RX REV CODE 258: Performed by: NURSE PRACTITIONER

## 2021-01-14 PROCEDURE — 80162 ASSAY OF DIGOXIN TOTAL: CPT

## 2021-01-14 PROCEDURE — 85007 BL SMEAR W/DIFF WBC COUNT: CPT

## 2021-01-14 PROCEDURE — 85027 COMPLETE CBC AUTOMATED: CPT

## 2021-01-14 PROCEDURE — 99233 SBSQ HOSP IP/OBS HIGH 50: CPT | Performed by: SURGERY

## 2021-01-14 RX ORDER — AMLODIPINE BESYLATE 10 MG/1
10 TABLET ORAL DAILY
Status: DISCONTINUED | OUTPATIENT
Start: 2021-01-15 | End: 2021-01-14

## 2021-01-14 RX ORDER — AMLODIPINE BESYLATE 10 MG/1
10 TABLET ORAL DAILY
Status: DISCONTINUED | OUTPATIENT
Start: 2021-01-14 | End: 2021-01-20

## 2021-01-14 RX ORDER — LISINOPRIL 5 MG/1
5 TABLET ORAL DAILY
Status: DISCONTINUED | OUTPATIENT
Start: 2021-01-15 | End: 2021-01-14

## 2021-01-14 RX ORDER — LISINOPRIL 5 MG/1
5 TABLET ORAL DAILY
Status: DISCONTINUED | OUTPATIENT
Start: 2021-01-14 | End: 2021-01-20

## 2021-01-14 RX ADMIN — DIGOXIN 125 MCG: 125 TABLET ORAL at 17:30

## 2021-01-14 RX ADMIN — CEFTRIAXONE SODIUM 1 G: 1 INJECTION, POWDER, FOR SOLUTION INTRAMUSCULAR; INTRAVENOUS at 06:00

## 2021-01-14 RX ADMIN — LISINOPRIL 5 MG: 5 TABLET ORAL at 12:13

## 2021-01-14 RX ADMIN — LEVETIRACETAM 500 MG: 500 TABLET, FILM COATED ORAL at 06:00

## 2021-01-14 RX ADMIN — DOXYCYCLINE 100 MG: 100 INJECTION, POWDER, LYOPHILIZED, FOR SOLUTION INTRAVENOUS at 17:30

## 2021-01-14 RX ADMIN — DOXYCYCLINE 100 MG: 100 INJECTION, POWDER, LYOPHILIZED, FOR SOLUTION INTRAVENOUS at 06:30

## 2021-01-14 RX ADMIN — METOPROLOL TARTRATE 75 MG: 50 TABLET, FILM COATED ORAL at 06:00

## 2021-01-14 RX ADMIN — SODIUM CHLORIDE 125 MG: 9 INJECTION, SOLUTION INTRAVENOUS at 17:30

## 2021-01-14 RX ADMIN — LEVETIRACETAM 500 MG: 500 TABLET, FILM COATED ORAL at 17:30

## 2021-01-14 RX ADMIN — METOPROLOL TARTRATE 75 MG: 50 TABLET, FILM COATED ORAL at 17:30

## 2021-01-14 RX ADMIN — POTASSIUM PHOSPHATE, MONOBASIC AND POTASSIUM PHOSPHATE, DIBASIC 30 MMOL: 224; 236 INJECTION, SOLUTION, CONCENTRATE INTRAVENOUS at 11:05

## 2021-01-14 RX ADMIN — AMLODIPINE BESYLATE 10 MG: 10 TABLET ORAL at 12:13

## 2021-01-14 ASSESSMENT — COGNITIVE AND FUNCTIONAL STATUS - GENERAL
DAILY ACTIVITIY SCORE: 6
SUGGESTED CMS G CODE MODIFIER DAILY ACTIVITY: CN
HELP NEEDED FOR BATHING: TOTAL
DRESSING REGULAR UPPER BODY CLOTHING: TOTAL
PERSONAL GROOMING: TOTAL
TOILETING: TOTAL
DRESSING REGULAR LOWER BODY CLOTHING: TOTAL
EATING MEALS: TOTAL

## 2021-01-14 NOTE — PROGRESS NOTES
Trauma / Surgical Daily Progress Note    Date of Service  1/14/2021    Chief Complaint  89 y.o. male admitted 1/10/2021 with head bleed after ground level fall while on anti-coagulation    Interval Events  Hospital day #5  Pt currently requires ICU care and hospital admission  Seen on rounds and discussed with multidisciplinary team  Injuries and physiologic derangements result in significant risk of long term morbidity  Events and interventions include:  Integration of multiple data points and associated complex medical decisions   Supportive care for TBI  Nutritional support-ongoing tube feeds  Pulmonary toilet- at high risk for decompensation  Mgt of a-fib, checking digoxin level today  Pain control    Review of Systems  Review of Systems   Unable to perform ROS: Mental status change        Vital Signs for last 24 hours  Temp:  [36.2 °C (97.1 °F)-37.4 °C (99.3 °F)] 37.4 °C (99.3 °F)  Pulse:  [] 116  Resp:  [] 28  BP: (120-164)/() 137/77  SpO2:  [87 %-100 %] 96 %    Hemodynamic parameters for last 24 hours       Respiratory Data     Respiration: (!) 28, Pulse Oximetry: 96 %     Work Of Breathing / Effort: Within Normal Limits  RUL Breath Sounds: Clear, RML Breath Sounds: Clear, RLL Breath Sounds: Diminished, PRISCA Breath Sounds: Clear, LLL Breath Sounds: Diminished    Physical Exam  Physical Exam  Constitutional:       General: He is not in acute distress.     Appearance: He is not toxic-appearing.   HENT:      Head:      Comments: Incision clean     Right Ear: External ear normal.      Left Ear: External ear normal.      Mouth/Throat:      Mouth: Mucous membranes are moist.   Eyes:      General: No scleral icterus.     Extraocular Movements: Extraocular movements intact.      Pupils: Pupils are equal, round, and reactive to light.   Neck:      Musculoskeletal: Normal range of motion.   Cardiovascular:      Rate and Rhythm: Tachycardia present. Rhythm irregular.      Pulses: Normal pulses.       Heart sounds: Normal heart sounds.   Pulmonary:      Effort: Pulmonary effort is normal. No respiratory distress.      Breath sounds: No stridor.   Abdominal:      General: Abdomen is flat.      Palpations: Abdomen is soft.      Tenderness: There is no abdominal tenderness. There is no guarding.   Genitourinary:     Comments: Arreaga in place  Musculoskeletal: Normal range of motion.   Skin:     General: Skin is warm and dry.   Neurological:      Comments: Not following  Aphasic  Moves all   Psychiatric:      Comments: Unable to assess         Laboratory  Recent Results (from the past 24 hour(s))   CBC WITH DIFFERENTIAL    Collection Time: 01/14/21  5:25 AM   Result Value Ref Range    WBC 18.4 (H) 4.8 - 10.8 K/uL    RBC 2.87 (L) 4.70 - 6.10 M/uL    Hemoglobin 9.0 (L) 14.0 - 18.0 g/dL    Hematocrit 29.2 (L) 42.0 - 52.0 %    .7 (H) 81.4 - 97.8 fL    MCH 31.4 27.0 - 33.0 pg    MCHC 30.8 (L) 33.7 - 35.3 g/dL    RDW 55.1 (H) 35.9 - 50.0 fL    Platelet Count 292 164 - 446 K/uL    MPV 11.0 9.0 - 12.9 fL    Neutrophils-Polys 40.90 (L) 44.00 - 72.00 %    Lymphocytes 53.00 (H) 22.00 - 41.00 %    Monocytes 6.10 0.00 - 13.40 %    Eosinophils 0.00 0.00 - 6.90 %    Basophils 0.00 0.00 - 1.80 %    Nucleated RBC 0.00 /100 WBC    Neutrophils (Absolute) 7.53 (H) 1.82 - 7.42 K/uL    Lymphs (Absolute) 9.75 (H) 1.00 - 4.80 K/uL    Monos (Absolute) 1.12 (H) 0.00 - 0.85 K/uL    Eos (Absolute) 0.00 0.00 - 0.51 K/uL    Baso (Absolute) 0.00 0.00 - 0.12 K/uL    NRBC (Absolute) 0.00 K/uL    Hypochromia 1+     Anisocytosis 1+     Macrocytosis 1+    Basic Metabolic Panel    Collection Time: 01/14/21  5:25 AM   Result Value Ref Range    Sodium 143 135 - 145 mmol/L    Potassium 3.8 3.6 - 5.5 mmol/L    Chloride 110 96 - 112 mmol/L    Co2 27 20 - 33 mmol/L    Glucose 128 (H) 65 - 99 mg/dL    Bun 39 (H) 8 - 22 mg/dL    Creatinine 0.97 0.50 - 1.40 mg/dL    Calcium 9.5 8.5 - 10.5 mg/dL    Anion Gap 6.0 (L) 7.0 - 16.0   MAGNESIUM    Collection  Time: 01/14/21  5:25 AM   Result Value Ref Range    Magnesium 1.7 1.5 - 2.5 mg/dL   PHOSPHORUS    Collection Time: 01/14/21  5:25 AM   Result Value Ref Range    Phosphorus 1.2 (L) 2.5 - 4.5 mg/dL   PERIPHERAL SMEAR REVIEW    Collection Time: 01/14/21  5:25 AM   Result Value Ref Range    Peripheral Smear Review see below    PLATELET ESTIMATE    Collection Time: 01/14/21  5:25 AM   Result Value Ref Range    Plt Estimation Normal    MORPHOLOGY    Collection Time: 01/14/21  5:25 AM   Result Value Ref Range    RBC Morphology Present     Polychromia 1+    DIFFERENTIAL MANUAL    Collection Time: 01/14/21  5:25 AM   Result Value Ref Range    Manual Diff Status PERFORMED    ESTIMATED GFR    Collection Time: 01/14/21  5:25 AM   Result Value Ref Range    GFR If African American >60 >60 mL/min/1.73 m 2    GFR If Non African American >60 >60 mL/min/1.73 m 2       Fluids    Intake/Output Summary (Last 24 hours) at 1/14/2021 1441  Last data filed at 1/14/2021 1400  Gross per 24 hour   Intake 2220 ml   Output 1905 ml   Net 315 ml       Core Measures & Quality Metrics    Arreaga catheter: Critically Ill - Requiring Accurate Measurement of Urinary Output      DVT Prophylaxis: Contraindicated - High bleeding risk      Antibiotics: Treating active infection/contamination beyond 24 hours perioperative coverage      FAZAL Score    ETOH Screening      Assessment/Plan  Acute hyperactive delirium due to multiple etiologies- (present on admission)  Assessment & Plan  Advanced age, ICU admission, traumatic brain hemorrhage, surgery  Limit psychotropic medications  Restraints as needed    Acute urinary retention- (present on admission)  Assessment & Plan  Arreaga placed in OR  Began to have bleeding upon arrival to ICU  Unable to advance  Urology placed catheter over wire  Arreaga in place for 2 weeks    Chronic anticoagulation- (present on admission)  Assessment & Plan  Takes ASA and Eliquis outpatient.  KCentra given prior to arrival  TEG with  platelet mapping showed AA 50.4  Per neurosurgery 1 un plts and 2 un FFP given  Repeat TEG with elevated ADP  Follow up TEG/PM as clinically warranted      Subdural hematoma (HCC)- (present on admission)  Assessment & Plan  Large left hemispheric subdural hematoma measuring 15mm in greatest transverse dimensions causing significant mass effect on the underlying cerebral hemisphere with left to right midline shift of 1cm. Also small anterior right frontal subdural hematoma measuring 4mm and small anterior interhemispheric subdural hematoma measuring 5mm.  Repeat interval head CT stable, but waning GCS  1/11 Craniotomy with evacuation  Post traumatic pharmacologic seizure prophylaxis for 1 week.  Speech Language Pathology cognitive evaluation.  Dante Sparrow MD. Neurosurgeon. [facility].    Iron deficiency anemia- (present on admission)  Assessment & Plan  Anemic on arrival  Iron replacement per pharmacy kinetics    Pneumonia- (present on admission)  Assessment & Plan  ABX course initiated at [facility] on 1/7/2021, MRSA negative.   Transitioned to Cefdinir/Doxycycline at [facility] for additional 7 days  1/10: Persistent RLL infiltrate, Rocephin and Doxycycline x7 days  Trend serial chest radiographs and laboratory studies    Essential hypertension- (present on admission)  Assessment & Plan  Chronic condition treated with Norvasc and metoprolol.  Holding maintenance medication during acute traumatic illness.   Nicardipine per Neurosurgery with goal SBP<165mmHg      AF (atrial fibrillation) (HCC)- (present on admission)  Assessment & Plan  Chronic condition treated with Digoxin and metoprolol - resumed at admit    Screening examination for infectious disease- (present on admission)  Assessment & Plan  Admission SARS-CoV-2 testing negative. LOW RISK patient. Repeat SARS-CoV-2 testing not indicated. Isolation precautions de-escalated.    Contraindication to deep vein thrombosis (DVT) prophylaxis- (present on  admission)  Assessment & Plan  Systemic anticoagulation contraindicated secondary to elevated bleeding risk.  1/11 Surveillance venous duplex scanning ordered.    Cancer (HCC)- (present on admission)  Assessment & Plan  History of CLL and prostate CA    Stroke (HCC)- (present on admission)  Assessment & Plan  Premorbid    Trauma- (present on admission)  Assessment & Plan  GLF on eliquis.  Trauma Yellow Transfer Activation from [facility].  Jere Meyer MD. Trauma Surgery.    Dysphagia- (present on admission)  Assessment & Plan  Secondary to altered mental status from traumatic brain injury  1/11 Cortrak placed, tube feeds commenced      Discussed patient condition with RN, RT, Pharmacy, Dietary and .

## 2021-01-14 NOTE — THERAPY
Occupational Therapy  Daily Treatment     Patient Name: Yousif Kimble  Age:  89 y.o., Sex:  male  Medical Record #: [id number]  Today's Date: 1/14/2021     Precautions  Precautions: (P) Fall Risk    Assessment    Pt currently limited by decreased functional mobility, activity tolerance, cognition, sensation, strength, AROM, coordination, balance, which are affecting pt's ability to complete ADLs/IADLs at baseline. Pt would benefit from OT services in the acute care setting to maximize functional recovery. .karoline    Plan    Continue current treatment plan.       Discharge Recommendations: (P) Recommend post-acute placement for additional occupational therapy services prior to discharge home       01/14/21 1057   Activities of Daily Living   Grooming Maximal Assist   Upper Body Dressing Maximal Assist   Lower Body Dressing Total Assist   Toileting Total Assist   Functional Mobility   Sit to Stand Moderate Assist   Bed, Chair, Wheelchair Transfer Unable to Participate   Short Term Goals   Short Term Goal # 1 min A with UB dressing   Goal Outcome # 1 Progressing slower than expected   Short Term Goal # 2 mod A with LB dressing   Goal Outcome # 2 Progressing slower than expected   Short Term Goal # 3 min A with ADL txfs   Goal Outcome # 3 Progressing slower than expected

## 2021-01-14 NOTE — PROGRESS NOTES
Neurosurgery Progress Note    Subjective:  Pt in bed, no acute events    Exam:  AA, fc's x4, aphasia, incision with staples- c/d    BP  Min: 120/85  Max: 164/83  Pulse  Av.6  Min: 79  Max: 106  Resp  Av.5  Min: 21  Max: 104  Temp  Av.8 °C (98.2 °F)  Min: 36.2 °C (97.1 °F)  Max: 37.1 °C (98.7 °F)  SpO2  Av.2 %  Min: 87 %  Max: 100 %    No data recorded    Recent Labs     21  0600 21  0621 21  0525   WBC 22.7* 23.7* 18.4*   RBC 2.68* 2.80* 2.87*   HEMOGLOBIN 8.6* 8.8* 9.0*   HEMATOCRIT 27.0* 28.1* 29.2*   .7* 100.4* 101.7*   MCH 32.1 31.4 31.4   MCHC 31.9* 31.3* 30.8*   RDW 55.1* 54.5* 55.1*   PLATELETCT 284 283 292   MPV 10.3 10.7 11.0     Recent Labs     21  0621  0621 21  0525   SODIUM 139 142 143   POTASSIUM 4.6 4.1 3.8   CHLORIDE 107 107 110   CO2 24 23 27   GLUCOSE 127* 129* 128*   BUN 23* 37* 39*   CREATININE 1.01 0.95 0.97   CALCIUM 9.2 9.2 9.5         Recent Labs     21  1445   REACTMIN 7.1   CLOTKINET 1.3   CLOTANGL 69.6   MAXCLOTS 74.0*   VQS32RDG 0.0   PRCINADP 38.4   PRCINAA 25.9       Intake/Output       21 - 21 - 01/15/21 0659       Total  Total       Intake    P.O.  --  -- --  0  -- 0    P.O. -- -- -- 0 -- 0    I.V.  1300  -- 1300  --  -- --    Volume (mL) (NS infusion) 1300 -- 1300 -- -- --    Other  60  -- 60  30  -- 30    Medications (PO/Enteral Liquids) 60 -- 60 30 -- 30    NG/GT  540  -- 540  675  -- 675    Intake (mL) (Enteral Tube 21 Cortrak - Gastric 12 Fr. Right nare) 540 -- 540 675 -- 675    IV Piggyback  270  -- 270  --  -- --    Volume (mL) (cefTRIAXone (ROCEPHIN) 1 g in  mL IVPB) 200 -- 200 -- -- --    Volume (mL) (ferric gluconate complex (FERRLECIT) 125 mg in  mL IVPB) 70 -- 70 -- -- --    Enteral  --  90 90  --  -- --    Free Water / Tube Flush -- 90 90 -- -- --    Total Intake 2170 90 2260 705 -- 705       Output    Urine   550  900 1450  80  -- 80    Output (mL) (Urethral Catheter 18 Fr.)  80 -- 80    Stool  --  -- --  --  -- --    Number of Times Stooled 4 x 1 x 5 x -- -- --    Emesis/NG output  --  -- --  0  -- 0    Output (mL) (Enteral Tube 01/11/21 Cortrak - Gastric 12 Fr. Right nare) -- -- -- 0 -- 0    Total Output  80 -- 80       Net I/O     1620 -810 810 625 -- 625            Intake/Output Summary (Last 24 hours) at 1/14/2021 0853  Last data filed at 1/14/2021 0800  Gross per 24 hour   Intake 1390 ml   Output 1430 ml   Net -40 ml            • potassium phosphate IVPB (CUSTOM)  30 mmol Once   • ferric gluconate (FERRLECIT) IV  125 mg Q24HR    Followed by   • [START ON 1/15/2021] ferric gluconate (FERRLECIT) IV  250 mg Q24HR   • levETIRAcetam  500 mg BID   • Pharmacy  1 Each PHARMACY TO DOSE   • digoxin  125 mcg Q EVENING   • docusate sodium 100mg/10mL  100 mg BID   • magnesium hydroxide  30 mL DAILY   • metoprolol  75 mg BID   • polyethylene glycol/lytes  1 Packet BID   • senna-docusate  1 Tab Nightly   • oxyCODONE immediate-release  2.5 mg Q3HRS PRN   • oxyCODONE immediate-release  5 mg Q3HRS PRN   • senna-docusate  1 Tab Q24HRS PRN   • Respiratory Therapy Consult   Continuous RT   • Pharmacy Consult Request  1 Each PHARMACY TO DOSE   • bisacodyl  10 mg Q24HRS PRN   • fleet  1 Each Once PRN   • morphine injection  2 mg Q3HRS PRN   • ondansetron  4 mg Q4HRS PRN   • cefTRIAXone (ROCEPHIN) IV  1 g Q24HRS   • doxycycline  100 mg Q12HRS   • [Held by provider] amLODIPine  10 mg DAILY   • [Held by provider] lisinopril  5 mg DAILY   • niCARdipine infusion  0-5 mg/hr Continuous   • Metoprolol Tartrate  5 mg Q6HRS PRN       Assessment and Plan:  Hospital day # 4 GLF on eliquis  POD# 3 s/p left frontotemporal crani for evac of sdh  Chemical prophylactic DVT therapy: No  Start date/time: tbd    NM as above  PT/OT  Neuro checks q4  CT 1/12- ml shift unchanged, sm amt of residual hematoma

## 2021-01-15 LAB
ANION GAP SERPL CALC-SCNC: 8 MMOL/L (ref 7–16)
ANISOCYTOSIS BLD QL SMEAR: ABNORMAL
BASOPHILS # BLD AUTO: 0 % (ref 0–1.8)
BASOPHILS # BLD: 0 K/UL (ref 0–0.12)
BUN SERPL-MCNC: 39 MG/DL (ref 8–22)
CALCIUM SERPL-MCNC: 9.4 MG/DL (ref 8.5–10.5)
CHLORIDE SERPL-SCNC: 116 MMOL/L (ref 96–112)
CO2 SERPL-SCNC: 27 MMOL/L (ref 20–33)
CREAT SERPL-MCNC: 0.83 MG/DL (ref 0.5–1.4)
EOSINOPHIL # BLD AUTO: 0.14 K/UL (ref 0–0.51)
EOSINOPHIL NFR BLD: 0.9 % (ref 0–6.9)
ERYTHROCYTE [DISTWIDTH] IN BLOOD BY AUTOMATED COUNT: 55.8 FL (ref 35.9–50)
GLUCOSE SERPL-MCNC: 139 MG/DL (ref 65–99)
HCT VFR BLD AUTO: 29 % (ref 42–52)
HGB BLD-MCNC: 9.1 G/DL (ref 14–18)
LYMPHOCYTES # BLD AUTO: 8.11 K/UL (ref 1–4.8)
LYMPHOCYTES NFR BLD: 53 % (ref 22–41)
MACROCYTES BLD QL SMEAR: ABNORMAL
MANUAL DIFF BLD: NORMAL
MCH RBC QN AUTO: 32 PG (ref 27–33)
MCHC RBC AUTO-ENTMCNC: 31.4 G/DL (ref 33.7–35.3)
MCV RBC AUTO: 102.1 FL (ref 81.4–97.8)
MONOCYTES # BLD AUTO: 0.66 K/UL (ref 0–0.85)
MONOCYTES NFR BLD AUTO: 4.3 % (ref 0–13.4)
MORPHOLOGY BLD-IMP: NORMAL
NEUTROPHILS # BLD AUTO: 6.4 K/UL (ref 1.82–7.42)
NEUTROPHILS NFR BLD: 40.9 % (ref 44–72)
NEUTS BAND NFR BLD MANUAL: 0.9 % (ref 0–10)
NRBC # BLD AUTO: 0.05 K/UL
NRBC BLD-RTO: 0.3 /100 WBC
PLATELET # BLD AUTO: 312 K/UL (ref 164–446)
PLATELET BLD QL SMEAR: NORMAL
PMV BLD AUTO: 10.5 FL (ref 9–12.9)
POTASSIUM SERPL-SCNC: 3.8 MMOL/L (ref 3.6–5.5)
RBC # BLD AUTO: 2.84 M/UL (ref 4.7–6.1)
RBC BLD AUTO: PRESENT
SODIUM SERPL-SCNC: 151 MMOL/L (ref 135–145)
WBC # BLD AUTO: 15.3 K/UL (ref 4.8–10.8)

## 2021-01-15 PROCEDURE — 700101 HCHG RX REV CODE 250: Performed by: NURSE PRACTITIONER

## 2021-01-15 PROCEDURE — 80048 BASIC METABOLIC PNL TOTAL CA: CPT

## 2021-01-15 PROCEDURE — 700111 HCHG RX REV CODE 636 W/ 250 OVERRIDE (IP): Performed by: NURSE PRACTITIONER

## 2021-01-15 PROCEDURE — 85027 COMPLETE CBC AUTOMATED: CPT

## 2021-01-15 PROCEDURE — 85007 BL SMEAR W/DIFF WBC COUNT: CPT

## 2021-01-15 PROCEDURE — 99233 SBSQ HOSP IP/OBS HIGH 50: CPT | Performed by: SURGERY

## 2021-01-15 PROCEDURE — 700105 HCHG RX REV CODE 258: Performed by: NURSE PRACTITIONER

## 2021-01-15 PROCEDURE — 770022 HCHG ROOM/CARE - ICU (200)

## 2021-01-15 PROCEDURE — 92610 EVALUATE SWALLOWING FUNCTION: CPT

## 2021-01-15 PROCEDURE — 700102 HCHG RX REV CODE 250 W/ 637 OVERRIDE(OP): Performed by: SURGERY

## 2021-01-15 PROCEDURE — 306565 RIGID MIT RESTRAINT(PAIR): Performed by: SURGERY

## 2021-01-15 PROCEDURE — A9270 NON-COVERED ITEM OR SERVICE: HCPCS | Performed by: SURGERY

## 2021-01-15 PROCEDURE — 700111 HCHG RX REV CODE 636 W/ 250 OVERRIDE (IP): Performed by: SURGERY

## 2021-01-15 PROCEDURE — 700105 HCHG RX REV CODE 258: Performed by: SURGERY

## 2021-01-15 RX ORDER — DIGOXIN 250 MCG
250 TABLET ORAL
Status: DISCONTINUED | OUTPATIENT
Start: 2021-01-15 | End: 2021-01-15

## 2021-01-15 RX ORDER — DIGOXIN 250 MCG
250 TABLET ORAL
Status: DISCONTINUED | OUTPATIENT
Start: 2021-01-15 | End: 2021-01-21 | Stop reason: HOSPADM

## 2021-01-15 RX ORDER — DIGOXIN 125 MCG
125 TABLET ORAL
Status: DISCONTINUED | OUTPATIENT
Start: 2021-01-16 | End: 2021-01-21 | Stop reason: HOSPADM

## 2021-01-15 RX ADMIN — SODIUM CHLORIDE 250 MG: 9 INJECTION, SOLUTION INTRAVENOUS at 17:32

## 2021-01-15 RX ADMIN — DOXYCYCLINE 100 MG: 100 INJECTION, POWDER, LYOPHILIZED, FOR SOLUTION INTRAVENOUS at 05:01

## 2021-01-15 RX ADMIN — CEFTRIAXONE SODIUM 1 G: 1 INJECTION, POWDER, FOR SOLUTION INTRAMUSCULAR; INTRAVENOUS at 05:01

## 2021-01-15 RX ADMIN — METOPROLOL TARTRATE 75 MG: 50 TABLET, FILM COATED ORAL at 05:01

## 2021-01-15 RX ADMIN — LEVETIRACETAM 500 MG: 500 TABLET, FILM COATED ORAL at 05:01

## 2021-01-15 RX ADMIN — METOPROLOL TARTRATE 5 MG: 5 INJECTION, SOLUTION INTRAVENOUS at 12:08

## 2021-01-15 RX ADMIN — DOXYCYCLINE 100 MG: 100 INJECTION, POWDER, LYOPHILIZED, FOR SOLUTION INTRAVENOUS at 17:33

## 2021-01-15 RX ADMIN — AMLODIPINE BESYLATE 10 MG: 10 TABLET ORAL at 05:01

## 2021-01-15 RX ADMIN — LEVETIRACETAM 500 MG: 500 TABLET, FILM COATED ORAL at 17:32

## 2021-01-15 RX ADMIN — OXYCODONE 2.5 MG: 5 TABLET ORAL at 16:22

## 2021-01-15 RX ADMIN — LISINOPRIL 5 MG: 5 TABLET ORAL at 05:01

## 2021-01-15 RX ADMIN — DIGOXIN 250 MCG: 250 TABLET ORAL at 17:32

## 2021-01-15 RX ADMIN — METOPROLOL TARTRATE 75 MG: 50 TABLET, FILM COATED ORAL at 17:32

## 2021-01-15 ASSESSMENT — PAIN DESCRIPTION - PAIN TYPE
TYPE: ACUTE PAIN
TYPE: ACUTE PAIN

## 2021-01-15 ASSESSMENT — FIBROSIS 4 INDEX: FIB4 SCORE: 1.65

## 2021-01-15 NOTE — PROGRESS NOTES
Trauma / Surgical Daily Progress Note    Date of Service  1/15/2021    Chief Complaint  89 y.o. male admitted 1/10/2021 with head bleed after ground level fall while on anti-coagulation    Interval Events  Hospital day #6  Pt currently requires ICU care and hospital admission  Seen on rounds and discussed with multidisciplinary team  Injuries and physiologic derangements result in significant risk of long term morbidity  Events and interventions include:  Integration of multiple data points and associated complex medical decisions   Supportive care for TBI  Nutritional support-ongoing tube feeds  Pulmonary toilet- at high risk for decompensation  Mgt of a-fib  Pain control    Review of Systems  Review of Systems   Unable to perform ROS: Mental status change        Vital Signs for last 24 hours  Temp:  [36.2 °C (97.2 °F)-37.2 °C (98.9 °F)] 36.7 °C (98.1 °F)  Pulse:  [] 114  Resp:  [18-34] 25  BP: (119-157)/(72-96) 155/80  SpO2:  [91 %-100 %] 98 %    Hemodynamic parameters for last 24 hours       Respiratory Data     Respiration: (!) 25, Pulse Oximetry: 98 %     Work Of Breathing / Effort: Mild  RUL Breath Sounds: Diminished, RML Breath Sounds: Diminished, RLL Breath Sounds: Diminished, PRISCA Breath Sounds: Diminished, LLL Breath Sounds: Diminished    Physical Exam  Physical Exam  Constitutional:       General: He is not in acute distress.     Appearance: He is not toxic-appearing.   HENT:      Head:      Comments: Incision clean     Right Ear: External ear normal.      Left Ear: External ear normal.      Mouth/Throat:      Mouth: Mucous membranes are moist.   Eyes:      General:         Right eye: No discharge.         Left eye: No discharge.      Extraocular Movements: Extraocular movements intact.      Pupils: Pupils are equal, round, and reactive to light.   Neck:      Musculoskeletal: Normal range of motion.   Cardiovascular:      Rate and Rhythm: Tachycardia present. Rhythm irregular.      Pulses: Normal  pulses.      Heart sounds: No murmur. No friction rub. No gallop.    Pulmonary:      Effort: Pulmonary effort is normal. No respiratory distress.      Breath sounds: No stridor.   Abdominal:      General: Abdomen is flat.      Palpations: Abdomen is soft.      Tenderness: There is no abdominal tenderness. There is no guarding.   Genitourinary:     Comments: Arreaga in place  Musculoskeletal: Normal range of motion.   Skin:     General: Skin is warm and dry.      Capillary Refill: Capillary refill takes less than 2 seconds.   Neurological:      Comments: Not following  Aphasic  Moves all   Psychiatric:      Comments: Unable to assess         Laboratory  Recent Results (from the past 24 hour(s))   DIGOXIN    Collection Time: 01/14/21  4:14 PM   Result Value Ref Range    Digoxin 0.7 (L) 0.8 - 2.0 ng/mL   CBC WITH DIFFERENTIAL    Collection Time: 01/15/21  5:15 AM   Result Value Ref Range    WBC 15.3 (H) 4.8 - 10.8 K/uL    RBC 2.84 (L) 4.70 - 6.10 M/uL    Hemoglobin 9.1 (L) 14.0 - 18.0 g/dL    Hematocrit 29.0 (L) 42.0 - 52.0 %    .1 (H) 81.4 - 97.8 fL    MCH 32.0 27.0 - 33.0 pg    MCHC 31.4 (L) 33.7 - 35.3 g/dL    RDW 55.8 (H) 35.9 - 50.0 fL    Platelet Count 312 164 - 446 K/uL    MPV 10.5 9.0 - 12.9 fL    Neutrophils-Polys 40.90 (L) 44.00 - 72.00 %    Lymphocytes 53.00 (H) 22.00 - 41.00 %    Monocytes 4.30 0.00 - 13.40 %    Eosinophils 0.90 0.00 - 6.90 %    Basophils 0.00 0.00 - 1.80 %    Nucleated RBC 0.30 /100 WBC    Neutrophils (Absolute) 6.40 1.82 - 7.42 K/uL    Lymphs (Absolute) 8.11 (H) 1.00 - 4.80 K/uL    Monos (Absolute) 0.66 0.00 - 0.85 K/uL    Eos (Absolute) 0.14 0.00 - 0.51 K/uL    Baso (Absolute) 0.00 0.00 - 0.12 K/uL    NRBC (Absolute) 0.05 K/uL    Anisocytosis 1+     Macrocytosis 1+    Basic Metabolic Panel    Collection Time: 01/15/21  5:15 AM   Result Value Ref Range    Sodium 151 (H) 135 - 145 mmol/L    Potassium 3.8 3.6 - 5.5 mmol/L    Chloride 116 (H) 96 - 112 mmol/L    Co2 27 20 - 33 mmol/L     Glucose 139 (H) 65 - 99 mg/dL    Bun 39 (H) 8 - 22 mg/dL    Creatinine 0.83 0.50 - 1.40 mg/dL    Calcium 9.4 8.5 - 10.5 mg/dL    Anion Gap 8.0 7.0 - 16.0   ESTIMATED GFR    Collection Time: 01/15/21  5:15 AM   Result Value Ref Range    GFR If African American >60 >60 mL/min/1.73 m 2    GFR If Non African American >60 >60 mL/min/1.73 m 2   DIFFERENTIAL MANUAL    Collection Time: 01/15/21  5:15 AM   Result Value Ref Range    Bands-Stabs 0.90 0.00 - 10.00 %    Manual Diff Status PERFORMED    PERIPHERAL SMEAR REVIEW    Collection Time: 01/15/21  5:15 AM   Result Value Ref Range    Peripheral Smear Review see below    PLATELET ESTIMATE    Collection Time: 01/15/21  5:15 AM   Result Value Ref Range    Plt Estimation Normal    MORPHOLOGY    Collection Time: 01/15/21  5:15 AM   Result Value Ref Range    RBC Morphology Present        Fluids    Intake/Output Summary (Last 24 hours) at 1/15/2021 1542  Last data filed at 1/15/2021 1200  Gross per 24 hour   Intake 465 ml   Output 1250 ml   Net -785 ml       Core Measures & Quality Metrics    Arreaga catheter: Critically Ill - Requiring Accurate Measurement of Urinary Output      DVT Prophylaxis: Contraindicated - High bleeding risk      Antibiotics: Treating active infection/contamination beyond 24 hours perioperative coverage      FAZAL Score    ETOH Screening      Assessment/Plan  Acute hyperactive delirium due to multiple etiologies- (present on admission)  Assessment & Plan  Advanced age, ICU admission, traumatic brain hemorrhage, surgery  Limit psychotropic medications  Restraints as needed    Acute urinary retention- (present on admission)  Assessment & Plan  Arreaga placed in OR  Began to have bleeding upon arrival to ICU  Unable to advance  Urology placed catheter over wire  Arreaga in place for 2 weeks    Chronic anticoagulation- (present on admission)  Assessment & Plan  Takes ASA and Eliquis outpatient.  KCentra given prior to arrival  TEG with platelet mapping showed AA  50.4  Per neurosurgery 1 un plts and 2 un FFP given  Repeat TEG with elevated ADP  Follow up TEG/PM as clinically warranted      Subdural hematoma (HCC)- (present on admission)  Assessment & Plan  Large left hemispheric subdural hematoma measuring 15mm in greatest transverse dimensions causing significant mass effect on the underlying cerebral hemisphere with left to right midline shift of 1cm. Also small anterior right frontal subdural hematoma measuring 4mm and small anterior interhemispheric subdural hematoma measuring 5mm.  Repeat interval head CT stable, but waning GCS  1/11 Craniotomy with evacuation  Post traumatic pharmacologic seizure prophylaxis for 1 week.  Speech Language Pathology cognitive evaluation.  Dante Sparrow MD. Neurosurgeon. [facility].    Iron deficiency anemia- (present on admission)  Assessment & Plan  Anemic on arrival  Iron replacement per pharmacy kinetics    Pneumonia- (present on admission)  Assessment & Plan  ABX course initiated at [facility] on 1/7/2021, MRSA negative.   Transitioned to Cefdinir/Doxycycline at [facility] for additional 7 days  1/10: Persistent RLL infiltrate, Rocephin and Doxycycline x7 days  Trend serial chest radiographs and laboratory studies    Essential hypertension- (present on admission)  Assessment & Plan  Chronic condition treated with Norvasc and metoprolol.  Holding maintenance medication during acute traumatic illness.   Nicardipine per Neurosurgery with goal SBP<165mmHg      AF (atrial fibrillation) (HCC)- (present on admission)  Assessment & Plan  Chronic condition treated with Digoxin and metoprolol - resumed at admit    Screening examination for infectious disease- (present on admission)  Assessment & Plan  Admission SARS-CoV-2 testing negative. LOW RISK patient. Repeat SARS-CoV-2 testing not indicated. Isolation precautions de-escalated.    Contraindication to deep vein thrombosis (DVT) prophylaxis- (present on admission)  Assessment &  Plan  Systemic anticoagulation contraindicated secondary to elevated bleeding risk.  1/11 Surveillance venous duplex scanning ordered.    Cancer (HCC)- (present on admission)  Assessment & Plan  History of CLL and prostate CA    Stroke (HCC)- (present on admission)  Assessment & Plan  Premorbid    Trauma- (present on admission)  Assessment & Plan  GLF on eliquis.  Trauma Yellow Transfer Activation from [facility].  Jere Meyer MD. Trauma Surgery.    Dysphagia- (present on admission)  Assessment & Plan  Secondary to altered mental status from traumatic brain injury  1/11 Cortrak placed, tube feeds commenced      Discussed patient condition with RN, RT, Pharmacy, Dietary and .

## 2021-01-15 NOTE — PROGRESS NOTES
Neurosurgery Progress Note    Subjective:  Pt in bed, no acute events    Exam:  AA, fc's x4, aphasia, incision with staples- c/d    BP  Min: 119/74  Max: 164/114  Pulse  Av.2  Min: 71  Max: 131  Resp  Av.8  Min: 20  Max: 55  Temp  Av.8 °C (98.2 °F)  Min: 36.2 °C (97.2 °F)  Max: 37.4 °C (99.3 °F)  SpO2  Av.1 %  Min: 91 %  Max: 100 %    No data recorded    Recent Labs     21  0621  0525 01/15/21  0515   WBC 23.7* 18.4* 15.3*   RBC 2.80* 2.87* 2.84*   HEMOGLOBIN 8.8* 9.0* 9.1*   HEMATOCRIT 28.1* 29.2* 29.0*   .4* 101.7* 102.1*   MCH 31.4 31.4 32.0   MCHC 31.3* 30.8* 31.4*   RDW 54.5* 55.1* 55.8*   PLATELETCT 283 292 312   MPV 10.7 11.0 10.5     Recent Labs     21  0621  0525 01/15/21  0515   SODIUM 142 143 151*   POTASSIUM 4.1 3.8 3.8   CHLORIDE 107 110 116*   CO2 23 27 27   GLUCOSE 129* 128* 139*   BUN 37* 39* 39*   CREATININE 0.95 0.97 0.83   CALCIUM 9.2 9.5 9.4               Intake/Output       21 - 01/15/21 0659 01/15/21 07 - 21 0659       Total  Total       Intake    P.O.  0  -- 0  0  -- 0    P.O. 0 -- 0 0 -- 0    Other  150  -- 150  --  -- --    Medications (PO/Enteral Liquids) 150 -- 150 -- -- --    NG/GT  1080  -- 1080  180  -- 180    Intake (mL) (Enteral Tube 21 Cortrak - Gastric 12 Fr. Right nare) 1080 -- 1080 180 -- 180    IV Piggyback  500  -- 500  --  -- --    Volume (mL) (potassium phosphate 30 mmol in  mL ivpb) 500 -- 500 -- -- --    Total Intake 1730 -- 1730 180 -- 180       Output    Urine  835  750 1585  320  -- 320    Output (mL) (Urethral Catheter 18 Fr.)  320 -- 320    Stool  --  -- --  --  -- --    Number of Times Stooled 1 x -- 1 x -- -- --    Emesis/NG output  0  -- 0  --  -- --    Output (mL) (Enteral Tube 21 Cortrak - Gastric 12 Fr. Right nare) 0 -- 0 -- -- --    Total Output  320 -- 320       Net I/O     895 -750 145 -140 -- -140             Intake/Output Summary (Last 24 hours) at 1/15/2021 0958  Last data filed at 1/15/2021 0900  Gross per 24 hour   Intake 1160 ml   Output 1565 ml   Net -405 ml            • amLODIPine  10 mg DAILY   • lisinopril  5 mg DAILY   • ferric gluconate (FERRLECIT) IV  250 mg Q24HR   • levETIRAcetam  500 mg BID   • Pharmacy  1 Each PHARMACY TO DOSE   • digoxin  125 mcg Q EVENING   • docusate sodium 100mg/10mL  100 mg BID   • magnesium hydroxide  30 mL DAILY   • metoprolol  75 mg BID   • polyethylene glycol/lytes  1 Packet BID   • senna-docusate  1 Tab Nightly   • oxyCODONE immediate-release  2.5 mg Q3HRS PRN   • oxyCODONE immediate-release  5 mg Q3HRS PRN   • senna-docusate  1 Tab Q24HRS PRN   • Respiratory Therapy Consult   Continuous RT   • Pharmacy Consult Request  1 Each PHARMACY TO DOSE   • bisacodyl  10 mg Q24HRS PRN   • fleet  1 Each Once PRN   • morphine injection  2 mg Q3HRS PRN   • ondansetron  4 mg Q4HRS PRN   • cefTRIAXone (ROCEPHIN) IV  1 g Q24HRS   • doxycycline  100 mg Q12HRS   • Metoprolol Tartrate  5 mg Q6HRS PRN       Assessment and Plan:  Hospital day # 5 GLF on eliquis  POD# 4 s/p left frontotemporal crani for evac of sdh  Chemical prophylactic DVT therapy: No  Start date/time: tbd    NM as above  PT/OT  Neuro checks q4  CT 1/12- ml shift unchanged, sm amt of residual hematoma

## 2021-01-15 NOTE — CARE PLAN
Problem: Safety  Goal: Will remain free from injury  Outcome: PROGRESSING AS EXPECTED  Goal: Will remain free from falls  Outcome: PROGRESSING AS EXPECTED     Problem: Infection  Goal: Will remain free from infection  Outcome: PROGRESSING AS EXPECTED     Problem: Venous Thromboembolism (VTW)/Deep Vein Thrombosis (DVT) Prevention:  Goal: Patient will participate in Venous Thrombosis (VTE)/Deep Vein Thrombosis (DVT)Prevention Measures  Outcome: PROGRESSING AS EXPECTED     Problem: Bowel/Gastric:  Goal: Normal bowel function is maintained or improved  Outcome: PROGRESSING AS EXPECTED  Goal: Will not experience complications related to bowel motility  Outcome: PROGRESSING AS EXPECTED     Problem: Knowledge Deficit  Goal: Knowledge of disease process/condition, treatment plan, diagnostic tests, and medications will improve  Outcome: PROGRESSING AS EXPECTED  Goal: Knowledge of the prescribed therapeutic regimen will improve  Outcome: PROGRESSING AS EXPECTED     Problem: Discharge Barriers/Planning  Goal: Patient's continuum of care needs will be met  Outcome: PROGRESSING AS EXPECTED     Problem: Respiratory:  Goal: Respiratory status will improve  Outcome: PROGRESSING AS EXPECTED     Problem: Urinary Elimination:  Goal: Ability to reestablish a normal urinary elimination pattern will improve  Outcome: PROGRESSING AS EXPECTED  Note: Coude Catheter placed by urology      Problem: Pain Management  Goal: Pain level will decrease to patient's comfort goal  Outcome: PROGRESSING AS EXPECTED     Problem: Psychosocial Needs:  Goal: Level of anxiety will decrease  Outcome: PROGRESSING AS EXPECTED     Problem: Skin Integrity  Goal: Risk for impaired skin integrity will decrease  Outcome: PROGRESSING AS EXPECTED

## 2021-01-15 NOTE — THERAPY
"Speech Language Pathology   Clinical Swallow Evaluation     Patient Name: Yousif Kimble  AGE:  89 y.o., SEX:  male  Medical Record #: [id number]  Today's Date: 1/15/2021     Precautions  Precautions: (P) Fall Risk, Nasogastric Tube, Swallow Precautions ( See Comments)  Comments: (P) strict NPO    Assessment    89 YO male trasnfered from outside facility 1/11/21 2/2 SDH w/ shift. PMHx: cancer, CHF, pneumonia, CVA.CMHx: acute hyperactive delirium, SDH, pneumonia,AFib, hx CVA.CXR 1/10/21 \"Right perihilar and basilar atelectasis with aspiration or other consolidation not excluded\". Head CT 1/12 \"Interval surgical evacuation of LEFT hemispheric subdural hematoma with small amount of residual hemorrhage deep to bone flap.LEFT to RIGHT midline shift is unchanged.\" [facility] and was discharged 2/7/20 with recommendations for DII/NTL diet. MBSS 1/24/20 showed silent aspiration on cup sip thin liquids.    Pt seen this date for clinical swallow evaluation 2/2 concerns for aspiration. Informal oral mech exam completed 2/2 absent direction following. Severely restricted ROM of lingual and labial structures. Upon inspection, oral cavity appreciated to be xerostomic, suspect related to open mouth posture and breathing through mouth. PO trials of ice x1 and MTL by 1/2 tsp x1 assessed. Hyolaryngeal elevation was absent, oral holding appreciated for >15 seconds per trial, all trials removed from oral cavity by clinician. Delayed weak throat clearing and increased wet exhale appreciated with single trial of MTL, suspect a result of premature spillage from oral cavity.     Pt is a HIGH RISK for aspiration at this time and is not appropriate for PO alimentation. Recommend continuation of NPO with non-oral source of nutrition, and diligent oral care to reduce xerostomia. SLP following.     Plan    Recommend Speech Therapy 3 times per week until therapy goals are met for the following treatments:  Dysphagia Training and " Patient / Family / Caregiver Education.    Discharge Recommendations: (P) Recommend post-acute placement for additional speech therapy services prior to discharge home    Subjective    Pt was awake, did not follow directions nor verbalize during session.      Objective       01/15/21 1040   Oral Motor Eval    Is Patient Able to Complete Oral Motor Eval No   Barriers To Oral Feeding   Barriers To Oral Feeding Impaired Cognition / Attention   Pre-Feeding Oral Stimulation Trial Abnormal Responses   Laryngeal Function   Voice Quality Not Tested   Volutional Cough Moderate   Excursion Upon Swallow Absent   Oral Food Presentation   Ice Chips Severe  (absent swallow trigger, trial removed from oral cavity)   Single Swallow Mildly Thick (2) - (Nectar Thick)  Severe  (absent swallow trigger, trial removed from oral cavity)   Self Feeding Needs Total Assistance   Tracheostomy   Tracheostomy  No   Dysphagia Strategies / Recommendations   Strategies / Interventions Recommended (Yes / No) Yes   Compensatory Strategies To Be Assessed   Diet / Liquid Recommendation NPO;Pre-Feeding Trials with SLP Only   Medication Administration  Via Gastric Tube   Therapy Interventions Dysphagia Therapy By Speech Language Pathologist   Dysphagia Rating   Nutritional Liquid Intake Rating Scale Nothing by mouth   Nutritional Food Intake Rating Scale Nothing by mouth   Patient / Family Goals   Patient / Family Goal #1 none stated   Short Term Goals   Short Term Goal # 1 Pt will participate in prefeeding trials 1:1 with SLP with no s/sx of aspiration and min cues.

## 2021-01-15 NOTE — DISCHARGE PLANNING
"LSW received a call from pt's brother, Ward Doss [phone], stating that he's been waiting for a call from Dr. Sparrow for 2 days now. Ward is requesting to speak with him so, \"I know which direction we should go.\" Emotional support was given to Awrd as he voiced some frustration around the lack of communication with pt's physicians.     LSW wrote Angelina SILVA who was unaware of this. She stated she'll forward the message on. Bedside RN aware.     At this time, there are no post acute referrals placed - unknown dc plan. LSW to follow up with Ward after he speaks with Dr. Sparrow.   "

## 2021-01-16 LAB
ANION GAP SERPL CALC-SCNC: 8 MMOL/L (ref 7–16)
ANISOCYTOSIS BLD QL SMEAR: ABNORMAL
BASOPHILS # BLD AUTO: 0 % (ref 0–1.8)
BASOPHILS # BLD: 0 K/UL (ref 0–0.12)
BUN SERPL-MCNC: 40 MG/DL (ref 8–22)
CALCIUM SERPL-MCNC: 9.8 MG/DL (ref 8.5–10.5)
CHLORIDE SERPL-SCNC: 116 MMOL/L (ref 96–112)
CO2 SERPL-SCNC: 28 MMOL/L (ref 20–33)
CREAT SERPL-MCNC: 0.74 MG/DL (ref 0.5–1.4)
EOSINOPHIL # BLD AUTO: 0.13 K/UL (ref 0–0.51)
EOSINOPHIL NFR BLD: 0.9 % (ref 0–6.9)
ERYTHROCYTE [DISTWIDTH] IN BLOOD BY AUTOMATED COUNT: 55.1 FL (ref 35.9–50)
GLUCOSE SERPL-MCNC: 143 MG/DL (ref 65–99)
HCT VFR BLD AUTO: 30.1 % (ref 42–52)
HGB BLD-MCNC: 9.4 G/DL (ref 14–18)
HYPOCHROMIA BLD QL SMEAR: ABNORMAL
LG PLATELETS BLD QL SMEAR: NORMAL
LYMPHOCYTES # BLD AUTO: 6.41 K/UL (ref 1–4.8)
LYMPHOCYTES NFR BLD: 44.8 % (ref 22–41)
MACROCYTES BLD QL SMEAR: ABNORMAL
MANUAL DIFF BLD: NORMAL
MCH RBC QN AUTO: 32.1 PG (ref 27–33)
MCHC RBC AUTO-ENTMCNC: 31.2 G/DL (ref 33.7–35.3)
MCV RBC AUTO: 102.7 FL (ref 81.4–97.8)
MONOCYTES # BLD AUTO: 1.12 K/UL (ref 0–0.85)
MONOCYTES NFR BLD AUTO: 7.8 % (ref 0–13.4)
MORPHOLOGY BLD-IMP: NORMAL
MYELOCYTES NFR BLD MANUAL: 0.9 %
NEUTROPHILS # BLD AUTO: 6.54 K/UL (ref 1.82–7.42)
NEUTROPHILS NFR BLD: 45.7 % (ref 44–72)
NRBC # BLD AUTO: 0.16 K/UL
NRBC BLD-RTO: 1.1 /100 WBC
OVALOCYTES BLD QL SMEAR: NORMAL
PLATELET # BLD AUTO: 341 K/UL (ref 164–446)
PLATELET BLD QL SMEAR: NORMAL
PMV BLD AUTO: 10.5 FL (ref 9–12.9)
POIKILOCYTOSIS BLD QL SMEAR: NORMAL
POLYCHROMASIA BLD QL SMEAR: NORMAL
POTASSIUM SERPL-SCNC: 3.9 MMOL/L (ref 3.6–5.5)
RBC # BLD AUTO: 2.93 M/UL (ref 4.7–6.1)
RBC BLD AUTO: PRESENT
SMUDGE CELLS BLD QL SMEAR: NORMAL
SODIUM SERPL-SCNC: 152 MMOL/L (ref 135–145)
TARGETS BLD QL SMEAR: NORMAL
WBC # BLD AUTO: 14.3 K/UL (ref 4.8–10.8)

## 2021-01-16 PROCEDURE — A9270 NON-COVERED ITEM OR SERVICE: HCPCS | Performed by: SURGERY

## 2021-01-16 PROCEDURE — 700101 HCHG RX REV CODE 250: Performed by: NURSE PRACTITIONER

## 2021-01-16 PROCEDURE — 770022 HCHG ROOM/CARE - ICU (200)

## 2021-01-16 PROCEDURE — 700102 HCHG RX REV CODE 250 W/ 637 OVERRIDE(OP): Performed by: SURGERY

## 2021-01-16 PROCEDURE — 700105 HCHG RX REV CODE 258: Performed by: SURGERY

## 2021-01-16 PROCEDURE — 85007 BL SMEAR W/DIFF WBC COUNT: CPT

## 2021-01-16 PROCEDURE — 700105 HCHG RX REV CODE 258: Performed by: NURSE PRACTITIONER

## 2021-01-16 PROCEDURE — 700111 HCHG RX REV CODE 636 W/ 250 OVERRIDE (IP): Performed by: SURGERY

## 2021-01-16 PROCEDURE — 85027 COMPLETE CBC AUTOMATED: CPT

## 2021-01-16 PROCEDURE — 99233 SBSQ HOSP IP/OBS HIGH 50: CPT | Performed by: SURGERY

## 2021-01-16 PROCEDURE — 700111 HCHG RX REV CODE 636 W/ 250 OVERRIDE (IP): Performed by: NURSE PRACTITIONER

## 2021-01-16 PROCEDURE — 80048 BASIC METABOLIC PNL TOTAL CA: CPT

## 2021-01-16 RX ADMIN — AMLODIPINE BESYLATE 10 MG: 10 TABLET ORAL at 05:24

## 2021-01-16 RX ADMIN — LISINOPRIL 5 MG: 5 TABLET ORAL at 05:24

## 2021-01-16 RX ADMIN — METOPROLOL TARTRATE 75 MG: 50 TABLET, FILM COATED ORAL at 17:36

## 2021-01-16 RX ADMIN — SODIUM CHLORIDE 250 MG: 9 INJECTION, SOLUTION INTRAVENOUS at 17:37

## 2021-01-16 RX ADMIN — LEVETIRACETAM 500 MG: 500 TABLET, FILM COATED ORAL at 05:24

## 2021-01-16 RX ADMIN — METOPROLOL TARTRATE 75 MG: 50 TABLET, FILM COATED ORAL at 05:24

## 2021-01-16 RX ADMIN — CEFTRIAXONE SODIUM 1 G: 1 INJECTION, POWDER, FOR SOLUTION INTRAMUSCULAR; INTRAVENOUS at 05:25

## 2021-01-16 RX ADMIN — DIGOXIN 125 MCG: 125 TABLET ORAL at 18:29

## 2021-01-16 RX ADMIN — DOXYCYCLINE 100 MG: 100 INJECTION, POWDER, LYOPHILIZED, FOR SOLUTION INTRAVENOUS at 18:26

## 2021-01-16 RX ADMIN — DOXYCYCLINE 100 MG: 100 INJECTION, POWDER, LYOPHILIZED, FOR SOLUTION INTRAVENOUS at 05:25

## 2021-01-16 RX ADMIN — METOPROLOL TARTRATE 5 MG: 5 INJECTION, SOLUTION INTRAVENOUS at 11:24

## 2021-01-16 RX ADMIN — METOPROLOL TARTRATE 5 MG: 5 INJECTION, SOLUTION INTRAVENOUS at 15:25

## 2021-01-16 RX ADMIN — LEVETIRACETAM 500 MG: 500 TABLET, FILM COATED ORAL at 17:37

## 2021-01-16 ASSESSMENT — PAIN DESCRIPTION - PAIN TYPE
TYPE: ACUTE PAIN

## 2021-01-16 ASSESSMENT — FIBROSIS 4 INDEX: FIB4 SCORE: 1.65

## 2021-01-16 NOTE — PROGRESS NOTES
Patient received in bed. Afebrile. ROLLINS. Expressive aphasia, follows commands. L Crani terence noted, open to air, CDI. No signs of bleeding. Afib on cardiac monitor. NC 2L with Sp02 98%. Dobhoff R nare Impact running 45cc/hr. BL lung sounds diminished. Abdomen soft, non distended. Arreaga draining adequate yellow urine. BLE pedal pulses palpable. No signs of distress at this time. Will continue to monitor.

## 2021-01-16 NOTE — PROGRESS NOTES
Neurosurgery Progress Note    Subjective:  Pt in bed, no acute events    Exam:  Awake no speech, fc's x4 slowly,  incision with staples- c/d/i    BP  Min: 125/79  Max: 168/109  Pulse  Av.9  Min: 79  Max: 136  Resp  Av.2  Min: 18  Max: 40  Temp  Av.1 °C (98.8 °F)  Min: 36.8 °C (98.2 °F)  Max: 37.4 °C (99.3 °F)  SpO2  Av %  Min: 87 %  Max: 100 %    No data recorded    Recent Labs     21  0525 01/15/21  0515 21  0545   WBC 18.4* 15.3* 14.3*   RBC 2.87* 2.84* 2.93*   HEMOGLOBIN 9.0* 9.1* 9.4*   HEMATOCRIT 29.2* 29.0* 30.1*   .7* 102.1* 102.7*   MCH 31.4 32.0 32.1   MCHC 30.8* 31.4* 31.2*   RDW 55.1* 55.8* 55.1*   PLATELETCT 292 312 341   MPV 11.0 10.5 10.5     Recent Labs     21  0525 01/15/21  0515 21  0545   SODIUM 143 151* 152*   POTASSIUM 3.8 3.8 3.9   CHLORIDE 110 116* 116*   CO2 27 27 28   GLUCOSE 128* 139* 143*   BUN 39* 39* 40*   CREATININE 0.97 0.83 0.74   CALCIUM 9.5 9.4 9.8               Intake/Output       01/15/21 0700 - 21 0659 21 07 - 21 0659      1900-0659 Total 1900-0659 Total       Intake    P.O.  0  -- 0  --  -- --    P.O. 0 -- 0 -- -- --    NG/GT  405  -- 405  --  -- --    Intake (mL) (Enteral Tube 21 Cortrak - Gastric 12 Fr. Right nare) 405 -- 405 -- -- --    Total Intake 405 -- 405 -- -- --       Output    Urine  920  850 1770  --  -- --    Output (mL) (Urethral Catheter 18 Fr.)  -- -- --    Stool  --  -- --  --  -- --    Number of Times Stooled 1 x -- 1 x -- -- --    Total Output  -- -- --       Net I/O     -176 -833 -8370 -- -- --            Intake/Output Summary (Last 24 hours) at 2021 0855  Last data filed at 2021 0600  Gross per 24 hour   Intake 315 ml   Output 1610 ml   Net -1295 ml            • digoxin  125 mcg QPM ,,SA,LARKIN   • digoxin  250 mcg MO, WE + FR   • amLODIPine  10 mg DAILY   • lisinopril  5 mg DAILY   • ferric gluconate (FERRLECIT) IV  250 mg Q24HR    • levETIRAcetam  500 mg BID   • Pharmacy  1 Each PHARMACY TO DOSE   • docusate sodium 100mg/10mL  100 mg BID   • magnesium hydroxide  30 mL DAILY   • metoprolol  75 mg BID   • polyethylene glycol/lytes  1 Packet BID   • senna-docusate  1 Tab Nightly   • oxyCODONE immediate-release  2.5 mg Q3HRS PRN   • oxyCODONE immediate-release  5 mg Q3HRS PRN   • senna-docusate  1 Tab Q24HRS PRN   • Respiratory Therapy Consult   Continuous RT   • Pharmacy Consult Request  1 Each PHARMACY TO DOSE   • bisacodyl  10 mg Q24HRS PRN   • fleet  1 Each Once PRN   • morphine injection  2 mg Q3HRS PRN   • ondansetron  4 mg Q4HRS PRN   • cefTRIAXone (ROCEPHIN) IV  1 g Q24HRS   • doxycycline  100 mg Q12HRS   • Metoprolol Tartrate  5 mg Q6HRS PRN       Assessment and Plan:  Hospital day # 6 GLF on eliquis  POD# 5 s/p left frontotemporal crani for evac of sdh  Chemical prophylactic DVT therapy: No  Start date/time: tbd    Stable examination  Hypernatremic at 152: increase free water intake  neurochecks q 4 hour

## 2021-01-16 NOTE — PROGRESS NOTES
Patient had a bowel movement. Large brown bowel movement . Mepilex and barrier cream placed. Skin intact in sacral area.

## 2021-01-16 NOTE — PROGRESS NOTES
Trauma / Surgical Daily Progress Note    Date of Service  1/16/2021    Chief Complaint  89 y.o. male admitted 1/10/2021 with head bleed after ground level fall while on anti-coagulation    Interval Events  Hospital day #7  Pt currently requires ICU care and hospital admission  Seen on rounds and discussed with multidisciplinary team  Injuries and physiologic derangements result in significant risk of long term morbidity  Events and interventions include:  Integration of multiple data points and associated complex medical decisions   Supportive care for TBI  Nutritional support-ongoing tube feeds  Pulmonary toilet- at high risk for decompensation  Mgt of a-fib-medications adjusted  Pain control    Review of Systems  Review of Systems   Unable to perform ROS: Mental status change        Vital Signs for last 24 hours  Temp:  [36.8 °C (98.2 °F)-37.4 °C (99.3 °F)] 36.8 °C (98.2 °F)  Pulse:  [] 94  Resp:  [22-40] 29  BP: (125-168)/() 128/85  SpO2:  [87 %-100 %] 93 %    Hemodynamic parameters for last 24 hours       Respiratory Data     Respiration: (!) 29, Pulse Oximetry: 93 %     Work Of Breathing / Effort: Mild  RUL Breath Sounds: Diminished, RML Breath Sounds: Diminished, RLL Breath Sounds: Diminished, PRISCA Breath Sounds: Diminished, LLL Breath Sounds: Diminished    Physical Exam  Physical Exam  Constitutional:       General: He is not in acute distress.     Appearance: He is not toxic-appearing or diaphoretic.   HENT:      Head:      Comments: Incision clean     Right Ear: External ear normal.      Left Ear: External ear normal.      Nose: Nose normal.      Mouth/Throat:      Mouth: Mucous membranes are moist.   Eyes:      General: No scleral icterus.        Right eye: No discharge.         Left eye: No discharge.      Extraocular Movements: Extraocular movements intact.      Pupils: Pupils are equal, round, and reactive to light.   Neck:      Musculoskeletal: Normal range of motion and neck supple.    Cardiovascular:      Rate and Rhythm: Tachycardia present. Rhythm irregular.      Pulses: Normal pulses.      Heart sounds: No murmur. No friction rub. No gallop.    Pulmonary:      Effort: Pulmonary effort is normal. No respiratory distress.      Breath sounds: No stridor.   Abdominal:      General: Abdomen is flat.      Palpations: Abdomen is soft.      Tenderness: There is no abdominal tenderness. There is no guarding.   Genitourinary:     Comments: Arreaga in place  Musculoskeletal: Normal range of motion.         General: No deformity.   Skin:     General: Skin is warm and dry.      Capillary Refill: Capillary refill takes less than 2 seconds.      Coloration: Skin is not jaundiced.      Findings: No bruising.   Neurological:      General: No focal deficit present.      Comments: Not following  Aphasic  Moves all   Psychiatric:      Comments: Unable to assess         Laboratory  Recent Results (from the past 24 hour(s))   CBC WITH DIFFERENTIAL    Collection Time: 01/16/21  5:45 AM   Result Value Ref Range    WBC 14.3 (H) 4.8 - 10.8 K/uL    RBC 2.93 (L) 4.70 - 6.10 M/uL    Hemoglobin 9.4 (L) 14.0 - 18.0 g/dL    Hematocrit 30.1 (L) 42.0 - 52.0 %    .7 (H) 81.4 - 97.8 fL    MCH 32.1 27.0 - 33.0 pg    MCHC 31.2 (L) 33.7 - 35.3 g/dL    RDW 55.1 (H) 35.9 - 50.0 fL    Platelet Count 341 164 - 446 K/uL    MPV 10.5 9.0 - 12.9 fL    Neutrophils-Polys 45.70 44.00 - 72.00 %    Lymphocytes 44.80 (H) 22.00 - 41.00 %    Monocytes 7.80 0.00 - 13.40 %    Eosinophils 0.90 0.00 - 6.90 %    Basophils 0.00 0.00 - 1.80 %    Nucleated RBC 1.10 /100 WBC    Neutrophils (Absolute) 6.54 1.82 - 7.42 K/uL    Lymphs (Absolute) 6.41 (H) 1.00 - 4.80 K/uL    Monos (Absolute) 1.12 (H) 0.00 - 0.85 K/uL    Eos (Absolute) 0.13 0.00 - 0.51 K/uL    Baso (Absolute) 0.00 0.00 - 0.12 K/uL    NRBC (Absolute) 0.16 K/uL    Hypochromia 1+     Anisocytosis 1+     Macrocytosis 1+    Basic Metabolic Panel    Collection Time: 01/16/21  5:45 AM   Result  Value Ref Range    Sodium 152 (H) 135 - 145 mmol/L    Potassium 3.9 3.6 - 5.5 mmol/L    Chloride 116 (H) 96 - 112 mmol/L    Co2 28 20 - 33 mmol/L    Glucose 143 (H) 65 - 99 mg/dL    Bun 40 (H) 8 - 22 mg/dL    Creatinine 0.74 0.50 - 1.40 mg/dL    Calcium 9.8 8.5 - 10.5 mg/dL    Anion Gap 8.0 7.0 - 16.0   ESTIMATED GFR    Collection Time: 01/16/21  5:45 AM   Result Value Ref Range    GFR If African American >60 >60 mL/min/1.73 m 2    GFR If Non African American >60 >60 mL/min/1.73 m 2   DIFFERENTIAL MANUAL    Collection Time: 01/16/21  5:45 AM   Result Value Ref Range    Myelocytes 0.90 %    Manual Diff Status PERFORMED    PERIPHERAL SMEAR REVIEW    Collection Time: 01/16/21  5:45 AM   Result Value Ref Range    Peripheral Smear Review see below    PLATELET ESTIMATE    Collection Time: 01/16/21  5:45 AM   Result Value Ref Range    Plt Estimation Normal    MORPHOLOGY    Collection Time: 01/16/21  5:45 AM   Result Value Ref Range    RBC Morphology Present     Large Platelets 1+     Polychromia 1+     Poikilocytosis 1+     Ovalocytes 1+     Target Cells 1+     Smudge Cells Few        Fluids    Intake/Output Summary (Last 24 hours) at 1/16/2021 1412  Last data filed at 1/16/2021 0600  Gross per 24 hour   Intake 90 ml   Output 1165 ml   Net -1075 ml       Core Measures & Quality Metrics  Labs reviewed and Medications reviewed  Arreaga catheter: Critically Ill - Requiring Accurate Measurement of Urinary Output      DVT Prophylaxis: Contraindicated - High bleeding risk      Antibiotics: Treating active infection/contamination beyond 24 hours perioperative coverage      FAZAL Score    ETOH Screening      Assessment/Plan  Acute hyperactive delirium due to multiple etiologies- (present on admission)  Assessment & Plan  Advanced age, ICU admission, traumatic brain hemorrhage, surgery  Limit psychotropic medications  Restraints as needed    Acute urinary retention- (present on admission)  Assessment & Plan  Arreaga placed in OR  Began  to have bleeding upon arrival to ICU  Unable to advance  Urology placed catheter over wire  Arreaga in place for 2 weeks    Chronic anticoagulation- (present on admission)  Assessment & Plan  Takes ASA and Eliquis outpatient.  KCentra given prior to arrival  TEG with platelet mapping showed AA 50.4  Per neurosurgery 1 un plts and 2 un FFP given  Repeat TEG with elevated ADP  Follow up TEG/PM as clinically warranted      Subdural hematoma (HCC)- (present on admission)  Assessment & Plan  Large left hemispheric subdural hematoma measuring 15mm in greatest transverse dimensions causing significant mass effect on the underlying cerebral hemisphere with left to right midline shift of 1cm. Also small anterior right frontal subdural hematoma measuring 4mm and small anterior interhemispheric subdural hematoma measuring 5mm.  Repeat interval head CT stable, but waning GCS  1/11 Craniotomy with evacuation.  Post traumatic pharmacologic seizure prophylaxis for 1 week.  Speech Language Pathology cognitive evaluation.  Dante Sparrow MD. Neurosurgeon. [facility].    Iron deficiency anemia- (present on admission)  Assessment & Plan  Anemic on arrival  Iron replacement per pharmacy kinetics    Pneumonia- (present on admission)  Assessment & Plan  ABX course initiated at [facility] on 1/7/2021, MRSA negative.   Transitioned to Cefdinir/Doxycycline at PR for additional 7 days  1/10: Persistent RLL infiltrate, Rocephin and Doxycycline x7 days  Trend serial chest radiographs and laboratory studies    Essential hypertension- (present on admission)  Assessment & Plan  Chronic condition treated with Norvasc and metoprolol.  Holding maintenance medication during acute traumatic illness.   Nicardipine per Neurosurgery with goal SBP<165mmHg      AF (atrial fibrillation) (HCC)- (present on admission)  Assessment & Plan  Chronic condition treated with Digoxin and metoprolol - resumed at admit    Screening examination for infectious  disease- (present on admission)  Assessment & Plan  Admission SARS-CoV-2 testing negative. LOW RISK patient. Repeat SARS-CoV-2 testing not indicated. Isolation precautions de-escalated.    Contraindication to deep vein thrombosis (DVT) prophylaxis- (present on admission)  Assessment & Plan  Systemic anticoagulation contraindicated secondary to elevated bleeding risk.  1/11 Surveillance venous duplex scanning ordered.    Cancer (HCC)- (present on admission)  Assessment & Plan  History of CLL and prostate CA    Stroke (HCC)- (present on admission)  Assessment & Plan  Premorbid    Trauma- (present on admission)  Assessment & Plan  GLF on eliquis.  Trauma Yellow Transfer Activation from [facility].  Jere Meyer MD. Trauma Surgery.    Dysphagia- (present on admission)  Assessment & Plan  Secondary to altered mental status from traumatic brain injury  1/11 Cortrak placed, tube feeds commenced      Discussed patient condition with RN, RT, Pharmacy, Dietary and .

## 2021-01-17 PROBLEM — I48.11 LONGSTANDING PERSISTENT ATRIAL FIBRILLATION (HCC): Status: ACTIVE | Noted: 2021-01-10

## 2021-01-17 PROBLEM — J18.9 PNEUMONIA: Status: RESOLVED | Noted: 2021-01-10 | Resolved: 2021-01-17

## 2021-01-17 LAB
ANION GAP SERPL CALC-SCNC: 8 MMOL/L (ref 7–16)
BASOPHILS # BLD AUTO: 0.4 % (ref 0–1.8)
BASOPHILS # BLD: 0.06 K/UL (ref 0–0.12)
BUN SERPL-MCNC: 39 MG/DL (ref 8–22)
CALCIUM SERPL-MCNC: 9.5 MG/DL (ref 8.5–10.5)
CHLORIDE SERPL-SCNC: 118 MMOL/L (ref 96–112)
CO2 SERPL-SCNC: 28 MMOL/L (ref 20–33)
CREAT SERPL-MCNC: 0.76 MG/DL (ref 0.5–1.4)
EOSINOPHIL # BLD AUTO: 0.2 K/UL (ref 0–0.51)
EOSINOPHIL NFR BLD: 1.2 % (ref 0–6.9)
ERYTHROCYTE [DISTWIDTH] IN BLOOD BY AUTOMATED COUNT: 55.5 FL (ref 35.9–50)
GLUCOSE SERPL-MCNC: 140 MG/DL (ref 65–99)
HCT VFR BLD AUTO: 31 % (ref 42–52)
HGB BLD-MCNC: 9.6 G/DL (ref 14–18)
IMM GRANULOCYTES # BLD AUTO: 0.2 K/UL (ref 0–0.11)
IMM GRANULOCYTES NFR BLD AUTO: 1.2 % (ref 0–0.9)
LYMPHOCYTES # BLD AUTO: 6.67 K/UL (ref 1–4.8)
LYMPHOCYTES NFR BLD: 41.6 % (ref 22–41)
MCH RBC QN AUTO: 32.1 PG (ref 27–33)
MCHC RBC AUTO-ENTMCNC: 31 G/DL (ref 33.7–35.3)
MCV RBC AUTO: 103.7 FL (ref 81.4–97.8)
MONOCYTES # BLD AUTO: 1.23 K/UL (ref 0–0.85)
MONOCYTES NFR BLD AUTO: 7.7 % (ref 0–13.4)
NEUTROPHILS # BLD AUTO: 7.67 K/UL (ref 1.82–7.42)
NEUTROPHILS NFR BLD: 47.9 % (ref 44–72)
NRBC # BLD AUTO: 0.18 K/UL
NRBC BLD-RTO: 1.1 /100 WBC
PLATELET # BLD AUTO: 350 K/UL (ref 164–446)
PMV BLD AUTO: 10.4 FL (ref 9–12.9)
POTASSIUM SERPL-SCNC: 3.8 MMOL/L (ref 3.6–5.5)
RBC # BLD AUTO: 2.99 M/UL (ref 4.7–6.1)
SODIUM SERPL-SCNC: 154 MMOL/L (ref 135–145)
WBC # BLD AUTO: 16 K/UL (ref 4.8–10.8)

## 2021-01-17 PROCEDURE — 80048 BASIC METABOLIC PNL TOTAL CA: CPT

## 2021-01-17 PROCEDURE — A9270 NON-COVERED ITEM OR SERVICE: HCPCS | Performed by: SURGERY

## 2021-01-17 PROCEDURE — 700102 HCHG RX REV CODE 250 W/ 637 OVERRIDE(OP): Performed by: SURGERY

## 2021-01-17 PROCEDURE — 700111 HCHG RX REV CODE 636 W/ 250 OVERRIDE (IP): Performed by: SURGERY

## 2021-01-17 PROCEDURE — 85025 COMPLETE CBC W/AUTO DIFF WBC: CPT

## 2021-01-17 PROCEDURE — 700101 HCHG RX REV CODE 250: Performed by: NURSE PRACTITIONER

## 2021-01-17 PROCEDURE — 99291 CRITICAL CARE FIRST HOUR: CPT | Performed by: SURGERY

## 2021-01-17 PROCEDURE — 700105 HCHG RX REV CODE 258: Performed by: SURGERY

## 2021-01-17 PROCEDURE — 700105 HCHG RX REV CODE 258: Performed by: NURSE PRACTITIONER

## 2021-01-17 PROCEDURE — 770022 HCHG ROOM/CARE - ICU (200)

## 2021-01-17 RX ORDER — METOPROLOL TARTRATE 50 MG/1
100 TABLET, FILM COATED ORAL 2 TIMES DAILY
Status: DISCONTINUED | OUTPATIENT
Start: 2021-01-17 | End: 2021-01-21 | Stop reason: HOSPADM

## 2021-01-17 RX ADMIN — DIGOXIN 125 MCG: 125 TABLET ORAL at 18:23

## 2021-01-17 RX ADMIN — LISINOPRIL 5 MG: 5 TABLET ORAL at 05:35

## 2021-01-17 RX ADMIN — METOPROLOL TARTRATE 100 MG: 50 TABLET, FILM COATED ORAL at 18:23

## 2021-01-17 RX ADMIN — METOPROLOL TARTRATE 75 MG: 50 TABLET, FILM COATED ORAL at 05:35

## 2021-01-17 RX ADMIN — DOXYCYCLINE 100 MG: 100 INJECTION, POWDER, LYOPHILIZED, FOR SOLUTION INTRAVENOUS at 05:35

## 2021-01-17 RX ADMIN — SODIUM CHLORIDE 250 MG: 9 INJECTION, SOLUTION INTRAVENOUS at 18:23

## 2021-01-17 RX ADMIN — AMLODIPINE BESYLATE 10 MG: 10 TABLET ORAL at 05:35

## 2021-01-17 RX ADMIN — METOPROLOL TARTRATE 25 MG: 25 TABLET, FILM COATED ORAL at 10:51

## 2021-01-17 NOTE — PROGRESS NOTES
Case management social worker called in regards to concerns on EPS. Left voicemail to contact me again. Will try to call again later today.

## 2021-01-17 NOTE — PROGRESS NOTES
Trauma / Surgical Daily Progress Note    Date of Service  1/17/2021    Chief Complaint  Acute subdural hematoma    Interval Events  Metoprolol dosage increased.  The patient remains critically injured with severe closed head injury and atrial tachyarrhythmias.  The patient was seen and examined on rounds and discussed with the multidisciplinary critical care team and consulting physicians. Critically evaluated laboratory tests, culture data, medications, imaging, and other diagnostic tests.    The patient has impairment of one or more vital organ systems and a high probability of imminent or life-threatening deterioration in condition. Provided high complexity decision making to assess, manipulate, and support vital system functions to treat vital organ system failure and/or to prevent further life-threatening deterioration of the patient's condition. Requires continued ICU and hospital admission.    Critical care interventions include: integration of multiple data points and associated complex medical decision making, management of cardiac arrhythmias and management of critical electrolyte abnormalities.    Review of Systems  Review of Systems   Unable to perform ROS: Mental status change        Vital Signs for last 24 hours  Temp:  [36.8 °C (98.2 °F)-37 °C (98.6 °F)] 37 °C (98.6 °F)  Pulse:  [] 95  Resp:  [22-69] 24  BP: (126-172)/(69-98) 136/87  SpO2:  [90 %-100 %] 98 %    Hemodynamic parameters for last 24 hours       Respiratory Data     Respiration: (!) 24, Pulse Oximetry: 98 %     Work Of Breathing / Effort: Mild  RUL Breath Sounds: Diminished, RML Breath Sounds: Diminished, RLL Breath Sounds: Diminished, PRISCA Breath Sounds: Diminished, LLL Breath Sounds: Diminished    Physical Exam  Physical Exam  Vitals signs and nursing note reviewed.   Constitutional:       General: He is not in acute distress.     Appearance: He is not toxic-appearing or diaphoretic.   HENT:      Head:      Comments: Incision  clean     Right Ear: External ear normal.      Left Ear: External ear normal.      Nose: Nose normal.      Mouth/Throat:      Mouth: Mucous membranes are moist.   Eyes:      General: No scleral icterus.        Right eye: No discharge.         Left eye: No discharge.      Extraocular Movements: Extraocular movements intact.      Pupils: Pupils are equal, round, and reactive to light.   Neck:      Musculoskeletal: Normal range of motion and neck supple.   Cardiovascular:      Rate and Rhythm: Tachycardia present. Rhythm irregular.      Pulses: Normal pulses.      Heart sounds: No murmur. No friction rub. No gallop.    Pulmonary:      Effort: Pulmonary effort is normal. No respiratory distress.      Breath sounds: No stridor.   Abdominal:      General: Abdomen is flat.      Palpations: Abdomen is soft.      Tenderness: There is no abdominal tenderness. There is no guarding.   Genitourinary:     Comments: Arreaga in place  Musculoskeletal: Normal range of motion.         General: No deformity.   Skin:     General: Skin is warm and dry.      Capillary Refill: Capillary refill takes less than 2 seconds.      Coloration: Skin is not jaundiced.      Findings: No bruising.   Neurological:      General: No focal deficit present.      Comments: Not following  Aphasic  Moves all   Psychiatric:      Comments: Unable to assess         Laboratory  Recent Results (from the past 24 hour(s))   CBC WITH DIFFERENTIAL    Collection Time: 01/17/21  4:25 AM   Result Value Ref Range    WBC 16.0 (H) 4.8 - 10.8 K/uL    RBC 2.99 (L) 4.70 - 6.10 M/uL    Hemoglobin 9.6 (L) 14.0 - 18.0 g/dL    Hematocrit 31.0 (L) 42.0 - 52.0 %    .7 (H) 81.4 - 97.8 fL    MCH 32.1 27.0 - 33.0 pg    MCHC 31.0 (L) 33.7 - 35.3 g/dL    RDW 55.5 (H) 35.9 - 50.0 fL    Platelet Count 350 164 - 446 K/uL    MPV 10.4 9.0 - 12.9 fL    Neutrophils-Polys 47.90 44.00 - 72.00 %    Lymphocytes 41.60 (H) 22.00 - 41.00 %    Monocytes 7.70 0.00 - 13.40 %    Eosinophils 1.20  0.00 - 6.90 %    Basophils 0.40 0.00 - 1.80 %    Immature Granulocytes 1.20 (H) 0.00 - 0.90 %    Nucleated RBC 1.10 /100 WBC    Neutrophils (Absolute) 7.67 (H) 1.82 - 7.42 K/uL    Lymphs (Absolute) 6.67 (H) 1.00 - 4.80 K/uL    Monos (Absolute) 1.23 (H) 0.00 - 0.85 K/uL    Eos (Absolute) 0.20 0.00 - 0.51 K/uL    Baso (Absolute) 0.06 0.00 - 0.12 K/uL    Immature Granulocytes (abs) 0.20 (H) 0.00 - 0.11 K/uL    NRBC (Absolute) 0.18 K/uL   Basic Metabolic Panel    Collection Time: 01/17/21  4:25 AM   Result Value Ref Range    Sodium 154 (H) 135 - 145 mmol/L    Potassium 3.8 3.6 - 5.5 mmol/L    Chloride 118 (H) 96 - 112 mmol/L    Co2 28 20 - 33 mmol/L    Glucose 140 (H) 65 - 99 mg/dL    Bun 39 (H) 8 - 22 mg/dL    Creatinine 0.76 0.50 - 1.40 mg/dL    Calcium 9.5 8.5 - 10.5 mg/dL    Anion Gap 8.0 7.0 - 16.0   ESTIMATED GFR    Collection Time: 01/17/21  4:25 AM   Result Value Ref Range    GFR If African American >60 >60 mL/min/1.73 m 2    GFR If Non African American >60 >60 mL/min/1.73 m 2       Fluids    Intake/Output Summary (Last 24 hours) at 1/17/2021 1350  Last data filed at 1/17/2021 0900  Gross per 24 hour   Intake 1290 ml   Output 1950 ml   Net -660 ml       Core Measures & Quality Metrics  Labs reviewed, Medications reviewed and Radiology images reviewed  Arreaga catheter: Critically Ill - Requiring Accurate Measurement of Urinary Output      DVT Prophylaxis: Contraindicated - High bleeding risk  DVT prophylaxis - mechanical: SCDs    Antibiotics: Treating active infection/contamination beyond 24 hours perioperative coverage      FAZAL Score  ETOH Screening    Assessment/Plan  Acute hyperactive delirium due to multiple etiologies- (present on admission)  Assessment & Plan  Advanced age, ICU admission, traumatic brain hemorrhage, surgery  Limit psychotropic medications  Restraints as needed    Longstanding persistent atrial fibrillation (HCC)- (present on admission)  Assessment & Plan  Chronic condition treated with  Digoxin and metoprolol.  Maintenance medications resumed on admission.    1/16 Digoxin dose increased.  1/17 Metoprolol dosage increased.    Subdural hematoma (HCC)- (present on admission)  Assessment & Plan  Large left hemispheric subdural hematoma causing significant mass effect on the underlying cerebral hemisphere with left to right midline shift .Small anterior right frontal subdural hematoma and small anterior interhemispheric subdural hematoma.  Repeat interval head CT stable, but waning GCS.  1/11 Craniotomy with evacuation.  Post traumatic pharmacologic seizure prophylaxis for 1 week.  Speech Language Pathology cognitive evaluation.  Dante Sparrow MD. Neurosurgeon. [facility].    Acute urinary retention- (present on admission)  Assessment & Plan  Arreaga placed in OR  Began to have bleeding upon arrival to ICU  Unable to advance  Urology placed catheter over wire  Arreaga in place for 2 weeks    Chronic anticoagulation- (present on admission)  Assessment & Plan  Takes ASA and Eliquis outpatient. KCentra given prior to arrival.  Admission thromboelastogram V demonstrated moderately elevated ragged tonic acid pathway platelet inhibition.  1 platelet pheresis pack and 2 units of FFP administered.      Essential hypertension- (present on admission)  Assessment & Plan  Chronic condition treated with amlodipine, lisinopril, and metoprolol.  1/10 Resumed metoprolol.  1/14 Resumed lisinopril and amlodipine.    Contraindication to deep vein thrombosis (DVT) prophylaxis- (present on admission)  Assessment & Plan  Systemic anticoagulation contraindicated secondary to elevated bleeding risk.  1/12 Trauma screening bilateral lower extremity venous duplex negative for above knee DVT.    Cancer (HCC)- (present on admission)  Assessment & Plan  History of CLL and prostate CA    Stroke (HCC)- (present on admission)  Assessment & Plan  Premorbid    Screening examination for infectious disease- (present on  admission)  Assessment & Plan  Admission SARS-CoV-2 testing negative. LOW RISK patient. Repeat SARS-CoV-2 testing not indicated. Isolation precautions de-escalated.    Trauma- (present on admission)  Assessment & Plan  GLF on eliquis.  Trauma Yellow Transfer Activation from [facility].  Jere Meyer MD. Trauma Surgery.    Dysphagia- (present on admission)  Assessment & Plan  Secondary to altered mental status from traumatic brain injury  1/11 Cortrak placed, tube feeds commenced      Discussed patient condition with RN, RT and Pharmacy.  CRITICAL CARE TIME EXCLUDING PROCEDURES: 34 minutes

## 2021-01-17 NOTE — PROGRESS NOTES
Neurosurgery Progress Note    Subjective:  No new events    Exam:  Awake and alert.  Spont EO.  Spont move.  No speech/No FC    BP  Min: 125/75  Max: 172/86  Pulse  Av.2  Min: 71  Max: 126  Resp  Av.1  Min: 22  Max: 69  Temp  Av.9 °C (98.4 °F)  Min: 36.8 °C (98.2 °F)  Max: 37 °C (98.6 °F)  SpO2  Av.7 %  Min: 90 %  Max: 100 %    No data recorded    Recent Labs     01/15/21  0515 01/16/21  0545 21  0425   WBC 15.3* 14.3* 16.0*   RBC 2.84* 2.93* 2.99*   HEMOGLOBIN 9.1* 9.4* 9.6*   HEMATOCRIT 29.0* 30.1* 31.0*   .1* 102.7* 103.7*   MCH 32.0 32.1 32.1   MCHC 31.4* 31.2* 31.0*   RDW 55.8* 55.1* 55.5*   PLATELETCT 312 341 350   MPV 10.5 10.5 10.4     Recent Labs     01/15/21  0515 21  0545 21  0425   SODIUM 151* 152* 154*   POTASSIUM 3.8 3.9 3.8   CHLORIDE 116* 116* 118*   CO2 27 28 28   GLUCOSE 139* 143* 140*   BUN 39* 40* 39*   CREATININE 0.83 0.74 0.76   CALCIUM 9.4 9.8 9.5               Intake/Output       21 0700 - 21 0659 21 07 - 21 0659      [id number] 0916-4992 Total 1539-2591 4961-5333 Total       Intake    NG/GT  --  1040 1040  --  -- --    Intake (mL) (Enteral Tube 21 Cortrak - Gastric 12 Fr. Right nare) -- 1040 1040 -- -- --    Total Intake -- 1040 1040 -- -- --       Output    Urine  1000  950 1950  --  -- --    Output (mL) (Urethral Catheter 18 Fr.) 1355 725 9081 -- -- --    Stool  --  -- --  --  -- --    Number of Times Stooled -- 1 x 1 x -- -- --    Total Output 4227 373 2657 -- -- --       Net I/O     -1000 90 -910 -- -- --            Intake/Output Summary (Last 24 hours) at 2021 0952  Last data filed at 2021 0600  Gross per 24 hour   Intake 1040 ml   Output 1950 ml   Net -910 ml            • metoprolol  100 mg BID   • metoprolol  25 mg Once   • digoxin  125 mcg QPM ,,SA,LARKIN   • digoxin  250 mcg MO, WE + FR   • amLODIPine  10 mg DAILY   • lisinopril  5 mg DAILY   • ferric gluconate (FERRLECIT) IV  250 mg Q24HR   •  Pharmacy  1 Each PHARMACY TO DOSE   • docusate sodium 100mg/10mL  100 mg BID   • magnesium hydroxide  30 mL DAILY   • polyethylene glycol/lytes  1 Packet BID   • senna-docusate  1 Tab Nightly   • oxyCODONE immediate-release  2.5 mg Q3HRS PRN   • oxyCODONE immediate-release  5 mg Q3HRS PRN   • senna-docusate  1 Tab Q24HRS PRN   • Respiratory Therapy Consult   Continuous RT   • Pharmacy Consult Request  1 Each PHARMACY TO DOSE   • bisacodyl  10 mg Q24HRS PRN   • fleet  1 Each Once PRN   • morphine injection  2 mg Q3HRS PRN   • ondansetron  4 mg Q4HRS PRN   • doxycycline  100 mg Q12HRS   • Metoprolol Tartrate  5 mg Q6HRS PRN       Assessment and Plan:  Hospital day # 7POD #6 Crani for SDH  Prophylactic anticoagulation:  NO   Neuro stable.

## 2021-01-18 LAB
ANION GAP SERPL CALC-SCNC: 8 MMOL/L (ref 7–16)
ANISOCYTOSIS BLD QL SMEAR: ABNORMAL
BASOPHILS # BLD AUTO: 0 % (ref 0–1.8)
BASOPHILS # BLD: 0 K/UL (ref 0–0.12)
BUN SERPL-MCNC: 40 MG/DL (ref 8–22)
CALCIUM SERPL-MCNC: 9.7 MG/DL (ref 8.5–10.5)
CHLORIDE SERPL-SCNC: 117 MMOL/L (ref 96–112)
CO2 SERPL-SCNC: 27 MMOL/L (ref 20–33)
CREAT SERPL-MCNC: 0.76 MG/DL (ref 0.5–1.4)
EOSINOPHIL # BLD AUTO: 0.39 K/UL (ref 0–0.51)
EOSINOPHIL NFR BLD: 2.4 % (ref 0–6.9)
ERYTHROCYTE [DISTWIDTH] IN BLOOD BY AUTOMATED COUNT: 55.8 FL (ref 35.9–50)
GLUCOSE SERPL-MCNC: 132 MG/DL (ref 65–99)
HCT VFR BLD AUTO: 31.7 % (ref 42–52)
HGB BLD-MCNC: 9.8 G/DL (ref 14–18)
HYPOCHROMIA BLD QL SMEAR: ABNORMAL
LYMPHOCYTES # BLD AUTO: 5.48 K/UL (ref 1–4.8)
LYMPHOCYTES NFR BLD: 33.6 % (ref 22–41)
MACROCYTES BLD QL SMEAR: ABNORMAL
MAGNESIUM SERPL-MCNC: 1.8 MG/DL (ref 1.5–2.5)
MANUAL DIFF BLD: NORMAL
MCH RBC QN AUTO: 31.8 PG (ref 27–33)
MCHC RBC AUTO-ENTMCNC: 30.9 G/DL (ref 33.7–35.3)
MCV RBC AUTO: 102.9 FL (ref 81.4–97.8)
MICROCYTES BLD QL SMEAR: ABNORMAL
MONOCYTES # BLD AUTO: 0.39 K/UL (ref 0–0.85)
MONOCYTES NFR BLD AUTO: 2.4 % (ref 0–13.4)
MORPHOLOGY BLD-IMP: NORMAL
NEUTROPHILS # BLD AUTO: 10.04 K/UL (ref 1.82–7.42)
NEUTROPHILS NFR BLD: 61.6 % (ref 44–72)
NRBC # BLD AUTO: 0.14 K/UL
NRBC BLD-RTO: 0.9 /100 WBC
OVALOCYTES BLD QL SMEAR: NORMAL
PHOSPHATE SERPL-MCNC: 2.6 MG/DL (ref 2.5–4.5)
PLATELET # BLD AUTO: 375 K/UL (ref 164–446)
PLATELET BLD QL SMEAR: NORMAL
PMV BLD AUTO: 10.4 FL (ref 9–12.9)
POLYCHROMASIA BLD QL SMEAR: NORMAL
POTASSIUM SERPL-SCNC: 3.7 MMOL/L (ref 3.6–5.5)
PREALB SERPL-MCNC: 13.7 MG/DL (ref 18–38)
RBC # BLD AUTO: 3.08 M/UL (ref 4.7–6.1)
RBC BLD AUTO: PRESENT
SODIUM SERPL-SCNC: 152 MMOL/L (ref 135–145)
WBC # BLD AUTO: 16.3 K/UL (ref 4.8–10.8)

## 2021-01-18 PROCEDURE — 84134 ASSAY OF PREALBUMIN: CPT

## 2021-01-18 PROCEDURE — 99233 SBSQ HOSP IP/OBS HIGH 50: CPT | Performed by: SURGERY

## 2021-01-18 PROCEDURE — 700102 HCHG RX REV CODE 250 W/ 637 OVERRIDE(OP): Performed by: SURGERY

## 2021-01-18 PROCEDURE — A9270 NON-COVERED ITEM OR SERVICE: HCPCS | Performed by: SURGERY

## 2021-01-18 PROCEDURE — 80048 BASIC METABOLIC PNL TOTAL CA: CPT

## 2021-01-18 PROCEDURE — 84100 ASSAY OF PHOSPHORUS: CPT

## 2021-01-18 PROCEDURE — 306565 RIGID MIT RESTRAINT(PAIR): Performed by: SURGERY

## 2021-01-18 PROCEDURE — 85027 COMPLETE CBC AUTOMATED: CPT

## 2021-01-18 PROCEDURE — 85007 BL SMEAR W/DIFF WBC COUNT: CPT

## 2021-01-18 PROCEDURE — 770022 HCHG ROOM/CARE - ICU (200)

## 2021-01-18 PROCEDURE — 97530 THERAPEUTIC ACTIVITIES: CPT

## 2021-01-18 PROCEDURE — 83735 ASSAY OF MAGNESIUM: CPT

## 2021-01-18 RX ADMIN — DIGOXIN 250 MCG: 250 TABLET ORAL at 17:33

## 2021-01-18 RX ADMIN — LISINOPRIL 5 MG: 5 TABLET ORAL at 05:19

## 2021-01-18 RX ADMIN — METOPROLOL TARTRATE 100 MG: 50 TABLET, FILM COATED ORAL at 17:32

## 2021-01-18 RX ADMIN — AMLODIPINE BESYLATE 10 MG: 10 TABLET ORAL at 05:19

## 2021-01-18 RX ADMIN — OXYCODONE 2.5 MG: 5 TABLET ORAL at 05:20

## 2021-01-18 RX ADMIN — METOPROLOL TARTRATE 100 MG: 50 TABLET, FILM COATED ORAL at 05:20

## 2021-01-18 ASSESSMENT — COGNITIVE AND FUNCTIONAL STATUS - GENERAL
MOVING FROM LYING ON BACK TO SITTING ON SIDE OF FLAT BED: UNABLE
STANDING UP FROM CHAIR USING ARMS: A LITTLE
WALKING IN HOSPITAL ROOM: A LITTLE
SUGGESTED CMS G CODE MODIFIER MOBILITY: CL
TURNING FROM BACK TO SIDE WHILE IN FLAT BAD: UNABLE
CLIMB 3 TO 5 STEPS WITH RAILING: A LOT
MOBILITY SCORE: 11
MOVING TO AND FROM BED TO CHAIR: UNABLE

## 2021-01-18 ASSESSMENT — GAIT ASSESSMENTS
ASSISTIVE DEVICE: HAND HELD ASSIST
DISTANCE (FEET): 3
DEVIATION: STEP TO;DECREASED BASE OF SUPPORT;BRADYKINETIC;SHUFFLED GAIT
GAIT LEVEL OF ASSIST: MINIMAL ASSIST

## 2021-01-18 ASSESSMENT — FIBROSIS 4 INDEX: FIB4 SCORE: 1.47

## 2021-01-18 ASSESSMENT — PAIN DESCRIPTION - PAIN TYPE
TYPE: ACUTE PAIN;SURGICAL PAIN
TYPE: ACUTE PAIN;SURGICAL PAIN

## 2021-01-18 ASSESSMENT — PAIN SCALES - WONG BAKER
WONGBAKER_NUMERICALRESPONSE: DOESN'T HURT AT ALL
WONGBAKER_NUMERICALRESPONSE: DOESN'T HURT AT ALL

## 2021-01-18 NOTE — DISCHARGE PLANNING
"LSW received a voicemail from RN Stephanie Reyes wanting to discuss her concerns for a possible EPS report. AGUSTINA Beebe is not scheduled today so LSW spoke with current bedside RN, Debbie, who reports that the concerns were that pt's brother changed code status to FULL upon admission, as pt was a DNR in the past, and that they're unsure if brother understands pt's new level of care & needs moving forward. LSW inquired if Neurosurgery contacted pt's brother, as Ward requested that on Friday 01/15 to help him understand pt's \"new level\", AGUSTINA Lewis was unsure.     LSW called and left message with Ward [phone] and left message inquiring.     With the information given to LSW at this time, there is no concern for an APS report to be made. Pt is safe and Ward has been involved in care, comes to bedside during visiting hours, and checks in with treatment team about pt's care.     No APS report made.   "

## 2021-01-18 NOTE — PROGRESS NOTES
Pt becomes combative when needs to be cleaned,  If he is touched from the mid abdomen down he is unable to be redirected and very uncooperative when it comes to being wiped clean.  I tried to look him in the eye and talk with him to see if he could understand and be able to stay on task but he could not for more thank a few seconds, there appears to be no recognition, or recall

## 2021-01-18 NOTE — THERAPY
Physical Therapy   Daily Treatment     Patient Name: Yousif Kimble  Age:  89 y.o., Sex:  male  Medical Record #: [id number]  Today's Date: 1/18/2021     Precautions: Fall Risk, Nasogastric Tube, Swallow Precautions    Assessment  Pt with improvement since initial evaluation and was able to initiate ambulation in room with HHA; however, session was limited by bowel incontinence and need for clean-up. Pt remained non-verbal during session but followed all commands and nodded appropriately throughout. Pt appears motivated to ambulate and be OOB. Continue to recommend postacute placement at this time. Will follow.     Plan  Continue current treatment plan.  DC Equipment Recommendations: Unable to determine at this time  Discharge Recommendations: Recommend post-acute placement for additional physical therapy services prior to discharge home         01/18/21 1349   Cognition    Speech/ Communication no verbalizations   Level of Consciousness Alert   Comments pt following commands and nodding appropriately throughout. appears more alert and participatory.    Balance   Sitting Balance (Static) Fair   Sitting Balance (Dynamic) Fair -   Standing Balance (Static) Fair -   Standing Balance (Dynamic) Poor +   Weight Shift Sitting Fair   Weight Shift Standing Poor   Comments standing with HHA   Gait Analysis   Gait Level Of Assist Minimal Assist   Assistive Device Hand Held Assist   Distance (Feet) 3   Deviation Step To;Decreased Base Of Support;Bradykinetic;Shuffled Gait   Weight Bearing Status no restrictions   Comments does appear to be able to ambulate farther though was limited this session by bowel movement while ambulating   Bed Mobility    Supine to Sit Minimal Assist   Sit to Supine Moderate Assist   Scooting Minimal Assist (seated)   Rolling Maximum Assist to Lt.;Maximal Assist to Rt.   Comments max for rolling may be d/t pt resisting assist for pericare and grimacing and groans with clean up   Functional Mobility   Sit to  Stand Minimal Assist   Short Term Goals    Short Term Goal # 1 pt will perform supine <> sit with min A in 6 visits for improved independence   Goal Outcome # 1 Goal met, new goal added   Short Term Goal # 1 B  pt will perform supine <> sit without bed features with SPV in 6 visits for improved independence   Goal Outcome  # 1 B added 1/18   Short Term Goal # 2 pt will complete all functional xfrs with SPV in 6 visits for improved independence   Goal Outcome # 2 Progressing as expected   Short Term Goal # 3 pt will ambulate 50ft with min A and LRAD in 6 visits to initiate gait   Goal Outcome # 3 Progressing as expected

## 2021-01-18 NOTE — DIETARY
Nutrition support weekly update:  Day 8 of admit.  Yousif Kimble is a 89 y.o. male with admitting DX of head bleed.  Tube feeding initiated on 1/11.     Current TF via gastric Cortrak is Impact Peptide 1.5 at goal rate 45 of ml/hr, providing 1620 kcals, 102 grams protein, and 834 mL free water.    Assessment:  Weight 59 kg via bed scale 1/18. Wt currently trending up from admit wt of 56 kg 1/10, suspect fluid related vs true gain, currently +0.6 L per I&Os.   Re-estimate of nutritional needs not currently indicated.    Evaluation:   1. Per SLP 1/15, pt remains at high risk for aspiration - nutrition support continues to be indicated.  2. Glu 132-140 over past 72 h.   3. Na 152 (H), BUN 40 (H) - 250 mL q6 h free water flushes + TF providing total of 1834 mL free water (33 mL/kg admit wt).   4. No updated pre-albumin/CRP available to evaluate inflammatory status.   5. Pressure injury to left knee documented 1/14. No pending wound consult, unsure staging; however, current TF formula appropriate for wound healing.   6. Pt continues with loose stools- bowel meds held per MAR.   7. Current formula remains appropriate to meet patient's nutritional needs with TBI.     Malnutrition risk: No new criteria met.     Recommendations/Plan:  1. Continue TF formula and rate.  2. Fluids per MD.  3. Advancement to PO diet per SLP.    RD continues to follow.

## 2021-01-18 NOTE — PROGRESS NOTES
Trauma / Surgical Daily Progress Note    Date of Service  1/18/2021    Chief Complaint  89 y.o. male admitted 1/10/2021 with head bleed    Interval Events  The patient remains critically ill with severe closed head injury and medical comorbidities.  The patient was seen and examined on rounds and discussed with the multidisciplinary critical care team and consulting physicians. Critically evaluated laboratory tests, culture data, medications, imaging, and other diagnostic tests.    The patient has impairment of one or more vital organ systems and a high probability of imminent or life-threatening deterioration in condition. Provided high complexity decision making to assess, manipulate, and support vital system functions to treat vital organ system failure and/or to prevent further life-threatening deterioration of the patient's condition. Requires continued ICU and hospital admission.    Review of Systems  Review of Systems   Unable to perform ROS: Mental status change        Vital Signs for last 24 hours  Temp:  [36.1 °C (97 °F)-36.9 °C (98.4 °F)] 36.3 °C (97.3 °F)  Pulse:  [] 81  Resp:  [9-102] 23  BP: (113-157)/(67-96) 131/75  SpO2:  [86 %-100 %] 96 %    Hemodynamic parameters for last 24 hours       Respiratory Data     Respiration: (!) 23, Pulse Oximetry: 96 %     Work Of Breathing / Effort: Mild  RUL Breath Sounds: Clear, RML Breath Sounds: Clear, RLL Breath Sounds: Clear, PRISCA Breath Sounds: Clear, LLL Breath Sounds: Clear    Physical Exam  Physical Exam  Vitals signs and nursing note reviewed.   Constitutional:       General: He is not in acute distress.     Appearance: He is not toxic-appearing or diaphoretic.   HENT:      Head:      Comments: Incision clean     Right Ear: External ear normal.      Left Ear: External ear normal.      Nose: Nose normal.      Mouth/Throat:      Mouth: Mucous membranes are moist.   Eyes:      General: No scleral icterus.        Right eye: No discharge.         Left eye:  No discharge.      Extraocular Movements: Extraocular movements intact.      Pupils: Pupils are equal, round, and reactive to light.   Neck:      Musculoskeletal: Normal range of motion and neck supple.   Cardiovascular:      Rate and Rhythm: Tachycardia present. Rhythm irregular.      Pulses: Normal pulses.      Heart sounds: No murmur. No friction rub. No gallop.    Pulmonary:      Effort: Pulmonary effort is normal. No respiratory distress.      Breath sounds: No stridor.   Abdominal:      General: Abdomen is flat.      Palpations: Abdomen is soft.      Tenderness: There is no abdominal tenderness. There is no guarding.   Genitourinary:     Comments: Arreaga in place  Musculoskeletal: Normal range of motion.         General: No deformity.   Skin:     General: Skin is warm and dry.      Capillary Refill: Capillary refill takes less than 2 seconds.      Coloration: Skin is not jaundiced.      Findings: No bruising.   Neurological:      General: No focal deficit present.      Comments: Not following  Aphasic  Moves all   Psychiatric:      Comments: Unable to assess         Laboratory  Recent Results (from the past 24 hour(s))   CBC WITH DIFFERENTIAL    Collection Time: 01/18/21  5:07 AM   Result Value Ref Range    WBC 16.3 (H) 4.8 - 10.8 K/uL    RBC 3.08 (L) 4.70 - 6.10 M/uL    Hemoglobin 9.8 (L) 14.0 - 18.0 g/dL    Hematocrit 31.7 (L) 42.0 - 52.0 %    .9 (H) 81.4 - 97.8 fL    MCH 31.8 27.0 - 33.0 pg    MCHC 30.9 (L) 33.7 - 35.3 g/dL    RDW 55.8 (H) 35.9 - 50.0 fL    Platelet Count 375 164 - 446 K/uL    MPV 10.4 9.0 - 12.9 fL    Neutrophils-Polys 61.60 44.00 - 72.00 %    Lymphocytes 33.60 22.00 - 41.00 %    Monocytes 2.40 0.00 - 13.40 %    Eosinophils 2.40 0.00 - 6.90 %    Basophils 0.00 0.00 - 1.80 %    Nucleated RBC 0.90 /100 WBC    Neutrophils (Absolute) 10.04 (H) 1.82 - 7.42 K/uL    Lymphs (Absolute) 5.48 (H) 1.00 - 4.80 K/uL    Monos (Absolute) 0.39 0.00 - 0.85 K/uL    Eos (Absolute) 0.39 0.00 - 0.51 K/uL     Baso (Absolute) 0.00 0.00 - 0.12 K/uL    NRBC (Absolute) 0.14 K/uL    Hypochromia 1+     Anisocytosis 1+     Macrocytosis 1+     Microcytosis 1+    Basic Metabolic Panel    Collection Time: 01/18/21  5:07 AM   Result Value Ref Range    Sodium 152 (H) 135 - 145 mmol/L    Potassium 3.7 3.6 - 5.5 mmol/L    Chloride 117 (H) 96 - 112 mmol/L    Co2 27 20 - 33 mmol/L    Glucose 132 (H) 65 - 99 mg/dL    Bun 40 (H) 8 - 22 mg/dL    Creatinine 0.76 0.50 - 1.40 mg/dL    Calcium 9.7 8.5 - 10.5 mg/dL    Anion Gap 8.0 7.0 - 16.0   MAGNESIUM    Collection Time: 01/18/21  5:07 AM   Result Value Ref Range    Magnesium 1.8 1.5 - 2.5 mg/dL   PHOSPHORUS    Collection Time: 01/18/21  5:07 AM   Result Value Ref Range    Phosphorus 2.6 2.5 - 4.5 mg/dL   DIFFERENTIAL MANUAL    Collection Time: 01/18/21  5:07 AM   Result Value Ref Range    Manual Diff Status PERFORMED    PERIPHERAL SMEAR REVIEW    Collection Time: 01/18/21  5:07 AM   Result Value Ref Range    Peripheral Smear Review see below    PLATELET ESTIMATE    Collection Time: 01/18/21  5:07 AM   Result Value Ref Range    Plt Estimation Normal    MORPHOLOGY    Collection Time: 01/18/21  5:07 AM   Result Value Ref Range    RBC Morphology Present     Polychromia 1+     Ovalocytes 1+    ESTIMATED GFR    Collection Time: 01/18/21  5:07 AM   Result Value Ref Range    GFR If African American >60 >60 mL/min/1.73 m 2    GFR If Non African American >60 >60 mL/min/1.73 m 2   Prealbumin    Collection Time: 01/18/21  1:13 PM   Result Value Ref Range    Pre-Albumin 13.7 (L) 18.0 - 38.0 mg/dL       Fluids    Intake/Output Summary (Last 24 hours) at 1/18/2021 1430  Last data filed at 1/18/2021 1200  Gross per 24 hour   Intake 1635 ml   Output 2375 ml   Net -740 ml       Core Measures & Quality Metrics  Labs reviewed, Medications reviewed and Radiology images reviewed  Arreaga catheter: Critically Ill - Requiring Accurate Measurement of Urinary Output      DVT Prophylaxis: Contraindicated - High  bleeding risk  DVT prophylaxis - mechanical: SCDs    Antibiotics: Treating active infection/contamination beyond 24 hours perioperative coverage      FAZAL Score  ETOH Screening    Assessment/Plan  Acute hyperactive delirium due to multiple etiologies- (present on admission)  Assessment & Plan  Advanced age, ICU admission, traumatic brain hemorrhage, surgery  Limit psychotropic medications  Restraints as needed    Longstanding persistent atrial fibrillation (HCC)- (present on admission)  Assessment & Plan  Chronic condition treated with Digoxin and metoprolol.  Maintenance medications resumed on admission.    1/16 Digoxin dose increased.  1/17 Metoprolol dosage increased.    Subdural hematoma (HCC)- (present on admission)  Assessment & Plan  Large left hemispheric subdural hematoma causing significant mass effect on the underlying cerebral hemisphere with left to right midline shift .Small anterior right frontal subdural hematoma and small anterior interhemispheric subdural hematoma.  Repeat interval head CT stable, but waning GCS.  1/11 Craniotomy with evacuation.  Post traumatic pharmacologic seizure prophylaxis for 1 week.  Speech Language Pathology cognitive evaluation.  Dante Sparrow MD. Neurosurgeon. [facility].    Acute urinary retention- (present on admission)  Assessment & Plan  Arreaga placed in OR  Began to have bleeding upon arrival to ICU  Unable to advance  Urology placed catheter over wire  Arreaga in place for 2 weeks    Chronic anticoagulation- (present on admission)  Assessment & Plan  Takes ASA and Eliquis outpatient. KCentra given prior to arrival.  Admission thromboelastogram demonstrated moderately elevated ragged tonic acid pathway platelet inhibition.  1 platelet pheresis pack and 2 units of FFP administered.      Essential hypertension- (present on admission)  Assessment & Plan  Chronic condition treated with amlodipine, lisinopril, and metoprolol.  1/10 Resumed metoprolol.  1/14  Resumed lisinopril and amlodipine.    Contraindication to deep vein thrombosis (DVT) prophylaxis- (present on admission)  Assessment & Plan  Systemic anticoagulation contraindicated secondary to elevated bleeding risk.  1/12 Trauma screening bilateral lower extremity venous duplex negative for above knee DVT.    Cancer (HCC)- (present on admission)  Assessment & Plan  History of CLL and prostate CA    Stroke (HCC)- (present on admission)  Assessment & Plan  Premorbid    Screening examination for infectious disease- (present on admission)  Assessment & Plan  Admission SARS-CoV-2 testing negative. LOW RISK patient. Repeat SARS-CoV-2 testing not indicated. Isolation precautions de-escalated.    Trauma- (present on admission)  Assessment & Plan  GLF on eliquis.  Trauma Yellow Transfer Activation from [facility].  Jere Meyer MD. Trauma Surgery.    Dysphagia- (present on admission)  Assessment & Plan  Secondary to altered mental status from traumatic brain injury  1/11 Cortrak placed, tube feeds commenced      Discussed patient condition with RN, RT, Pharmacy, Dietary and .  CRITICAL CARE TIME EXCLUDING PROCEDURES: 35 minutes

## 2021-01-19 ENCOUNTER — APPOINTMENT (OUTPATIENT)
Dept: RADIOLOGY | Facility: MEDICAL CENTER | Age: 86
DRG: 025 | End: 2021-01-19
Attending: SURGERY
Payer: MEDICARE

## 2021-01-19 LAB
ANION GAP SERPL CALC-SCNC: 6 MMOL/L (ref 7–16)
ANISOCYTOSIS BLD QL SMEAR: ABNORMAL
BASOPHILS # BLD AUTO: 0 % (ref 0–1.8)
BASOPHILS # BLD: 0 K/UL (ref 0–0.12)
BUN SERPL-MCNC: 41 MG/DL (ref 8–22)
CALCIUM SERPL-MCNC: 9.1 MG/DL (ref 8.5–10.5)
CHLORIDE SERPL-SCNC: 112 MMOL/L (ref 96–112)
CO2 SERPL-SCNC: 27 MMOL/L (ref 20–33)
CREAT SERPL-MCNC: 0.7 MG/DL (ref 0.5–1.4)
DIGOXIN SERPL-MCNC: 1 NG/ML (ref 0.8–2)
EOSINOPHIL # BLD AUTO: 0.35 K/UL (ref 0–0.51)
EOSINOPHIL NFR BLD: 2.6 % (ref 0–6.9)
ERYTHROCYTE [DISTWIDTH] IN BLOOD BY AUTOMATED COUNT: 57.2 FL (ref 35.9–50)
GLUCOSE SERPL-MCNC: 134 MG/DL (ref 65–99)
HCT VFR BLD AUTO: 29.5 % (ref 42–52)
HGB BLD-MCNC: 9.1 G/DL (ref 14–18)
HYPOCHROMIA BLD QL SMEAR: ABNORMAL
LYMPHOCYTES # BLD AUTO: 5.44 K/UL (ref 1–4.8)
LYMPHOCYTES NFR BLD: 40 % (ref 22–41)
MACROCYTES BLD QL SMEAR: ABNORMAL
MANUAL DIFF BLD: NORMAL
MCH RBC QN AUTO: 32.2 PG (ref 27–33)
MCHC RBC AUTO-ENTMCNC: 30.8 G/DL (ref 33.7–35.3)
MCV RBC AUTO: 104.2 FL (ref 81.4–97.8)
MONOCYTES # BLD AUTO: 1.31 K/UL (ref 0–0.85)
MONOCYTES NFR BLD AUTO: 9.6 % (ref 0–13.4)
MORPHOLOGY BLD-IMP: NORMAL
MYELOCYTES NFR BLD MANUAL: 2.6 %
NEUTROPHILS # BLD AUTO: 6.15 K/UL (ref 1.82–7.42)
NEUTROPHILS NFR BLD: 45.2 % (ref 44–72)
NRBC # BLD AUTO: 0.07 K/UL
NRBC BLD-RTO: 0.5 /100 WBC
PLATELET # BLD AUTO: 354 K/UL (ref 164–446)
PLATELET BLD QL SMEAR: NORMAL
PMV BLD AUTO: 10.6 FL (ref 9–12.9)
POLYCHROMASIA BLD QL SMEAR: NORMAL
POTASSIUM SERPL-SCNC: 3.7 MMOL/L (ref 3.6–5.5)
RBC # BLD AUTO: 2.83 M/UL (ref 4.7–6.1)
RBC BLD AUTO: PRESENT
SMUDGE CELLS BLD QL SMEAR: NORMAL
SODIUM SERPL-SCNC: 145 MMOL/L (ref 135–145)
WBC # BLD AUTO: 13.6 K/UL (ref 4.8–10.8)

## 2021-01-19 PROCEDURE — 99233 SBSQ HOSP IP/OBS HIGH 50: CPT | Performed by: SURGERY

## 2021-01-19 PROCEDURE — 85007 BL SMEAR W/DIFF WBC COUNT: CPT

## 2021-01-19 PROCEDURE — A9270 NON-COVERED ITEM OR SERVICE: HCPCS | Performed by: SURGERY

## 2021-01-19 PROCEDURE — 700101 HCHG RX REV CODE 250: Performed by: SURGERY

## 2021-01-19 PROCEDURE — 80048 BASIC METABOLIC PNL TOTAL CA: CPT

## 2021-01-19 PROCEDURE — 92526 ORAL FUNCTION THERAPY: CPT

## 2021-01-19 PROCEDURE — 71045 X-RAY EXAM CHEST 1 VIEW: CPT

## 2021-01-19 PROCEDURE — 700102 HCHG RX REV CODE 250 W/ 637 OVERRIDE(OP): Performed by: SURGERY

## 2021-01-19 PROCEDURE — 85027 COMPLETE CBC AUTOMATED: CPT

## 2021-01-19 PROCEDURE — 306565 RIGID MIT RESTRAINT(PAIR): Performed by: SURGERY

## 2021-01-19 PROCEDURE — 80162 ASSAY OF DIGOXIN TOTAL: CPT

## 2021-01-19 PROCEDURE — 770022 HCHG ROOM/CARE - ICU (200)

## 2021-01-19 PROCEDURE — 93970 EXTREMITY STUDY: CPT | Mod: 26,GZ | Performed by: INTERNAL MEDICINE

## 2021-01-19 PROCEDURE — 93970 EXTREMITY STUDY: CPT

## 2021-01-19 RX ADMIN — DOCUSATE SODIUM 100 MG: 50 LIQUID ORAL at 06:10

## 2021-01-19 RX ADMIN — OXYCODONE 2.5 MG: 5 TABLET ORAL at 04:43

## 2021-01-19 RX ADMIN — METOPROLOL TARTRATE 50 MG: 50 TABLET, FILM COATED ORAL at 18:01

## 2021-01-19 RX ADMIN — DIGOXIN 125 MCG: 125 TABLET ORAL at 18:02

## 2021-01-19 RX ADMIN — MAGNESIUM HYDROXIDE 30 ML: 400 SUSPENSION ORAL at 06:10

## 2021-01-19 RX ADMIN — POLYETHYLENE GLYCOL 3350 1 PACKET: 17 POWDER, FOR SOLUTION ORAL at 06:11

## 2021-01-19 RX ADMIN — METOPROLOL TARTRATE 100 MG: 50 TABLET, FILM COATED ORAL at 06:11

## 2021-01-19 ASSESSMENT — PAIN DESCRIPTION - PAIN TYPE: TYPE: ACUTE PAIN;SURGICAL PAIN

## 2021-01-19 NOTE — DISCHARGE PLANNING
Received Choice form at 0410  Agency/Facility Name: [facility]  Referral sent per Choice form @ 4448

## 2021-01-19 NOTE — THERAPY
"Speech Language Pathology  Daily Treatment     Patient Name: Yousif Kimble  Age:  89 y.o., Sex:  male  Medical Record #: [id number]  Today's Date: 1/19/2021     Precautions  Precautions: (P) Fall Risk, Nasogastric Tube, Swallow Precautions ( See Comments)  Comments: (P) strict NPO    Assessment    Pt was seen for dysphagia tx this date.  He continues to be non-verbal with attempts to say his name resulting in moaning.  Pt nodded his head to simple personal questions such as \"Is your last name Shyanne?\", however he made no attempts to answer simple non-personal y/n questions.  Pt continues to have severely restricted ROM of lingual and labial structures, and oral  cavity continues to be xerostomic, suspect related to open mouth posture and breathing through mouth. PO trials of ice x1 and MTL by 1/2 tsp x1 were given.  Pt made no attempts to close his mouth or move his tongue, and hyolaryngeal elevation was absent.  All trials were suctioned from pt's mouth by SLP.  Despite max cueing and a model, pt was only able to follow 4 simple oral motor directives including \"move your tongue up, to the right and the left\", and \"close your lips\".  Pt remains at high risk for aspiration.  Continue strict NPO/cortrak with frequent oral care.  SLP is following.    Plan  1) Strict NPO with cortrak  2) Please provide diligent and frequent oral care to reduce xerostomia    Continue current treatment plan.    Discharge Recommendations: (P) Recommend post-acute placement for additional speech therapy services prior to discharge home       Objective     01/19/21 0910   Verbal Expression   Comments Pt non-verbal but attempting to say his name with moaning noted   Auditory Comprehension   Comments Pt nodding head yes/no to simple personal questions   Dysphagia    Diet / Liquid Recommendation NPO;Pre-Feeding Trials with SLP Only   Nutritional Liquid Intake Rating Scale Nothing by mouth   Nutritional Food Intake Rating Scale Nothing by mouth "   Skilled Intervention Compensatory Strategies;Tactile Cueing;Gestural Cueing;Verbal Cueing   Recommended Route of Medication Administration   Medication Administration  Via Gastric Tube   Patient / Family Goals   Patient / Family Goal #1 none stated

## 2021-01-19 NOTE — PROGRESS NOTES
Trauma / Surgical Daily Progress Note    Date of Service  1/19/2021    Chief Complaint  89 y.o. male admitted 1/10/2021 with head bleed    Interval Events  The patient remains critically ill with multisystem trauma and superimposed on multiple medical comorbidities.  The patient was seen and examined on rounds and discussed with the multidisciplinary critical care team and consulting physicians. Critically evaluated laboratory tests, culture data, medications, imaging, and other diagnostic tests.    The patient has impairment of one or more vital organ systems and a high probability of imminent or life-threatening deterioration in condition. Provided high complexity decision making to assess, manipulate, and support vital system functions to treat vital organ system failure and/or to prevent further life-threatening deterioration of the patient's condition. Requires continued ICU and hospital admission.    Review of Systems  Review of Systems   Unable to perform ROS: Mental status change        Vital Signs for last 24 hours  Temp:  [36.5 °C (97.7 °F)-37.1 °C (98.8 °F)] 37.1 °C (98.8 °F)  Pulse:  [] 80  Resp:  [14-32] 19  BP: ()/(57-89) 126/63  SpO2:  [94 %-100 %] 100 %    Hemodynamic parameters for last 24 hours       Respiratory Data     Respiration: 19, Pulse Oximetry: 100 %     Work Of Breathing / Effort: Mild  RUL Breath Sounds: Clear, RML Breath Sounds: Clear, RLL Breath Sounds: Clear, PRISCA Breath Sounds: Clear, LLL Breath Sounds: Clear    Physical Exam  Physical Exam  Vitals signs and nursing note reviewed.   Constitutional:       General: He is not in acute distress.     Appearance: He is not toxic-appearing or diaphoretic.   HENT:      Head:      Comments: Incision clean     Right Ear: External ear normal.      Left Ear: External ear normal.      Nose: Nose normal.      Mouth/Throat:      Mouth: Mucous membranes are moist.   Eyes:      General: No scleral icterus.        Right eye: No discharge.          Left eye: No discharge.      Extraocular Movements: Extraocular movements intact.      Pupils: Pupils are equal, round, and reactive to light.   Neck:      Musculoskeletal: Normal range of motion and neck supple.   Cardiovascular:      Rate and Rhythm: Tachycardia present. Rhythm irregular.      Pulses: Normal pulses.      Heart sounds: No murmur. No friction rub. No gallop.    Pulmonary:      Effort: Pulmonary effort is normal. No respiratory distress.      Breath sounds: No stridor.   Abdominal:      General: Abdomen is flat.      Palpations: Abdomen is soft.      Tenderness: There is no abdominal tenderness. There is no guarding.   Genitourinary:     Comments: Arreaga in place  Musculoskeletal: Normal range of motion.         General: No deformity.   Skin:     General: Skin is warm and dry.      Capillary Refill: Capillary refill takes less than 2 seconds.      Coloration: Skin is not jaundiced.      Findings: No bruising.   Neurological:      General: No focal deficit present.      Comments: Not following  Aphasic  Moves all   Psychiatric:      Comments: Unable to assess         Laboratory  Recent Results (from the past 24 hour(s))   Prealbumin    Collection Time: 01/18/21  1:13 PM   Result Value Ref Range    Pre-Albumin 13.7 (L) 18.0 - 38.0 mg/dL   CBC WITH DIFFERENTIAL    Collection Time: 01/19/21  5:15 AM   Result Value Ref Range    WBC 13.6 (H) 4.8 - 10.8 K/uL    RBC 2.83 (L) 4.70 - 6.10 M/uL    Hemoglobin 9.1 (L) 14.0 - 18.0 g/dL    Hematocrit 29.5 (L) 42.0 - 52.0 %    .2 (H) 81.4 - 97.8 fL    MCH 32.2 27.0 - 33.0 pg    MCHC 30.8 (L) 33.7 - 35.3 g/dL    RDW 57.2 (H) 35.9 - 50.0 fL    Platelet Count 354 164 - 446 K/uL    MPV 10.6 9.0 - 12.9 fL    Neutrophils-Polys 45.20 44.00 - 72.00 %    Lymphocytes 40.00 22.00 - 41.00 %    Monocytes 9.60 0.00 - 13.40 %    Eosinophils 2.60 0.00 - 6.90 %    Basophils 0.00 0.00 - 1.80 %    Nucleated RBC 0.50 /100 WBC    Neutrophils (Absolute) 6.15 1.82 - 7.42 K/uL     Lymphs (Absolute) 5.44 (H) 1.00 - 4.80 K/uL    Monos (Absolute) 1.31 (H) 0.00 - 0.85 K/uL    Eos (Absolute) 0.35 0.00 - 0.51 K/uL    Baso (Absolute) 0.00 0.00 - 0.12 K/uL    NRBC (Absolute) 0.07 K/uL    Hypochromia 1+     Anisocytosis 1+     Macrocytosis 1+    Basic Metabolic Panel    Collection Time: 01/19/21  5:15 AM   Result Value Ref Range    Sodium 145 135 - 145 mmol/L    Potassium 3.7 3.6 - 5.5 mmol/L    Chloride 112 96 - 112 mmol/L    Co2 27 20 - 33 mmol/L    Glucose 134 (H) 65 - 99 mg/dL    Bun 41 (H) 8 - 22 mg/dL    Creatinine 0.70 0.50 - 1.40 mg/dL    Calcium 9.1 8.5 - 10.5 mg/dL    Anion Gap 6.0 (L) 7.0 - 16.0   DIGOXIN    Collection Time: 01/19/21  5:15 AM   Result Value Ref Range    Digoxin 1.0 0.8 - 2.0 ng/mL   ESTIMATED GFR    Collection Time: 01/19/21  5:15 AM   Result Value Ref Range    GFR If African American >60 >60 mL/min/1.73 m 2    GFR If Non African American >60 >60 mL/min/1.73 m 2   DIFFERENTIAL MANUAL    Collection Time: 01/19/21  5:15 AM   Result Value Ref Range    Myelocytes 2.60 %    Manual Diff Status PERFORMED    PERIPHERAL SMEAR REVIEW    Collection Time: 01/19/21  5:15 AM   Result Value Ref Range    Peripheral Smear Review see below    PLATELET ESTIMATE    Collection Time: 01/19/21  5:15 AM   Result Value Ref Range    Plt Estimation Normal    MORPHOLOGY    Collection Time: 01/19/21  5:15 AM   Result Value Ref Range    RBC Morphology Present     Polychromia 1+     Smudge Cells Few        Fluids    Intake/Output Summary (Last 24 hours) at 1/19/2021 1215  Last data filed at 1/19/2021 1100  Gross per 24 hour   Intake 990 ml   Output 2050 ml   Net -1060 ml       Core Measures & Quality Metrics  Labs reviewed, Medications reviewed and Radiology images reviewed  Arreaga catheter: Critically Ill - Requiring Accurate Measurement of Urinary Output      DVT Prophylaxis: Contraindicated - High bleeding risk  DVT prophylaxis - mechanical: SCDs    Antibiotics: Treating active  infection/contamination beyond 24 hours perioperative coverage      FAZAL Score  ETOH Screening    Assessment/Plan  Acute hyperactive delirium due to multiple etiologies- (present on admission)  Assessment & Plan  Advanced age, ICU admission, traumatic brain hemorrhage, surgery  Limit psychotropic medications  Restraints as needed    Longstanding persistent atrial fibrillation (HCC)- (present on admission)  Assessment & Plan  Chronic condition treated with Digoxin and metoprolol.  Maintenance medications resumed on admission.    1/16 Digoxin dose increased.  1/17 Metoprolol dosage increased.    Dysphagia- (present on admission)  Assessment & Plan  Secondary to altered mental status from traumatic brain injury  1/11 Cortrak placed, tube feeds commenced.  Will require definitive enteral feeding access for anticipated extended hospice care at home.    Acute urinary retention- (present on admission)  Assessment & Plan  Arreaga placed in OR  Began to have bleeding upon arrival to ICU  Unable to advance  Urology placed catheter over wire  Arreaga in place for 2 weeks    Chronic anticoagulation- (present on admission)  Assessment & Plan  Takes ASA and Eliquis outpatient. KCentra given prior to arrival.  Admission thromboelastogram demonstrated moderately elevated ragged tonic acid pathway platelet inhibition.  1 platelet pheresis pack and 2 units of FFP administered.      Essential hypertension- (present on admission)  Assessment & Plan  Chronic condition treated with amlodipine, lisinopril, and metoprolol.  1/10 Resumed metoprolol.  1/14 Resumed lisinopril and amlodipine.    Contraindication to deep vein thrombosis (DVT) prophylaxis- (present on admission)  Assessment & Plan  Systemic anticoagulation contraindicated secondary to elevated bleeding risk.  1/12 Trauma screening bilateral lower extremity venous duplex negative for above knee DVT.    Subdural hematoma (HCC)- (present on admission)  Assessment & Plan  Large left  hemispheric subdural hematoma causing significant mass effect on the underlying cerebral hemisphere with left to right midline shift .Small anterior right frontal subdural hematoma and small anterior interhemispheric subdural hematoma.  Repeat interval head CT stable, but waning GCS.  1/11 Craniotomy with evacuation.  Post traumatic pharmacologic seizure prophylaxis for 1 week.  Speech Language Pathology cognitive evaluation.  Dante Sparrow MD. Neurosurgeon. [facility].    Cancer (HCC)- (present on admission)  Assessment & Plan  History of CLL and prostate CA    Stroke (HCC)- (present on admission)  Assessment & Plan  Premorbid condition.    Screening examination for infectious disease- (present on admission)  Assessment & Plan  Admission SARS-CoV-2 testing negative. LOW RISK patient. Repeat SARS-CoV-2 testing not indicated. Isolation precautions de-escalated.    Trauma- (present on admission)  Assessment & Plan  GLF on eliquis.  Trauma Yellow Transfer Activation from [facility].  Jere Meyer MD. Trauma Surgery.      Discussed patient condition with RN, RT, Pharmacy, Dietary and .  CRITICAL CARE TIME EXCLUDING PROCEDURES: 35 minutes

## 2021-01-19 NOTE — DISCHARGE PLANNING
Anticipated Discharge Disposition: Home with [facility]     Action: LSW spoke with Darshana, liaison with [facility]. She reports that she spoke with pt's brother & that they have accepted him on service. Darshana stated that pt will need a peg tube placed prior to going home on hospice as his brother wants to continue with artifical nutrition. [facility] is able to replace pt's cortrak if it gets dislodged however they have no x ray to confirm placement. Gtube is a safer option. Darshana reports she discussed this with Ward who is agreeable.     LSW discussed this with Dr. Sullivan who is agreeable. Bedside RN aware as well.     Barriers to Discharge: Coordinating home with hospice     Plan: Follow up with Darshana from Hospice once Gtube has been placed.

## 2021-01-19 NOTE — PROGRESS NOTES
Neurosurgery Progress Note    Subjective:  No new events    Exam:  Spont EO.  Spont move.  Nonverbal  Slowly follows commands x 4 extremiteis  Scalp incision c/d/i with staples    BP  Min: 113/71  Max: 157/89  Pulse  Av.6  Min: 70  Max: 108  Resp  Av.3  Min: 9  Max: 102  Temp  Av.3 °C (97.3 °F)  Min: 36.1 °C (97 °F)  Max: 36.5 °C (97.7 °F)  SpO2  Av.1 %  Min: 86 %  Max: 100 %    No data recorded    Recent Labs     21  0545 21  0425 21  0507   WBC 14.3* 16.0* 16.3*   RBC 2.93* 2.99* 3.08*   HEMOGLOBIN 9.4* 9.6* 9.8*   HEMATOCRIT 30.1* 31.0* 31.7*   .7* 103.7* 102.9*   MCH 32.1 32.1 31.8   MCHC 31.2* 31.0* 30.9*   RDW 55.1* 55.5* 55.8*   PLATELETCT 341 350 375   MPV 10.5 10.4 10.4     Recent Labs     21  0545 21  0425 21  0507   SODIUM 152* 154* 152*   POTASSIUM 3.9 3.8 3.7   CHLORIDE 116* 118* 117*   CO2 28 28 27   GLUCOSE 143* 140* 132*   BUN 40* 39* 40*   CREATININE 0.74 0.76 0.76   CALCIUM 9.8 9.5 9.7               Intake/Output       21 0700 - 21 0659 21 07 - 21 0659      7403-2808 8502-6346 Total 9222-9750 3526-6103 Total       Intake    NG/GT  500  1040 1540  345  -- 345    Intake (mL) (Enteral Tube 21 Cortrak - Gastric 12 Fr. Right nare) 500 1040 1540 345 -- 345    Total Intake 500 1040 1540 345 -- 345       Output    Urine  1000  750 1750  650  -- 650    Output (mL) (Urethral Catheter 18 Fr.) 2187 833 8960 650 -- 650    Other  --  -- --  250  -- 250    Other -- -- -- 250 -- 250    Stool  --  -- --  --  -- --    Number of Times Stooled 1 x -- 1 x 2 x -- 2 x    Total Output 0489 801 2778 900 -- 900       Net I/O     -500 290 -210 -555 -- -555            Intake/Output Summary (Last 24 hours) at 2021 1649  Last data filed at 2021 1500  Gross per 24 hour   Intake 1385 ml   Output 2650 ml   Net -1265 ml            • metoprolol  100 mg BID   • digoxin  125 mcg QPM ,TH,SA,LARKIN   • digoxin  250 mcg MO, WE + FR   •  amLODIPine  10 mg DAILY   • lisinopril  5 mg DAILY   • Pharmacy  1 Each PHARMACY TO DOSE   • docusate sodium 100mg/10mL  100 mg BID   • magnesium hydroxide  30 mL DAILY   • polyethylene glycol/lytes  1 Packet BID   • senna-docusate  1 Tab Nightly   • oxyCODONE immediate-release  2.5 mg Q3HRS PRN   • oxyCODONE immediate-release  5 mg Q3HRS PRN   • senna-docusate  1 Tab Q24HRS PRN   • Respiratory Therapy Consult   Continuous RT   • Pharmacy Consult Request  1 Each PHARMACY TO DOSE   • bisacodyl  10 mg Q24HRS PRN   • fleet  1 Each Once PRN   • morphine injection  2 mg Q3HRS PRN   • ondansetron  4 mg Q4HRS PRN   • Metoprolol Tartrate  5 mg Q6HRS PRN       Assessment and Plan:  Hospital day # 8POD #7 Crani for SDH  Prophylactic anticoagulation:  NO   Neuro stable.  Normalize Na  Out of bed to chair

## 2021-01-19 NOTE — PROGRESS NOTES
Neurosurgery Progress Note    Subjective:  No new events    Exam:  Spont EO.  Spont move.  Nonverbal  Slowly follows commands x 4 extremiteis  Scalp incision c/d/i with staples    BP  Min: 99/57  Max: 157/89  Pulse  Av.3  Min: 72  Max: 114  Resp  Av.3  Min: 14  Max: 28  Temp  Av.6 °C (97.8 °F)  Min: 36.3 °C (97.3 °F)  Max: 36.9 °C (98.4 °F)  SpO2  Av.4 %  Min: 94 %  Max: 99 %    No data recorded    Recent Labs     21  0425 21  0507 21  0515   WBC 16.0* 16.3* 13.6*   RBC 2.99* 3.08* 2.83*   HEMOGLOBIN 9.6* 9.8* 9.1*   HEMATOCRIT 31.0* 31.7* 29.5*   .7* 102.9* 104.2*   MCH 32.1 31.8 32.2   MCHC 31.0* 30.9* 30.8*   RDW 55.5* 55.8* 57.2*   PLATELETCT 350 375 354   MPV 10.4 10.4 10.6     Recent Labs     21  0425 21  0507 21  0515   SODIUM 154* 152* 145   POTASSIUM 3.8 3.7 3.7   CHLORIDE 118* 117* 112   CO2 28 27 27   GLUCOSE 140* 132* 134*   BUN 39* 40* 41*   CREATININE 0.76 0.76 0.70   CALCIUM 9.5 9.7 9.1               Intake/Output       21 07 - 21 0659 21 07 - 21 0659      1900-0659 Total  Total       Intake    I.V.  --  120 120  --  -- --    IV Volume (TKO) -- 120 120 -- -- --    NG/GT  640  525 1165  --  -- --    Intake (mL) (Enteral Tube 21 Cortrak - Gastric 12 Fr. Right nare)  -- -- --    Total Intake  -- -- --       Output    Urine  1000  1175 2175  --  -- --    Output (mL) (Urethral Catheter 18 Fr.) 1000 1175 2175 -- -- --    Other  500  -- 500  --  -- --    Other 500 -- 500 -- -- --    Stool  --  -- --  --  -- --    Number of Times Stooled 2 x -- 2 x 1 x -- 1 x    Total Output 1500 1175 2675 -- -- --       Net I/O     -510 -530 -1390 -- -- --            Intake/Output Summary (Last 24 hours) at 2021 0854  Last data filed at 2021 0600  Gross per 24 hour   Intake 1235 ml   Output 2600 ml   Net -1365 ml            • metoprolol  100 mg BID   • digoxin  125  mcg QPM TU,TH,SA,LARKIN   • digoxin  250 mcg MO, WE + FR   • amLODIPine  10 mg DAILY   • lisinopril  5 mg DAILY   • Pharmacy  1 Each PHARMACY TO DOSE   • docusate sodium 100mg/10mL  100 mg BID   • magnesium hydroxide  30 mL DAILY   • polyethylene glycol/lytes  1 Packet BID   • senna-docusate  1 Tab Nightly   • oxyCODONE immediate-release  2.5 mg Q3HRS PRN   • oxyCODONE immediate-release  5 mg Q3HRS PRN   • senna-docusate  1 Tab Q24HRS PRN   • Respiratory Therapy Consult   Continuous RT   • Pharmacy Consult Request  1 Each PHARMACY TO DOSE   • bisacodyl  10 mg Q24HRS PRN   • fleet  1 Each Once PRN   • morphine injection  2 mg Q3HRS PRN   • ondansetron  4 mg Q4HRS PRN   • Metoprolol Tartrate  5 mg Q6HRS PRN       Assessment and Plan:  Hospital day # 9   POD #8 Crani for SDH  Prophylactic anticoagulation:  NO   Neuro stable.  Normalize Na  Out of bed to chair  DC staples 1/25/21

## 2021-01-19 NOTE — DISCHARGE PLANNING
"Anticipated Discharge Disposition: Home with Hospice     Action: LSW received a call back from patient's brother, Ward [phone], stating that he did receive a call from pt's neurosurgeon last week and was pleased with their conversation.     Ward shared that he's been doing a lot of thinking especially with pt's level of needs and pt's overall goals. Ward reports that he is considering hospice at home and has looked into [facility] especially since pt was on service with [facility] in the past. Ward stated, \"I think he's at that point.\" LSW educated Ward on hospice at home and the referral process to which Ward voiced understanding. Ward reports that he will be pt's 24/7 support/caregiver and that he's in good health to do so. LSW inquired about code status to which Ward stated, \"I've also thought about that.. I want to keep him full treat at this time.\" Extensive emotional support provided and then PC ended.     LSW discussed this with Dr. Sullivan who is agreeable. Hospice referral placed. Also discussed a palliative care consult to help Ward discuss code status/POLST and be extra support to him during this change of dc focus.      LSW completed hospice choice for [facility] and faxed it to [facility] for processing.     Barriers to Discharge: Coordinating hospice at home    Plan: Follow up with [facility] regarding hospice referral     "

## 2021-01-20 ENCOUNTER — APPOINTMENT (OUTPATIENT)
Dept: RADIOLOGY | Facility: MEDICAL CENTER | Age: 86
DRG: 025 | End: 2021-01-20
Attending: SURGERY
Payer: MEDICARE

## 2021-01-20 ENCOUNTER — ANESTHESIA (OUTPATIENT)
Dept: SURGERY | Facility: MEDICAL CENTER | Age: 86
DRG: 025 | End: 2021-01-20
Payer: MEDICARE

## 2021-01-20 ENCOUNTER — ANESTHESIA EVENT (OUTPATIENT)
Dept: SURGERY | Facility: MEDICAL CENTER | Age: 86
DRG: 025 | End: 2021-01-20
Payer: MEDICARE

## 2021-01-20 LAB
ANION GAP SERPL CALC-SCNC: 7 MMOL/L (ref 7–16)
ANISOCYTOSIS BLD QL SMEAR: ABNORMAL
BASOPHILS # BLD AUTO: 0 % (ref 0–1.8)
BASOPHILS # BLD: 0 K/UL (ref 0–0.12)
BUN SERPL-MCNC: 38 MG/DL (ref 8–22)
CALCIUM SERPL-MCNC: 9.2 MG/DL (ref 8.5–10.5)
CHLORIDE SERPL-SCNC: 110 MMOL/L (ref 96–112)
CO2 SERPL-SCNC: 26 MMOL/L (ref 20–33)
CREAT SERPL-MCNC: 0.69 MG/DL (ref 0.5–1.4)
EKG IMPRESSION: NORMAL
EOSINOPHIL # BLD AUTO: 0.35 K/UL (ref 0–0.51)
EOSINOPHIL NFR BLD: 2.6 % (ref 0–6.9)
ERYTHROCYTE [DISTWIDTH] IN BLOOD BY AUTOMATED COUNT: 58 FL (ref 35.9–50)
GLUCOSE SERPL-MCNC: 100 MG/DL (ref 65–99)
HCT VFR BLD AUTO: 32.3 % (ref 42–52)
HGB BLD-MCNC: 9.8 G/DL (ref 14–18)
LYMPHOCYTES # BLD AUTO: 6.17 K/UL (ref 1–4.8)
LYMPHOCYTES NFR BLD: 45.7 % (ref 22–41)
MACROCYTES BLD QL SMEAR: ABNORMAL
MANUAL DIFF BLD: NORMAL
MCH RBC QN AUTO: 31.7 PG (ref 27–33)
MCHC RBC AUTO-ENTMCNC: 30.3 G/DL (ref 33.7–35.3)
MCV RBC AUTO: 104.5 FL (ref 81.4–97.8)
MONOCYTES # BLD AUTO: 0.81 K/UL (ref 0–0.85)
MONOCYTES NFR BLD AUTO: 6 % (ref 0–13.4)
MORPHOLOGY BLD-IMP: NORMAL
NEUTROPHILS # BLD AUTO: 6.17 K/UL (ref 1.82–7.42)
NEUTROPHILS NFR BLD: 45.7 % (ref 44–72)
NRBC # BLD AUTO: 0.03 K/UL
NRBC BLD-RTO: 0.2 /100 WBC
PLATELET # BLD AUTO: 298 K/UL (ref 164–446)
PLATELET BLD QL SMEAR: NORMAL
PMV BLD AUTO: 11.5 FL (ref 9–12.9)
POTASSIUM SERPL-SCNC: 3.9 MMOL/L (ref 3.6–5.5)
RBC # BLD AUTO: 3.09 M/UL (ref 4.7–6.1)
RBC BLD AUTO: PRESENT
SARS-COV+SARS-COV-2 AG RESP QL IA.RAPID: NOTDETECTED
SARS-COV-2 RNA RESP QL NAA+PROBE: NOTDETECTED
SMUDGE CELLS BLD QL SMEAR: NORMAL
SODIUM SERPL-SCNC: 143 MMOL/L (ref 135–145)
SPECIMEN SOURCE: NORMAL
SPECIMEN SOURCE: NORMAL
WBC # BLD AUTO: 13.5 K/UL (ref 4.8–10.8)

## 2021-01-20 PROCEDURE — 93010 ELECTROCARDIOGRAM REPORT: CPT | Performed by: INTERNAL MEDICINE

## 2021-01-20 PROCEDURE — 87426 SARSCOV CORONAVIRUS AG IA: CPT

## 2021-01-20 PROCEDURE — 85007 BL SMEAR W/DIFF WBC COUNT: CPT

## 2021-01-20 PROCEDURE — 700117 HCHG RX CONTRAST REV CODE 255: Performed by: SURGERY

## 2021-01-20 PROCEDURE — 770022 HCHG ROOM/CARE - ICU (200)

## 2021-01-20 PROCEDURE — 501623 HCHG TUBE, GASTROSTOMY: Performed by: SURGERY

## 2021-01-20 PROCEDURE — 160036 HCHG PACU - EA ADDL 30 MINS PHASE I: Performed by: SURGERY

## 2021-01-20 PROCEDURE — 0DH64UZ INSERTION OF FEEDING DEVICE INTO STOMACH, PERCUTANEOUS ENDOSCOPIC APPROACH: ICD-10-PCS | Performed by: SURGERY

## 2021-01-20 PROCEDURE — U0005 INFEC AGEN DETEC AMPLI PROBE: HCPCS

## 2021-01-20 PROCEDURE — A9270 NON-COVERED ITEM OR SERVICE: HCPCS | Performed by: SURGERY

## 2021-01-20 PROCEDURE — 93005 ELECTROCARDIOGRAM TRACING: CPT | Performed by: SURGERY

## 2021-01-20 PROCEDURE — 700102 HCHG RX REV CODE 250 W/ 637 OVERRIDE(OP): Performed by: SURGERY

## 2021-01-20 PROCEDURE — 97530 THERAPEUTIC ACTIVITIES: CPT

## 2021-01-20 PROCEDURE — 160208 HCHG ENDO MINUTES - EA ADDL 1 MIN LEVEL 4: Performed by: SURGERY

## 2021-01-20 PROCEDURE — 500868 HCHG NEEDLE, SURGI(VARES): Performed by: SURGERY

## 2021-01-20 PROCEDURE — 700101 HCHG RX REV CODE 250: Performed by: ANESTHESIOLOGY

## 2021-01-20 PROCEDURE — 160002 HCHG RECOVERY MINUTES (STAT): Performed by: SURGERY

## 2021-01-20 PROCEDURE — 160035 HCHG PACU - 1ST 60 MINS PHASE I: Performed by: SURGERY

## 2021-01-20 PROCEDURE — 501838 HCHG SUTURE GENERAL: Performed by: SURGERY

## 2021-01-20 PROCEDURE — 700111 HCHG RX REV CODE 636 W/ 250 OVERRIDE (IP): Performed by: ANESTHESIOLOGY

## 2021-01-20 PROCEDURE — U0003 INFECTIOUS AGENT DETECTION BY NUCLEIC ACID (DNA OR RNA); SEVERE ACUTE RESPIRATORY SYNDROME CORONAVIRUS 2 (SARS-COV-2) (CORONAVIRUS DISEASE [COVID-19]), AMPLIFIED PROBE TECHNIQUE, MAKING USE OF HIGH THROUGHPUT TECHNOLOGIES AS DESCRIBED BY CMS-2020-01-R: HCPCS

## 2021-01-20 PROCEDURE — 99233 SBSQ HOSP IP/OBS HIGH 50: CPT | Mod: 25 | Performed by: SURGERY

## 2021-01-20 PROCEDURE — 49465 FLUORO EXAM OF G/COLON TUBE: CPT

## 2021-01-20 PROCEDURE — 160203 HCHG ENDO MINUTES - 1ST 30 MINS LEVEL 4: Performed by: SURGERY

## 2021-01-20 PROCEDURE — 700101 HCHG RX REV CODE 250: Performed by: SURGERY

## 2021-01-20 PROCEDURE — 85027 COMPLETE CBC AUTOMATED: CPT

## 2021-01-20 PROCEDURE — 80048 BASIC METABOLIC PNL TOTAL CA: CPT

## 2021-01-20 PROCEDURE — 160048 HCHG OR STATISTICAL LEVEL 1-5: Performed by: SURGERY

## 2021-01-20 PROCEDURE — 502571 HCHG PACK, LAP CHOLE: Performed by: SURGERY

## 2021-01-20 PROCEDURE — A6402 STERILE GAUZE <= 16 SQ IN: HCPCS | Performed by: SURGERY

## 2021-01-20 PROCEDURE — 43653 LAPAROSCOPY GASTROSTOMY: CPT | Performed by: SURGERY

## 2021-01-20 PROCEDURE — 700105 HCHG RX REV CODE 258: Performed by: ANESTHESIOLOGY

## 2021-01-20 PROCEDURE — 160009 HCHG ANES TIME/MIN: Performed by: SURGERY

## 2021-01-20 RX ORDER — AMLODIPINE BESYLATE 10 MG/1
10 TABLET ORAL DAILY
Status: DISCONTINUED | OUTPATIENT
Start: 2021-01-20 | End: 2021-01-21 | Stop reason: HOSPADM

## 2021-01-20 RX ORDER — LISINOPRIL 5 MG/1
5 TABLET ORAL DAILY
Status: DISCONTINUED | OUTPATIENT
Start: 2021-01-20 | End: 2021-01-21 | Stop reason: HOSPADM

## 2021-01-20 RX ORDER — CEFAZOLIN SODIUM 1 G/3ML
INJECTION, POWDER, FOR SOLUTION INTRAMUSCULAR; INTRAVENOUS PRN
Status: DISCONTINUED | OUTPATIENT
Start: 2021-01-20 | End: 2021-01-20 | Stop reason: SURG

## 2021-01-20 RX ORDER — BUPIVACAINE HYDROCHLORIDE AND EPINEPHRINE 5; 5 MG/ML; UG/ML
INJECTION, SOLUTION EPIDURAL; INTRACAUDAL; PERINEURAL
Status: DISCONTINUED | OUTPATIENT
Start: 2021-01-20 | End: 2021-01-20 | Stop reason: HOSPADM

## 2021-01-20 RX ORDER — HYDROMORPHONE HYDROCHLORIDE 1 MG/ML
0.4 INJECTION, SOLUTION INTRAMUSCULAR; INTRAVENOUS; SUBCUTANEOUS
Status: DISCONTINUED | OUTPATIENT
Start: 2021-01-20 | End: 2021-01-20 | Stop reason: HOSPADM

## 2021-01-20 RX ORDER — OXYCODONE HCL 5 MG/5 ML
10 SOLUTION, ORAL ORAL
Status: DISCONTINUED | OUTPATIENT
Start: 2021-01-20 | End: 2021-01-20 | Stop reason: HOSPADM

## 2021-01-20 RX ORDER — OXYCODONE HCL 5 MG/5 ML
5 SOLUTION, ORAL ORAL
Status: DISCONTINUED | OUTPATIENT
Start: 2021-01-20 | End: 2021-01-20 | Stop reason: HOSPADM

## 2021-01-20 RX ORDER — ONDANSETRON 2 MG/ML
4 INJECTION INTRAMUSCULAR; INTRAVENOUS
Status: DISCONTINUED | OUTPATIENT
Start: 2021-01-20 | End: 2021-01-20 | Stop reason: HOSPADM

## 2021-01-20 RX ORDER — HYDRALAZINE HYDROCHLORIDE 20 MG/ML
5 INJECTION INTRAMUSCULAR; INTRAVENOUS
Status: DISCONTINUED | OUTPATIENT
Start: 2021-01-20 | End: 2021-01-20 | Stop reason: HOSPADM

## 2021-01-20 RX ORDER — HYDROMORPHONE HYDROCHLORIDE 1 MG/ML
0.1 INJECTION, SOLUTION INTRAMUSCULAR; INTRAVENOUS; SUBCUTANEOUS
Status: DISCONTINUED | OUTPATIENT
Start: 2021-01-20 | End: 2021-01-20 | Stop reason: HOSPADM

## 2021-01-20 RX ORDER — LABETALOL HYDROCHLORIDE 5 MG/ML
5 INJECTION, SOLUTION INTRAVENOUS
Status: DISCONTINUED | OUTPATIENT
Start: 2021-01-20 | End: 2021-01-20 | Stop reason: HOSPADM

## 2021-01-20 RX ORDER — SODIUM CHLORIDE, SODIUM LACTATE, POTASSIUM CHLORIDE, CALCIUM CHLORIDE 600; 310; 30; 20 MG/100ML; MG/100ML; MG/100ML; MG/100ML
INJECTION, SOLUTION INTRAVENOUS CONTINUOUS
Status: DISCONTINUED | OUTPATIENT
Start: 2021-01-20 | End: 2021-01-20 | Stop reason: HOSPADM

## 2021-01-20 RX ORDER — DIPHENHYDRAMINE HYDROCHLORIDE 50 MG/ML
12.5 INJECTION INTRAMUSCULAR; INTRAVENOUS
Status: DISCONTINUED | OUTPATIENT
Start: 2021-01-20 | End: 2021-01-20 | Stop reason: HOSPADM

## 2021-01-20 RX ORDER — METOPROLOL TARTRATE 1 MG/ML
1 INJECTION, SOLUTION INTRAVENOUS
Status: DISCONTINUED | OUTPATIENT
Start: 2021-01-20 | End: 2021-01-20 | Stop reason: HOSPADM

## 2021-01-20 RX ORDER — MEPERIDINE HYDROCHLORIDE 25 MG/ML
12.5 INJECTION INTRAMUSCULAR; INTRAVENOUS; SUBCUTANEOUS
Status: DISCONTINUED | OUTPATIENT
Start: 2021-01-20 | End: 2021-01-20 | Stop reason: HOSPADM

## 2021-01-20 RX ORDER — HYDROMORPHONE HYDROCHLORIDE 1 MG/ML
0.2 INJECTION, SOLUTION INTRAMUSCULAR; INTRAVENOUS; SUBCUTANEOUS
Status: DISCONTINUED | OUTPATIENT
Start: 2021-01-20 | End: 2021-01-20 | Stop reason: HOSPADM

## 2021-01-20 RX ORDER — ROCURONIUM BROMIDE 10 MG/ML
INJECTION, SOLUTION INTRAVENOUS PRN
Status: DISCONTINUED | OUTPATIENT
Start: 2021-01-20 | End: 2021-01-20 | Stop reason: SURG

## 2021-01-20 RX ORDER — SODIUM CHLORIDE, SODIUM LACTATE, POTASSIUM CHLORIDE, CALCIUM CHLORIDE 600; 310; 30; 20 MG/100ML; MG/100ML; MG/100ML; MG/100ML
INJECTION, SOLUTION INTRAVENOUS
Status: DISCONTINUED | OUTPATIENT
Start: 2021-01-20 | End: 2021-01-20 | Stop reason: SURG

## 2021-01-20 RX ADMIN — LISINOPRIL 5 MG: 5 TABLET ORAL at 15:05

## 2021-01-20 RX ADMIN — IOHEXOL 30 ML: 350 INJECTION, SOLUTION INTRAVENOUS at 12:00

## 2021-01-20 RX ADMIN — METOPROLOL TARTRATE 100 MG: 50 TABLET, FILM COATED ORAL at 17:52

## 2021-01-20 RX ADMIN — PHENYLEPHRINE HYDROCHLORIDE 0.3 MCG/KG/MIN: 10 INJECTION INTRAVENOUS at 10:31

## 2021-01-20 RX ADMIN — CEFAZOLIN 2 G: 330 INJECTION, POWDER, FOR SOLUTION INTRAMUSCULAR; INTRAVENOUS at 10:31

## 2021-01-20 RX ADMIN — PROPOFOL 50 MG: 10 INJECTION, EMULSION INTRAVENOUS at 10:31

## 2021-01-20 RX ADMIN — ROCURONIUM BROMIDE 40 MG: 10 INJECTION, SOLUTION INTRAVENOUS at 10:31

## 2021-01-20 RX ADMIN — SUGAMMADEX 150 MG: 100 INJECTION, SOLUTION INTRAVENOUS at 10:48

## 2021-01-20 RX ADMIN — AMLODIPINE BESYLATE 10 MG: 10 TABLET ORAL at 15:05

## 2021-01-20 RX ADMIN — DIGOXIN 250 MCG: 250 TABLET ORAL at 17:53

## 2021-01-20 RX ADMIN — SODIUM CHLORIDE, POTASSIUM CHLORIDE, SODIUM LACTATE AND CALCIUM CHLORIDE: 600; 310; 30; 20 INJECTION, SOLUTION INTRAVENOUS at 10:18

## 2021-01-20 ASSESSMENT — COGNITIVE AND FUNCTIONAL STATUS - GENERAL
DRESSING REGULAR UPPER BODY CLOTHING: A LOT
PERSONAL GROOMING: A LITTLE
TOILETING: A LOT
DAILY ACTIVITIY SCORE: 15
SUGGESTED CMS G CODE MODIFIER DAILY ACTIVITY: CK
DRESSING REGULAR LOWER BODY CLOTHING: A LOT
HELP NEEDED FOR BATHING: A LOT

## 2021-01-20 ASSESSMENT — PAIN SCALES - GENERAL: PAIN_LEVEL: 0

## 2021-01-20 ASSESSMENT — FIBROSIS 4 INDEX: FIB4 SCORE: 1.73

## 2021-01-20 NOTE — THERAPY
Occupational Therapy  Daily Treatment     Patient Name: Yousif Kimble  Age:  89 y.o., Sex:  male  Medical Record #: [id number]  Today's Date: 1/20/2021     Precautions  Precautions: (P) Fall Risk, Nasogastric Tube, Swallow Precautions ( See Comments)  Comments: strict NPO    Assessment     Pt currently limited by decreased functional mobility, activity tolerance, cognition, sensation, strength, balance, and pain which are affecting pt's ability to complete ADLs/IADLs at baseline. Pt would benefit from OT services in the acute care setting to maximize functional recovery.     Plan    Continue current treatment plan.       Discharge Recommendations: (P) Recommend post-acute placement for additional occupational therapy services prior to discharge home       01/20/21 1008   Activities of Daily Living   Grooming Moderate Assist   Upper Body Dressing Moderate Assist   Lower Body Dressing Maximal Assist   Functional Mobility   Sit to Stand Minimal Assist   Bed, Chair, Wheelchair Transfer Moderate Assist   Short Term Goals   Short Term Goal # 1 min A with UB dressing   Goal Outcome # 1 Progressing slower than expected   Short Term Goal # 2 mod A with LB dressing   Goal Outcome # 2 Progressing slower than expected   Short Term Goal # 3 min A with ADL txfs   Goal Outcome # 3 Progressing slower than expected

## 2021-01-20 NOTE — OP REPORT
DATE OF OPERATION: 1/20/2021    PREOPERATIVE DIAGNOSIS: Oropharyngeal dysphagia, maluntrition    POSTOPERATIVE DIAGNOSIS: Same    PROCEDURE PERFORMED: Laparoscopic gastrostomy tube placement    SURGEON: Mike Sullivan M.D.    ASSISTANT: PAVEL Beasley.     ANESTHESIOLOGIST:  Gregorio Medina M.D.    ANESTHESIA: General endotracheal anesthesia    ASA CLASSIFICATION: III.    INDICATIONS: The patient is a 89 year-old elderly man with a history of oropharyngeal dysphagia, recent head trauma, and chronic illness. He is taken to the operating room today for laparoscopic gastrostomy tube placement.     TOBIN Beasley  Was present and assisted for the entirety of the case.  She provided critical assistance with exposure, running of the laparoscopic camera, assistance in percutaneous placement of the feeding tube, and closure of the port site incision.    FINDINGS: Normal anatomy. Satisfactory tube placement in the anterior wall of the stomach.    WOUND CLASSIFICATION: Class II, Clean Contaminated..     SPECIMEN: None.    ESTIMATED BLOOD LOSS: Less than 5 mL.    PROCEDURE: Following informed consent, the patient was properly identified, taken to the operating room, and placed in the supine position where a general endotracheal anesthesia was administered. Intravenous antibiotics were administered by the anesthesiologist in the correct time interval. Sequential compression devices were employed. The abdomen was prepped and draped into a sterile field.     Marcaine 0.5% was used to infiltrate the port sites. A 5 mm infraumbilical midline incision was made and the subcutaneous tissues spread bluntly. The fascia was elevated with a hook and a Veress needle was atraumatically inserted. Carbon dioxide pneumoperitoneum was instilled. A 5 mm separator port was passed. A 5 mm 30 degree lens/camera was passed into the peritoneal cavity.     The stomach and abdominal contents were inspected with the findings as  detailed as above. A Halyard ZENAIDA introducer kit was employed. Four quadrant T-bar fasteners were passed percutaneously under direct vision into the anterior wall of the greater curvature of the stomach. The stomach was cannulated percutaneously with a needle.  A 038 flexible guidewire was passed without resistance into the lumen of the stomach.  The dilator and peel-away sheath combo was passed Seldinger technique into the lumen of the stomach.  An 18 Nepali ZENAIDA gastrostomy tube was passed through the introducer and the balloon inflated with 10 cc of sterile water.  The peel-away sheath was removed.  The T-bar fasteners were secured and the outer Silastic bolster of the G-tube snugged.    The abdomen was desufflated and the port was removed.  The port site skin incisions were closed with interrupted 4-0 VICRYL® Plus Antibacterial subcuticular sutures. A DERMABOND ADVANCED® Topical Skin Adhesive dressing was applied.    The patient tolerated the procedure well, and there were no apparent complications. All sponge, needle, and instrument counts were correct on 2 separate occasions. The patient was awakened, extubated, and transferred to  post anesthesia care unit (PACU) in satisfactory condition.       ____________________________________     Mike Sullivan M.D.    DD: 1/20/2021  11:10 AM

## 2021-01-20 NOTE — ANESTHESIA POSTPROCEDURE EVALUATION
Patient: Yousif Kimble    Procedure Summary     Date: 01/20/21 Room / Location: [place] [address] / SURGERY [facility]    Anesthesia Start: 1018 Anesthesia Stop: 1131    Procedure: CREATION, GASTROSTOMY, LAPAROSCOPIC (N/A Abdomen) Diagnosis: (malnutrition)    Surgeons: Mike Sullivan M.D. Responsible Provider: Gregorio Medina M.D.    Anesthesia Type: general ASA Status: 3          Final Anesthesia Type: general  Last vitals  BP   Blood Pressure : 119/66    Temp   36.7 °C (98.1 °F)    Pulse   Pulse: 98   Resp   20    SpO2   99 %      Anesthesia Post Evaluation    Patient location during evaluation: PACU  Patient participation: complete - patient cannot participate  Post-procedure mental status: patient at baseline.  Pain score: 0    Airway patency: patent  Anesthetic complications: no  Cardiovascular status: hemodynamically stable  Respiratory status: acceptable  Hydration status: euvolemic    PONV: none           Nurse Pain Score: 0 (NPRS)

## 2021-01-20 NOTE — DISCHARGE PLANNING
Anticipated Discharge Disposition: Home with [facility]     Action: Pt got his GT placed this AM. Darshana with [facility] reports that Ward has 24/7 caregivers through [facility] to care for pt once on hospice services. DME has been delivered. Darshana requesting transfer home tomorrow 01/20 @ 1300. Transport/REMSA forms completed and faxed to [facility] for processing.     PC AGUSTINA Artis had a meeting planned with Ward tomorrow at 1500 to complete POLST form. Ward is aware of the requested transportation time an reports he will complete a POLST with [facility]'s team.     Barriers to Discharge: none    Plan: Follow up with [facility] regarding confirmation of transport

## 2021-01-20 NOTE — ANESTHESIA TIME REPORT
Anesthesia Start and Stop Event Times     Date Time Event    1/20/2021 0951 Ready for Procedure     1018 Anesthesia Start     1131 Anesthesia Stop        Responsible Staff  01/20/21    Name Role Begin End    Gregorio Medina M.D. Anesth 1018 1131        Preop Diagnosis (Free Text):  Pre-op Diagnosis     malnutrition        Preop Diagnosis (Codes):    Post op Diagnosis  Malnutrition (HCC)      Premium Reason  Non-Premium    Comments: ASA 3 (stroke), age>90

## 2021-01-20 NOTE — PROGRESS NOTES
At 12:15 pt arrival to floor from PACU, placed back onto monitor asssesed pt placed abdominal binder, pt resting quietly

## 2021-01-20 NOTE — ANESTHESIA PROCEDURE NOTES
Airway    Date/Time: 1/20/2021 10:32 AM  Performed by: Gregorio Medina M.D.  Authorized by: Gregorio Medina M.D.     Location:  OR  Urgency:  Elective  Difficult Airway: No    Indications for Airway Management:  Anesthesia      Spontaneous Ventilation: absent    Sedation Level:  Deep  Preoxygenated: Yes    Patient Position:  Sniffing  Mask Difficulty Assessment:  1 - vent by mask  Final Airway Type:  Endotracheal airway  Final Endotracheal Airway:  ETT  Cuffed: Yes    Technique Used for Successful ETT Placement:  Direct laryngoscopy  Devices/Methods Used in Placement:  Cricoid pressure    Insertion Site:  Oral  Blade Type:  Hardeep  Laryngoscope Blade/Videolaryngoscope Blade Size:  3  ETT Size (mm):  7.5  Measured from:  Teeth  ETT to Teeth (cm):  24  Placement Verified by: auscultation and capnometry    Cormack-Lehane Classification:  Grade I - full view of glottis  Number of Attempts at Approach:  1  Number of Other Approaches Attempted:  0

## 2021-01-20 NOTE — OR NURSING
Transported to room on o2 2l NC tank is full   Deion RN at bedside. 2 RN bedside check of surgical site. Scant drainage. Binder will be placed by Deion BERRIOS

## 2021-01-20 NOTE — ANESTHESIA PREPROCEDURE EVALUATION
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)   (+) Essential hypertension   (+) Essential hypertension, benign   (+) Hypertension   (+) Longstanding persistent atrial fibrillation (HCC)   (+) Systolic CHF (HCC)      Other   (+) Chronic lymphocytic leukemia (HCC)       Physical Exam    Airway   Mallampati: II  TM distance: >3 FB  Neck ROM: full       Cardiovascular   Rhythm: irregular  Rate: normal  (-) murmur     Dental       Very poor dentition  Facial Hair   Pulmonary - normal exam  Breath sounds clear to auscultation     Abdominal    Neurological - abnormal exam    Comments: altered             Anesthesia Plan    ASA 3   ASA physical status 3 criteria: CVA or TIA - history (> 3 months)    Plan - general       Airway plan will be ETT        Induction: intravenous    Postoperative Plan: Postoperative administration of opioids is intended.    Pertinent diagnostic labs and testing reviewed    Informed Consent:    Anesthetic plan and risks discussed with patient.    Use of blood products discussed with: patient whom consented to blood products.

## 2021-01-20 NOTE — PROGRESS NOTES
Neurosurgery Progress Note    Subjective:  No new events    Exam:  Sleeping but does briefly OE's. Nonverbal  Slowly follows commands x 4 extremiteis  Scalp incision c/d/i with staples    BP  Min: 118/70  Max: 156/78  Pulse  Av.9  Min: 70  Max: 95  Resp  Av.7  Min: 12  Max: 43  Temp  Av.6 °C (97.9 °F)  Min: 36.1 °C (97 °F)  Max: 37.1 °C (98.8 °F)  SpO2  Av.8 %  Min: 91 %  Max: 100 %    No data recorded    Recent Labs     21  0507 21  0515 21  0410   WBC 16.3* 13.6* 13.5*   RBC 3.08* 2.83* 3.09*   HEMOGLOBIN 9.8* 9.1* 9.8*   HEMATOCRIT 31.7* 29.5* 32.3*   .9* 104.2* 104.5*   MCH 31.8 32.2 31.7   MCHC 30.9* 30.8* 30.3*   RDW 55.8* 57.2* 58.0*   PLATELETCT 375 354 298   MPV 10.4 10.6 11.5     Recent Labs     21  0507 21  0515 21  0410   SODIUM 152* 145 143   POTASSIUM 3.7 3.7 3.9   CHLORIDE 117* 112 110   CO2 27 27 26   GLUCOSE 132* 134* 100*   BUN 40* 41* 38*   CREATININE 0.76 0.70 0.69   CALCIUM 9.7 9.1 9.2               Intake/Output       21 07 - 21 0659 21 07 - 21 0659       Total  Total       Intake    I.V.  120  50 170  --  -- --    IV Volume (TKO) 120 50 170 -- -- --    NG/GT  500  250 750  160  -- 160    Intake (mL) (Enteral Tube 21 Cortrak - Gastric 12 Fr. Right nare) 500 250 750 160 -- 160    Total Intake 620 300 920 160 -- 160       Output    Urine  950  1000 1950  225  -- 225    Output (mL) (Urethral Catheter 18 Fr.) 950 1000 1950 225 -- 225    Drains  --  -- --  0  -- 0    Residual Amount (mL) (Discarded) (Enteral Tube 21 Cortrak - Gastric 12 Fr. Right nare) -- -- -- 0 -- 0    Stool  --  -- --  --  -- --    Number of Times Stooled 2 x 1 x 3 x -- -- --    Total Output 950 1000 1950 225 -- 225       Net I/O     -330 -700 -1030 -65 -- -65            Intake/Output Summary (Last 24 hours) at 2021 0845  Last data filed at 2021 0800  Gross per 24 hour   Intake  1060 ml   Output 2175 ml   Net -1115 ml            • metoprolol  100 mg BID   • digoxin  125 mcg QPM TU,TH,SA,LARKIN   • digoxin  250 mcg MO, WE + FR   • amLODIPine  10 mg DAILY   • lisinopril  5 mg DAILY   • Pharmacy  1 Each PHARMACY TO DOSE   • docusate sodium 100mg/10mL  100 mg BID   • magnesium hydroxide  30 mL DAILY   • polyethylene glycol/lytes  1 Packet BID   • senna-docusate  1 Tab Nightly   • oxyCODONE immediate-release  2.5 mg Q3HRS PRN   • oxyCODONE immediate-release  5 mg Q3HRS PRN   • senna-docusate  1 Tab Q24HRS PRN   • Respiratory Therapy Consult   Continuous RT   • Pharmacy Consult Request  1 Each PHARMACY TO DOSE   • bisacodyl  10 mg Q24HRS PRN   • fleet  1 Each Once PRN   • morphine injection  2 mg Q3HRS PRN   • ondansetron  4 mg Q4HRS PRN   • Metoprolol Tartrate  5 mg Q6HRS PRN       Assessment and Plan:  Hospital day # 10   POD #9 Crani for SDH  DC cranial staples 1/25/2021 - order placed  Prophylactic anticoagulation:  NO  Neuro stable - trang, check in a few times/ wk - call for concerns  Na - 143  Out of bed to chair

## 2021-01-20 NOTE — PROGRESS NOTES
Trauma / Surgical Daily Progress Note    Date of Service  1/20/2021    Chief Complaint  89 y.o. male admitted 1/10/2021 with head bleed    Interval Events  Laparoscopic gastrostomy tube placement.    The patient was seen and examined on rounds and discussed with the multidisciplinary critical care team and consulting physicians. Critically evaluated laboratory tests, culture data, medications, imaging, and other diagnostic tests.    The patient has impairment of one or more vital organ systems and a high probability of imminent or life-threatening deterioration in condition. Provided high complexity decision making to assess, manipulate, and support vital system functions to treat vital organ system failure and/or to prevent further life-threatening deterioration of the patient's condition. Requires continued ICU and hospital admission.    Review of Systems  Review of Systems   Unable to perform ROS: Mental status change        Vital Signs for last 24 hours  Temp:  [36.1 °C (97 °F)-37.1 °C (98.8 °F)] 36.7 °C (98.1 °F)  Pulse:  [] 98  Resp:  [12-43] 20  BP: (108-162)/(62-93) 119/66  SpO2:  [90 %-100 %] 99 %    Hemodynamic parameters for last 24 hours       Respiratory Data     Respiration: 20, Pulse Oximetry: 99 %     Work Of Breathing / Effort: Mild  RUL Breath Sounds: Clear, RML Breath Sounds: Clear, RLL Breath Sounds: Clear, PRISCA Breath Sounds: Clear, LLL Breath Sounds: Clear    Physical Exam  Physical Exam  Vitals signs and nursing note reviewed.   Constitutional:       General: He is not in acute distress.     Appearance: He is not toxic-appearing or diaphoretic.   HENT:      Head:      Comments: Incision clean     Right Ear: External ear normal.      Left Ear: External ear normal.      Nose: Nose normal.      Mouth/Throat:      Mouth: Mucous membranes are moist.   Eyes:      General: No scleral icterus.        Right eye: No discharge.         Left eye: No discharge.      Extraocular Movements: Extraocular  movements intact.      Pupils: Pupils are equal, round, and reactive to light.   Neck:      Musculoskeletal: Normal range of motion and neck supple.   Cardiovascular:      Rate and Rhythm: Tachycardia present. Rhythm irregular.      Pulses: Normal pulses.      Heart sounds: No murmur. No friction rub. No gallop.    Pulmonary:      Effort: Pulmonary effort is normal. No respiratory distress.      Breath sounds: No stridor.   Abdominal:      General: Abdomen is flat.      Palpations: Abdomen is soft.      Tenderness: There is no abdominal tenderness. There is no guarding.      Comments: Left subcostal gastrostomy tube site is clean and dry   Genitourinary:     Comments: Arreaga in place  Musculoskeletal: Normal range of motion.         General: No deformity.   Skin:     General: Skin is warm and dry.      Capillary Refill: Capillary refill takes less than 2 seconds.      Coloration: Skin is not jaundiced.      Findings: No bruising.   Neurological:      General: No focal deficit present.      Comments: Not following  Aphasic  Moves all   Psychiatric:      Comments: Unable to assess         Laboratory  Recent Results (from the past 24 hour(s))   CBC WITH DIFFERENTIAL    Collection Time: 01/20/21  4:10 AM   Result Value Ref Range    WBC 13.5 (H) 4.8 - 10.8 K/uL    RBC 3.09 (L) 4.70 - 6.10 M/uL    Hemoglobin 9.8 (L) 14.0 - 18.0 g/dL    Hematocrit 32.3 (L) 42.0 - 52.0 %    .5 (H) 81.4 - 97.8 fL    MCH 31.7 27.0 - 33.0 pg    MCHC 30.3 (L) 33.7 - 35.3 g/dL    RDW 58.0 (H) 35.9 - 50.0 fL    Platelet Count 298 164 - 446 K/uL    MPV 11.5 9.0 - 12.9 fL    Neutrophils-Polys 45.70 44.00 - 72.00 %    Lymphocytes 45.70 (H) 22.00 - 41.00 %    Monocytes 6.00 0.00 - 13.40 %    Eosinophils 2.60 0.00 - 6.90 %    Basophils 0.00 0.00 - 1.80 %    Nucleated RBC 0.20 /100 WBC    Neutrophils (Absolute) 6.17 1.82 - 7.42 K/uL    Lymphs (Absolute) 6.17 (H) 1.00 - 4.80 K/uL    Monos (Absolute) 0.81 0.00 - 0.85 K/uL    Eos (Absolute) 0.35  0.00 - 0.51 K/uL    Baso (Absolute) 0.00 0.00 - 0.12 K/uL    NRBC (Absolute) 0.03 K/uL    Anisocytosis 1+     Macrocytosis 1+    Basic Metabolic Panel    Collection Time: 21  4:10 AM   Result Value Ref Range    Sodium 143 135 - 145 mmol/L    Potassium 3.9 3.6 - 5.5 mmol/L    Chloride 110 96 - 112 mmol/L    Co2 26 20 - 33 mmol/L    Glucose 100 (H) 65 - 99 mg/dL    Bun 38 (H) 8 - 22 mg/dL    Creatinine 0.69 0.50 - 1.40 mg/dL    Calcium 9.2 8.5 - 10.5 mg/dL    Anion Gap 7.0 7.0 - 16.0   ESTIMATED GFR    Collection Time: 21  4:10 AM   Result Value Ref Range    GFR If African American >60 >60 mL/min/1.73 m 2    GFR If Non African American >60 >60 mL/min/1.73 m 2   DIFFERENTIAL MANUAL    Collection Time: 21  4:10 AM   Result Value Ref Range    Manual Diff Status PERFORMED    PERIPHERAL SMEAR REVIEW    Collection Time: 21  4:10 AM   Result Value Ref Range    Peripheral Smear Review see below    PLATELET ESTIMATE    Collection Time: 21  4:10 AM   Result Value Ref Range    Plt Estimation Normal    MORPHOLOGY    Collection Time: 21  4:10 AM   Result Value Ref Range    RBC Morphology Present     Smudge Cells Moderate    SARS-COV Antigen KENDRA: Collect dry nasal swab AND NP swab in VTM    Collection Time: 21  6:00 AM   Result Value Ref Range    SARS-CoV-2 Source Nasal Swab     SARS-COV ANTIGEN KENDRA NotDetected Not-Detected   SARS-CoV-2 PCR (24 hour In-House): Collect NP swab in VTM    Collection Time: 21  6:00 AM    Specimen: Nasopharyngeal; Respirate   Result Value Ref Range    SARS-CoV-2 Source NP Swab    EKG    Collection Time: 21  6:06 AM   Result Value Ref Range    Report       [facility]    Test Date:  2021  Pt Name:    REID PACE                  Department: 19  MRN:        [id number]                      Room:       [facility]  Gender:     Male                         Technician: [facility]  :        1930                   Requested By:TYLER HARDIN  Order #:     [id number]                    Reading MD: Klever Sparrow MD    Measurements  Intervals                                Axis  Rate:       91                           P:  CO:                                      QRS:        -26  QRSD:       78                           T:          195  QT:         312  QTc:        384    Interpretive Statements  ATRIAL FIBRILLATION, V-RATE    ANTERIOR INFARCT, AGE INDETERMINATE  T WAVE ABNORMALITIES, INFERIOR LEADS, CONSIDER ISCHEMIA  LOW VOLTAGE  No previous ECG available for comparison  Electronically Signed On 1- 7:12:28 PST by Klever Sparrow MD         Fluids    Intake/Output Summary (Last 24 hours) at 1/20/2021 1117  Last data filed at 1/20/2021 1000  Gross per 24 hour   Intake 870 ml   Output 2350 ml   Net -1480 ml       Core Measures & Quality Metrics  Labs reviewed, Medications reviewed and Radiology images reviewed  Arreaga catheter: Critically Ill - Requiring Accurate Measurement of Urinary Output      DVT Prophylaxis: Contraindicated - High bleeding risk  DVT prophylaxis - mechanical: SCDs    Antibiotics: Treating active infection/contamination beyond 24 hours perioperative coverage      FAZAL Score  ETOH Screening    Assessment/Plan  Acute hyperactive delirium due to multiple etiologies- (present on admission)  Assessment & Plan  Advanced age, ICU admission, traumatic brain hemorrhage, surgery  Limit psychotropic medications  Restraints as needed    Longstanding persistent atrial fibrillation (HCC)- (present on admission)  Assessment & Plan  Chronic condition treated with Digoxin and metoprolol.  Maintenance medications resumed on admission.    1/16 Digoxin dose increased.  1/17 Metoprolol dosage increased.    Dysphagia- (present on admission)  Assessment & Plan  Secondary to altered mental status from traumatic brain injury  1/11 Cortrak placed, tube feeds commenced.  1/20 Laparoscopic gastrostomy tube placement.    Acute urinary retention- (present on  admission)  Assessment & Plan  Arreaga placed in OR  Began to have bleeding upon arrival to ICU  Unable to advance  Urology placed catheter over wire  Arreaga in place for 2 weeks    Chronic anticoagulation- (present on admission)  Assessment & Plan  Takes ASA and Eliquis outpatient. KCentra given prior to arrival.  Admission thromboelastogram demonstrated moderately elevated ragged tonic acid pathway platelet inhibition.  1 platelet pheresis pack and 2 units of FFP administered.      Essential hypertension- (present on admission)  Assessment & Plan  Chronic condition treated with amlodipine, lisinopril, and metoprolol.  1/10 Resumed metoprolol.  1/14 Resumed lisinopril and amlodipine.    Contraindication to deep vein thrombosis (DVT) prophylaxis- (present on admission)  Assessment & Plan  Systemic anticoagulation contraindicated secondary to elevated bleeding risk.  1/12 Trauma screening bilateral lower extremity venous duplex negative for above knee DVT.  1/19 Trauma screening bilateral lower extremity venous duplex negative for above knee DVT.    Subdural hematoma (HCC)- (present on admission)  Assessment & Plan  Large left hemispheric subdural hematoma causing significant mass effect on the underlying cerebral hemisphere with left to right midline shift .Small anterior right frontal subdural hematoma and small anterior interhemispheric subdural hematoma.  Repeat interval head CT stable, but waning GCS.  1/11 Craniotomy with evacuation.  Post traumatic pharmacologic seizure prophylaxis for 1 week.  Speech Language Pathology cognitive evaluation.  Dante Sparrow MD. Neurosurgeon. [facility].    Cancer (HCC)- (present on admission)  Assessment & Plan  History of CLL and prostate CA    Stroke (HCC)- (present on admission)  Assessment & Plan  Premorbid condition.    Screening examination for infectious disease- (present on admission)  Assessment & Plan  Admission SARS-CoV-2 testing negative. Repeat  SARS-CoV-2 testing not indicated. Isolation precautions de-escalated.  1/20 Repeat SARS-CoV-2 testing for Interval surgery following admission testing negative.    Trauma- (present on admission)  Assessment & Plan  GLF on eliquis.  Trauma Yellow Transfer Activation from [facility].  Jere Meyer MD. Trauma Surgery.      Discussed patient condition with RN, RT, Pharmacy, Dietary and .  CRITICAL CARE TIME EXCLUDING PROCEDURES: 36  minutes

## 2021-01-20 NOTE — DISCHARGE PLANNING
Received Transport Form @ 5208  Spoke to Emma @ [facility]    Transport is scheduled for 1/21 @1300 going to Home: [address] [facility] in [place].

## 2021-01-20 NOTE — CONSULTS
Reason for PC Consult: Advance Care Planning    Consulted by: Dr. Sullivan      Assessment:  General: Pt is a 89 yo male who was transferred to [facility] on 1/10/21 from an outside facility with a subdural hematoma. 1/11/21 Craniotomy with evacuation was performed from Neurosurgery. Per Dr. Sparrow note pt had a CVA last year in the [facility] frontoparietal area and has been weaker since that time. CT showed stable large left holohemispheric acute subdural hematoma and some anterior parafalcine hematoma. Pt has a history of HTN, CHF, Afib with chronic anticoagulation was on Eliquis. Hx of CLL and prostate CA. Pt is able to follow commands and nodding appropriately throughout his PT evaluation. Pt having a PEG tube placed, and plan [facility].    Social: Pt lives with MANAN Doss. Pt is going to have [facility] full time care with Hospice support. Per Ward pt was doing well and functional status prior to this event was good.     Consults: Urology and neurosurgery     Dyspnea: No-    Last BM: 01/19/21-    Pain: No-    Depression: No-    Dementia: Unable to Determine;       Spiritual:  Is Mandaeism or spirituality important for coping with this illness? No-    Has a  or spiritual provider visit been requested? No    Palliative Performance Scale: 40%    Advance Directive: Advance Directive, DPOA-    DPOA: Yes- Ward Doss  POLST: No-      Code Status: Full-      Outcome:  Palliative spoke with ESTHELA Castelan prior to reaching out with Ward pt's brother. Palliative care called Ward pt's DPOA introduced self and role of palliative care. Set up appointment with Ward to review POLST. Ward stated that he could meet with RN at bedside at 1500 1/21/21.     ADDENDUM: Annelise called as well as pt's brother to inform this RN that pt will be going home with hospice on 1/21/21 at 1300 and Ward would not be able to meet with RN.     Updated: Annelise PROCTOR    Plan: POLST review with Ward pt's  DPOA, home [facility] after PEG placement D/C possibly Friday 1/22/2021    Thank you for allowing Palliative Care to participate in this patient's care. Please feel free to call [phone] with any questions or concerns.

## 2021-01-20 NOTE — PROGRESS NOTES
0600 Report given to preop  0610 COVID samples taken via NP and sent to lab.  0615 EKG tech at bedside for pre op 12 lead ekg

## 2021-01-21 VITALS
HEIGHT: 68 IN | HEART RATE: 78 BPM | SYSTOLIC BLOOD PRESSURE: 131 MMHG | WEIGHT: 128.97 LBS | DIASTOLIC BLOOD PRESSURE: 81 MMHG | TEMPERATURE: 98.1 F | OXYGEN SATURATION: 91 % | BODY MASS INDEX: 19.55 KG/M2 | RESPIRATION RATE: 29 BRPM

## 2021-01-21 LAB
ANION GAP SERPL CALC-SCNC: 7 MMOL/L (ref 7–16)
BASOPHILS # BLD AUTO: 0.2 % (ref 0–1.8)
BASOPHILS # BLD: 0.02 K/UL (ref 0–0.12)
BUN SERPL-MCNC: 37 MG/DL (ref 8–22)
CALCIUM SERPL-MCNC: 9.1 MG/DL (ref 8.5–10.5)
CHLORIDE SERPL-SCNC: 111 MMOL/L (ref 96–112)
CO2 SERPL-SCNC: 28 MMOL/L (ref 20–33)
CREAT SERPL-MCNC: 0.77 MG/DL (ref 0.5–1.4)
EOSINOPHIL # BLD AUTO: 0.21 K/UL (ref 0–0.51)
EOSINOPHIL NFR BLD: 1.8 % (ref 0–6.9)
ERYTHROCYTE [DISTWIDTH] IN BLOOD BY AUTOMATED COUNT: 61.2 FL (ref 35.9–50)
GLUCOSE SERPL-MCNC: 149 MG/DL (ref 65–99)
HCT VFR BLD AUTO: 33.1 % (ref 42–52)
HGB BLD-MCNC: 10.2 G/DL (ref 14–18)
IMM GRANULOCYTES # BLD AUTO: 0.09 K/UL (ref 0–0.11)
IMM GRANULOCYTES NFR BLD AUTO: 0.8 % (ref 0–0.9)
LYMPHOCYTES # BLD AUTO: 4.56 K/UL (ref 1–4.8)
LYMPHOCYTES NFR BLD: 38.9 % (ref 22–41)
MAGNESIUM SERPL-MCNC: 2 MG/DL (ref 1.5–2.5)
MCH RBC QN AUTO: 32 PG (ref 27–33)
MCHC RBC AUTO-ENTMCNC: 30.8 G/DL (ref 33.7–35.3)
MCV RBC AUTO: 103.8 FL (ref 81.4–97.8)
MONOCYTES # BLD AUTO: 0.69 K/UL (ref 0–0.85)
MONOCYTES NFR BLD AUTO: 5.9 % (ref 0–13.4)
NEUTROPHILS # BLD AUTO: 6.15 K/UL (ref 1.82–7.42)
NEUTROPHILS NFR BLD: 52.4 % (ref 44–72)
NRBC # BLD AUTO: 0 K/UL
NRBC BLD-RTO: 0 /100 WBC
PHOSPHATE SERPL-MCNC: 2.6 MG/DL (ref 2.5–4.5)
PLATELET # BLD AUTO: 359 K/UL (ref 164–446)
PMV BLD AUTO: 10.9 FL (ref 9–12.9)
POTASSIUM SERPL-SCNC: 3.5 MMOL/L (ref 3.6–5.5)
RBC # BLD AUTO: 3.19 M/UL (ref 4.7–6.1)
SODIUM SERPL-SCNC: 146 MMOL/L (ref 135–145)
WBC # BLD AUTO: 11.7 K/UL (ref 4.8–10.8)

## 2021-01-21 PROCEDURE — A9270 NON-COVERED ITEM OR SERVICE: HCPCS | Performed by: SURGERY

## 2021-01-21 PROCEDURE — 99238 HOSP IP/OBS DSCHRG MGMT 30/<: CPT | Mod: 24 | Performed by: SURGERY

## 2021-01-21 PROCEDURE — 700102 HCHG RX REV CODE 250 W/ 637 OVERRIDE(OP): Performed by: SURGERY

## 2021-01-21 PROCEDURE — 83735 ASSAY OF MAGNESIUM: CPT

## 2021-01-21 PROCEDURE — 84100 ASSAY OF PHOSPHORUS: CPT

## 2021-01-21 PROCEDURE — 85025 COMPLETE CBC W/AUTO DIFF WBC: CPT

## 2021-01-21 PROCEDURE — 80048 BASIC METABOLIC PNL TOTAL CA: CPT

## 2021-01-21 RX ADMIN — POTASSIUM BICARBONATE 50 MEQ: 977.5 TABLET, EFFERVESCENT ORAL at 09:20

## 2021-01-21 RX ADMIN — LISINOPRIL 5 MG: 5 TABLET ORAL at 05:36

## 2021-01-21 RX ADMIN — METOPROLOL TARTRATE 100 MG: 50 TABLET, FILM COATED ORAL at 05:36

## 2021-01-21 RX ADMIN — AMLODIPINE BESYLATE 10 MG: 10 TABLET ORAL at 05:36

## 2021-01-21 ASSESSMENT — FIBROSIS 4 INDEX: FIB4 SCORE: 1.44

## 2021-01-21 NOTE — PROGRESS NOTES
Neurosurgery Progress Note    Subjective:  No new events  Reviewed notes regarding dc home today with hospice    Exam:    Scalp incision c/d/i with staples    BP  Min: 108/62  Max: 162/65  Pulse  Av.9  Min: 62  Max: 124  Resp  Av.8  Min: 13  Max: 38  Temp  Av.8 °C (98.2 °F)  Min: 36.2 °C (97.2 °F)  Max: 37.1 °C (98.8 °F)  SpO2  Av.4 %  Min: 88 %  Max: 100 %    No data recorded    Recent Labs     21  0515 21  0410 21  0710   WBC 13.6* 13.5* 11.7*   RBC 2.83* 3.09* 3.19*   HEMOGLOBIN 9.1* 9.8* 10.2*   HEMATOCRIT 29.5* 32.3* 33.1*   .2* 104.5* 103.8*   MCH 32.2 31.7 32.0   MCHC 30.8* 30.3* 30.8*   RDW 57.2* 58.0* 61.2*   PLATELETCT 354 298 359   MPV 10.6 11.5 10.9     Recent Labs     21  0515 21  0410 21  0710   SODIUM 145 143 146*   POTASSIUM 3.7 3.9 3.5*   CHLORIDE 112 110 111   CO2 27 26 28   GLUCOSE 134* 100* 149*   BUN 41* 38* 37*   CREATININE 0.70 0.69 0.77   CALCIUM 9.1 9.2 9.1               Intake/Output       21 0700 - 21 0659 21 07 - 21 0659      1900-0659 Total 2336-9268 1783-7528 Total       Intake    I.V.  700  -- 700  --  -- --    Volume (mL) (Lactated Ringers) 700 -- 700 -- -- --    Other  --  190 190  --  -- --    Medications (PO/Enteral Liquids) -- 190 190 -- -- --    NG/GT  370  420 790  --  -- --    Intake (mL) (Enteral Tube 21 Gastrostomy 18 Fr. Abdomen) 370 420 790 -- -- --    Total Intake 1357 194 7669 -- -- --       Output    Urine  880  850 1730  125  -- 125    Output (mL) (Urethral Catheter 18 Fr.)  125 -- 125    Drains  0  -- 0  --  -- --    Residual Amount (mL) (Discarded) (Enteral Tube 21 Gastrostomy 18 Fr. Abdomen) 0 -- 0 -- -- --    Residual Amount (mL) (Discarded) ([REMOVED] Enteral Tube 21 Cortrak - Gastric 12 Fr. Right nare) 0 -- 0 -- -- --    Stool  --  -- --  --  -- --    Number of Times Stooled 2 x -- 2 x -- -- --    Total Output  125 -- 125        Net I/O     190 -240 -50 -125 -- -125            Intake/Output Summary (Last 24 hours) at 1/21/2021 0843  Last data filed at 1/21/2021 0800  Gross per 24 hour   Intake 1680 ml   Output 1630 ml   Net 50 ml            • amLODIPine  10 mg DAILY   • lisinopril  5 mg DAILY   • metoprolol tartrate  100 mg BID   • digoxin  125 mcg QPM TU,TH,SA,LARKIN   • digoxin  250 mcg MO, WE + FR   • Pharmacy  1 Each PHARMACY TO DOSE   • docusate sodium 100mg/10mL  100 mg BID   • magnesium hydroxide  30 mL DAILY   • polyethylene glycol/lytes  1 Packet BID   • senna-docusate  1 Tab Nightly   • oxyCODONE immediate-release  2.5 mg Q3HRS PRN   • oxyCODONE immediate-release  5 mg Q3HRS PRN   • senna-docusate  1 Tab Q24HRS PRN   • Respiratory Therapy Consult   Continuous RT   • Pharmacy Consult Request  1 Each PHARMACY TO DOSE   • bisacodyl  10 mg Q24HRS PRN   • fleet  1 Each Once PRN   • morphine injection  2 mg Q3HRS PRN   • ondansetron  4 mg Q4HRS PRN   • Metoprolol Tartrate  5 mg Q6HRS PRN       Assessment and Plan:  Hospital day # 11   POD # 10 Crani for SDH  DC cranial staples 1/25/2021 - order placed  Prophylactic anticoagulation:  NO

## 2021-01-21 NOTE — PROGRESS NOTES
Pt dc'd home with hospice, via stretcher and no emergency transport after g tube flushed and hep loc dc'd , tip intact pt ted well

## 2021-01-21 NOTE — DISCHARGE PLANNING
Anticipated Discharge Disposition: [facility]     Action: DC summary has been dictated. LSW called transcription and requested the document be expitied as pt is dc.     LSW informed Darshana with [facility] who reports if dc summary is not uploaded before pt leaves LSW can fax it to their office ([phone])    Packet completed and placed on nursing cart outside of pt's room. Packet included: ACP documents and facesheet. Pt is a full code for transfer, [facility] aware.     Barriers to Discharge: none    Plan: Pt to dc home by [facility], with [facility].

## 2021-02-01 NOTE — PROGRESS NOTES
Spoke to pt's brother, Ward Doss, he stated he was ok with disposing of Yousif's belongings that were left at [facility]. Belongings disposed of accordingly.

## 2021-02-12 NOTE — DOCUMENTATION QUERY
[facility]                                                                       Query Response Note      PATIENT:               REID PACE  ACCT #:                  [id number]  MRN:                     [id number]  :                      1930  ADMIT DATE:       1/10/2021 1:16 PM  DISCH DATE:        2021 1:42 PM  RESPONDING  PROVIDER #:        [id number]           QUERY TEXT:    Patient is currently anti-coagulated with eliquis due to current diagnosis of afib.  The patient presented with a traumatic subdural hematoma. The  is unable to determine if there is a relationship between the subdural hematoma and the anticoagulation therapy. Please clarify the relationship of these conditions.     NOTE:  If an appropriate response is not listed below, please respond with a new note.    Shanita Millan CCS  Coding   [email or web address]      The patient's Clinical Indicators include:  CLINICAL INDICATORS:    01/10 lab values:  Recent Labs            01/10/21 @ 1328  WBC                         15.9*  RBC                  3.97*  HEMOGLOBIN 12.6*  HEMATOCRIT   38.5*      DIAGNOSTIC STUDIES:  CT scan shows a stable large left holohemispheric acute   subdural hematoma and some anterior parafalcine hematoma as well.    PER H&P:  Subdural hematoma (HCC)- (present on admission)  Assessment & Plan  Large left hemispheric subdural hematoma measuring 15mm in greatest transverse dimensions causing significant mass effect on the underlying cerebral hemisphere with left to right midline shift of 1cm. Also small anterior right frontal subdural hematoma measuring 4mm and small anterior interhemispheric subdural hematoma measuring 5mm.    Assessment/Plan  Chronic anticoagulation- (present on admission)  Assessment & Plan  Takes ASA and Eliquis outpatient.  KCentra given prior to arrival  TEG with platelet mapping showed AA  50.4    Iron deficiency anemia- (present on admission)  Assessment & Plan  Anemic on arrival    Progress Notes:   Trauma- (present on admission)  Assessment & Plan  GLF on eliquis.    TREATMENT:  left frontotemporal craniotomy with evacuation of acute subdural hematoma.  KCentra given prior to arrival  01/10 NEURO PN:   2 U FFP, 1U platelets    RELATED CONDITIONS:  persistent afib;  GLF; HTN, hx of CVA, prostate cancer, lymphocytic leukemia; thrombocytosis; Oropharyngeal dysphagia, malnutrition  Options provided:   -- Subdural hematoma is due to or associated with anticoagulation therapy   -- Unrelated to each other   -- Unable to determine      Query created by: Shanita Millan on 1/27/2021 4:07 PM    RESPONSE TEXT:    Unable to determine          Electronically signed by:  COLLIN VALLADARES MD 2/12/2021 10:32 AM

## 2022-10-15 NOTE — THERAPY
01/24/20 1059   Precautions   Precautions Fall Risk;Swallow Precautions ( See Comments);Other (See Comments)   Comments NTL, puréed, shortness of breath with activity   Pain 0 - 10 Group   Therapist Pain Assessment 0   Cognition    Speech/ Communication Dysarthric;Hard of Hearing   Level of Consciousness Alert   Ability To Follow Commands 1 Step   Safety Awareness Impaired   New Learning Impaired   Attention Impaired   Sequencing Impaired   Bed Mobility    Supine to Sit Stand by Assist   Sit to Stand Contact Guard Assist   Scooting Stand by Assist   Rolling Supervised   Neuro-Muscular Treatments   Neuro-Muscular Treatments Sequencing;Tactile Cuing;Verbal Cuing   PT Total Time Spent   PT Individual Total Time Spent (Mins) 30   PT Charge Group   Charges Yes   PT Gait Training 1   PT Therapeutic Activities 1   Physical Therapy   Daily Treatment     Patient Name: Yousif Kimble  Age:  89 y.o., Sex:  male  Medical Record #: [id number]  Today's Date: 1/24/2020     Precautions  Precautions: Fall Risk, Swallow Precautions ( See Comments), Other (See Comments)  Comments: NTL, puréed, shortness of breath with activity    Subjective    The patient was resting in bed and he agreed to PT.  He had no complaints of pain.     Objective    The patient participated in bed mobility and transfer training to the wheelchair which required SBA for bed mobility and CGA to stand and transfer.  He also participated in gait training with a fww and CGA.  He tolerated 110 FT x2    FIM Bed/Chair/Wheelchair Transfers Score: 4 - Minimal Assistance  Bed/Chair/Wheelchair Transfers Description:  Supervision for safety, Increased time(CGA sit to stand and transfer, supine to/from sit SBA)    FIM Walking Score:  2 - Max Assistance  Walking Description:  Extra time, Safety concerns, Verbal cueing, Supervision for safety, Walker(FWW, CGA/gait belt 110 FT x2, shortness of breath)    FIM Wheelchair Score:  2 - Max Assistance  Wheelchair Description:       FIM  Stairs Score:     Stairs Description:         Assessment    The patient is very pleasant and cooperative and he is willing to participate in PT activities.  His tolerance for activity is limited by fatigue and shortness of breath.  He needs additional training for transfers, using the walker, sequencing sit to/from stand.    Plan    Neuromuscular reeducation/balance training, safety education, sequencing activities, gait training, endurance training     no epistaxis

## 2025-04-23 NOTE — OR NURSING
----- Message from Lupe sent at 4/23/2025  1:15 PM CDT -----  Regarding: appointment  Contact: patient  Type:  Sooner Apoointment RequestCaller is requesting a sooner appointment.  Caller declined first available appointment listed below.  Caller will not accept being placed on the waitlist and is requesting a message be sent to doctor.Name of Caller:patientWhen is the first available appointment?Symptoms:ultrasound and doctor visit for FridayWould the patient rather a call back or a response via [facility]? [facility] Call Back Number:[phone] (home) Additional Information: Please call patient to advise.  Thanks!   Daughter will call us back when knows schedule to bring her . Xray contacted for KUB

## (undated) DEVICE — PACK CRANI - (1EA/CA)

## (undated) DEVICE — LACTATED RINGERS INJ 1000 ML - (14EA/CA 60CA/PF)

## (undated) DEVICE — COVER FOOT UNIVERSAL DISP. - (25EA/CA)

## (undated) DEVICE — PACK LAP CHOLE OR - (2EA/CA)

## (undated) DEVICE — SUTURE 2-0 VICRYL PLUS CT-1 - 8 X 18 INCH(12/BX)

## (undated) DEVICE — SLEEVE, VASO, THIGH, MED

## (undated) DEVICE — BLADE CLIPPER FITS 2501 CLIPPER (BLUE)  (20EA/CA)

## (undated) DEVICE — SET EXTENSION WITH 2 PORTS (48EA/CA) ***PART #2C8610 IS A SUBSTITUTE*****

## (undated) DEVICE — SYRINGE 50ML CATHETER TIP (40EA/BX 4BX/CA)

## (undated) DEVICE — CANISTER SUCTION 3000ML MECHANICAL FILTER AUTO SHUTOFF MEDI-VAC NONSTERILE LF DISP  (40EA/CA)

## (undated) DEVICE — PATTIES SURG X-RAYCOTTONOID - 1 X 3 IN (200/CA)

## (undated) DEVICE — DRESSING XEROFORM 1X8 - (50/BX 4BX/CA)

## (undated) DEVICE — GLOVE BIOGEL SZ 7 SURGICAL PF LTX - (50PR/BX 4BX/CA)

## (undated) DEVICE — SENSOR SPO2 NEO LNCS ADHESIVE (20/BX) SEE USER NOTES

## (undated) DEVICE — GLOVE BIOGEL INDICATOR SZ 8 SURGICAL PF LTX - (50/BX 4BX/CA)

## (undated) DEVICE — SPONGE GAUZESTER 4 X 4 4PLY - (128PK/CA)

## (undated) DEVICE — MIDAS LUBRICATOR DIFFUSER PACK (4EA/CA)

## (undated) DEVICE — GOWN WARMING STANDARD FLEX - (30/CA)

## (undated) DEVICE — SUTURE 4-0 VICRYL PLUS FS-2 - 27 INCH (36/BX)

## (undated) DEVICE — DRAPE STRLE REG TOWEL 18X24 - (10/BX 4BX/CA)"

## (undated) DEVICE — STAPLER SKIN DISP - (6/BX 10BX/CA) VISISTAT

## (undated) DEVICE — KIT 18FR INITIAL PLACEMENT - (1/CA)

## (undated) DEVICE — TUBING CLEARLINK DUO-VENT - C-FLO (48EA/CA)

## (undated) DEVICE — CATH, COUNCIL TIP 16 FR

## (undated) DEVICE — GOWN SURGICAL X-LARGE ULTRA - FILM-REINFORCED (20/CA)

## (undated) DEVICE — NEPTUNE 4 PORT MANIFOLD - (20/PK)

## (undated) DEVICE — DISSECT TOOL MIDAS F2/8TA23

## (undated) DEVICE — SUTURE 4-0 NUROLON CR/8 TF - (12/BX) ETHICON

## (undated) DEVICE — GLOVE BIOGEL PI INDICATOR SZ 7.0 SURGICAL PF LF - (50/BX 4BX/CA)

## (undated) DEVICE — ELECTRODE DUAL RETURN W/ CORD - (50/PK)

## (undated) DEVICE — LACTATED RINGERS INJ. 500 ML - (24EA/CA)

## (undated) DEVICE — BATTERY VARISPEED

## (undated) DEVICE — KIT ANESTHESIA W/CIRCUIT & 3/LT BAG W/FILTER (20EA/CA)

## (undated) DEVICE — CATHETER COUDE FOLEY 5CC - 14FR (12EA/CA)

## (undated) DEVICE — PERFORATER DISP TIP DGR-0

## (undated) DEVICE — SET LEADWIRE 5 LEAD BEDSIDE DISPOSABLE ECG (1SET OF 5/EA)

## (undated) DEVICE — KIT SURGIFLO W/OUT THROMBIN - (6EA/CA)

## (undated) DEVICE — TOWELS CLOTH SURGICAL - (4/PK 20PK/CA)

## (undated) DEVICE — SUCTION INSTRUMENT YANKAUER BULBOUS TIP W/O VENT (50EA/CA)

## (undated) DEVICE — SUTURE GENERAL

## (undated) DEVICE — SCALP CLIP RANEY 20-1037 (10EA/PK 20PK/CA)

## (undated) DEVICE — GLOVE BIOGEL SZ 8.5 SURGICAL PF LTX - (50PR/BX 4BX/CA)

## (undated) DEVICE — SET TUBING PNEUMOCLEAR HIGH FLOW SMOKE EVACUATION (10EA/BX)

## (undated) DEVICE — HEAD HOLDER JUNIOR/ADULT

## (undated) DEVICE — PROTECTOR ULNA NERVE - (36PR/CA)

## (undated) DEVICE — SYRINGE ASEPTO - (50EA/CA

## (undated) DEVICE — TRAY SKIN SCRUB PVP WET (20EA/CA) PART #DYND70356 DISCONTINUED

## (undated) DEVICE — GLOVE BIOGEL SZ 7.5 SURGICAL PF LTX - (50PR/BX 4BX/CA)

## (undated) DEVICE — TUBING C&T SET FLYING LEADS DRAIN TUBING (10EA/BX)

## (undated) DEVICE — KIT EVACUATER 3 SPRING PVC LF 1/8 DRAIN SIZE (10EA/CA)"

## (undated) DEVICE — PEN SKIN MARKER W/RULER - (50EA/BX)

## (undated) DEVICE — SPONGE DRAIN 4 X 4IN 6-PLY - (2/PK25PK/BX12BX/CS)

## (undated) DEVICE — MASK ANESTHESIA ADULT  - (100/CA)

## (undated) DEVICE — CHLORAPREP 26 ML APPLICATOR - ORANGE TINT(25/CA)

## (undated) DEVICE — GLOVE BIOGEL PI INDICATOR SZ 8.5 SURGICAL PF LF - (50PR/BX 4BX/CA)

## (undated) DEVICE — BANDAID X-LARGE 2 X 4 IN LF (50EA/BX)

## (undated) DEVICE — TUBE GASTROSTOMY 18FR 20CC

## (undated) DEVICE — NEEDLE INSFL 120MM 14GA VRRS - (20/BX)

## (undated) DEVICE — BANDAID SHEER STRIP 3/4 IN (100EA/BX 12BX/CA)

## (undated) DEVICE — ELECTRODE 850 FOAM ADHESIVE - HYDROGEL RADIOTRNSPRNT (50/PK)

## (undated) DEVICE — PATTIES SURG X-RAYCOTTONOID - 1/2 X 3 IN (200/CA)

## (undated) DEVICE — SURGIFOAM (SIZE 100) - (6EA/CA)

## (undated) DEVICE — BANDAGE ROLL STERILE BULKEE 4.5 IN X 4 YD (100EA/CA)

## (undated) DEVICE — LIGHT SOURCE MIS 12FT

## (undated) DEVICE — SODIUM CHL IRRIGATION 0.9% 1000ML (12EA/CA)

## (undated) DEVICE — GLOVE BIOGEL INDICATOR SZ 7.5 SURGICAL PF LTX - (50PR/BX 4BX/CA)